# Patient Record
Sex: MALE | Race: WHITE | NOT HISPANIC OR LATINO | Employment: OTHER | URBAN - METROPOLITAN AREA
[De-identification: names, ages, dates, MRNs, and addresses within clinical notes are randomized per-mention and may not be internally consistent; named-entity substitution may affect disease eponyms.]

---

## 2017-01-03 ENCOUNTER — TELEPHONE (OUTPATIENT)
Dept: SURGERY | Facility: CLINIC | Age: 57
End: 2017-01-03

## 2017-01-03 NOTE — TELEPHONE ENCOUNTER
Pt having trouble with clotting & lovenox shots would like to speak to you sx 1/6    ADDENDUM:  I spoke with Mr. Gill who had bleeding from his AVF yesterday evening following dialysis. He just began taking therapeutic Lovenox in place of his usual Coumadin two days ago. The bleeding has slowed down and is only minimally oozing when he removes the dressing but he was curious what to do about the remaining Lovenox injections. He weighs 140kg and so the Lovenox was ordered 140mg SQ BID. I advised him to half the dose and take about 70mg SQ BID beginning tomorrow morning. He will have his PT, PTT, and INR checked when he comes in for PATs Thursday. He knows to call me back tomorrow if the fistula is still bleeding, as he may require a small prolene suture to be placed. I would be happy to do this in the office tomorrow if needed, although I suspect the skin will clot this evening as it has almost completely stopped bleeding now.

## 2017-01-05 ENCOUNTER — APPOINTMENT (OUTPATIENT)
Dept: PREADMISSION TESTING | Facility: HOSPITAL | Age: 57
End: 2017-01-05

## 2017-01-05 VITALS
TEMPERATURE: 98.6 F | HEART RATE: 85 BPM | RESPIRATION RATE: 18 BRPM | DIASTOLIC BLOOD PRESSURE: 71 MMHG | HEIGHT: 68 IN | SYSTOLIC BLOOD PRESSURE: 114 MMHG | WEIGHT: 312 LBS | BODY MASS INDEX: 47.29 KG/M2 | OXYGEN SATURATION: 93 %

## 2017-01-05 DIAGNOSIS — N25.81 SECONDARY HYPERPARATHYROIDISM OF RENAL ORIGIN (HCC): ICD-10-CM

## 2017-01-05 LAB
ABO GROUP BLD: NORMAL
BLD GP AB SCN SERPL QL: NEGATIVE
RH BLD: POSITIVE

## 2017-01-05 RX ORDER — MORPHINE SULFATE 100 MG/1
100 TABLET ORAL DAILY
Status: ON HOLD | COMMUNITY
End: 2018-07-06

## 2017-01-05 RX ORDER — ERGOCALCIFEROL 1.25 MG/1
50000 CAPSULE ORAL WEEKLY
COMMUNITY
End: 2017-05-09

## 2017-01-05 RX ORDER — FEBUXOSTAT 80 MG/1
40 TABLET, FILM COATED ORAL DAILY
Status: ON HOLD | COMMUNITY
End: 2017-01-06 | Stop reason: ALTCHOICE

## 2017-01-05 RX ORDER — PRAVASTATIN SODIUM 40 MG
40 TABLET ORAL DAILY
COMMUNITY

## 2017-01-05 RX ORDER — FERROUS SULFATE 325(65) MG
325 TABLET ORAL 2 TIMES DAILY
COMMUNITY
End: 2017-05-09

## 2017-01-05 NOTE — MR AVS SNAPSHOT
Armando Gill   1/5/2017 9:15 AM   Appointment    Provider:  STEPHON Reynaga   Department:  [facility] PREADMISSION T   Dept Phone:  [phone]                Your Full Care Plan              Your Updated Medication List          This list is accurate as of: 1/5/17  9:21 AM.  Always use your most recent med list.                aspirin 81 MG EC tablet       bumetanide 1 MG tablet   Commonly known as:  BUMEX       calcium acetate 667 MG capsule   Commonly known as:  PHOSLO       febuxostat 80 MG tablet tablet   Commonly known as:  ULORIC       ferrous sulfate 325 (65 FE) MG tablet       HUMALOG KWIKPEN 100 UNIT/ML solution pen-injector   Generic drug:  Insulin Lispro       hydrALAZINE 25 MG tablet   Commonly known as:  APRESOLINE       levETIRAcetam 1000 MG tablet   Commonly known as:  KEPPRA       LORazepam 0.5 MG tablet   Commonly known as:  ATIVAN       Morphine 100 MG 12 hr tablet   Commonly known as:  MS CONTIN       O2   Commonly known as:  OXYGEN       ondansetron 4 MG tablet   Commonly known as:  ZOFRAN       pravastatin 40 MG tablet   Commonly known as:  PRAVACHOL       PRODIGY NO CODING BLOOD GLUC test strip   Generic drug:  glucose blood       SYNTHROID 175 MCG tablet   Generic drug:  levothyroxine       temazepam 15 MG capsule   Commonly known as:  RESTORIL       TRINTELLIX 10 MG tablet   Generic drug:  Vortioxetine HBr       vitamin D 04470 UNITS capsule capsule   Commonly known as:  ERGOCALCIFEROL       warfarin 2.5 MG tablet   Commonly known as:  COUMADIN               [facility] Signup     Our records indicate that you have an active [facility] account.    You can view your After Visit Summary by going to [employer] and logging in with your [facility] username and password.  If you don't have a [facility] username and password but a parent or guardian has access to your record, the parent or guardian should login with their own [facility]  "username and password and access your record to view the After Visit Summary.    If you have questions, you can email [email or web address] or call [phone] to talk to our MyChart staff.  Remember, MyChart is NOT to be used for urgent needs.  For medical emergencies, dial 911.               Other Info from Your Visit           Allergies     Adhesive Tape Itching, Other (See Comments)    Pulls of the patient's skin on arms     Sulfa Antibiotics Other (See Comments)    Causes headaches      Vital Signs     Blood Pressure Pulse Temperature Respirations Height Weight    114/71 (BP Location: Right arm, Patient Position: Sitting) 85 98.6 °F (37 °C) (Oral) 18 68\" (172.7 cm) 312 lb (142 kg)    Oxygen Saturation Body Mass Index Smoking Status             93% 47.44 kg/m2 Never Smoker           Discharge Instructions       Take the following medications the morning of surgery with a small sip of water.    LEVETIRACETAM    ARRIVE  AM    General Instructions:  • Do not eat or drink after midnight: includes water, mints, or gum. You may brush your teeth.  • Do not smoke, chew tobacco, or drink alcohol.  • Bring medications in original bottles, any inhalers and if applicable your C-PAP/ BI-PAP machine.  • Bring any papers given to you in the doctor’s office.  • Wear clean comfortable clothes and socks.  • Do not wear contact lenses or make-up.  Bring a case for your glasses if applicable.   • Bring crutches or walker if applicable.  • Leave all other valuables and jewelry at home.    If you were given a blood bank ID arm band remember to bring it with you the day of surgery.    Preventing a Surgical Site Infection:  Shower on the morning of surgery using a fresh bar of anti-bacterial soap (such as Dial) and clean washcloth.  Dry with a clean towel and dress in clean clothing.  For 2 to 3 days before surgery, avoid shaving with a razor near where you will have surgery because the razor can irritate skin and make it " easier to develop an infection  Ask your surgeon if you will be receiving antibiotics prior to surgery  Make sure you, your family, and all healthcare providers clean their hands with soap and water or an alcohol based hand  before caring for you or your wound  If at all possible, quit smoking as many days before surgery as you can.    Day of surgery:  Upon arrival, a Pre-op nurse and Anesthesiologist will review your health history, obtain vital signs, and answer questions you may have.  The only belongings needed at this time will be your home medications and if applicable your C-PAP/BI-PAP machine.  If you are staying overnight your family can leave the rest of your belongings in the car and bring them to your room later.  A Pre-op nurse will start an IV and you may receive medication in preparation for surgery, including something to help you relax.  Your family will be able to see you in the Pre-op area.  While you are in surgery your family should notify the waiting room  if they leave the waiting room area and provide a contact phone number.    Please be aware that surgery does come with discomfort.  We want to make every effort to control your discomfort so please discuss any uncontrolled symptoms with your nurse.   Your doctor will most likely have prescribed pain medications.      If you are going home after surgery you will receive individualized written care instructions before being discharged.  A responsible adult must drive you to and from the hospital on the day of your surgery and stay with you for 24 hours.    If you are staying overnight following surgery, you will be transported to your hospital room following the recovery period.  [facility] has all private rooms.    If you have any questions please call Pre-Admission Testing at [phone].  Deductibles and co-payments are collected on the day of service. Please be prepared to pay the required co-pay, deductible  or deposit on the day of service as defined by your plan.       SYMPTOMS OF A STROKE    Call 911 or have someone take you to the Emergency Department if you have any of the following:    · Sudden numbness or weakness of your face, arm or leg especially on one side of the body  · Sudden confusion, diffiiculty speaking or trouble understanding   · Changes in your vision or loss of sight in one eye  · Sudden severe headache with no known cause  · sudden dizziness, trouble walking, loss of balance or coordination    It is important to seek emergency care right away if you have further stroke symptoms. If you get emergency help quickly, the powerful clot-dissolving medicines can reduce the disabilities caused by a stroke.     For more information:    American Stroke Association  1-942-9-STROKE  www.strokeassociation.org           IF YOU SMOKE OR USE TOBACCO PLEASE READ THE FOLLOWING:    Why is smoking bad for me?  Smoking increases the risk of heart disease, lung disease, vascular disease, stroke, and cancer.     If you smoke, STOP!    If you would like more information on quitting smoking, please visit the [employer] website: [email or web address]   This link will provide additional resources including the QUIT line and the Beat the Pack support groups.     For more information:    American Cancer Society  ([phone] 567-6781    American Heart Association  1-563.140.9939

## 2017-01-05 NOTE — DISCHARGE INSTRUCTIONS
Take the following medications the morning of surgery with a small sip of water.    LEVETIRACETAM    ARRIVE  AM    General Instructions:  • Do not eat or drink after midnight: includes water, mints, or gum. You may brush your teeth.  • Do not smoke, chew tobacco, or drink alcohol.  • Bring medications in original bottles, any inhalers and if applicable your C-PAP/ BI-PAP machine.  • Bring any papers given to you in the doctor’s office.  • Wear clean comfortable clothes and socks.  • Do not wear contact lenses or make-up.  Bring a case for your glasses if applicable.   • Bring crutches or walker if applicable.  • Leave all other valuables and jewelry at home.    If you were given a blood bank ID arm band remember to bring it with you the day of surgery.    Preventing a Surgical Site Infection:  Shower on the morning of surgery using a fresh bar of anti-bacterial soap (such as Dial) and clean washcloth.  Dry with a clean towel and dress in clean clothing.  For 2 to 3 days before surgery, avoid shaving with a razor near where you will have surgery because the razor can irritate skin and make it easier to develop an infection  Ask your surgeon if you will be receiving antibiotics prior to surgery  Make sure you, your family, and all healthcare providers clean their hands with soap and water or an alcohol based hand  before caring for you or your wound  If at all possible, quit smoking as many days before surgery as you can.    Day of surgery:  Upon arrival, a Pre-op nurse and Anesthesiologist will review your health history, obtain vital signs, and answer questions you may have.  The only belongings needed at this time will be your home medications and if applicable your C-PAP/BI-PAP machine.  If you are staying overnight your family can leave the rest of your belongings in the car and bring them to your room later.  A Pre-op nurse will start an IV and you may receive medication in preparation for  surgery, including something to help you relax.  Your family will be able to see you in the Pre-op area.  While you are in surgery your family should notify the waiting room  if they leave the waiting room area and provide a contact phone number.    Please be aware that surgery does come with discomfort.  We want to make every effort to control your discomfort so please discuss any uncontrolled symptoms with your nurse.   Your doctor will most likely have prescribed pain medications.      If you are going home after surgery you will receive individualized written care instructions before being discharged.  A responsible adult must drive you to and from the hospital on the day of your surgery and stay with you for 24 hours.    If you are staying overnight following surgery, you will be transported to your hospital room following the recovery period.  [facility] has all private rooms.    If you have any questions please call Pre-Admission Testing at [phone].  Deductibles and co-payments are collected on the day of service. Please be prepared to pay the required co-pay, deductible or deposit on the day of service as defined by your plan.

## 2017-01-06 ENCOUNTER — ANESTHESIA (OUTPATIENT)
Dept: PERIOP | Facility: HOSPITAL | Age: 57
End: 2017-01-06

## 2017-01-06 ENCOUNTER — ANESTHESIA EVENT (OUTPATIENT)
Dept: PERIOP | Facility: HOSPITAL | Age: 57
End: 2017-01-06

## 2017-01-06 PROBLEM — N25.81 SECONDARY HYPERPARATHYROIDISM OF RENAL ORIGIN (HCC): Status: ACTIVE | Noted: 2017-01-06

## 2017-01-06 PROCEDURE — 25010000003 CEFAZOLIN IN DEXTROSE 2-4 GM/100ML-% SOLUTION: Performed by: SURGERY

## 2017-01-06 PROCEDURE — 25010000002 SUCCINYLCHOLINE PER 20 MG: Performed by: NURSE ANESTHETIST, CERTIFIED REGISTERED

## 2017-01-06 PROCEDURE — 25010000002 ONDANSETRON PER 1 MG: Performed by: NURSE ANESTHETIST, CERTIFIED REGISTERED

## 2017-01-06 PROCEDURE — 25010000002 PHENYLEPHRINE PER 1 ML: Performed by: NURSE ANESTHETIST, CERTIFIED REGISTERED

## 2017-01-06 PROCEDURE — 25010000002 PROPOFOL 10 MG/ML EMULSION: Performed by: NURSE ANESTHETIST, CERTIFIED REGISTERED

## 2017-01-06 PROCEDURE — 25010000002 NEOSTIGMINE 10 MG/10ML SOLUTION: Performed by: NURSE ANESTHETIST, CERTIFIED REGISTERED

## 2017-01-06 PROCEDURE — 25010000002 FENTANYL CITRATE (PF) 100 MCG/2ML SOLUTION: Performed by: NURSE ANESTHETIST, CERTIFIED REGISTERED

## 2017-01-06 RX ORDER — GLYCOPYRROLATE 0.2 MG/ML
INJECTION INTRAMUSCULAR; INTRAVENOUS AS NEEDED
Status: DISCONTINUED | OUTPATIENT
Start: 2017-01-06 | End: 2017-01-06 | Stop reason: SURG

## 2017-01-06 RX ORDER — SUCCINYLCHOLINE CHLORIDE 20 MG/ML
INJECTION INTRAMUSCULAR; INTRAVENOUS AS NEEDED
Status: DISCONTINUED | OUTPATIENT
Start: 2017-01-06 | End: 2017-01-06 | Stop reason: SURG

## 2017-01-06 RX ORDER — ROCURONIUM BROMIDE 10 MG/ML
INJECTION, SOLUTION INTRAVENOUS AS NEEDED
Status: DISCONTINUED | OUTPATIENT
Start: 2017-01-06 | End: 2017-01-06 | Stop reason: SURG

## 2017-01-06 RX ORDER — LIDOCAINE HYDROCHLORIDE 20 MG/ML
INJECTION, SOLUTION INFILTRATION; PERINEURAL AS NEEDED
Status: DISCONTINUED | OUTPATIENT
Start: 2017-01-06 | End: 2017-01-06 | Stop reason: SURG

## 2017-01-06 RX ORDER — PROPOFOL 10 MG/ML
VIAL (ML) INTRAVENOUS AS NEEDED
Status: DISCONTINUED | OUTPATIENT
Start: 2017-01-06 | End: 2017-01-06 | Stop reason: SURG

## 2017-01-06 RX ORDER — ONDANSETRON 2 MG/ML
INJECTION INTRAMUSCULAR; INTRAVENOUS AS NEEDED
Status: DISCONTINUED | OUTPATIENT
Start: 2017-01-06 | End: 2017-01-06 | Stop reason: SURG

## 2017-01-06 RX ORDER — NEOSTIGMINE METHYLSULFATE 1 MG/ML
INJECTION, SOLUTION INTRAVENOUS AS NEEDED
Status: DISCONTINUED | OUTPATIENT
Start: 2017-01-06 | End: 2017-01-06 | Stop reason: SURG

## 2017-01-06 RX ORDER — FENTANYL CITRATE 50 UG/ML
INJECTION, SOLUTION INTRAMUSCULAR; INTRAVENOUS AS NEEDED
Status: DISCONTINUED | OUTPATIENT
Start: 2017-01-06 | End: 2017-01-06 | Stop reason: SURG

## 2017-01-06 RX ADMIN — PHENYLEPHRINE HYDROCHLORIDE 150 MCG: 10 INJECTION INTRAVENOUS at 09:58

## 2017-01-06 RX ADMIN — SUGAMMADEX 200 MG: 100 INJECTION, SOLUTION INTRAVENOUS at 11:14

## 2017-01-06 RX ADMIN — PHENYLEPHRINE HYDROCHLORIDE 100 MCG: 10 INJECTION INTRAVENOUS at 08:28

## 2017-01-06 RX ADMIN — ROCURONIUM BROMIDE 5 MG: 10 INJECTION INTRAVENOUS at 07:38

## 2017-01-06 RX ADMIN — PHENYLEPHRINE HYDROCHLORIDE 100 MCG: 10 INJECTION INTRAVENOUS at 09:43

## 2017-01-06 RX ADMIN — PHENYLEPHRINE HYDROCHLORIDE 150 MCG: 10 INJECTION INTRAVENOUS at 08:15

## 2017-01-06 RX ADMIN — PHENYLEPHRINE HYDROCHLORIDE 100 MCG: 10 INJECTION INTRAVENOUS at 09:38

## 2017-01-06 RX ADMIN — PROPOFOL 300 MG: 10 INJECTION, EMULSION INTRAVENOUS at 07:38

## 2017-01-06 RX ADMIN — CEFAZOLIN SODIUM 3 G: 2 INJECTION, SOLUTION INTRAVENOUS at 08:00

## 2017-01-06 RX ADMIN — GLYCOPYRROLATE 0.8 MG: 0.2 INJECTION INTRAMUSCULAR; INTRAVENOUS at 11:03

## 2017-01-06 RX ADMIN — PHENYLEPHRINE HYDROCHLORIDE 100 MCG: 10 INJECTION INTRAVENOUS at 07:54

## 2017-01-06 RX ADMIN — ROCURONIUM BROMIDE 45 MG: 10 INJECTION INTRAVENOUS at 07:44

## 2017-01-06 RX ADMIN — FENTANYL CITRATE 50 MCG: 50 INJECTION INTRAMUSCULAR; INTRAVENOUS at 07:37

## 2017-01-06 RX ADMIN — SUCCINYLCHOLINE CHLORIDE 140 MG: 20 INJECTION, SOLUTION INTRAMUSCULAR; INTRAVENOUS; PARENTERAL at 07:38

## 2017-01-06 RX ADMIN — ONDANSETRON 4 MG: 2 INJECTION INTRAMUSCULAR; INTRAVENOUS at 10:15

## 2017-01-06 RX ADMIN — PHENYLEPHRINE HYDROCHLORIDE 100 MCG: 10 INJECTION INTRAVENOUS at 07:57

## 2017-01-06 RX ADMIN — LIDOCAINE HYDROCHLORIDE 60 MG: 20 INJECTION, SOLUTION INFILTRATION; PERINEURAL at 11:03

## 2017-01-06 RX ADMIN — NEOSTIGMINE METHYLSULFATE 5 MG: 1 INJECTION INTRAVENOUS at 11:03

## 2017-01-06 RX ADMIN — LIDOCAINE HYDROCHLORIDE 60 MG: 20 INJECTION, SOLUTION INFILTRATION; PERINEURAL at 07:38

## 2017-01-06 NOTE — ANESTHESIA PROCEDURE NOTES
Airway  Urgency: elective    Date/Time: 1/6/2017 7:40 AM  End Time:1/6/2017 11:16 AM  Airway not difficult    General Information and Staff    Patient location during procedure: OR  Anesthesiologist: KAREY ZUNIGA  CRNA: SARAH ZUNIGA    Indications and Patient Condition  Indications for airway management: airway protection    Preoxygenated: yes  Mask difficulty assessment: 0 - not attempted    Final Airway Details  Final airway type: endotracheal airway      Successful airway: ETT and reinforced tube  Cuffed: yes   Successful intubation technique: video laryngoscopy  Facilitating devices/methods: intubating stylet  Endotracheal tube insertion site: oral  Blade: Caitlyn  Blade size: #3  ETT size: 8.0 mm  Cormack-Lehane Classification: grade I - full view of glottis  Placement verified by: chest auscultation and capnometry   Cuff volume (mL): 8  Measured from: lips  ETT to lips (cm): 22  Number of attempts at approach: 1    Additional Comments  SIVI.  EYES TAPED CLOSED PRIOR TO INTUBATION.  INTUBATION AS CHARTED ABOVE.  APPEARS ATRAUMATIC.  NO CHANGE TO DENTITION. +ETCO2. +BBS. +CR.

## 2017-01-06 NOTE — ANESTHESIA POSTPROCEDURE EVALUATION
Patient: Armando Gill    Procedure Summary     Date Anesthesia Start Anesthesia Stop Room / Location    01/06/17 0726 1116  [facility] / [facility]       Procedure Diagnosis Surgeon Provider    FOUR GLAND PARATHYROIDECTOMY WITH AUTOTRANSPLANT INTO RIGHT FOREARM, INTRA-OPERATIVE PTH MEASUREMENT, PARTIAL THYMECTOMY (Right Neck) Secondary hyperparathyroidism of renal origin  (Secondary hyperparathyroidism of renal origin [N25.81]) MD Deborah Blanca MD          Anesthesia Type: general  Last vitals  /72 (01/06/17 1245)    Temp      Pulse 79 (01/06/17 1245)   Resp 16 (01/06/17 1245)    SpO2 99 % (01/06/17 1245)      Post Anesthesia Care and Evaluation    Patient location during evaluation: PACU  Patient participation: complete - patient participated  Level of consciousness: awake  Pain management: adequate  Airway patency: patent  Cardiovascular status: acceptable  Respiratory status: acceptable  Hydration status: acceptable

## 2017-01-06 NOTE — ANESTHESIA PREPROCEDURE EVALUATION
Anesthesia Evaluation     Patient summary reviewed and Nursing notes reviewed    Airway   Mallampati: II  possible difficult intubation  Comment: Full neck  Dental          Pulmonary - normal exam   (+) asthma, sleep apnea (prev attpted trach for poonam.  not successful. doesn't use cpap,, but sleeps with head up),   Cardiovascular - normal exam  (+) hypertension, dysrhythmias,     Neuro/Psych  (+) psychiatric history Anxiety and Depression,    GI/Hepatic/Renal/Endo    (+)  chronic renal disease (last dialysis 1-4), diabetes mellitus, hypothyroidism,     ROS Comment: Ca ++ 9.5    Musculoskeletal     Abdominal    Substance History      OB/GYN          Other   (+) arthritis                          Anesthesia Plan    ASA 3     general

## 2017-01-10 ENCOUNTER — TELEPHONE (OUTPATIENT)
Dept: SURGERY | Facility: CLINIC | Age: 57
End: 2017-01-10

## 2017-01-10 NOTE — TELEPHONE ENCOUNTER
Could you please contact Dr. Ash Boone's office and ask him to speak with the [facility] that Mr. Gill uses to let the dialysis staff know what Calcitriol conversion he would like to now administer as an additional 0.5mg BID equivalent (that was what Mr. Gill was discharged with Monday) during his three times weekly dialysis sessions? I will be tied up in the operating room the rest of this afternoon, but would be happy to speak with him afterwards if he needs to contact me by my cell phone.

## 2017-01-10 NOTE — TELEPHONE ENCOUNTER
Spoke with Mariposa Cross NP @ Dr Boone office she will take care of the Calcitriol order at [facility] /

## 2017-01-13 DIAGNOSIS — N25.81 SECONDARY HYPERPARATHYROIDISM OF RENAL ORIGIN (HCC): Primary | ICD-10-CM

## 2017-01-16 ENCOUNTER — LAB (OUTPATIENT)
Dept: LAB | Facility: HOSPITAL | Age: 57
End: 2017-01-16

## 2017-01-16 DIAGNOSIS — N25.81 SECONDARY HYPERPARATHYROIDISM OF RENAL ORIGIN (HCC): ICD-10-CM

## 2017-01-16 LAB
ANION GAP SERPL CALCULATED.3IONS-SCNC: 15.5 MMOL/L
BUN BLD-MCNC: 52 MG/DL (ref 6–20)
BUN/CREAT SERPL: 9.4 (ref 7–25)
CA-I BLD-MCNC: 4.8 MG/DL (ref 4.6–5.4)
CA-I SERPL ISE-MCNC: 1.19 MMOL/L (ref 1.1–1.35)
CALCIUM SPEC-SCNC: 9.2 MG/DL (ref 8.6–10.5)
CHLORIDE SERPL-SCNC: 94 MMOL/L (ref 98–107)
CO2 SERPL-SCNC: 29.5 MMOL/L (ref 22–29)
CREAT BLD-MCNC: 5.56 MG/DL (ref 0.76–1.27)
GFR SERPL CREATININE-BSD FRML MDRD: 11 ML/MIN/1.73
GLUCOSE BLD-MCNC: 124 MG/DL (ref 65–99)
PHOSPHATE SERPL-MCNC: 4.1 MG/DL (ref 2.5–4.5)
POTASSIUM BLD-SCNC: 4.5 MMOL/L (ref 3.5–5.2)
PTH-INTACT SERPL-MCNC: 577.5 PG/ML (ref 15–65)
SODIUM BLD-SCNC: 139 MMOL/L (ref 136–145)

## 2017-01-16 PROCEDURE — 36415 COLL VENOUS BLD VENIPUNCTURE: CPT

## 2017-01-16 PROCEDURE — 83970 ASSAY OF PARATHORMONE: CPT

## 2017-01-16 PROCEDURE — 80048 BASIC METABOLIC PNL TOTAL CA: CPT

## 2017-01-16 PROCEDURE — 82330 ASSAY OF CALCIUM: CPT

## 2017-01-16 PROCEDURE — 84100 ASSAY OF PHOSPHORUS: CPT

## 2017-05-09 ENCOUNTER — OFFICE VISIT (OUTPATIENT)
Dept: SURGERY | Facility: CLINIC | Age: 57
End: 2017-05-09

## 2017-05-09 VITALS — WEIGHT: 315 LBS | OXYGEN SATURATION: 96 % | HEART RATE: 90 BPM | BODY MASS INDEX: 47.74 KG/M2 | HEIGHT: 68 IN

## 2017-05-09 DIAGNOSIS — N25.81 SECONDARY HYPERPARATHYROIDISM OF RENAL ORIGIN (HCC): Primary | ICD-10-CM

## 2017-05-09 PROCEDURE — 99213 OFFICE O/P EST LOW 20 MIN: CPT | Performed by: SURGERY

## 2017-05-26 ENCOUNTER — OFFICE VISIT (OUTPATIENT)
Dept: SURGERY | Facility: CLINIC | Age: 57
End: 2017-05-26

## 2017-05-26 DIAGNOSIS — N25.81 SECONDARY HYPERPARATHYROIDISM OF RENAL ORIGIN (HCC): Primary | ICD-10-CM

## 2017-05-26 PROCEDURE — 99213 OFFICE O/P EST LOW 20 MIN: CPT | Performed by: SURGERY

## 2017-05-26 RX ORDER — CEFAZOLIN SODIUM IN 0.9 % NACL 3 G/100 ML
3 INTRAVENOUS SOLUTION, PIGGYBACK (ML) INTRAVENOUS ONCE
Status: CANCELLED | OUTPATIENT
Start: 2017-08-02 | End: 2017-05-26

## 2017-06-01 ENCOUNTER — APPOINTMENT (OUTPATIENT)
Dept: WOUND CARE | Facility: HOSPITAL | Age: 57
End: 2017-06-01
Attending: SURGERY

## 2017-06-08 ENCOUNTER — APPOINTMENT (OUTPATIENT)
Dept: WOUND CARE | Facility: HOSPITAL | Age: 57
End: 2017-06-08
Attending: SURGERY

## 2017-06-13 ENCOUNTER — TRANSCRIBE ORDERS (OUTPATIENT)
Dept: WOUND CARE | Facility: HOSPITAL | Age: 57
End: 2017-06-13

## 2017-06-13 ENCOUNTER — HOSPITAL ENCOUNTER (OUTPATIENT)
Dept: GENERAL RADIOLOGY | Facility: HOSPITAL | Age: 57
Discharge: HOME | End: 2017-06-13
Attending: SURGERY | Admitting: SURGERY

## 2017-06-13 ENCOUNTER — LAB (OUTPATIENT)
Dept: LAB | Facility: HOSPITAL | Age: 57
End: 2017-06-13
Attending: SURGERY

## 2017-06-13 DIAGNOSIS — L97.522 NON-PRESSURE CHRONIC ULCER OF OTHER PART OF LEFT FOOT WITH FAT LAYER EXPOSED (HCC): ICD-10-CM

## 2017-06-13 DIAGNOSIS — L97.522 ULCER OF LEFT FOOT, WITH FAT LAYER EXPOSED (HCC): Primary | ICD-10-CM

## 2017-06-13 DIAGNOSIS — L97.522 ULCER OF LEFT FOOT, WITH FAT LAYER EXPOSED (HCC): ICD-10-CM

## 2017-06-13 LAB
ALBUMIN SERPL-MCNC: 4.2 G/DL (ref 3.5–5.2)
ALBUMIN/GLOB SERPL: 1.3 G/DL
ALP SERPL-CCNC: 101 U/L (ref 39–117)
ALT SERPL W P-5'-P-CCNC: 12 U/L (ref 1–41)
ANION GAP SERPL CALCULATED.3IONS-SCNC: 16.6 MMOL/L
AST SERPL-CCNC: 11 U/L (ref 1–40)
BASOPHILS # BLD AUTO: 0.04 10*3/MM3 (ref 0–0.2)
BASOPHILS NFR BLD AUTO: 0.4 % (ref 0–1.5)
BILIRUB SERPL-MCNC: 0.4 MG/DL (ref 0.1–1.2)
BUN BLD-MCNC: 56 MG/DL (ref 6–20)
BUN/CREAT SERPL: 11.5 (ref 7–25)
CALCIUM SPEC-SCNC: 9.5 MG/DL (ref 8.6–10.5)
CHLORIDE SERPL-SCNC: 94 MMOL/L (ref 98–107)
CO2 SERPL-SCNC: 30.4 MMOL/L (ref 22–29)
CREAT BLD-MCNC: 4.88 MG/DL (ref 0.76–1.27)
CRP SERPL-MCNC: 1.05 MG/DL (ref 0–0.5)
DEPRECATED RDW RBC AUTO: 46.8 FL (ref 37–54)
EOSINOPHIL # BLD AUTO: 0.28 10*3/MM3 (ref 0–0.7)
EOSINOPHIL NFR BLD AUTO: 2.7 % (ref 0.3–6.2)
ERYTHROCYTE [DISTWIDTH] IN BLOOD BY AUTOMATED COUNT: 13.1 % (ref 11.5–14.5)
ERYTHROCYTE [SEDIMENTATION RATE] IN BLOOD: 25 MM/HR (ref 0–20)
GFR SERPL CREATININE-BSD FRML MDRD: 12 ML/MIN/1.73
GLOBULIN UR ELPH-MCNC: 3.2 GM/DL
GLUCOSE BLD-MCNC: 155 MG/DL (ref 65–99)
HCT VFR BLD AUTO: 38.8 % (ref 40.4–52.2)
HGB BLD-MCNC: 12.3 G/DL (ref 13.7–17.6)
IMM GRANULOCYTES # BLD: 0.06 10*3/MM3 (ref 0–0.03)
IMM GRANULOCYTES NFR BLD: 0.6 % (ref 0–0.5)
LYMPHOCYTES # BLD AUTO: 1.24 10*3/MM3 (ref 0.9–4.8)
LYMPHOCYTES NFR BLD AUTO: 12 % (ref 19.6–45.3)
MCH RBC QN AUTO: 31 PG (ref 27–32.7)
MCHC RBC AUTO-ENTMCNC: 31.7 G/DL (ref 32.6–36.4)
MCV RBC AUTO: 97.7 FL (ref 79.8–96.2)
MONOCYTES # BLD AUTO: 1.01 10*3/MM3 (ref 0.2–1.2)
MONOCYTES NFR BLD AUTO: 9.8 % (ref 5–12)
NEUTROPHILS # BLD AUTO: 7.68 10*3/MM3 (ref 1.9–8.1)
NEUTROPHILS NFR BLD AUTO: 74.5 % (ref 42.7–76)
PLATELET # BLD AUTO: 156 10*3/MM3 (ref 140–500)
PMV BLD AUTO: 10.1 FL (ref 6–12)
POTASSIUM BLD-SCNC: 4 MMOL/L (ref 3.5–5.2)
PROT SERPL-MCNC: 7.4 G/DL (ref 6–8.5)
RBC # BLD AUTO: 3.97 10*6/MM3 (ref 4.6–6)
SODIUM BLD-SCNC: 141 MMOL/L (ref 136–145)
WBC NRBC COR # BLD: 10.31 10*3/MM3 (ref 4.5–10.7)

## 2017-06-13 PROCEDURE — 86140 C-REACTIVE PROTEIN: CPT

## 2017-06-13 PROCEDURE — 85652 RBC SED RATE AUTOMATED: CPT

## 2017-06-13 PROCEDURE — 85025 COMPLETE CBC W/AUTO DIFF WBC: CPT

## 2017-06-13 PROCEDURE — 80053 COMPREHEN METABOLIC PANEL: CPT

## 2017-06-13 PROCEDURE — 36415 COLL VENOUS BLD VENIPUNCTURE: CPT

## 2017-06-13 PROCEDURE — 73630 X-RAY EXAM OF FOOT: CPT

## 2017-06-15 ENCOUNTER — OFFICE VISIT (OUTPATIENT)
Dept: WOUND CARE | Facility: HOSPITAL | Age: 57
End: 2017-06-15
Attending: SURGERY

## 2017-06-15 DIAGNOSIS — L97.509 DIABETIC FOOT ULCER WITH OSTEOMYELITIS (HCC): Primary | ICD-10-CM

## 2017-06-15 DIAGNOSIS — E11.621 DIABETIC FOOT ULCER WITH OSTEOMYELITIS (HCC): Primary | ICD-10-CM

## 2017-06-15 DIAGNOSIS — E11.69 DIABETIC FOOT ULCER WITH OSTEOMYELITIS (HCC): Primary | ICD-10-CM

## 2017-06-15 DIAGNOSIS — M86.9 DIABETIC FOOT ULCER WITH OSTEOMYELITIS (HCC): Primary | ICD-10-CM

## 2017-06-15 PROCEDURE — 87147 CULTURE TYPE IMMUNOLOGIC: CPT | Performed by: SURGERY

## 2017-06-15 PROCEDURE — 87205 SMEAR GRAM STAIN: CPT | Performed by: SURGERY

## 2017-06-15 PROCEDURE — 87070 CULTURE OTHR SPECIMN AEROBIC: CPT | Performed by: SURGERY

## 2017-06-15 PROCEDURE — 87075 CULTR BACTERIA EXCEPT BLOOD: CPT | Performed by: SURGERY

## 2017-06-18 LAB
BACTERIA SPEC AEROBE CULT: ABNORMAL
BACTERIA SPEC AEROBE CULT: ABNORMAL
GRAM STN SPEC: ABNORMAL
GRAM STN SPEC: ABNORMAL

## 2017-06-20 LAB — BACTERIA SPEC ANAEROBE CULT: NORMAL

## 2017-06-29 ENCOUNTER — APPOINTMENT (OUTPATIENT)
Dept: WOUND CARE | Facility: HOSPITAL | Age: 57
End: 2017-06-29
Attending: SURGERY

## 2017-07-13 ENCOUNTER — APPOINTMENT (OUTPATIENT)
Dept: WOUND CARE | Facility: HOSPITAL | Age: 57
End: 2017-07-13
Attending: SURGERY

## 2017-07-27 ENCOUNTER — APPOINTMENT (OUTPATIENT)
Dept: WOUND CARE | Facility: HOSPITAL | Age: 57
End: 2017-07-27
Attending: SURGERY

## 2017-07-27 ENCOUNTER — APPOINTMENT (OUTPATIENT)
Dept: PREADMISSION TESTING | Facility: HOSPITAL | Age: 57
End: 2017-07-27

## 2017-07-27 VITALS
HEIGHT: 68 IN | RESPIRATION RATE: 16 BRPM | DIASTOLIC BLOOD PRESSURE: 77 MMHG | WEIGHT: 315 LBS | SYSTOLIC BLOOD PRESSURE: 135 MMHG | HEART RATE: 100 BPM | TEMPERATURE: 98.1 F | OXYGEN SATURATION: 96 % | BODY MASS INDEX: 47.74 KG/M2

## 2017-07-27 DIAGNOSIS — N25.81 SECONDARY HYPERPARATHYROIDISM OF RENAL ORIGIN (HCC): ICD-10-CM

## 2017-07-27 LAB
ANION GAP SERPL CALCULATED.3IONS-SCNC: 17.7 MMOL/L
BUN BLD-MCNC: 46 MG/DL (ref 6–20)
BUN/CREAT SERPL: 8.4 (ref 7–25)
CALCIUM SPEC-SCNC: 10.3 MG/DL (ref 8.6–10.5)
CALCIUM SPEC-SCNC: 10.3 MG/DL (ref 8.6–10.5)
CHLORIDE SERPL-SCNC: 91 MMOL/L (ref 98–107)
CO2 SERPL-SCNC: 30.3 MMOL/L (ref 22–29)
CREAT BLD-MCNC: 5.45 MG/DL (ref 0.76–1.27)
DEPRECATED RDW RBC AUTO: 44 FL (ref 37–54)
ERYTHROCYTE [DISTWIDTH] IN BLOOD BY AUTOMATED COUNT: 12.7 % (ref 11.5–14.5)
GFR SERPL CREATININE-BSD FRML MDRD: 11 ML/MIN/1.73
GLUCOSE BLD-MCNC: 108 MG/DL (ref 65–99)
HCT VFR BLD AUTO: 42.5 % (ref 40.4–52.2)
HGB BLD-MCNC: 13.8 G/DL (ref 13.7–17.6)
MCH RBC QN AUTO: 30.9 PG (ref 27–32.7)
MCHC RBC AUTO-ENTMCNC: 32.5 G/DL (ref 32.6–36.4)
MCV RBC AUTO: 95.3 FL (ref 79.8–96.2)
PLATELET # BLD AUTO: 165 10*3/MM3 (ref 140–500)
PMV BLD AUTO: 10.1 FL (ref 6–12)
POTASSIUM BLD-SCNC: 5 MMOL/L (ref 3.5–5.2)
PTH-INTACT SERPL-MCNC: 2658 PG/ML (ref 15–65)
RBC # BLD AUTO: 4.46 10*6/MM3 (ref 4.6–6)
SODIUM BLD-SCNC: 139 MMOL/L (ref 136–145)
WBC NRBC COR # BLD: 8.21 10*3/MM3 (ref 4.5–10.7)

## 2017-07-27 PROCEDURE — 93010 ELECTROCARDIOGRAM REPORT: CPT | Performed by: INTERNAL MEDICINE

## 2017-07-27 PROCEDURE — 80048 BASIC METABOLIC PNL TOTAL CA: CPT | Performed by: SURGERY

## 2017-07-27 PROCEDURE — 36415 COLL VENOUS BLD VENIPUNCTURE: CPT

## 2017-07-27 PROCEDURE — 83970 ASSAY OF PARATHORMONE: CPT | Performed by: SURGERY

## 2017-07-27 PROCEDURE — 93005 ELECTROCARDIOGRAM TRACING: CPT

## 2017-07-27 PROCEDURE — 85027 COMPLETE CBC AUTOMATED: CPT | Performed by: SURGERY

## 2017-07-27 RX ORDER — LORAZEPAM 0.5 MG/1
0.5 TABLET ORAL EVERY 8 HOURS PRN
Status: ON HOLD | COMMUNITY
End: 2018-07-06

## 2017-07-27 RX ORDER — WARFARIN SODIUM 2.5 MG/1
2.5 TABLET ORAL
Status: ON HOLD | COMMUNITY
End: 2017-08-02

## 2017-08-02 ENCOUNTER — ANESTHESIA (OUTPATIENT)
Dept: PERIOP | Facility: HOSPITAL | Age: 57
End: 2017-08-02

## 2017-08-02 ENCOUNTER — ANESTHESIA EVENT (OUTPATIENT)
Dept: PERIOP | Facility: HOSPITAL | Age: 57
End: 2017-08-02

## 2017-08-02 ENCOUNTER — HOSPITAL ENCOUNTER (OUTPATIENT)
Facility: HOSPITAL | Age: 57
Discharge: HOME | End: 2017-08-04
Attending: SURGERY | Admitting: SURGERY

## 2017-08-02 DIAGNOSIS — N18.6 END STAGE RENAL DISEASE (HCC): ICD-10-CM

## 2017-08-02 DIAGNOSIS — N25.81 SECONDARY HYPERPARATHYROIDISM OF RENAL ORIGIN (HCC): Primary | ICD-10-CM

## 2017-08-02 LAB
ANION GAP SERPL CALCULATED.3IONS-SCNC: 16.5 MMOL/L
ANION GAP SERPL CALCULATED.3IONS-SCNC: 18.4 MMOL/L
BUN BLD-MCNC: 47 MG/DL (ref 6–20)
BUN BLD-MCNC: 53 MG/DL (ref 6–20)
BUN/CREAT SERPL: 10.3 (ref 7–25)
BUN/CREAT SERPL: 10.6 (ref 7–25)
CALCIUM SPEC-SCNC: 9.1 MG/DL (ref 8.6–10.5)
CALCIUM SPEC-SCNC: 9.5 MG/DL (ref 8.6–10.5)
CALCIUM SPEC-SCNC: 9.8 MG/DL (ref 8.6–10.5)
CHLORIDE SERPL-SCNC: 92 MMOL/L (ref 98–107)
CHLORIDE SERPL-SCNC: 93 MMOL/L (ref 98–107)
CO2 SERPL-SCNC: 24.6 MMOL/L (ref 22–29)
CO2 SERPL-SCNC: 27.5 MMOL/L (ref 22–29)
CREAT BLD-MCNC: 4.43 MG/DL (ref 0.76–1.27)
CREAT BLD-MCNC: 5.15 MG/DL (ref 0.76–1.27)
GFR SERPL CREATININE-BSD FRML MDRD: 12 ML/MIN/1.73
GFR SERPL CREATININE-BSD FRML MDRD: 14 ML/MIN/1.73
GLUCOSE BLD-MCNC: 100 MG/DL (ref 65–99)
GLUCOSE BLD-MCNC: 144 MG/DL (ref 65–99)
GLUCOSE BLDC GLUCOMTR-MCNC: 126 MG/DL (ref 70–130)
GLUCOSE BLDC GLUCOMTR-MCNC: 134 MG/DL (ref 70–130)
GLUCOSE BLDC GLUCOMTR-MCNC: 91 MG/DL (ref 70–130)
INR PPP: 1.02 (ref 0.9–1.1)
INR PPP: 1.04 (ref 0.9–1.1)
MAGNESIUM SERPL-MCNC: 1.7 MG/DL (ref 1.6–2.6)
PHOSPHATE SERPL-MCNC: 3.8 MG/DL (ref 2.5–4.5)
POTASSIUM BLD-SCNC: 4.4 MMOL/L (ref 3.5–5.2)
POTASSIUM BLD-SCNC: 4.5 MMOL/L (ref 3.5–5.2)
PROTHROMBIN TIME: 13 SECONDS (ref 11.7–14.2)
PROTHROMBIN TIME: 13.2 SECONDS (ref 11.7–14.2)
PTH-INTACT SERPL-SCNC: 194.2 PG/ML (ref 15–65)
PTH-INTACT SERPL-SCNC: 209.8 PG/ML (ref 15–65)
PTH-INTACT SERPL-SCNC: 69 PG/ML (ref 15–65)
PTH-INTACT SERPL-SCNC: 71.4 PG/ML (ref 15–65)
SODIUM BLD-SCNC: 136 MMOL/L (ref 136–145)
SODIUM BLD-SCNC: 136 MMOL/L (ref 136–145)

## 2017-08-02 PROCEDURE — 60512 AUTOTRANSPLANT PARATHYROID: CPT | Performed by: SURGERY

## 2017-08-02 PROCEDURE — 88305 TISSUE EXAM BY PATHOLOGIST: CPT | Performed by: SURGERY

## 2017-08-02 PROCEDURE — 25010000002 DEXAMETHASONE PER 1 MG: Performed by: NURSE ANESTHETIST, CERTIFIED REGISTERED

## 2017-08-02 PROCEDURE — 82310 ASSAY OF CALCIUM: CPT | Performed by: SURGERY

## 2017-08-02 PROCEDURE — 82962 GLUCOSE BLOOD TEST: CPT

## 2017-08-02 PROCEDURE — 99222 1ST HOSP IP/OBS MODERATE 55: CPT | Performed by: SURGERY

## 2017-08-02 PROCEDURE — 60502 RE-EXPLORE PARATHYROIDS: CPT | Performed by: SURGERY

## 2017-08-02 PROCEDURE — 25010000002 SUCCINYLCHOLINE PER 20 MG: Performed by: NURSE ANESTHETIST, CERTIFIED REGISTERED

## 2017-08-02 PROCEDURE — 25010000002 NEOSTIGMINE PER 0.5 MG: Performed by: NURSE ANESTHETIST, CERTIFIED REGISTERED

## 2017-08-02 PROCEDURE — 88307 TISSUE EXAM BY PATHOLOGIST: CPT | Performed by: SURGERY

## 2017-08-02 PROCEDURE — 83735 ASSAY OF MAGNESIUM: CPT | Performed by: SURGERY

## 2017-08-02 PROCEDURE — 84100 ASSAY OF PHOSPHORUS: CPT | Performed by: SURGERY

## 2017-08-02 PROCEDURE — 94799 UNLISTED PULMONARY SVC/PX: CPT

## 2017-08-02 PROCEDURE — 25010000002 ONDANSETRON PER 1 MG: Performed by: NURSE ANESTHETIST, CERTIFIED REGISTERED

## 2017-08-02 PROCEDURE — 80048 BASIC METABOLIC PNL TOTAL CA: CPT | Performed by: SURGERY

## 2017-08-02 PROCEDURE — 88331 PATH CONSLTJ SURG 1 BLK 1SPC: CPT | Performed by: SURGERY

## 2017-08-02 PROCEDURE — 25010000002 FENTANYL CITRATE (PF) 100 MCG/2ML SOLUTION: Performed by: NURSE ANESTHETIST, CERTIFIED REGISTERED

## 2017-08-02 PROCEDURE — 85610 PROTHROMBIN TIME: CPT | Performed by: SURGERY

## 2017-08-02 PROCEDURE — 60220 PARTIAL REMOVAL OF THYROID: CPT | Performed by: SURGERY

## 2017-08-02 PROCEDURE — 25010000002 HYDROMORPHONE PER 4 MG: Performed by: NURSE ANESTHETIST, CERTIFIED REGISTERED

## 2017-08-02 PROCEDURE — 25010000002 MIDAZOLAM PER 1 MG: Performed by: ANESTHESIOLOGY

## 2017-08-02 PROCEDURE — 25010000002 HYDROMORPHONE PER 4 MG: Performed by: ANESTHESIOLOGY

## 2017-08-02 PROCEDURE — 25010000002 HYDRALAZINE PER 20 MG: Performed by: NURSE ANESTHETIST, CERTIFIED REGISTERED

## 2017-08-02 PROCEDURE — 25010000002 HEPARIN (PORCINE) PER 1000 UNITS: Performed by: SURGERY

## 2017-08-02 PROCEDURE — 83970 ASSAY OF PARATHORMONE: CPT | Performed by: SURGERY

## 2017-08-02 PROCEDURE — 25010000002 HYDROMORPHONE PER 4 MG: Performed by: SURGERY

## 2017-08-02 PROCEDURE — 25010000002 MIDAZOLAM PER 1 MG: Performed by: NURSE ANESTHETIST, CERTIFIED REGISTERED

## 2017-08-02 PROCEDURE — 25010000002 PROPOFOL 10 MG/ML EMULSION: Performed by: NURSE ANESTHETIST, CERTIFIED REGISTERED

## 2017-08-02 PROCEDURE — 85610 PROTHROMBIN TIME: CPT | Performed by: PHYSICIAN ASSISTANT

## 2017-08-02 RX ORDER — PROMETHAZINE HYDROCHLORIDE 25 MG/1
25 SUPPOSITORY RECTAL ONCE AS NEEDED
Status: DISCONTINUED | OUTPATIENT
Start: 2017-08-02 | End: 2017-08-02 | Stop reason: HOSPADM

## 2017-08-02 RX ORDER — PROPOFOL 10 MG/ML
VIAL (ML) INTRAVENOUS AS NEEDED
Status: DISCONTINUED | OUTPATIENT
Start: 2017-08-02 | End: 2017-08-02 | Stop reason: SURG

## 2017-08-02 RX ORDER — LEVOTHYROXINE SODIUM 175 UG/1
175 TABLET ORAL
Status: DISCONTINUED | OUTPATIENT
Start: 2017-08-03 | End: 2017-08-04 | Stop reason: HOSPADM

## 2017-08-02 RX ORDER — PROMETHAZINE HYDROCHLORIDE 25 MG/ML
6.25 INJECTION, SOLUTION INTRAMUSCULAR; INTRAVENOUS ONCE AS NEEDED
Status: DISCONTINUED | OUTPATIENT
Start: 2017-08-02 | End: 2017-08-02 | Stop reason: HOSPADM

## 2017-08-02 RX ORDER — OXYCODONE HYDROCHLORIDE AND ACETAMINOPHEN 5; 325 MG/1; MG/1
1 TABLET ORAL EVERY 4 HOURS PRN
Status: DISCONTINUED | OUTPATIENT
Start: 2017-08-02 | End: 2017-08-03

## 2017-08-02 RX ORDER — MORPHINE SULFATE 100 MG/1
100 TABLET ORAL EVERY 12 HOURS SCHEDULED
Status: DISCONTINUED | OUTPATIENT
Start: 2017-08-02 | End: 2017-08-02 | Stop reason: HOSPADM

## 2017-08-02 RX ORDER — ASPIRIN 81 MG/1
81 TABLET ORAL DAILY
Status: DISCONTINUED | OUTPATIENT
Start: 2017-08-03 | End: 2017-08-04 | Stop reason: HOSPADM

## 2017-08-02 RX ORDER — LORAZEPAM 0.5 MG/1
0.5 TABLET ORAL EVERY 8 HOURS PRN
Status: DISCONTINUED | OUTPATIENT
Start: 2017-08-02 | End: 2017-08-04 | Stop reason: HOSPADM

## 2017-08-02 RX ORDER — LIDOCAINE HYDROCHLORIDE 20 MG/ML
INJECTION, SOLUTION INFILTRATION; PERINEURAL AS NEEDED
Status: DISCONTINUED | OUTPATIENT
Start: 2017-08-02 | End: 2017-08-02 | Stop reason: SURG

## 2017-08-02 RX ORDER — CEFAZOLIN SODIUM IN 0.9 % NACL 3 G/100 ML
3 INTRAVENOUS SOLUTION, PIGGYBACK (ML) INTRAVENOUS ONCE
Status: COMPLETED | OUTPATIENT
Start: 2017-08-02 | End: 2017-08-02

## 2017-08-02 RX ORDER — MORPHINE SULFATE 100 MG/1
100 TABLET ORAL DAILY
Status: DISCONTINUED | OUTPATIENT
Start: 2017-08-02 | End: 2017-08-04 | Stop reason: HOSPADM

## 2017-08-02 RX ORDER — MIDAZOLAM HYDROCHLORIDE 1 MG/ML
1 INJECTION INTRAMUSCULAR; INTRAVENOUS
Status: DISCONTINUED | OUTPATIENT
Start: 2017-08-02 | End: 2017-08-02 | Stop reason: HOSPADM

## 2017-08-02 RX ORDER — ROCURONIUM BROMIDE 10 MG/ML
INJECTION, SOLUTION INTRAVENOUS AS NEEDED
Status: DISCONTINUED | OUTPATIENT
Start: 2017-08-02 | End: 2017-08-02 | Stop reason: SURG

## 2017-08-02 RX ORDER — BUMETANIDE 1 MG/1
1 TABLET ORAL
Status: DISCONTINUED | OUTPATIENT
Start: 2017-08-03 | End: 2017-08-04 | Stop reason: HOSPADM

## 2017-08-02 RX ORDER — BUPIVACAINE HYDROCHLORIDE AND EPINEPHRINE 5; 5 MG/ML; UG/ML
INJECTION, SOLUTION PERINEURAL AS NEEDED
Status: DISCONTINUED | OUTPATIENT
Start: 2017-08-02 | End: 2017-08-02 | Stop reason: HOSPADM

## 2017-08-02 RX ORDER — SODIUM CHLORIDE 9 MG/ML
9 INJECTION, SOLUTION INTRAVENOUS CONTINUOUS
Status: DISCONTINUED | OUTPATIENT
Start: 2017-08-02 | End: 2017-08-02

## 2017-08-02 RX ORDER — LEVETIRACETAM 500 MG/1
1000 TABLET ORAL EVERY 12 HOURS SCHEDULED
Status: DISCONTINUED | OUTPATIENT
Start: 2017-08-02 | End: 2017-08-04 | Stop reason: HOSPADM

## 2017-08-02 RX ORDER — FENTANYL CITRATE 50 UG/ML
INJECTION, SOLUTION INTRAMUSCULAR; INTRAVENOUS AS NEEDED
Status: DISCONTINUED | OUTPATIENT
Start: 2017-08-02 | End: 2017-08-02 | Stop reason: SURG

## 2017-08-02 RX ORDER — HYDROMORPHONE HYDROCHLORIDE 1 MG/ML
0.5 INJECTION, SOLUTION INTRAMUSCULAR; INTRAVENOUS; SUBCUTANEOUS
Status: CANCELLED | OUTPATIENT
Start: 2017-08-02 | End: 2017-08-12

## 2017-08-02 RX ORDER — CALCITRIOL 0.5 UG/1
0.5 CAPSULE, LIQUID FILLED ORAL EVERY 12 HOURS SCHEDULED
Status: DISCONTINUED | OUTPATIENT
Start: 2017-08-02 | End: 2017-08-04 | Stop reason: HOSPADM

## 2017-08-02 RX ORDER — CALCIUM CARBONATE 500(1250)
500 TABLET ORAL 3 TIMES DAILY
Status: DISCONTINUED | OUTPATIENT
Start: 2017-08-02 | End: 2017-08-03

## 2017-08-02 RX ORDER — SUCCINYLCHOLINE CHLORIDE 20 MG/ML
INJECTION INTRAMUSCULAR; INTRAVENOUS AS NEEDED
Status: DISCONTINUED | OUTPATIENT
Start: 2017-08-02 | End: 2017-08-02 | Stop reason: SURG

## 2017-08-02 RX ORDER — HYDRALAZINE HYDROCHLORIDE 25 MG/1
25 TABLET, FILM COATED ORAL NIGHTLY
Status: DISCONTINUED | OUTPATIENT
Start: 2017-08-02 | End: 2017-08-04 | Stop reason: HOSPADM

## 2017-08-02 RX ORDER — ATORVASTATIN CALCIUM 10 MG/1
10 TABLET, FILM COATED ORAL DAILY
Status: DISCONTINUED | OUTPATIENT
Start: 2017-08-02 | End: 2017-08-04 | Stop reason: HOSPADM

## 2017-08-02 RX ORDER — HYDROMORPHONE HYDROCHLORIDE 1 MG/ML
0.5 INJECTION, SOLUTION INTRAMUSCULAR; INTRAVENOUS; SUBCUTANEOUS
Status: DISCONTINUED | OUTPATIENT
Start: 2017-08-02 | End: 2017-08-02 | Stop reason: HOSPADM

## 2017-08-02 RX ORDER — FAMOTIDINE 10 MG/ML
20 INJECTION, SOLUTION INTRAVENOUS ONCE
Status: COMPLETED | OUTPATIENT
Start: 2017-08-02 | End: 2017-08-02

## 2017-08-02 RX ORDER — LABETALOL HYDROCHLORIDE 5 MG/ML
5 INJECTION, SOLUTION INTRAVENOUS
Status: DISCONTINUED | OUTPATIENT
Start: 2017-08-02 | End: 2017-08-02 | Stop reason: HOSPADM

## 2017-08-02 RX ORDER — NICOTINE POLACRILEX 4 MG
15 LOZENGE BUCCAL
Status: DISCONTINUED | OUTPATIENT
Start: 2017-08-02 | End: 2017-08-04 | Stop reason: HOSPADM

## 2017-08-02 RX ORDER — FENTANYL CITRATE 50 UG/ML
50 INJECTION, SOLUTION INTRAMUSCULAR; INTRAVENOUS
Status: DISCONTINUED | OUTPATIENT
Start: 2017-08-02 | End: 2017-08-02 | Stop reason: HOSPADM

## 2017-08-02 RX ORDER — DEXTROSE MONOHYDRATE 25 G/50ML
25 INJECTION, SOLUTION INTRAVENOUS
Status: DISCONTINUED | OUTPATIENT
Start: 2017-08-02 | End: 2017-08-04 | Stop reason: HOSPADM

## 2017-08-02 RX ORDER — NALOXONE HCL 0.4 MG/ML
0.4 VIAL (ML) INJECTION
Status: CANCELLED | OUTPATIENT
Start: 2017-08-02

## 2017-08-02 RX ORDER — GLYCOPYRROLATE 0.2 MG/ML
INJECTION INTRAMUSCULAR; INTRAVENOUS AS NEEDED
Status: DISCONTINUED | OUTPATIENT
Start: 2017-08-02 | End: 2017-08-02 | Stop reason: SURG

## 2017-08-02 RX ORDER — SODIUM CHLORIDE 0.9 % (FLUSH) 0.9 %
1-10 SYRINGE (ML) INJECTION AS NEEDED
Status: DISCONTINUED | OUTPATIENT
Start: 2017-08-02 | End: 2017-08-04 | Stop reason: HOSPADM

## 2017-08-02 RX ORDER — FENTANYL CITRATE 50 UG/ML
25 INJECTION, SOLUTION INTRAMUSCULAR; INTRAVENOUS
Status: DISCONTINUED | OUTPATIENT
Start: 2017-08-02 | End: 2017-08-02 | Stop reason: HOSPADM

## 2017-08-02 RX ORDER — SODIUM CHLORIDE 9 MG/ML
50 INJECTION, SOLUTION INTRAVENOUS CONTINUOUS
Status: DISCONTINUED | OUTPATIENT
Start: 2017-08-02 | End: 2017-08-02

## 2017-08-02 RX ORDER — MAGNESIUM HYDROXIDE 1200 MG/15ML
LIQUID ORAL AS NEEDED
Status: DISCONTINUED | OUTPATIENT
Start: 2017-08-02 | End: 2017-08-02 | Stop reason: HOSPADM

## 2017-08-02 RX ORDER — SODIUM CHLORIDE, SODIUM LACTATE, POTASSIUM CHLORIDE, CALCIUM CHLORIDE 600; 310; 30; 20 MG/100ML; MG/100ML; MG/100ML; MG/100ML
9 INJECTION, SOLUTION INTRAVENOUS CONTINUOUS
Status: DISCONTINUED | OUTPATIENT
Start: 2017-08-02 | End: 2017-08-02

## 2017-08-02 RX ORDER — ONDANSETRON 2 MG/ML
4 INJECTION INTRAMUSCULAR; INTRAVENOUS EVERY 6 HOURS PRN
Status: DISCONTINUED | OUTPATIENT
Start: 2017-08-02 | End: 2017-08-04 | Stop reason: HOSPADM

## 2017-08-02 RX ORDER — HYDROMORPHONE HYDROCHLORIDE 1 MG/ML
0.25 INJECTION, SOLUTION INTRAMUSCULAR; INTRAVENOUS; SUBCUTANEOUS
Status: DISCONTINUED | OUTPATIENT
Start: 2017-08-02 | End: 2017-08-02 | Stop reason: HOSPADM

## 2017-08-02 RX ORDER — OXYCODONE HCL 20 MG/1
TABLET, FILM COATED, EXTENDED RELEASE ORAL
Status: DISCONTINUED
Start: 2017-08-02 | End: 2017-08-02 | Stop reason: WASHOUT

## 2017-08-02 RX ORDER — HEPARIN SODIUM 5000 [USP'U]/ML
5000 INJECTION, SOLUTION INTRAVENOUS; SUBCUTANEOUS EVERY 8 HOURS SCHEDULED
Status: DISCONTINUED | OUTPATIENT
Start: 2017-08-02 | End: 2017-08-04 | Stop reason: HOSPADM

## 2017-08-02 RX ORDER — DEXAMETHASONE SODIUM PHOSPHATE 10 MG/ML
INJECTION INTRAMUSCULAR; INTRAVENOUS AS NEEDED
Status: DISCONTINUED | OUTPATIENT
Start: 2017-08-02 | End: 2017-08-02 | Stop reason: SURG

## 2017-08-02 RX ORDER — DIPHENHYDRAMINE HYDROCHLORIDE 50 MG/ML
12.5 INJECTION INTRAMUSCULAR; INTRAVENOUS
Status: DISCONTINUED | OUTPATIENT
Start: 2017-08-02 | End: 2017-08-02 | Stop reason: HOSPADM

## 2017-08-02 RX ORDER — IPRATROPIUM BROMIDE AND ALBUTEROL SULFATE 2.5; .5 MG/3ML; MG/3ML
3 SOLUTION RESPIRATORY (INHALATION) ONCE AS NEEDED
Status: DISCONTINUED | OUTPATIENT
Start: 2017-08-02 | End: 2017-08-02 | Stop reason: HOSPADM

## 2017-08-02 RX ORDER — PROMETHAZINE HYDROCHLORIDE 25 MG/1
25 TABLET ORAL ONCE AS NEEDED
Status: DISCONTINUED | OUTPATIENT
Start: 2017-08-02 | End: 2017-08-02 | Stop reason: HOSPADM

## 2017-08-02 RX ORDER — CEFAZOLIN SODIUM IN 0.9 % NACL 3 G/100 ML
3 INTRAVENOUS SOLUTION, PIGGYBACK (ML) INTRAVENOUS ONCE
Status: DISCONTINUED | OUTPATIENT
Start: 2017-08-02 | End: 2017-08-04 | Stop reason: HOSPADM

## 2017-08-02 RX ORDER — IBUPROFEN 200 MG
1250 CAPSULE ORAL 3 TIMES DAILY
Status: DISCONTINUED | OUTPATIENT
Start: 2017-08-02 | End: 2017-08-02 | Stop reason: CLARIF

## 2017-08-02 RX ORDER — ONDANSETRON 2 MG/ML
INJECTION INTRAMUSCULAR; INTRAVENOUS AS NEEDED
Status: DISCONTINUED | OUTPATIENT
Start: 2017-08-02 | End: 2017-08-02 | Stop reason: SURG

## 2017-08-02 RX ORDER — HYDRALAZINE HYDROCHLORIDE 20 MG/ML
5 INJECTION INTRAMUSCULAR; INTRAVENOUS
Status: DISCONTINUED | OUTPATIENT
Start: 2017-08-02 | End: 2017-08-02 | Stop reason: HOSPADM

## 2017-08-02 RX ORDER — MIDAZOLAM HYDROCHLORIDE 1 MG/ML
2 INJECTION INTRAMUSCULAR; INTRAVENOUS
Status: DISCONTINUED | OUTPATIENT
Start: 2017-08-02 | End: 2017-08-02 | Stop reason: HOSPADM

## 2017-08-02 RX ORDER — SODIUM CHLORIDE 0.9 % (FLUSH) 0.9 %
1-10 SYRINGE (ML) INJECTION AS NEEDED
Status: DISCONTINUED | OUTPATIENT
Start: 2017-08-02 | End: 2017-08-02 | Stop reason: HOSPADM

## 2017-08-02 RX ADMIN — HYDRALAZINE HYDROCHLORIDE 5 MG: 20 INJECTION INTRAMUSCULAR; INTRAVENOUS at 13:06

## 2017-08-02 RX ADMIN — MIDAZOLAM 1 MG: 1 INJECTION INTRAMUSCULAR; INTRAVENOUS at 07:34

## 2017-08-02 RX ADMIN — MIDAZOLAM 1 MG: 1 INJECTION INTRAMUSCULAR; INTRAVENOUS at 13:43

## 2017-08-02 RX ADMIN — FENTANYL CITRATE 25 MCG: 50 INJECTION INTRAMUSCULAR; INTRAVENOUS at 12:51

## 2017-08-02 RX ADMIN — FENTANYL CITRATE 25 MCG: 50 INJECTION INTRAMUSCULAR; INTRAVENOUS at 12:40

## 2017-08-02 RX ADMIN — SUCCINYLCHOLINE CHLORIDE 160 MG: 20 INJECTION, SOLUTION INTRAMUSCULAR; INTRAVENOUS; PARENTERAL at 09:07

## 2017-08-02 RX ADMIN — MORPHINE SULFATE 100 MG: 100 TABLET, EXTENDED RELEASE ORAL at 13:40

## 2017-08-02 RX ADMIN — SODIUM CHLORIDE: 9 INJECTION, SOLUTION INTRAVENOUS at 10:00

## 2017-08-02 RX ADMIN — GLYCOPYRROLATE 0.4 MG: 0.2 INJECTION INTRAMUSCULAR; INTRAVENOUS at 11:52

## 2017-08-02 RX ADMIN — SODIUM CHLORIDE 9 ML/HR: 9 INJECTION, SOLUTION INTRAVENOUS at 07:35

## 2017-08-02 RX ADMIN — ATORVASTATIN CALCIUM 10 MG: 10 TABLET, FILM COATED ORAL at 18:32

## 2017-08-02 RX ADMIN — ROCURONIUM BROMIDE 10 MG: 10 INJECTION INTRAVENOUS at 09:07

## 2017-08-02 RX ADMIN — HYDROMORPHONE HYDROCHLORIDE 0.5 MG: 1 INJECTION, SOLUTION INTRAMUSCULAR; INTRAVENOUS; SUBCUTANEOUS at 13:07

## 2017-08-02 RX ADMIN — Medication 500 MG: at 18:32

## 2017-08-02 RX ADMIN — CEFAZOLIN 3 G: 1 INJECTION, POWDER, FOR SOLUTION INTRAMUSCULAR; INTRAVENOUS; PARENTERAL at 09:20

## 2017-08-02 RX ADMIN — HYDROMORPHONE HYDROCHLORIDE 0.5 MG: 1 INJECTION, SOLUTION INTRAMUSCULAR; INTRAVENOUS; SUBCUTANEOUS at 13:43

## 2017-08-02 RX ADMIN — Medication 500 MG: at 20:01

## 2017-08-02 RX ADMIN — FAMOTIDINE 20 MG: 10 INJECTION INTRAVENOUS at 07:34

## 2017-08-02 RX ADMIN — OXYCODONE HYDROCHLORIDE AND ACETAMINOPHEN 1 TABLET: 5; 325 TABLET ORAL at 19:47

## 2017-08-02 RX ADMIN — NEOSTIGMINE METHYLSULFATE 2 MG: 1 INJECTION INTRAMUSCULAR; INTRAVENOUS; SUBCUTANEOUS at 11:52

## 2017-08-02 RX ADMIN — CALCITRIOL 0.5 MCG: 0.5 CAPSULE, LIQUID FILLED ORAL at 20:01

## 2017-08-02 RX ADMIN — FENTANYL CITRATE 50 MCG: 50 INJECTION INTRAMUSCULAR; INTRAVENOUS at 09:07

## 2017-08-02 RX ADMIN — HYDROMORPHONE HYDROCHLORIDE 1 MG: 2 INJECTION, SOLUTION INTRAMUSCULAR; INTRAVENOUS; SUBCUTANEOUS at 17:12

## 2017-08-02 RX ADMIN — ROCURONIUM BROMIDE 20 MG: 10 INJECTION INTRAVENOUS at 09:10

## 2017-08-02 RX ADMIN — FENTANYL CITRATE 50 MCG: 50 INJECTION INTRAMUSCULAR; INTRAVENOUS at 09:26

## 2017-08-02 RX ADMIN — PROPOFOL 150 MG: 10 INJECTION, EMULSION INTRAVENOUS at 09:07

## 2017-08-02 RX ADMIN — HYDROMORPHONE HYDROCHLORIDE 1 MG: 2 INJECTION, SOLUTION INTRAMUSCULAR; INTRAVENOUS; SUBCUTANEOUS at 15:13

## 2017-08-02 RX ADMIN — HEPARIN SODIUM 5000 UNITS: 5000 INJECTION, SOLUTION INTRAVENOUS; SUBCUTANEOUS at 22:09

## 2017-08-02 RX ADMIN — MORPHINE SULFATE 100 MG: 100 TABLET, EXTENDED RELEASE ORAL at 15:15

## 2017-08-02 RX ADMIN — HYDROMORPHONE HYDROCHLORIDE 0.25 MG: 1 INJECTION, SOLUTION INTRAMUSCULAR; INTRAVENOUS; SUBCUTANEOUS at 12:25

## 2017-08-02 RX ADMIN — LIDOCAINE HYDROCHLORIDE 60 MG: 20 INJECTION, SOLUTION INFILTRATION; PERINEURAL at 09:07

## 2017-08-02 RX ADMIN — DEXAMETHASONE SODIUM PHOSPHATE 4 MG: 10 INJECTION INTRAMUSCULAR; INTRAVENOUS at 09:30

## 2017-08-02 RX ADMIN — MIDAZOLAM 1 MG: 1 INJECTION INTRAMUSCULAR; INTRAVENOUS at 13:07

## 2017-08-02 RX ADMIN — LORAZEPAM 0.5 MG: 0.5 TABLET ORAL at 15:22

## 2017-08-02 RX ADMIN — HYDRALAZINE HYDROCHLORIDE 5 MG: 20 INJECTION INTRAMUSCULAR; INTRAVENOUS at 12:30

## 2017-08-02 RX ADMIN — PROPOFOL 50 MG: 10 INJECTION, EMULSION INTRAVENOUS at 09:26

## 2017-08-02 RX ADMIN — OXYCODONE HYDROCHLORIDE AND ACETAMINOPHEN 1 TABLET: 5; 325 TABLET ORAL at 23:38

## 2017-08-02 RX ADMIN — HYDROMORPHONE HYDROCHLORIDE 0.5 MG: 1 INJECTION, SOLUTION INTRAMUSCULAR; INTRAVENOUS; SUBCUTANEOUS at 12:50

## 2017-08-02 RX ADMIN — FENTANYL CITRATE 25 MCG: 50 INJECTION INTRAMUSCULAR; INTRAVENOUS at 12:25

## 2017-08-02 RX ADMIN — OXYCODONE HYDROCHLORIDE AND ACETAMINOPHEN 1 TABLET: 5; 325 TABLET ORAL at 15:13

## 2017-08-02 RX ADMIN — ONDANSETRON 4 MG: 2 INJECTION INTRAMUSCULAR; INTRAVENOUS at 11:52

## 2017-08-02 RX ADMIN — HYDROMORPHONE HYDROCHLORIDE 0.25 MG: 1 INJECTION, SOLUTION INTRAMUSCULAR; INTRAVENOUS; SUBCUTANEOUS at 12:35

## 2017-08-02 RX ADMIN — HYDROMORPHONE HYDROCHLORIDE 1 MG: 2 INJECTION, SOLUTION INTRAMUSCULAR; INTRAVENOUS; SUBCUTANEOUS at 22:08

## 2017-08-02 RX ADMIN — HEPARIN SODIUM 5000 UNITS: 5000 INJECTION, SOLUTION INTRAVENOUS; SUBCUTANEOUS at 18:32

## 2017-08-02 RX ADMIN — FENTANYL CITRATE 50 MCG: 50 INJECTION INTRAMUSCULAR; INTRAVENOUS at 09:45

## 2017-08-02 RX ADMIN — HYDRALAZINE HYDROCHLORIDE 5 MG: 20 INJECTION INTRAMUSCULAR; INTRAVENOUS at 12:24

## 2017-08-02 RX ADMIN — HYDROMORPHONE HYDROCHLORIDE 1 MG: 2 INJECTION, SOLUTION INTRAMUSCULAR; INTRAVENOUS; SUBCUTANEOUS at 19:47

## 2017-08-02 RX ADMIN — FENTANYL CITRATE 25 MCG: 50 INJECTION INTRAMUSCULAR; INTRAVENOUS at 12:30

## 2017-08-02 RX ADMIN — HYDRALAZINE HYDROCHLORIDE 25 MG: 25 TABLET, FILM COATED ORAL at 20:01

## 2017-08-02 RX ADMIN — LEVETIRACETAM 1000 MG: 500 TABLET, FILM COATED ORAL at 20:01

## 2017-08-02 NOTE — H&P
General Surgery  History and Physical     CC: Recurrent secondary hyperparathyroidism     HPI: The patient is a pleasant 56 y.o. year-old gentleman who presents today for recurrent secondary hyperparathyroidism.  I saw him 2 weeks ago, we discussed that his PTH hormone levels had increased back up to 1522 from a postoperative level of 340 following his 3 gland parathyroidectomy with autotransplantation into his right arm in January of this year.  A repeat sestamibi/SPECT-CT revealed persistent radiotracer activity within the left superior neck but there was no CT evidence for an enlarged gland in this vicinity.  At the time of his initial operation for his secondary hyperparathyroidism, 3 of his 4 glands were able to be found, but his left superior gland was unable to be identified. Of note, the repeat sestamibi scan shows no activity within the right forearm at the vicinity of his autotransplant. He again emphasizes that the first 2 weeks after his surgery in January he felt great, but since that time he has had an increase in body aches and pains, with recurrent foot ulcers that had previously healed following his parathyroidectomy surgery.  He denies any abdominal pain, kidney stones, or depression but does have increasing anxiety lately.  He continues to dialyze for his end-stage renal disease every Monday, Wednesday, and Friday and Dr. Boone is his nephrologist.  He receives hemodialysis via a left upper extremity AV fistula.  He takes Coumadin daily for a history of atrial fibrillation.     Past Medical History:   End-stage renal disease  Secondary hyperparathyroidism  Hyperlipidemia  Hypertension  Diabetes mellitus  Depression  Atrial fibrillation  Anxiety  Hypothyroidism  Obstructive sleep apnea (previous tracheostomy)  Seizure disorder  Vitamin D deficiency     Past Surgical History:   Three gland parathyroidectomy with partial thymectomy and autotransplant into right forearm (unable to find fourth gland  within the neck or thymus)  Left upper extremity AV fistula  Cholecystectomy  Cataract extraction  Left inguinal hernia repair  Tracheostomy  Colonoscopy (2015)     Medications:   Hydralazine 25 mg daily  Keppra 1000 mg twice daily  Bumex 2 mg 2 times daily on nondialysis days  Aspirin 81 mg daily  Coumadin 2.5 mg daily  Pravastatin 40 mg daily  Levothyroxine 175 µg daily  Morphine sulfate 100 mg daily  Trintellix 10 mg nightly  Calcium carbonate 1250 mg 3 times daily with meals  Velphoro 100 mg 3 times daily with meals (posphate binder)  Zofran 4 mg every 12 hours as needed for nausea  Ativan 0.5 mg daily as needed for anxiety  Humalog insulin as needed sliding scale before meals  Continuous oxygen at 2 L as needed     Allergies: Adhesive tape, sulfa antibiotics     Family History: Father and sister with coronary artery disease     Social History: , nonsmoker, no alcohol use     ROS:   Constitutional: Negative for fevers or chills  HENT: Negative for hearing loss or runny nose  Eyes: Negative for vision changes or scleral icterus  Respiratory: Negative for cough or shortness of breath  Cardiovascular: Negative for chest pain or heart palpitations  Gastrointestinal: Negative for abdominal pain, nausea, vomiting, constipation, melena, or hematochezia  Genitourinary: Negative for hematuria or dysuria  Musculoskeletal: Positive for myalgias on the joint pain, and bone pain  Neurologic: Negative for headaches or dizziness  Psychiatric: Positive for anxiety and depression   All other systems reviewed and negative     Physical Exam:  Vitals:    08/02/17 0633   BP: 107/65   Pulse: 90   Resp: 20   Temp: 98.2 °F (36.8 °C)   SpO2: 94%       General: No acute distress, well-nourished & well-developed  HEAD: normocephalic, atraumatic  EYES: normal conjunctiva, sclera anicteric  EARS: grossly normal hearing  NECK: supple, no thyromegaly, incision well-healed  CARDIOVASCULAR: regular rate and rhythm  RESPIRATORY: clear to  auscultation bilaterally  GASTROINTESTINAL: soft, nontender, non-distended  MUSCULOSKELETAL: LUE AVF with good thrill, right forearm scar well-healed  PSYCHIATRIC: oriented x3, normal mood and affect     LABS:  PTH 1522  Ca 9.2     SESTAMIBI/SPECT CT:  There is physiologic distribution of the radiopharmaceutical following injection of Tc-99 sestamibi.  There is a focus of persistent radiotracer activity involving the superior aspect of the left thyroid gland without an obvious CT correlate.  No focus of persistent activity consistent with an enlarged parathyroid gland is seen in the neck.  There is no activity within the right forearm in the region of the patient's parathyroid autotransplantation.  There are postsurgical clips demonstrated in the left lower parathyroid region and inferior to the right thyroid gland as well.  No pathologic adenopathy is seen in the visualized neck.  No suspicious lung nodules are seen.  Some minimal scarring and/or chronic atelectasis in the lingula segment of the left upper lobe is seen.  Coronary artery calcifications are demonstrated.  No acute bony abnormality.     ASSESSMENT & PLAN  Mr. Gill is a 56 year-old gentleman with ESRD and resulting secondary hyperparathyroidism s/p three gland parathyroidectomy with auto-transplant in the right forearm and inability to identify his fourth gland anywhere in the neck or thymus.  His sestamibi/SPECT-CT suggests that the residual parathyroid activity is emanating from a small radiographically undetectable gland within the left superior neck.  His autotransplanted partial gland in the right forearm has no obvious activity on sestamibi scan.  Additionally, there are no mediastinal glands identified.  I counseled him that he will require a repeat neck exploration with his fourth gland removed and re-autotransplanted into the right forearm. He will need to be admitted postoperatively for calcium monitoring that will be managed by his  nephrologist, Dr. Boone. All questions were answered today and he understands that he will need to be discharged home on calcium supplementation just as he did with the last operation.     Freida Morfin MD  General and Endoscopic Surgery  [facility]     [address], [address]  [place], [place], [address]  P: [phone]  F: [phone]

## 2017-08-02 NOTE — PLAN OF CARE
Problem: Patient Care Overview (Adult)  Goal: Plan of Care Review  Outcome: Ongoing (interventions implemented as appropriate)    08/02/17 0646 08/02/17 6960   Coping/Psychosocial Response Interventions   Plan Of Care Reviewed With --  patient   Patient Care Overview   Progress no change --    Outcome Evaluation   Outcome Summary/Follow up Plan --  pt arrived to floor from PACU with complaints of 10/10 pain. Medicated as ordered and pt now resting comfortably in bed. Will continue to monitor         Problem: Fall Risk (Adult)  Goal: Identify Related Risk Factors and Signs and Symptoms  Outcome: Ongoing (interventions implemented as appropriate)  Goal: Absence of Falls  Outcome: Ongoing (interventions implemented as appropriate)    Problem: Diabetes, Type 2 (Adult)  Goal: Signs and Symptoms of Listed Potential Problems Will be Absent or Manageable (Diabetes, Type 2)  Outcome: Ongoing (interventions implemented as appropriate)

## 2017-08-02 NOTE — CONSULTS
Referring Provider: Freida Morfin MD  Reason for Consultation: ESRD    Subjective     Chief complaint No chief complaint on file.      History of present illness:    56 years old white male with a past medical history of end-stage renal disease on hemodialysis was admitted for left thyroid extremity and parathyroidectomy with autotransplantation.  The has left upper extremity AV fistula.  Patient was to dialysis Monday Wednesday Friday however he had dialysis on Monday and Tuesday.  He just finish his surgery.  We will consulted to help in management of his end-stage renal disease.    Past Medical History:   Diagnosis Date   • Anxiety    • Arthritis    • Asthma    • Asymptomatic hyperuricemia    • Atrial fibrillation    • Callus of foot     LEFT FOOT , PRESENTLY ATTENDING WOUND CLINIC [facility]   • Cancer of pituitary gland    • Depression    • Diabetes mellitus     type 2, A1C of 5 1 month ago by patient report   • Dialysis patient     MON WED FRI   • Dyslipidemia    • End stage renal disease    • Gout    • History of anemia    • History of colon polyps    • Hyperparathyroidism    • Hyperprolactinemia    • Hypertension    • Hypogonadism, male    • Hypothyroidism    • Male erectile disorder of organic origin    • Neuropathy    • Obstructive sleep apnea    • Presence of arterial-venous shunt (for dialysis)    • Seizure disorder      2 YRS AGO   • Vitamin D deficiency disease      Past Surgical History:   Procedure Laterality Date   • CATARACT EXTRACTION Bilateral 2009   • CHOLECYSTECTOMY  2002    Performed in [place]   • COLONOSCOPY W/ POLYPECTOMY N/A 11/2015    Dr. Bolivar   • INGUINAL HERNIA REPAIR Left 2002    Open Inguinal   • THYROIDECTOMY Right 1/6/2017    Procedure: FOUR GLAND PARATHYROIDECTOMY WITH AUTOTRANSPLANT INTO RIGHT FOREARM, INTRA-OPERATIVE PTH MEASUREMENT, PARTIAL THYMECTOMY;  Surgeon: Freida Morfin MD;  Location: [facility];  Service:    • TRACHEOSTOMY N/A 10/22/2007    W/ REVISION  OF THYROID ISTHMUS, DUE TO SLEEP APNEA; ALLOWED TO GROW CLOSED AFTER ONE YEAR DUE TO LACK OF RELIEF OF SLEEP APNEA SYMPTOMS, DR. MAXIMO BELL   • TRACHEOSTOMY N/A 05/21/2009    REVISION, DR. MAXIMO BELL   • TRACHEOSTOMY N/A 04/25/2009    REVISION, DR. MAXIMO BELL   • VASCULAR SURGERY Left 2014    Acess in Left Arm-Dr. Guzman     Family History   Problem Relation Age of Onset   • Heart disease Sister    • Heart disease Father    • Kidney disease Neg Hx    • Malig Hyperthermia Neg Hx      Social History   Substance Use Topics   • Smoking status: Never Smoker   • Smokeless tobacco: Never Used   • Alcohol use No     Prescriptions Prior to Admission   Medication Sig Dispense Refill Last Dose   • aspirin 81 MG EC tablet Take 81 mg by mouth. NOT TO STOP PTS PER DOCTER ORDER   7/28/2017   • bumetanide (BUMEX) 1 MG tablet Take 1 mg by mouth 2 (Two) Times a Day. Only on non dialysis days (T, Th, Sat, Sun)   7/30/2017   • calcium carbonate (OS-ALENA) 1250 (500 CA) MG tablet Take 1 tablet by mouth 3 (Three) Times a Day With Meals. 90 tablet 12 8/1/2017 at 2000   • hydrALAZINE (APRESOLINE) 25 MG tablet Take 25 mg by mouth Every Night.   8/1/2017 at 2000   • levETIRAcetam (KEPPRA) 1000 MG tablet Take 1,000 mg by mouth 2 (two) times a day.   8/2/2017 at 0500   • LORazepam (ATIVAN) 0.5 MG tablet Take 0.5 mg by mouth Every 8 (Eight) Hours As Needed for Anxiety.   8/1/2017 at 1300   • Morphine (MS CONTIN) 100 MG 12 hr tablet Take 100 mg by mouth Daily.   8/1/2017 at 0900   • ondansetron (ZOFRAN) 4 MG tablet Take 4 mg by mouth Every 8 (Eight) Hours As Needed for nausea or vomiting.   2/2/2017   • pravastatin (PRAVACHOL) 40 MG tablet Take 40 mg by mouth Daily.   8/1/2017 at 0800   • SYNTHROID 175 MCG tablet Take 175 mcg by mouth daily.   8/1/2017 at 0800   • Vortioxetine HBr (BRINTELLIX) 10 MG tablet Take 10 mg by mouth every night.   8/1/2017 at 2000   • warfarin (COUMADIN) 2.5 MG tablet Take 5 mg by mouth Daily. TAKES 1/2  Of 2.5  TAB  "ON sun., mon., wed., Friday  TO STOP FOR SX   7/28/2017   • enoxaparin (LOVENOX) 60 MG/0.6ML solution syringe Inject 70 mg under the skin Every 12 (Twelve) Hours.   8/1/2017 at 2000   • Insulin Lispro (HUMALOG KWIKPEN) 100 UNIT/ML solution pen-injector Inject 100 mL under the skin As Needed. SLIDING SCALE    Taking     Allergies:  Adhesive tape and Sulfa antibiotics    Review of Systems  Pertinent items are noted in HPI.    Objective     Vital Signs  Temp:  [97.9 °F (36.6 °C)-98.5 °F (36.9 °C)] 97.9 °F (36.6 °C)  Heart Rate:  [] 96  Resp:  [16-20] 16  BP: (107-216)/() 136/70    Flowsheet Rows         First Filed Value    Admission Height  68\" (172.7 cm) Documented at 08/02/2017 0633    Admission Weight  (!)  318 lb 4.8 oz (144 kg) Documented at 08/02/2017 0633           I/O this shift:  In: 900 [I.V.:900]  Out: 25 [Blood:25]       Intake/Output Summary (Last 24 hours) at 08/02/17 1650  Last data filed at 08/02/17 1432   Gross per 24 hour   Intake              900 ml   Output               25 ml   Net              875 ml       Physical Exam:     General Appearance:    Alert, cooperative, in no acute distress   Head:    Normocephalic, without obvious abnormality, atraumatic   Eyes:            Lids and lashes normal, conjunctivae and sclerae normal, no   icterus, no pallor, corneas clear, PERRLA   Ears:    Ears appear intact with no abnormalities noted   Throat:   No oral lesions, no thrush, oral mucosa moist   Neck:   No adenopathy, supple, trachea midline, no thyromegaly, no   carotid bruit, no JVD   Back:     No kyphosis present, no scoliosis present, no skin lesions,      erythema or scars, no tenderness to percussion or                   palpation,   range of motion normal   Lungs:     Clear to auscultation,respirations regular, even and                  unlabored    Heart:    Regular rhythm and normal rate, normal S1 and S2, no            murmur, no gallop, no rub, no click   Chest Wall:    No " abnormalities observed   Abdomen:     Normal bowel sounds, no masses, no organomegaly, soft        non-tender, non-distended, no guarding, no rebound                tenderness   Rectal:     Deferred   Extremities:   Moves all extremities well, no edema, no cyanosis, no             Redness. L AVF   Pulses:   Pulses palpable and equal bilaterally   Skin:   No bleeding, bruising or rash   Lymph nodes:   No palpable adenopathy   Neurologic:   Cranial nerves 2 - 12 grossly intact, sensation intact, DTR       present and equal bilaterally         Results Review:  Results from last 7 days  Lab Units 08/02/17  1355 08/02/17  0631 07/27/17  1159   SODIUM mmol/L 136 136 139   POTASSIUM mmol/L 4.5 4.4 5.0   CHLORIDE mmol/L 93* 92* 91*   CO2 mmol/L 24.6 27.5 30.3*   BUN mg/dL 53* 47* 46*   CREATININE mg/dL 5.15* 4.43* 5.45*   CALCIUM mg/dL 9.5 9.8 10.3  10.3   GLUCOSE mg/dL 144* 100* 108*       Estimated Creatinine Clearance: 22.6 mL/min (by C-G formula based on Cr of 5.15).      Results from last 7 days  Lab Units 08/02/17  1355   MAGNESIUM mg/dL 1.7   PHOSPHORUS mg/dL 3.8         Results from last 7 days  Lab Units 07/27/17  1159   WBC 10*3/mm3 8.21   HEMOGLOBIN g/dL 13.8   PLATELETS 10*3/mm3 165         Results from last 7 days  Lab Units 08/02/17  1528 08/02/17  0614   INR  1.04 1.02       Active Medications    [START ON 8/3/2017] aspirin 81 mg Oral Daily   atorvastatin 10 mg Oral Daily   [START ON 8/3/2017] bumetanide 1 mg Oral 2 times per day on Sun Tue Thu Sat   ceFAZolin 3 g Intravenous Once   heparin (porcine) 5,000 Units Subcutaneous Q8H   hydrALAZINE 25 mg Oral Nightly   insulin aspart 0-14 Units Subcutaneous 4x Daily With Meals & Nightly   levETIRAcetam 1,000 mg Oral Q12H   [START ON 8/3/2017] levothyroxine 175 mcg Oral Q AM   Morphine 100 mg Oral Daily   oyster shell calcium 500 mg Oral TID   Vortioxetine HBr 10 mg Oral Nightly          Assessment/Plan     Active Problems:    Secondary hyperparathyroidism of renal  origin      1. End-stage renal disease on hemodialysis Monday Wednesday Friday: He should have the dialysis on Monday and Tuesday.  Plan for dialysis tomorrow against 3 calcium bath  2.  Secondary hyperparathyroidism status post left thyroid lobectomy with autotransplantation in the right forearm: 31 calcium supplement.  We will obtain ionized calcium every 6 hours.  Discussed with the nurse.  He might need to start the patient on a calcium drip.  3.  Status post left thyroid lobectomy   4.  Anemia of end-stage renal disease      Andres Hall MD  08/02/17  4:50 PM

## 2017-08-02 NOTE — ANESTHESIA PROCEDURE NOTES
Airway  Urgency: elective    Date/Time: 8/2/2017 9:09 AM  Airway not difficult    General Information and Staff    Patient location during procedure: OR  Anesthesiologist: BECKY GOMEZ  CRNA: CHRIS MONTANO    Indications and Patient Condition  Indications for airway management: airway protection    Preoxygenated: yes  MILS maintained throughout  Mask difficulty assessment: 2 - vent by mask + OA or adjuvant +/- NMBA    Final Airway Details  Final airway type: endotracheal airway      Successful airway: ETT and reinforced tube  Cuffed: yes   Successful intubation technique: direct laryngoscopy  Endotracheal tube insertion site: oral  Blade: Caitlyn  Blade size: #4  ETT size: 7.5 mm  Cormack-Lehane Classification: grade I - full view of glottis  Placement verified by: chest auscultation and capnometry   Measured from: teeth  ETT to teeth (cm): 23  Number of attempts at approach: 1

## 2017-08-02 NOTE — ANESTHESIA PREPROCEDURE EVALUATION
Anesthesia Evaluation     Patient summary reviewed and Nursing notes reviewed   no history of anesthetic complications:  NPO Solid Status: > 6 hours       Airway   Mallampati: II  TM distance: >3 FB  Neck ROM: full  no difficulty expected  Comment: EMANUEL Jan 2017 with Grade I view and 8.0 ETT  Dental    (+) poor dentition    Pulmonary - normal exam   (+) sleep apnea (previous TRACH reversed unable to tolerate CPAP),   Cardiovascular - normal exam    ECG reviewed    (+) hypertension,   (-) angina      Neuro/Psych  GI/Hepatic/Renal/Endo    (+) morbid obesity, renal disease dialysis, diabetes mellitus,     Musculoskeletal     Abdominal    Substance History      OB/GYN          Other                                        Anesthesia Plan    ASA 3     general     Anesthetic plan and risks discussed with patient.

## 2017-08-02 NOTE — PLAN OF CARE
Problem: Perioperative Period (Adult)  Goal: Signs and Symptoms of Listed Potential Problems Will be Absent or Manageable (Perioperative Period)  Outcome: Ongoing (interventions implemented as appropriate)    08/02/17 0647   Perioperative Period   Problems Assessed (Perioperative Period) pain   Problems Present (Perioperative Period) none

## 2017-08-02 NOTE — OP NOTE
Operative Note :  Freida Morfin MD      Armando Gill  1960    Procedure Date: 08/02/17    Pre-op Diagnosis:  · Recurrent secondary hyperparathyroidism due to end stage renal disease    Post-op Diagnosis:  · Recurrent secondary hyperparathyroidism due to end stage renal disease    Procedure:   · Left thyroid lobectomy, left superior parathyroidectomy with autotransplantation into right forearm, and intraoperative PTH monitoring    Surgeon: Freida Morfin MD    Assistant: Jessica Wilkes MD    Anesthesia:  General (general endotracheal tube)    Estimated Blood Loss: 50 cc    Specimens: Left superior parathyroid gland (frozen), left thyroid lobe, intraoperative PTH measurement x2    Complications: None    Indications:  · Mr. Gill is a 56-year-old gentleman with end-stage renal disease and resulting secondary hyperparathyroidism.  He underwent attempted 4 gland parathyroid excision last January, however one of his parathyroid gland was unable to be identified and it was thought to be due to significant scar tissue and possible atrophy due to his previous tracheostomy for obstructive sleep apnea.  Following a 3 gland excision with autotransplantation into the right forearm, his parathyroid hormone levels dropped from 2500 preoperatively to 300 immediately Intra-Op.  However, over the next 3 months, his parathyroid hormone levels has slowly risen back up to a level of 2500.  He presents today for neck exploration with completion parathyroidectomy and free autotransplanted into the right forearm.  A sestamibi scan performed preoperatively revealed increased uptake within the left upper portion of the neck within the thyroid gland itself, suspicious for an intrathyroidal position of his remaining parathyroid gland.  He has been counseled on the risks of the procedure, including bleeding (increased given his Coumadin use and end-stage renal disease), possible recurrent laryngeal nerve injury, increased  given this is a reoperation on his neck approximately 7 months after his last operation), and possible inability to find his parathyroid gland.  Despite these risks, he has consented to proceed.    Findings:   · Left superior parathyroid gland identified within the posterior thyroidal tissue at the superior pole the left thyroid lobe.  A portion of the gland was autotransplanted into the right brachial radialis musculature of the forearm in the same position as his previous autotransplant. Pre-op PTH measurement of 209 dropped to 69 intra-operatively following the excision of the last parathyroid gland.    Description of procedure:  The patient was brought to the operating room and placed on the OR table in supine position.  An endotracheal tube was inserted, and general anesthesia was induced.  A large shoulder roll was positioned behind his scapulas and his arms were tucked at his side.  His anterior neck was prepped and draped in a sterile fashion.  A surgical timeout was then completed.  The previous lower neck incision was anesthetized using 0.5% Marcaine with epinephrine and the skin incised through his prior scar.  The underlying soft tissues and platysma were divided using electrocautery and superior and inferior flaps were then raised beneath the platysma overlying his anterior jugular veins.  The strap muscles were then divided in the midline, and significant scar tissue was encountered here.  The thyroid beneath the strap musculature was not very easy to identify given the very dense scar tissue.  Very slow and tedious dissection was carried out around what appeared to be the left lobe of the thyroid gland along its inferior pole.  We were able to eventually enter a small window of normal-appearing tissue at the base of the left thyroid lobe, and this allowed for greater ease dissecting along the entirety of the inferior pole.  The inferior thyroidal vessels were isolated and divided between clips.  Once  the left lobe of the thyroid was able to slowly be retracted more medially, the recurrent laryngeal nerve was able to be identified coursing up towards the larynx and trachea.  We took great care to avoid any damage to the nerve, however it was significantly fused up to the capsule surrounding the thyroid lobe and it did incur a bit of blunt contact during dissection to slowly retract it off of the undersurface of the thyroid lobe.  The superior pole of the thyroid lobe was also slowly and carefully dissected out.  The superior thyroidal vessels were also isolated between clips and divided.  Once the entirety of the thyroid lobe was then retracted medially, it was taken off of the superior surface of the trachea.  What appeared to be a parathyroid gland nestled within the medial and posterior thyroid lobe was identified.  The left lobe of the thyroid gland was then transected across the isthmus using electrocautery.  The identified parathyroid tissue was slowly dissected off of the undersurface of the thyroid gland and a small portion set aside for possible autotransplantation.  This portion of the parathyroid gland was submerged in saline, and sunk to the bottom, supporting the diagnosis of parathyroid tissue.  The remaining portion of the gland was sent off to pathology as a frozen section.  The left neck was then reinspected and the recurrent laryngeal nerve identified.  It appeared to be free of any injury.  Surgicel was placed into the left neck and pressure applied for hemostasis.  Approximately fifteen minutes later, 5 cc of venous blood was withdrawn from the left internal jugular vein under direct visualization and passed off to the laboratory for intraoperative PTH measurement.  Ten minutes later, another 5 cc of venous blood was withdrawn from the left internal jugular vein and sent off for a second PTH measurement intraoperatively.  At this point, pathology called back in and reported that the frozen  section was consistent with a hypercellular parathyroid gland and some attached adjacent thyroid tissue.  The left thyroid lobe was then sent off to pathology in formalin as a permanent specimen.  The neck was then reinspected and found to be hemostatic.  2 new pieces of Surgicel were then placed down within the left neck and the neck was closed in multiple layers beginning with the strap muscles in the midline.  These were closed with a running 3-0 Vicryl stitch.  The platysma was then closed transversely using a running 3-0 Vicryl stitch.  Skin was then closed with a running 5-0 Vicryl subcuticular stitch and dressed with Dermabond.  The drapes were then taken down and the right arm was prepped and draped in a sterile fashion.  A small portion of the set aside left superior parathyroid gland was minced up on a small piece of Telfa gauze.  The previous incision over the right upper forearm was anesthetized using 0.5% Marcaine and opened with a 15 blade scalpel.  The underlying brachioradialis musculature was identified and the overlying muscular fascia was divided.  Small pockets were then created within the brachioradialis musculature and within each pocket a small piece of parathyroid tissue was submerged.  Each of the small pockets were closed primarily using interrupted 3-0 Vicryl sutures.  I made sure to place all of the pockets within the center of the previously placed metallic clips marking all 4 quadrants of the muscular involvement.  The remaining wound was then closed in interrupted layers using 3-0 Vicryl deep dermal sutures and a running 4-0 Vicryl subcuticular stitch.  Dermabond was dressed over this incision as well.  The patient was then extubated and transferred to PACU in stable condition.  Both of the intraoperative PTH measurements came down to 69 and 71 respectively, and were called into the OR prior to closure of the arm incision.  All counts were correct per nursing at the completion of the  case.    Freida Morfin MD  General and Endoscopic Surgery  [facility]    [address], [address]  [place], [place], [address]  P: [phone]  F: [phone]

## 2017-08-02 NOTE — PLAN OF CARE
Problem: Patient Care Overview (Adult)  Goal: Plan of Care Review  Outcome: Ongoing (interventions implemented as appropriate)    08/02/17 0646   Coping/Psychosocial Response Interventions   Plan Of Care Reviewed With patient   Patient Care Overview   Progress no change       Goal: Adult Individualization and Mutuality  Outcome: Ongoing (interventions implemented as appropriate)    08/02/17 0646   Individualization   Patient Specific Preferences Goes by Trell

## 2017-08-02 NOTE — ANESTHESIA POSTPROCEDURE EVALUATION
Patient: Armando Gill    Procedure Summary     Date Anesthesia Start Anesthesia Stop Room / Location    08/02/17 0858 1208  [facility] 04 / [facility]       Procedure Diagnosis Surgeon Provider    left thyroid lobectomy, Left superior parathyroidectomy with autotransplant into right forearm, intra-operative pth monitoring (N/A Neck) Secondary hyperparathyroidism of renal origin  (Secondary hyperparathyroidism of renal origin [N25.81]) MD Luis Blanca MD          Anesthesia Type: general  Last vitals  BP   (!) 200/93 (08/02/17 1306)    Temp        Pulse       Resp        SpO2          Post Anesthesia Care and Evaluation    Patient location during evaluation: PACU  Anesthetic complications: No anesthetic complications

## 2017-08-03 LAB
ANION GAP SERPL CALCULATED.3IONS-SCNC: 19.1 MMOL/L
BASOPHILS # BLD AUTO: 0.03 10*3/MM3 (ref 0–0.2)
BASOPHILS NFR BLD AUTO: 0.3 % (ref 0–1.5)
BUN BLD-MCNC: 62 MG/DL (ref 6–20)
BUN/CREAT SERPL: 10.1 (ref 7–25)
CALCIUM SPEC-SCNC: 8.1 MG/DL (ref 8.6–10.5)
CALCIUM SPEC-SCNC: 8.1 MG/DL (ref 8.6–10.5)
CALCIUM SPEC-SCNC: 8.7 MG/DL (ref 8.6–10.5)
CALCIUM SPEC-SCNC: 9 MG/DL (ref 8.6–10.5)
CHLORIDE SERPL-SCNC: 94 MMOL/L (ref 98–107)
CO2 SERPL-SCNC: 22.9 MMOL/L (ref 22–29)
CREAT BLD-MCNC: 6.13 MG/DL (ref 0.76–1.27)
CYTO UR: NORMAL
DEPRECATED RDW RBC AUTO: 44.7 FL (ref 37–54)
EOSINOPHIL # BLD AUTO: 0.21 10*3/MM3 (ref 0–0.7)
EOSINOPHIL NFR BLD AUTO: 2.1 % (ref 0.3–6.2)
ERYTHROCYTE [DISTWIDTH] IN BLOOD BY AUTOMATED COUNT: 12.9 % (ref 11.5–14.5)
GFR SERPL CREATININE-BSD FRML MDRD: 10 ML/MIN/1.73
GLUCOSE BLD-MCNC: 113 MG/DL (ref 65–99)
GLUCOSE BLDC GLUCOMTR-MCNC: 151 MG/DL (ref 70–130)
GLUCOSE BLDC GLUCOMTR-MCNC: 166 MG/DL (ref 70–130)
GLUCOSE BLDC GLUCOMTR-MCNC: 99 MG/DL (ref 70–130)
HCT VFR BLD AUTO: 39.7 % (ref 40.4–52.2)
HGB BLD-MCNC: 12.7 G/DL (ref 13.7–17.6)
IMM GRANULOCYTES # BLD: 0.03 10*3/MM3 (ref 0–0.03)
IMM GRANULOCYTES NFR BLD: 0.3 % (ref 0–0.5)
INR PPP: 1.05 (ref 0.9–1.1)
LAB AP CASE REPORT: NORMAL
LYMPHOCYTES # BLD AUTO: 1.14 10*3/MM3 (ref 0.9–4.8)
LYMPHOCYTES NFR BLD AUTO: 11.5 % (ref 19.6–45.3)
Lab: NORMAL
Lab: NORMAL
MCH RBC QN AUTO: 30.6 PG (ref 27–32.7)
MCHC RBC AUTO-ENTMCNC: 32 G/DL (ref 32.6–36.4)
MCV RBC AUTO: 95.7 FL (ref 79.8–96.2)
MONOCYTES # BLD AUTO: 1.51 10*3/MM3 (ref 0.2–1.2)
MONOCYTES NFR BLD AUTO: 15.3 % (ref 5–12)
NEUTROPHILS # BLD AUTO: 6.97 10*3/MM3 (ref 1.9–8.1)
NEUTROPHILS NFR BLD AUTO: 70.5 % (ref 42.7–76)
PATH REPORT.FINAL DX SPEC: NORMAL
PATH REPORT.GROSS SPEC: NORMAL
PLATELET # BLD AUTO: 152 10*3/MM3 (ref 140–500)
PMV BLD AUTO: 10.7 FL (ref 6–12)
POTASSIUM BLD-SCNC: 4.6 MMOL/L (ref 3.5–5.2)
PROTHROMBIN TIME: 13.3 SECONDS (ref 11.7–14.2)
RBC # BLD AUTO: 4.15 10*6/MM3 (ref 4.6–6)
SODIUM BLD-SCNC: 136 MMOL/L (ref 136–145)
WBC NRBC COR # BLD: 9.89 10*3/MM3 (ref 4.5–10.7)

## 2017-08-03 PROCEDURE — 82310 ASSAY OF CALCIUM: CPT | Performed by: SURGERY

## 2017-08-03 PROCEDURE — 85025 COMPLETE CBC W/AUTO DIFF WBC: CPT | Performed by: SURGERY

## 2017-08-03 PROCEDURE — 85610 PROTHROMBIN TIME: CPT | Performed by: SURGERY

## 2017-08-03 PROCEDURE — 25010000002 HEPARIN (PORCINE) PER 1000 UNITS: Performed by: SURGERY

## 2017-08-03 PROCEDURE — G0378 HOSPITAL OBSERVATION PER HR: HCPCS

## 2017-08-03 PROCEDURE — 80048 BASIC METABOLIC PNL TOTAL CA: CPT | Performed by: SURGERY

## 2017-08-03 PROCEDURE — 99024 POSTOP FOLLOW-UP VISIT: CPT | Performed by: SURGERY

## 2017-08-03 PROCEDURE — 82962 GLUCOSE BLOOD TEST: CPT

## 2017-08-03 PROCEDURE — 25010000002 HYDROMORPHONE PER 4 MG: Performed by: SURGERY

## 2017-08-03 RX ORDER — OXYCODONE AND ACETAMINOPHEN 7.5; 325 MG/1; MG/1
1 TABLET ORAL EVERY 4 HOURS PRN
Qty: 30 TABLET | Refills: 0 | Status: SHIPPED | OUTPATIENT
Start: 2017-08-03 | End: 2017-08-24

## 2017-08-03 RX ORDER — OXYCODONE AND ACETAMINOPHEN 7.5; 325 MG/1; MG/1
1 TABLET ORAL EVERY 4 HOURS PRN
Status: DISCONTINUED | OUTPATIENT
Start: 2017-08-03 | End: 2017-08-04 | Stop reason: HOSPADM

## 2017-08-03 RX ORDER — CALCIUM CARBONATE 200(500)MG
3 TABLET,CHEWABLE ORAL EVERY 8 HOURS SCHEDULED
Status: DISCONTINUED | OUTPATIENT
Start: 2017-08-03 | End: 2017-08-04 | Stop reason: HOSPADM

## 2017-08-03 RX ORDER — ALBUMIN (HUMAN) 12.5 G/50ML
12.5 SOLUTION INTRAVENOUS AS NEEDED
Status: DISCONTINUED | OUTPATIENT
Start: 2017-08-03 | End: 2017-08-04 | Stop reason: HOSPADM

## 2017-08-03 RX ORDER — ALBUMIN (HUMAN) 12.5 G/50ML
12.5 SOLUTION INTRAVENOUS AS NEEDED
Status: CANCELLED | OUTPATIENT
Start: 2017-08-03 | End: 2017-08-04

## 2017-08-03 RX ADMIN — CALCITRIOL 0.5 MCG: 0.5 CAPSULE, LIQUID FILLED ORAL at 08:33

## 2017-08-03 RX ADMIN — ATORVASTATIN CALCIUM 10 MG: 10 TABLET, FILM COATED ORAL at 08:33

## 2017-08-03 RX ADMIN — ASPIRIN 81 MG: 81 TABLET ORAL at 08:33

## 2017-08-03 RX ADMIN — Medication 3 TABLET: at 17:54

## 2017-08-03 RX ADMIN — Medication 3 TABLET: at 22:41

## 2017-08-03 RX ADMIN — Medication 500 MG: at 08:33

## 2017-08-03 RX ADMIN — HEPARIN SODIUM 5000 UNITS: 5000 INJECTION, SOLUTION INTRAVENOUS; SUBCUTANEOUS at 05:17

## 2017-08-03 RX ADMIN — HYDROMORPHONE HYDROCHLORIDE 1 MG: 2 INJECTION, SOLUTION INTRAMUSCULAR; INTRAVENOUS; SUBCUTANEOUS at 05:17

## 2017-08-03 RX ADMIN — MORPHINE SULFATE 100 MG: 100 TABLET, EXTENDED RELEASE ORAL at 08:33

## 2017-08-03 RX ADMIN — HYDROMORPHONE HYDROCHLORIDE 1 MG: 2 INJECTION, SOLUTION INTRAMUSCULAR; INTRAVENOUS; SUBCUTANEOUS at 20:09

## 2017-08-03 RX ADMIN — CALCITRIOL 0.5 MCG: 0.5 CAPSULE, LIQUID FILLED ORAL at 20:10

## 2017-08-03 RX ADMIN — BUMETANIDE 1 MG: 1 TABLET ORAL at 20:08

## 2017-08-03 RX ADMIN — HYDROMORPHONE HYDROCHLORIDE 1 MG: 2 INJECTION, SOLUTION INTRAMUSCULAR; INTRAVENOUS; SUBCUTANEOUS at 14:16

## 2017-08-03 RX ADMIN — OXYCODONE HYDROCHLORIDE AND ACETAMINOPHEN 1 TABLET: 5; 325 TABLET ORAL at 06:08

## 2017-08-03 RX ADMIN — HYDROMORPHONE HYDROCHLORIDE 1 MG: 2 INJECTION, SOLUTION INTRAMUSCULAR; INTRAVENOUS; SUBCUTANEOUS at 03:14

## 2017-08-03 RX ADMIN — OXYCODONE HYDROCHLORIDE AND ACETAMINOPHEN 1 TABLET: 7.5; 325 TABLET ORAL at 21:45

## 2017-08-03 RX ADMIN — LEVETIRACETAM 1000 MG: 500 TABLET, FILM COATED ORAL at 20:08

## 2017-08-03 RX ADMIN — HEPARIN SODIUM 5000 UNITS: 5000 INJECTION, SOLUTION INTRAVENOUS; SUBCUTANEOUS at 14:16

## 2017-08-03 RX ADMIN — HYDROMORPHONE HYDROCHLORIDE 1 MG: 2 INJECTION, SOLUTION INTRAMUSCULAR; INTRAVENOUS; SUBCUTANEOUS at 08:02

## 2017-08-03 RX ADMIN — OXYCODONE HYDROCHLORIDE AND ACETAMINOPHEN 1 TABLET: 7.5; 325 TABLET ORAL at 15:03

## 2017-08-03 RX ADMIN — LEVOTHYROXINE SODIUM 175 MCG: 175 TABLET ORAL at 05:17

## 2017-08-03 RX ADMIN — HEPARIN SODIUM 5000 UNITS: 5000 INJECTION, SOLUTION INTRAVENOUS; SUBCUTANEOUS at 22:42

## 2017-08-03 RX ADMIN — LEVETIRACETAM 1000 MG: 500 TABLET, FILM COATED ORAL at 08:33

## 2017-08-03 RX ADMIN — HYDROMORPHONE HYDROCHLORIDE 1 MG: 2 INJECTION, SOLUTION INTRAMUSCULAR; INTRAVENOUS; SUBCUTANEOUS at 00:20

## 2017-08-03 RX ADMIN — HYDROMORPHONE HYDROCHLORIDE 1 MG: 2 INJECTION, SOLUTION INTRAMUSCULAR; INTRAVENOUS; SUBCUTANEOUS at 22:42

## 2017-08-03 RX ADMIN — HYDRALAZINE HYDROCHLORIDE 25 MG: 25 TABLET, FILM COATED ORAL at 20:08

## 2017-08-03 RX ADMIN — HYDROMORPHONE HYDROCHLORIDE 1 MG: 2 INJECTION, SOLUTION INTRAMUSCULAR; INTRAVENOUS; SUBCUTANEOUS at 10:41

## 2017-08-03 RX ADMIN — HYDROMORPHONE HYDROCHLORIDE 1 MG: 2 INJECTION, SOLUTION INTRAMUSCULAR; INTRAVENOUS; SUBCUTANEOUS at 17:54

## 2017-08-03 NOTE — SIGNIFICANT NOTE
08/03/17 1453   Rehab Treatment   Discipline (PT Student)   Rehab Evaluation   Evaluation Not Performed patient/family decline, not feeling well  (Pt reports he does not feel like participipating today.  Pt just retruned from dialysis and reports being too tired.  Informed RN Harvinder who is agreebale to follow up tomorrow for evaluation.  Will attempt evaluation again tomorrow.)   Recommendation   PT - Next Appointment 08/04/17

## 2017-08-03 NOTE — PROGRESS NOTES
"General Surgery    Progress Note    CC: Follow-up secondary hyperparathyroidism    POD#1 completion four gland parathyroidectomy with autotransplant into right forearm, left thyroid lobectomy        S: Seen in dialysis, pain in throat is worse today but he denies any dysphagia or dyspnea. Some raspiness to his voice, this is improved with more conversation    O:/75  Pulse 81  Temp 98.1 °F (36.7 °C)  Resp 20  Ht 68\" (172.7 cm)  Wt (!) 320 lb (145 kg)  SpO2 94%  BMI 48.66 kg/m2  Body mass index is 48.66 kg/(m^2).    Intake & Output (last day)       08/02 0701 - 08/03 0700 08/03 0701 - 08/04 0700    I.V. (mL/kg) 900 (6.2)     Total Intake(mL/kg) 900 (6.2)     Urine (mL/kg/hr) 0 (0)     Blood 25 (0)     Total Output 25      Net +875            Unmeasured Urine Occurrence 3 x           GENERAL: alert, well appearing, and in no distress and oriented to person, place, and time  HEENT: normocephalic, atraumatic, moist mucous membranes, clear sclera  NECK: lower transverse incision with some ecchymoses inferiorly, dermabond intact   CHEST: clear to auscultation, no wheezes, rales or rhonchi, symmetric air entry  CARDIAC: regular rate and rhythm    ABDOMEN: soft, nontender, nondistended, no masses or organomegaly  EXTREMITIES: right forearm incision healing well with scant ecchymosis, LUE AVF with dialysis cannulas attached   SKIN: Warm and moist, no rashes    LABS    Results from last 7 days  Lab Units 08/03/17  0702 08/02/17  2355 08/02/17  1809 08/02/17  1355 08/02/17  0631   SODIUM mmol/L 136  --   --  136 136   POTASSIUM mmol/L 4.6  --   --  4.5 4.4   CHLORIDE mmol/L 94*  --   --  93* 92*   CO2 mmol/L 22.9  --   --  24.6 27.5   BUN mg/dL 62*  --   --  53* 47*   CREATININE mg/dL 6.13*  --   --  5.15* 4.43*   GLUCOSE mg/dL 113*  --   --  144* 100*   CALCIUM mg/dL 8.1* 8.1* 9.1 9.5 9.8       Results from last 7 days  Lab Units 08/03/17  0702 07/27/17  1159   WBC 10*3/mm3 9.89 8.21   HEMOGLOBIN g/dL 12.7* 13.8 "   HEMATOCRIT % 39.7* 42.5   PLATELETS 10*3/mm3 152 165         Results from last 7 days  Lab Units 08/03/17  0702 08/02/17  1528 08/02/17  0614   INR  1.05 1.04 1.02         A/P: 56 y.o. male POD#1 completion four gland parathyroidectomy with autotransplant into right forearm, left thyroid lobectomy        Increase PO pain medication dosing to Percocet 7.5s (Rx left on chart for discharge)  Calcium down to 8.1, continue oral supplementation. Appreciate renal assistance. Discharge home whenever OK with nephrology.  Resume Coumadin tomorrow if no further expansion of his neck ecchymosis. Continue prophylactic subQ heparin  He will need labs drawn on Monday August 14th to recheck his Calcium, and I will call him with those results. He will need prescriptions for the oral calcium supplementation e-prescribed prior to discharge. His outpatient labs are already ordered under his discharge tab.   I will be going out of town this evening, Dr. Barakat to cover until I can return. He should follow-up with me 2 weeks after discharge.    Freida Morfin MD  General and Endoscopic Surgery  [facility]    [address], [address]  [place], [place], [address]  P: [phone]  F: [phone]

## 2017-08-03 NOTE — PLAN OF CARE
Problem: Patient Care Overview (Adult)  Goal: Plan of Care Review  Outcome: Ongoing (interventions implemented as appropriate)    08/03/17 0306   Coping/Psychosocial Response Interventions   Plan Of Care Reviewed With patient   Patient Care Overview   Progress no change   Outcome Evaluation   Outcome Summary/Follow up Plan no complications pt slept most the night. q2 pain meds        Goal: Adult Individualization and Mutuality  Outcome: Ongoing (interventions implemented as appropriate)  Goal: Discharge Needs Assessment  Outcome: Ongoing (interventions implemented as appropriate)    Problem: Perioperative Period (Adult)  Goal: Signs and Symptoms of Listed Potential Problems Will be Absent or Manageable (Perioperative Period)  Outcome: Ongoing (interventions implemented as appropriate)    Problem: Fall Risk (Adult)  Goal: Identify Related Risk Factors and Signs and Symptoms  Outcome: Ongoing (interventions implemented as appropriate)  Goal: Absence of Falls  Outcome: Ongoing (interventions implemented as appropriate)    Problem: Diabetes, Type 2 (Adult)  Goal: Signs and Symptoms of Listed Potential Problems Will be Absent or Manageable (Diabetes, Type 2)  Outcome: Ongoing (interventions implemented as appropriate)    Problem: Pain, Acute (Adult)  Goal: Identify Related Risk Factors and Signs and Symptoms  Outcome: Ongoing (interventions implemented as appropriate)  Goal: Acceptable Pain Control/Comfort Level  Outcome: Ongoing (interventions implemented as appropriate)

## 2017-08-03 NOTE — PROGRESS NOTES
NEPHROLOGY PROGRESS NOTE    PATIENT IDENTIFICATION:   Name:  Armando Gill      MRN:  [id number]     56 y.o.  male             Reason for visit: ESRD    SUBJECTIVE:   Seen and examined. No SOA or CP. No new complaints. Had dialysis earlier.  OBJECTIVE:  Vitals:    08/03/17 0546 08/03/17 0743 08/03/17 0845 08/03/17 1250   BP:  136/73 143/75 (!) 185/90   BP Location:    Right arm   Patient Position:    Lying   Pulse:  80 81 81   Resp:  16 20 18   Temp:  98 °F (36.7 °C) 98.1 °F (36.7 °C) 97.8 °F (36.6 °C)   TempSrc:  Oral  Oral   SpO2:  94%     Weight: (!) 320 lb (145 kg)      Height:               Body mass index is 48.66 kg/(m^2).    Intake/Output Summary (Last 24 hours) at 08/03/17 1459  Last data filed at 08/03/17 1300   Gross per 24 hour   Intake                0 ml   Output             2000 ml   Net            -2000 ml         Exam:  GEN:  No distress, appears stated age  EYES:   Anicteric sclera  ENT:    External ears/nose normal, MM are   NECK:  No adenopathy, JVP   LUNGS: Normal chest on inspection; not labored  CV:  Normal S1S2, without murmur  ABD:  Non-tender, non-distended, no hepatosplenomegaly, +BS  EXT:  No edema; no cyanosis; clubbing    Scheduled meds:    aspirin 81 mg Oral Daily   atorvastatin 10 mg Oral Daily   bumetanide 1 mg Oral 2 times per day on Sun Tue Thu Sat   calcitriol 0.5 mcg Oral Q12H   ceFAZolin 3 g Intravenous Once   heparin (porcine) 5,000 Units Subcutaneous Q8H   hydrALAZINE 25 mg Oral Nightly   insulin aspart 0-14 Units Subcutaneous 4x Daily With Meals & Nightly   levETIRAcetam 1,000 mg Oral Q12H   levothyroxine 175 mcg Oral Q AM   Morphine 100 mg Oral Daily   oyster shell calcium 500 mg Oral TID   Vortioxetine HBr 10 mg Oral Nightly   Vortioxetine HBr 10 mg Oral Nightly     IV meds:                           Data Review:      Results from last 7 days  Lab Units 08/03/17  1429 08/03/17  0702 08/02/17  2355  08/02/17  1355 08/02/17  0631   SODIUM mmol/L  --  136  --   --  136  136   POTASSIUM mmol/L  --  4.6  --   --  4.5 4.4   CHLORIDE mmol/L  --  94*  --   --  93* 92*   CO2 mmol/L  --  22.9  --   --  24.6 27.5   BUN mg/dL  --  62*  --   --  53* 47*   CREATININE mg/dL  --  6.13*  --   --  5.15* 4.43*   CALCIUM mg/dL 9.0 8.1* 8.1*  < > 9.5 9.8   GLUCOSE mg/dL  --  113*  --   --  144* 100*   < > = values in this interval not displayed.    Estimated Creatinine Clearance: 19.1 mL/min (by C-G formula based on Cr of 6.13).      Results from last 7 days  Lab Units 08/02/17  1355   MAGNESIUM mg/dL 1.7   PHOSPHORUS mg/dL 3.8         Results from last 7 days  Lab Units 08/03/17  0702   WBC 10*3/mm3 9.89   HEMOGLOBIN g/dL 12.7*   PLATELETS 10*3/mm3 152         Results from last 7 days  Lab Units 08/03/17  0702 08/02/17  1528 08/02/17  0614   INR  1.05 1.04 1.02             ASSESSMENT:   Principal Problem:    Secondary hyperparathyroidism of renal origin      1. End-stage renal disease on hemodialysis Monday Wednesday Friday:   Plan for dialysis tomorrow against 3 calcium bath to put him back on his schedule.   2.  Secondary hyperparathyroidism status post left thyroid lobectomy with autotransplantation in the right forearm. Will increase calcium to 1500 mg TID. D/W pharmacy We will obtain ionized calcium every 6 hours.    3.  Status post left thyroid lobectomy   4.  Anemia of end-stage renal disease    OK to D/C home post dialysis tomorrow if calcium is OK    Andres Hall MD  8/3/2017    2:59 PM

## 2017-08-03 NOTE — PLAN OF CARE
Problem: Patient Care Overview (Adult)  Goal: Plan of Care Review  Outcome: Ongoing (interventions implemented as appropriate)    08/03/17 6117   Coping/Psychosocial Response Interventions   Plan Of Care Reviewed With patient;spouse   Patient Care Overview   Progress improving   Outcome Evaluation   Outcome Summary/Follow up Plan This shift pt has had stable vital signs. Pt had dialyisis and was off unit from shortly after 8 am til after 2 pm. Pt has voiced alot of anxiety and pain.        Goal: Adult Individualization and Mutuality  Outcome: Ongoing (interventions implemented as appropriate)  Goal: Discharge Needs Assessment  Outcome: Ongoing (interventions implemented as appropriate)    Problem: Perioperative Period (Adult)  Goal: Signs and Symptoms of Listed Potential Problems Will be Absent or Manageable (Perioperative Period)  Outcome: Ongoing (interventions implemented as appropriate)    Problem: Fall Risk (Adult)  Goal: Identify Related Risk Factors and Signs and Symptoms  Outcome: Ongoing (interventions implemented as appropriate)  Goal: Absence of Falls  Outcome: Ongoing (interventions implemented as appropriate)    Problem: Diabetes, Type 2 (Adult)  Goal: Signs and Symptoms of Listed Potential Problems Will be Absent or Manageable (Diabetes, Type 2)  Outcome: Ongoing (interventions implemented as appropriate)    Problem: Pain, Acute (Adult)  Goal: Identify Related Risk Factors and Signs and Symptoms  Outcome: Ongoing (interventions implemented as appropriate)  Goal: Acceptable Pain Control/Comfort Level  Outcome: Ongoing (interventions implemented as appropriate)

## 2017-08-03 NOTE — SIGNIFICANT NOTE
08/03/17 0830   Rehab Treatment   Discipline (PT Student)   Rehab Evaluation   Evaluation Not Performed (Pt going to dialysis per nursing.  Will check back in PM.)   Recommendation   PT - Next Appointment 08/03/17

## 2017-08-03 NOTE — PROGRESS NOTES
Discharge Planning Assessment  [facility]     Patient Name: Armando Gill  MRN: [id number]  Today's Date: 8/3/2017    Admit Date: 8/2/2017          Discharge Needs Assessment       08/03/17 1633    Living Environment    Lives With spouse    Living Arrangements house    Home Accessibility no concerns    Stair Railings at Home none    Type of Financial/Environmental Concern none    Transportation Available car;family or friend will provide    Living Environment    Provides Primary Care For no one    Quality Of Family Relationships supportive    Able to Return to Prior Living Arrangements yes    Discharge Needs Assessment    Concerns To Be Addressed basic needs concerns    Readmission Within The Last 30 Days no previous admission in last 30 days    Equipment Currently Used at Home none    Equipment Needed After Discharge none    Discharge Contact Information if Applicable wife, Roxie Stewart [phone]    Discharge Planning Comments Home with wife, no needs            Discharge Plan       08/03/17 1634    Case Management/Social Work Plan    Plan Home with wife,  no needs    Patient/Family In Agreement With Plan yes    Additional Comments S/W patient at bedside and verified face sheet.  CMS ZAPATA given and explained, recieved signature at bedside.  Patient drives, does not use any equipment.  Patient uses [facility] in [place] for medications and states that he can afford his meds now, but has had trouble in the past.  Explained about [employer], or calling the company that supplies the drug for an application.  Patient has a living will, on file.  Patient currently does dialysis at [facility] in [facility] on M-W-F.  Wife will  patient from the hospital.  Will continue to monitor and assist, as needed.  DENAE Hughes, CCP        Discharge Placement     No information found                Demographic Summary       08/03/17 1631    Referral Information    Admission Type inpatient    Arrived From home  or self-care    Referral Source admission list    Reason For Consult discharge planning    Record Reviewed plan of care    Contact Information    Permission Granted to Share Information With family/designee   wife, Roxie Gill [phone]    Primary Care Physician Information    Name Dr. Luis Antonio Abarca             Functional Status       08/03/17 1633    Functional Status Current    Ambulation 0-->independent    Transferring 0-->independent    Toileting 0-->independent    Bathing 0-->independent    Dressing 0-->independent    Eating 0-->independent    Communication 0-->understands/communicates without difficulty    Swallowing (if score 2 or more for any item, consult Rehab Services) 0-->swallows foods/liquids without difficulty    Functional Status Prior    Ambulation 0-->independent    Transferring 0-->independent    Toileting 0-->independent    Bathing 0-->independent    Dressing 0-->independent    Eating 0-->independent    Communication 0-->understands/communicates without difficulty    Swallowing 0-->swallows foods/liquids without difficulty    IADL    Medications independent    Meal Preparation assistive person    Housekeeping assistive person    Laundry assistive person    Shopping assistive person    Oral Care independent            Psychosocial     None            Abuse/Neglect     None            Legal     None            Substance Abuse     None            Patient Forms     None          Shalini Garg RN

## 2017-08-04 VITALS
DIASTOLIC BLOOD PRESSURE: 73 MMHG | TEMPERATURE: 98.8 F | OXYGEN SATURATION: 98 % | BODY MASS INDEX: 47.74 KG/M2 | HEART RATE: 86 BPM | WEIGHT: 315 LBS | RESPIRATION RATE: 18 BRPM | HEIGHT: 68 IN | SYSTOLIC BLOOD PRESSURE: 129 MMHG

## 2017-08-04 LAB
ANION GAP SERPL CALCULATED.3IONS-SCNC: 16 MMOL/L
BASOPHILS # BLD AUTO: 0.04 10*3/MM3 (ref 0–0.2)
BASOPHILS NFR BLD AUTO: 0.5 % (ref 0–1.5)
BUN BLD-MCNC: 48 MG/DL (ref 6–20)
BUN/CREAT SERPL: 9.1 (ref 7–25)
CALCIUM SPEC-SCNC: 8.4 MG/DL (ref 8.6–10.5)
CALCIUM SPEC-SCNC: 8.6 MG/DL (ref 8.6–10.5)
CALCIUM SPEC-SCNC: 9.9 MG/DL (ref 8.6–10.5)
CHLORIDE SERPL-SCNC: 95 MMOL/L (ref 98–107)
CO2 SERPL-SCNC: 23 MMOL/L (ref 22–29)
CREAT BLD-MCNC: 5.29 MG/DL (ref 0.76–1.27)
DEPRECATED RDW RBC AUTO: 44.8 FL (ref 37–54)
EOSINOPHIL # BLD AUTO: 0.46 10*3/MM3 (ref 0–0.7)
EOSINOPHIL NFR BLD AUTO: 5.6 % (ref 0.3–6.2)
ERYTHROCYTE [DISTWIDTH] IN BLOOD BY AUTOMATED COUNT: 12.9 % (ref 11.5–14.5)
GFR SERPL CREATININE-BSD FRML MDRD: 11 ML/MIN/1.73
GLUCOSE BLD-MCNC: 95 MG/DL (ref 65–99)
GLUCOSE BLDC GLUCOMTR-MCNC: 122 MG/DL (ref 70–130)
GLUCOSE BLDC GLUCOMTR-MCNC: 82 MG/DL (ref 70–130)
HCT VFR BLD AUTO: 39.7 % (ref 40.4–52.2)
HGB BLD-MCNC: 12.6 G/DL (ref 13.7–17.6)
IMM GRANULOCYTES # BLD: 0.02 10*3/MM3 (ref 0–0.03)
IMM GRANULOCYTES NFR BLD: 0.2 % (ref 0–0.5)
INR PPP: 0.99 (ref 0.9–1.1)
LYMPHOCYTES # BLD AUTO: 1.16 10*3/MM3 (ref 0.9–4.8)
LYMPHOCYTES NFR BLD AUTO: 14.2 % (ref 19.6–45.3)
MCH RBC QN AUTO: 30.7 PG (ref 27–32.7)
MCHC RBC AUTO-ENTMCNC: 31.7 G/DL (ref 32.6–36.4)
MCV RBC AUTO: 96.6 FL (ref 79.8–96.2)
MONOCYTES # BLD AUTO: 1.38 10*3/MM3 (ref 0.2–1.2)
MONOCYTES NFR BLD AUTO: 16.9 % (ref 5–12)
NEUTROPHILS # BLD AUTO: 5.1 10*3/MM3 (ref 1.9–8.1)
NEUTROPHILS NFR BLD AUTO: 62.6 % (ref 42.7–76)
PLATELET # BLD AUTO: 126 10*3/MM3 (ref 140–500)
PMV BLD AUTO: 10.6 FL (ref 6–12)
POTASSIUM BLD-SCNC: 4.7 MMOL/L (ref 3.5–5.2)
PROTHROMBIN TIME: 12.7 SECONDS (ref 11.7–14.2)
RBC # BLD AUTO: 4.11 10*6/MM3 (ref 4.6–6)
SODIUM BLD-SCNC: 134 MMOL/L (ref 136–145)
WBC NRBC COR # BLD: 8.16 10*3/MM3 (ref 4.5–10.7)

## 2017-08-04 PROCEDURE — 25010000002 HYDROMORPHONE PER 4 MG: Performed by: SURGERY

## 2017-08-04 PROCEDURE — 82310 ASSAY OF CALCIUM: CPT | Performed by: SURGERY

## 2017-08-04 PROCEDURE — 85025 COMPLETE CBC W/AUTO DIFF WBC: CPT | Performed by: SURGERY

## 2017-08-04 PROCEDURE — 82962 GLUCOSE BLOOD TEST: CPT

## 2017-08-04 PROCEDURE — 80048 BASIC METABOLIC PNL TOTAL CA: CPT | Performed by: SURGERY

## 2017-08-04 PROCEDURE — 85610 PROTHROMBIN TIME: CPT | Performed by: SURGERY

## 2017-08-04 PROCEDURE — G0378 HOSPITAL OBSERVATION PER HR: HCPCS

## 2017-08-04 PROCEDURE — 25010000002 HEPARIN (PORCINE) PER 1000 UNITS: Performed by: SURGERY

## 2017-08-04 RX ADMIN — Medication 3 TABLET: at 14:37

## 2017-08-04 RX ADMIN — MORPHINE SULFATE 100 MG: 100 TABLET, EXTENDED RELEASE ORAL at 14:39

## 2017-08-04 RX ADMIN — HYDROMORPHONE HYDROCHLORIDE 1 MG: 2 INJECTION, SOLUTION INTRAMUSCULAR; INTRAVENOUS; SUBCUTANEOUS at 14:37

## 2017-08-04 RX ADMIN — HEPARIN SODIUM 5000 UNITS: 5000 INJECTION, SOLUTION INTRAVENOUS; SUBCUTANEOUS at 14:37

## 2017-08-04 RX ADMIN — HEPARIN SODIUM 5000 UNITS: 5000 INJECTION, SOLUTION INTRAVENOUS; SUBCUTANEOUS at 06:28

## 2017-08-04 RX ADMIN — HYDROMORPHONE HYDROCHLORIDE 1 MG: 2 INJECTION, SOLUTION INTRAMUSCULAR; INTRAVENOUS; SUBCUTANEOUS at 06:28

## 2017-08-04 RX ADMIN — ASPIRIN 81 MG: 81 TABLET ORAL at 14:37

## 2017-08-04 RX ADMIN — HYDROMORPHONE HYDROCHLORIDE 1 MG: 2 INJECTION, SOLUTION INTRAMUSCULAR; INTRAVENOUS; SUBCUTANEOUS at 04:24

## 2017-08-04 RX ADMIN — ATORVASTATIN CALCIUM 10 MG: 10 TABLET, FILM COATED ORAL at 14:37

## 2017-08-04 RX ADMIN — CALCITRIOL 0.5 MCG: 0.5 CAPSULE, LIQUID FILLED ORAL at 14:36

## 2017-08-04 RX ADMIN — HYDROMORPHONE HYDROCHLORIDE 1 MG: 2 INJECTION, SOLUTION INTRAMUSCULAR; INTRAVENOUS; SUBCUTANEOUS at 08:17

## 2017-08-04 RX ADMIN — LEVOTHYROXINE SODIUM 175 MCG: 175 TABLET ORAL at 06:28

## 2017-08-04 RX ADMIN — Medication 3 TABLET: at 06:28

## 2017-08-04 RX ADMIN — HYDROMORPHONE HYDROCHLORIDE 1 MG: 2 INJECTION, SOLUTION INTRAMUSCULAR; INTRAVENOUS; SUBCUTANEOUS at 01:58

## 2017-08-04 RX ADMIN — LEVETIRACETAM 1000 MG: 500 TABLET, FILM COATED ORAL at 14:36

## 2017-08-04 NOTE — SIGNIFICANT NOTE
08/04/17 1042   Rehab Treatment   Discipline physical therapist   Rehab Evaluation   Evaluation Not Performed patient unavailable for evaluation  (off the floor to dialysis. Will follow up later today)   Recommendation   PT - Next Appointment 08/04/17

## 2017-08-04 NOTE — SIGNIFICANT NOTE
08/04/17 1353   Rehab Treatment   Discipline physical therapist   Rehab Evaluation   Evaluation Not Performed patient unavailable for evaluation  (Off  the floor to dialysis since around 10 AM. Will follow up tomorrow.)   Recommendation   PT - Next Appointment 08/05/17

## 2017-08-04 NOTE — PLAN OF CARE
Problem: Patient Care Overview (Adult)  Goal: Plan of Care Review  Outcome: Ongoing (interventions implemented as appropriate)    08/04/17 0412   Coping/Psychosocial Response Interventions   Plan Of Care Reviewed With patient   Patient Care Overview   Progress no change   Outcome Evaluation   Outcome Summary/Follow up Plan no complications pt rested most of the night. q2 pain meds        Goal: Adult Individualization and Mutuality  Outcome: Ongoing (interventions implemented as appropriate)  Goal: Discharge Needs Assessment  Outcome: Ongoing (interventions implemented as appropriate)    Problem: Perioperative Period (Adult)  Goal: Signs and Symptoms of Listed Potential Problems Will be Absent or Manageable (Perioperative Period)  Outcome: Ongoing (interventions implemented as appropriate)    Problem: Fall Risk (Adult)  Goal: Identify Related Risk Factors and Signs and Symptoms  Outcome: Ongoing (interventions implemented as appropriate)  Goal: Absence of Falls  Outcome: Ongoing (interventions implemented as appropriate)    Problem: Diabetes, Type 2 (Adult)  Goal: Signs and Symptoms of Listed Potential Problems Will be Absent or Manageable (Diabetes, Type 2)  Outcome: Ongoing (interventions implemented as appropriate)    Problem: Pain, Acute (Adult)  Goal: Identify Related Risk Factors and Signs and Symptoms  Outcome: Ongoing (interventions implemented as appropriate)  Goal: Acceptable Pain Control/Comfort Level  Outcome: Ongoing (interventions implemented as appropriate)

## 2017-08-04 NOTE — PROGRESS NOTES
Continued Stay Note  [facility]     Patient Name: Armando Gill  MRN: [id number]  Today's Date: 8/4/2017    Admit Date: 8/2/2017          Discharge Plan       08/04/17 1602    Case Management/Social Work Plan    Plan Home with wife, no needs    Patient/Family In Agreement With Plan yes    Final Note    Final Note orders to d/c              Discharge Codes     None        Expected Discharge Date and Time     Expected Discharge Date Expected Discharge Time    Aug 4, 2017             Shalini Garg RN

## 2017-08-04 NOTE — PROGRESS NOTES
NEPHROLOGY PROGRESS NOTE    PATIENT IDENTIFICATION:   Name:  Armando Gill      MRN:  [id number]     56 y.o.  male             Reason for visit: ESRD    SUBJECTIVE:   Seen and examined. No SOA or CP. Seen on HD with no issues. Wants to go home.    OBJECTIVE:  Vitals:    08/03/17 1933 08/03/17 2320 08/04/17 0643 08/04/17 0751   BP: 100/65 104/64  122/69   BP Location: Right arm Right arm  Right arm   Patient Position: Lying Lying  Lying   Pulse: 101 90  88   Resp: 16 16  18   Temp: 98.4 °F (36.9 °C) 98.1 °F (36.7 °C)  99.2 °F (37.3 °C)   TempSrc: Oral Oral  Oral   SpO2:  95%  97%   Weight:   (!) 321 lb 14.4 oz (146 kg)    Height:               Body mass index is 48.94 kg/(m^2).  No intake or output data in the 24 hours ending 08/04/17 1412      Exam:  GEN:  No distress, appears stated age  EYES:   Anicteric sclera  ENT:    External ears/nose normal, MM are   NECK:  No adenopathy, JVP   LUNGS: Normal chest on inspection; not labored  CV:  Normal S1S2, without murmur  ABD:  Non-tender, non-distended, no hepatosplenomegaly, +BS  EXT:  No edema; no cyanosis; clubbing    Scheduled meds:      aspirin 81 mg Oral Daily   atorvastatin 10 mg Oral Daily   bumetanide 1 mg Oral 2 times per day on Sun Tue Thu Sat   calcitriol 0.5 mcg Oral Q12H   calcium carbonate 3 tablet Oral Q8H   ceFAZolin 3 g Intravenous Once   heparin (porcine) 5,000 Units Subcutaneous Q8H   hydrALAZINE 25 mg Oral Nightly   insulin aspart 0-14 Units Subcutaneous 4x Daily With Meals & Nightly   levETIRAcetam 1,000 mg Oral Q12H   levothyroxine 175 mcg Oral Q AM   Morphine 100 mg Oral Daily   Vortioxetine HBr 10 mg Oral Nightly     IV meds:                           Data Review:      Results from last 7 days  Lab Units 08/04/17  1220 08/04/17  0621 08/04/17  0002  08/03/17  0702  08/02/17  1355   SODIUM mmol/L  --  134*  --   --  136  --  136   POTASSIUM mmol/L  --  4.7  --   --  4.6  --  4.5   CHLORIDE mmol/L  --  95*  --   --  94*  --  93*   CO2 mmol/L   --  23.0  --   --  22.9  --  24.6   BUN mg/dL  --  48*  --   --  62*  --  53*   CREATININE mg/dL  --  5.29*  --   --  6.13*  --  5.15*   CALCIUM mg/dL 9.9 8.6 8.4*  < > 8.1*  < > 9.5   GLUCOSE mg/dL  --  95  --   --  113*  --  144*   < > = values in this interval not displayed.    Estimated Creatinine Clearance: 22.2 mL/min (by C-G formula based on Cr of 5.29).      Results from last 7 days  Lab Units 08/02/17  1355   MAGNESIUM mg/dL 1.7   PHOSPHORUS mg/dL 3.8         Results from last 7 days  Lab Units 08/04/17  0621 08/03/17  0702   WBC 10*3/mm3 8.16 9.89   HEMOGLOBIN g/dL 12.6* 12.7*   PLATELETS 10*3/mm3 126* 152         Results from last 7 days  Lab Units 08/04/17  0622 08/03/17  0702 08/02/17  1528 08/02/17  0614   INR  0.99 1.05 1.04 1.02             ASSESSMENT:   Principal Problem:    Secondary hyperparathyroidism of renal origin      1. End-stage renal disease on hemodialysis Monday Wednesday Friday:    2.  Secondary hyperparathyroidism status post left thyroid lobectomy with autotransplantation in the right forearm   3.  Status post left thyroid lobectomy   4.  Anemia of end-stage renal disease    Seen on HD. OK to D/C home post dialysis today on Tums 1500 mg po TID with weekly calcium check. D/W patient and dialysis    Andres Hall MD  8/4/2017    2:12 PM

## 2017-08-07 NOTE — PROGRESS NOTES
Continued Stay Note  [facility]     Patient Name: Armando Gill  MRN: [id number]  Today's Date: 8/7/2017    Admit Date: 8/2/2017          Discharge Plan       08/07/17 0756    Case Management/Social Work Plan    Plan Home with wife, no needs    Final Note    Final Note d/c'd home              Discharge Codes       08/07/17 0756    Discharge Codes    Discharge Codes 01  Discharge to home        Expected Discharge Date and Time     Expected Discharge Date Expected Discharge Time    Aug 4, 2017             Shalini Garg RN

## 2017-08-08 ENCOUNTER — TELEPHONE (OUTPATIENT)
Dept: SURGERY | Facility: CLINIC | Age: 57
End: 2017-08-08

## 2017-08-24 ENCOUNTER — OFFICE VISIT (OUTPATIENT)
Dept: SURGERY | Facility: CLINIC | Age: 57
End: 2017-08-24

## 2017-08-24 ENCOUNTER — APPOINTMENT (OUTPATIENT)
Dept: LAB | Facility: HOSPITAL | Age: 57
End: 2017-08-24

## 2017-08-24 ENCOUNTER — APPOINTMENT (OUTPATIENT)
Dept: WOUND CARE | Facility: HOSPITAL | Age: 57
End: 2017-08-24
Attending: SURGERY

## 2017-08-24 DIAGNOSIS — N25.81 SECONDARY HYPERPARATHYROIDISM OF RENAL ORIGIN (HCC): Primary | ICD-10-CM

## 2017-08-24 DIAGNOSIS — I48.20 CHRONIC ATRIAL FIBRILLATION (HCC): ICD-10-CM

## 2017-08-24 LAB — CALCIUM SPEC-SCNC: 9.3 MG/DL (ref 8.6–10.5)

## 2017-08-24 PROCEDURE — 82310 ASSAY OF CALCIUM: CPT | Performed by: SURGERY

## 2017-08-24 PROCEDURE — 36415 COLL VENOUS BLD VENIPUNCTURE: CPT | Performed by: SURGERY

## 2017-08-24 PROCEDURE — 99024 POSTOP FOLLOW-UP VISIT: CPT | Performed by: SURGERY

## 2017-08-25 ENCOUNTER — TELEPHONE (OUTPATIENT)
Dept: SURGERY | Facility: CLINIC | Age: 57
End: 2017-08-25

## 2017-08-25 NOTE — TELEPHONE ENCOUNTER
Patient called and said Dr Boone told him not to stop taking his TUMS. Patient states he was quite addiment.   Is this OK with you?

## 2017-08-25 NOTE — PROGRESS NOTES
CHIEF COMPLAINT:   Chief Complaint   Patient presents with   • Post-op     Left thyroid lobectomy, left superior parathyroidectomy with autotransplantation into right forearm, and intraoperative PTH monitoring on 08/02/2017       HISTORY OF PRESENT ILLNESS:  This is a 56 y.o. male  who presents for a post-operative visit after undergoing a completion 4 gland parathyroidectomy with autotransplant into the right forearm for secondary hyperparathyroidism due to renal disease.  He had previously undergone 3 gland excision back in January of this year, but his fourth gland was unable to be identified and it was thought to be due to possible atrophy following his previous tracheostomy for sleep apnea years prior. Unfortunately following the 3 gland parathyroidectomy with autotransplant in January, he developed recurrent secondary hyperparathyroidism suggestive of a persistent unidentified gland.  Preoperative imaging was suggestive of a left superior gland within the neck with a recurrent pTH increase up to 2600. He is now approximately 3 weeks postop from a completion fourth gland parathyroidectomy with left iraj-thyroidectomy due to the intrathyroidal nature of the retained parathyroid gland.  He has been taking Tums 1500 mg 3 times daily at the direction of his nephrology team and has had no signs of hypocalcemia.  He has been undergoing hemodialysis every Monday, Wednesday, and Friday at [facility] in [place] and has been back on his Coumadin daily.  He has had no change in his voice, and states that he feels well overall.    Pathology:   1.  PARATHYROID, LEFT SUPERIOR, EXCISION:              MILDLY ENLARGED AND HYPERCELLULAR PARATHYROID GLAND (SEE COMMENT).             SMALL PORTION OF ADHERENT BENIGN THYROID TISSUE.     COMMENT: The histologic features are compatible with the clinical impression of parathyroid hyperplasia.      2.  THYROID, LEFT LOBE, LOBECTOMY:             THYROID TISSUE WITH MINIMAL FIBROSIS, BUT  OTHERWISE WITH NO SIGNIFICANT HISTOPATHOLOGY.             PARATHYROID TISSUE PRESENT IN ADJACENT SOFT TISSUE.             SMALL LYMPH NODE PRESENT IN ADJACENT SOFT TISSUE.             ADJACENT SOFT TISSUE WITH FOCAL FIBROSIS AND FOREIGN BODY-TYPE GIANT CELL REACTION.             NO EVIDENCE OF MALIGNANCY.        PHYSICAL EXAM:  Neck: lower transverse scar healing well, no neck swelling  Lungs: Clear  Heart: RRR  ABD: obese, soft  Ext: right forearm incision healing well without ecchymoses, left upper extremity AVF with palpable thrill    A/P:  This is a 56 y.o. male patient who is S/P 4 gland parathyroidectomy with autotransplantation into the right forearm for secondary hyperparathyroidism    His most recent labs drawn at hemodialysis on August 16 showed a calcium level of 8.1 and a parathyroid hormone level of 138, down from 2650 preoperatively.  His intraoperative PTH measurements were much lower, but this test was run on a separate machine with different calibration, and his PTH level of 138 now being above the intraoperative measurements of 71 and 69 is irrelevant.  This does not represent a true increase in his PTH measurement.  I asked him to go directly over to the lab following our office visit today for repeat calcium measurement, and it has just returned as 9.3, so I have contacted him and told him to stop taking his Tums regularly as it appears his autotransplanted small portion of the fourth gland is now functioning well.  He knows to be on the look out for any signs of hypocalcemia, and contact his nephrologist immediately.  He can follow-up with me as needed.    Freida Morfin MD  General and Endoscopic Surgery  [facility]    [address], [address]  [place], [place], [address]  P: [phone]  F: [phone]

## 2017-08-27 NOTE — TELEPHONE ENCOUNTER
That's fine, he can refer to his nephrologist regarding ongoing calcium supplementation and should continue taking the TUMS if Dr. Boone wants him to do so.

## 2017-09-07 ENCOUNTER — APPOINTMENT (OUTPATIENT)
Dept: WOUND CARE | Facility: HOSPITAL | Age: 57
End: 2017-09-07
Attending: SURGERY

## 2017-09-21 ENCOUNTER — APPOINTMENT (OUTPATIENT)
Dept: WOUND CARE | Facility: HOSPITAL | Age: 57
End: 2017-09-21
Attending: SURGERY

## 2017-10-05 ENCOUNTER — APPOINTMENT (OUTPATIENT)
Dept: WOUND CARE | Facility: HOSPITAL | Age: 57
End: 2017-10-05
Attending: SURGERY

## 2017-10-12 ENCOUNTER — APPOINTMENT (OUTPATIENT)
Dept: WOUND CARE | Facility: HOSPITAL | Age: 57
End: 2017-10-12
Attending: SURGERY

## 2017-10-26 ENCOUNTER — APPOINTMENT (OUTPATIENT)
Dept: WOUND CARE | Facility: HOSPITAL | Age: 57
End: 2017-10-26
Attending: SURGERY

## 2017-11-09 ENCOUNTER — APPOINTMENT (OUTPATIENT)
Dept: WOUND CARE | Facility: HOSPITAL | Age: 57
End: 2017-11-09
Attending: SURGERY

## 2017-11-16 ENCOUNTER — APPOINTMENT (OUTPATIENT)
Dept: WOUND CARE | Facility: HOSPITAL | Age: 57
End: 2017-11-16
Attending: SURGERY

## 2017-12-07 ENCOUNTER — APPOINTMENT (OUTPATIENT)
Dept: WOUND CARE | Facility: HOSPITAL | Age: 57
End: 2017-12-07
Attending: SURGERY

## 2017-12-21 ENCOUNTER — APPOINTMENT (OUTPATIENT)
Dept: WOUND CARE | Facility: HOSPITAL | Age: 57
End: 2017-12-21
Attending: SURGERY

## 2018-01-04 ENCOUNTER — APPOINTMENT (OUTPATIENT)
Dept: WOUND CARE | Facility: HOSPITAL | Age: 58
End: 2018-01-04
Attending: SURGERY

## 2018-01-18 ENCOUNTER — APPOINTMENT (OUTPATIENT)
Dept: WOUND CARE | Facility: HOSPITAL | Age: 58
End: 2018-01-18
Attending: SURGERY

## 2018-01-25 ENCOUNTER — APPOINTMENT (OUTPATIENT)
Dept: WOUND CARE | Facility: HOSPITAL | Age: 58
End: 2018-01-25
Attending: SURGERY

## 2018-02-08 ENCOUNTER — APPOINTMENT (OUTPATIENT)
Dept: WOUND CARE | Facility: HOSPITAL | Age: 58
End: 2018-02-08
Attending: SURGERY

## 2018-02-22 ENCOUNTER — APPOINTMENT (OUTPATIENT)
Dept: WOUND CARE | Facility: HOSPITAL | Age: 58
End: 2018-02-22
Attending: SURGERY

## 2018-03-08 ENCOUNTER — APPOINTMENT (OUTPATIENT)
Dept: WOUND CARE | Facility: HOSPITAL | Age: 58
End: 2018-03-08
Attending: SURGERY

## 2018-03-15 ENCOUNTER — APPOINTMENT (OUTPATIENT)
Dept: WOUND CARE | Facility: HOSPITAL | Age: 58
End: 2018-03-15
Attending: SURGERY

## 2018-03-29 ENCOUNTER — APPOINTMENT (OUTPATIENT)
Dept: WOUND CARE | Facility: HOSPITAL | Age: 58
End: 2018-03-29
Attending: SURGERY

## 2018-04-05 ENCOUNTER — APPOINTMENT (OUTPATIENT)
Dept: WOUND CARE | Facility: HOSPITAL | Age: 58
End: 2018-04-05
Attending: SURGERY

## 2018-04-11 ENCOUNTER — TRANSCRIBE ORDERS (OUTPATIENT)
Dept: ADMINISTRATIVE | Facility: HOSPITAL | Age: 58
End: 2018-04-11

## 2018-04-11 DIAGNOSIS — R07.9 CHEST PAIN, UNSPECIFIED TYPE: Primary | ICD-10-CM

## 2018-04-17 ENCOUNTER — APPOINTMENT (OUTPATIENT)
Dept: CARDIOLOGY | Facility: HOSPITAL | Age: 58
End: 2018-04-17

## 2018-04-19 ENCOUNTER — APPOINTMENT (OUTPATIENT)
Dept: WOUND CARE | Facility: HOSPITAL | Age: 58
End: 2018-04-19
Attending: SURGERY

## 2018-04-24 ENCOUNTER — HOSPITAL ENCOUNTER (OUTPATIENT)
Dept: CARDIOLOGY | Facility: HOSPITAL | Age: 58
Discharge: HOME | End: 2018-04-24
Admitting: FAMILY MEDICINE

## 2018-04-24 VITALS — BODY MASS INDEX: 49.42 KG/M2 | WEIGHT: 315 LBS

## 2018-04-24 DIAGNOSIS — R07.9 CHEST PAIN, UNSPECIFIED TYPE: ICD-10-CM

## 2018-04-24 LAB
AORTIC DIMENSIONLESS INDEX: 1 (DI)
BH CV ECHO MEAS - ACS: 2 CM
BH CV ECHO MEAS - AO MAX PG (FULL): 1.4 MMHG
BH CV ECHO MEAS - AO MAX PG: 8.8 MMHG
BH CV ECHO MEAS - AO MEAN PG (FULL): 1 MMHG
BH CV ECHO MEAS - AO MEAN PG: 5 MMHG
BH CV ECHO MEAS - AO ROOT AREA (BSA CORRECTED): 1.4
BH CV ECHO MEAS - AO ROOT AREA: 9.1 CM^2
BH CV ECHO MEAS - AO ROOT DIAM: 3.4 CM
BH CV ECHO MEAS - AO V2 MAX: 148 CM/SEC
BH CV ECHO MEAS - AO V2 MEAN: 99.4 CM/SEC
BH CV ECHO MEAS - AO V2 VTI: 24.8 CM
BH CV ECHO MEAS - AVA(I,A): 4.6 CM^2
BH CV ECHO MEAS - AVA(I,D): 4.6 CM^2
BH CV ECHO MEAS - AVA(V,A): 4.2 CM^2
BH CV ECHO MEAS - AVA(V,D): 4.2 CM^2
BH CV ECHO MEAS - BSA(HAYCOCK): 2.7 M^2
BH CV ECHO MEAS - BSA: 2.5 M^2
BH CV ECHO MEAS - BZI_BMI: 49.4 KILOGRAMS/M^2
BH CV ECHO MEAS - BZI_METRIC_HEIGHT: 172.7 CM
BH CV ECHO MEAS - BZI_METRIC_WEIGHT: 147.4 KG
BH CV ECHO MEAS - CONTRAST EF (2CH): 77.9 ML/M^2
BH CV ECHO MEAS - CONTRAST EF 4CH: 81.9 ML/M^2
BH CV ECHO MEAS - EDV(CUBED): 125 ML
BH CV ECHO MEAS - EDV(MOD-SP2): 133 ML
BH CV ECHO MEAS - EDV(MOD-SP4): 147 ML
BH CV ECHO MEAS - EDV(TEICH): 118.2 ML
BH CV ECHO MEAS - EF(CUBED): 68.6 %
BH CV ECHO MEAS - EF(MOD-BP): 69 %
BH CV ECHO MEAS - EF(MOD-SP2): 69 %
BH CV ECHO MEAS - EF(MOD-SP4): 67 %
BH CV ECHO MEAS - EF(TEICH): 59.9 %
BH CV ECHO MEAS - ESV(CUBED): 39.3 ML
BH CV ECHO MEAS - ESV(MOD-SP2): 41 ML
BH CV ECHO MEAS - ESV(MOD-SP4): 49 ML
BH CV ECHO MEAS - ESV(TEICH): 47.4 ML
BH CV ECHO MEAS - FS: 32 %
BH CV ECHO MEAS - IVS/LVPW: 0.91
BH CV ECHO MEAS - IVSD: 1 CM
BH CV ECHO MEAS - LAT PEAK E' VEL: 14 CM/SEC
BH CV ECHO MEAS - LV DIASTOLIC VOL/BSA (35-75): 33 ML/M^2
BH CV ECHO MEAS - LV MASS(C)D: 194.4 GRAMS
BH CV ECHO MEAS - LV MASS(C)DI: 77.4 GRAMS/M^2
BH CV ECHO MEAS - LV MAX PG: 7.4 MMHG
BH CV ECHO MEAS - LV MEAN PG: 4 MMHG
BH CV ECHO MEAS - LV SYSTOLIC VOL/BSA (12-30): 6 ML/M^2
BH CV ECHO MEAS - LV V1 MAX: 136 CM/SEC
BH CV ECHO MEAS - LV V1 MEAN: 90.2 CM/SEC
BH CV ECHO MEAS - LV V1 VTI: 25 CM
BH CV ECHO MEAS - LVIDD: 5 CM
BH CV ECHO MEAS - LVIDS: 3.4 CM
BH CV ECHO MEAS - LVLD AP2: 9.1 CM
BH CV ECHO MEAS - LVLD AP4: 9.4 CM
BH CV ECHO MEAS - LVLS AP2: 8.6 CM
BH CV ECHO MEAS - LVLS AP4: 8.2 CM
BH CV ECHO MEAS - LVOT AREA (M): 4.5 CM^2
BH CV ECHO MEAS - LVOT AREA: 4.5 CM^2
BH CV ECHO MEAS - LVOT DIAM: 2.4 CM
BH CV ECHO MEAS - LVPWD: 1.1 CM
BH CV ECHO MEAS - MED PEAK E' VEL: 7 CM/SEC
BH CV ECHO MEAS - MV A DUR: 0.12 SEC
BH CV ECHO MEAS - MV A MAX VEL: 79.1 CM/SEC
BH CV ECHO MEAS - MV DEC SLOPE: 589 CM/SEC^2
BH CV ECHO MEAS - MV DEC TIME: 0.21 SEC
BH CV ECHO MEAS - MV E MAX VEL: 59.2 CM/SEC
BH CV ECHO MEAS - MV E/A: 0.75
BH CV ECHO MEAS - MV MAX PG: 4.2 MMHG
BH CV ECHO MEAS - MV MEAN PG: 2 MMHG
BH CV ECHO MEAS - MV P1/2T MAX VEL: 109 CM/SEC
BH CV ECHO MEAS - MV P1/2T: 54.2 MSEC
BH CV ECHO MEAS - MV V2 MAX: 102 CM/SEC
BH CV ECHO MEAS - MV V2 MEAN: 57.3 CM/SEC
BH CV ECHO MEAS - MV V2 VTI: 28.1 CM
BH CV ECHO MEAS - MVA P1/2T LCG: 2 CM^2
BH CV ECHO MEAS - MVA(P1/2T): 4.1 CM^2
BH CV ECHO MEAS - MVA(VTI): 4 CM^2
BH CV ECHO MEAS - PULM A REVS DUR: 0.1 SEC
BH CV ECHO MEAS - PULM A REVS VEL: 28.3 CM/SEC
BH CV ECHO MEAS - PULM DIAS VEL: 38.3 CM/SEC
BH CV ECHO MEAS - PULM S/D: 0.89
BH CV ECHO MEAS - PULM SYS VEL: 34 CM/SEC
BH CV ECHO MEAS - RAP SYSTOLE: 8 MMHG
BH CV ECHO MEAS - RVSP: 56 MMHG
BH CV ECHO MEAS - SI(AO): 89.6 ML/M^2
BH CV ECHO MEAS - SI(CUBED): 34.1 ML/M^2
BH CV ECHO MEAS - SI(LVOT): 45 ML/M^2
BH CV ECHO MEAS - SI(MOD-SP2): 23.9 ML/M^2
BH CV ECHO MEAS - SI(MOD-SP4): 27.1 ML/M^2
BH CV ECHO MEAS - SI(TEICH): 28.2 ML/M^2
BH CV ECHO MEAS - SV(AO): 225.2 ML
BH CV ECHO MEAS - SV(CUBED): 85.7 ML
BH CV ECHO MEAS - SV(LVOT): 113.1 ML
BH CV ECHO MEAS - SV(MOD-SP4): 68 ML
BH CV ECHO MEAS - SV(TEICH): 70.8 ML
BH CV ECHO MEAS - TAPSE (>1.6): 2.1 CM2
BH CV ECHO MEAS - TR MAX VEL: 347 CM/SEC
BH CV ECHO MEASUREMENTS AVERAGE E/E' RATIO: 6.5
BH CV STRESS BP STAGE 1: NORMAL
BH CV STRESS BP STAGE 2: NORMAL
BH CV STRESS BP STAGE 3: NORMAL
BH CV STRESS DOSE DOBUTAMINE STAGE 1: 10
BH CV STRESS DOSE DOBUTAMINE STAGE 2: 20
BH CV STRESS DOSE DOBUTAMINE STAGE 3: 30
BH CV STRESS DURATION MIN STAGE 1: 3
BH CV STRESS DURATION MIN STAGE 2: 3
BH CV STRESS DURATION MIN STAGE 3: 2
BH CV STRESS DURATION SEC STAGE 1: 0
BH CV STRESS DURATION SEC STAGE 2: 0
BH CV STRESS DURATION SEC STAGE 3: 18
BH CV STRESS ECHO POST STRESS EJECTION FRACTION EF: 82 %
BH CV STRESS HR STAGE 1: 105
BH CV STRESS HR STAGE 2: 129
BH CV STRESS HR STAGE 3: 140
BH CV STRESS PROTOCOL 1: NORMAL
BH CV STRESS RATE STAGE 1: 89
BH CV STRESS RATE STAGE 2: 178
BH CV STRESS RATE STAGE 3: 266
BH CV STRESS RECOVERY BP: NORMAL MMHG
BH CV STRESS RECOVERY HR: 111 BPM
BH CV STRESS STAGE 1: 1
BH CV STRESS STAGE 2: 2
BH CV STRESS STAGE 3: 3
BH CV VAS BP RIGHT ARM: NORMAL MMHG
BH CV XLRA - RV BASE: 5 CM
BH CV XLRA - TDI S': 8 CM/SEC
LEFT ATRIUM VOLUME INDEX: 31.5 ML/M2
MAXIMAL PREDICTED HEART RATE: 164 BPM
PERCENT MAX PREDICTED HR: 85.98 %
STRESS BASELINE BP: NORMAL MMHG
STRESS BASELINE HR: 85 BPM
STRESS PERCENT HR: 101 %
STRESS POST PEAK BP: NORMAL MMHG
STRESS POST PEAK HR: 141 BPM
STRESS TARGET HR: 139 BPM

## 2018-04-24 PROCEDURE — 25010000002 PERFLUTREN (DEFINITY) 8.476 MG IN SODIUM CHLORIDE 0.9 % 10 ML INJECTION: Performed by: FAMILY MEDICINE

## 2018-04-24 PROCEDURE — 93325 DOPPLER ECHO COLOR FLOW MAPG: CPT | Performed by: INTERNAL MEDICINE

## 2018-04-24 PROCEDURE — 93350 STRESS TTE ONLY: CPT

## 2018-04-24 PROCEDURE — 93320 DOPPLER ECHO COMPLETE: CPT

## 2018-04-24 PROCEDURE — 93325 DOPPLER ECHO COLOR FLOW MAPG: CPT

## 2018-04-24 PROCEDURE — 25010000002 DOBUTAMINE PER 250 MG: Performed by: INTERNAL MEDICINE

## 2018-04-24 PROCEDURE — 93017 CV STRESS TEST TRACING ONLY: CPT

## 2018-04-24 PROCEDURE — 93352 ADMIN ECG CONTRAST AGENT: CPT | Performed by: INTERNAL MEDICINE

## 2018-04-24 PROCEDURE — 93018 CV STRESS TEST I&R ONLY: CPT | Performed by: INTERNAL MEDICINE

## 2018-04-24 PROCEDURE — 93350 STRESS TTE ONLY: CPT | Performed by: INTERNAL MEDICINE

## 2018-04-24 PROCEDURE — 93320 DOPPLER ECHO COMPLETE: CPT | Performed by: INTERNAL MEDICINE

## 2018-04-24 PROCEDURE — 93016 CV STRESS TEST SUPVJ ONLY: CPT | Performed by: INTERNAL MEDICINE

## 2018-04-24 RX ORDER — DOBUTAMINE HYDROCHLORIDE 100 MG/100ML
2-20 INJECTION INTRAVENOUS
Status: DISCONTINUED | OUTPATIENT
Start: 2018-04-24 | End: 2018-04-25 | Stop reason: HOSPADM

## 2018-04-24 RX ORDER — ERGOCALCIFEROL 1.25 MG/1
50000 CAPSULE ORAL
COMMUNITY

## 2018-04-24 RX ADMIN — SODIUM CHLORIDE 8 ML: 9 INJECTION INTRAMUSCULAR; INTRAVENOUS; SUBCUTANEOUS at 11:12

## 2018-04-24 RX ADMIN — DOBUTAMINE HYDROCHLORIDE 10 MCG/KG/MIN: 100 INJECTION INTRAVENOUS at 11:34

## 2018-05-03 ENCOUNTER — APPOINTMENT (OUTPATIENT)
Dept: WOUND CARE | Facility: HOSPITAL | Age: 58
End: 2018-05-03
Attending: SURGERY

## 2018-05-10 ENCOUNTER — APPOINTMENT (OUTPATIENT)
Dept: WOUND CARE | Facility: HOSPITAL | Age: 58
End: 2018-05-10
Attending: SURGERY

## 2018-05-24 ENCOUNTER — APPOINTMENT (OUTPATIENT)
Dept: WOUND CARE | Facility: HOSPITAL | Age: 58
End: 2018-05-24
Attending: SURGERY

## 2018-06-01 ENCOUNTER — OFFICE VISIT (OUTPATIENT)
Dept: WOUND CARE | Facility: HOSPITAL | Age: 58
End: 2018-06-01
Attending: SURGERY

## 2018-06-21 ENCOUNTER — OFFICE VISIT (OUTPATIENT)
Dept: WOUND CARE | Facility: HOSPITAL | Age: 58
End: 2018-06-21
Attending: SURGERY

## 2018-06-23 ENCOUNTER — APPOINTMENT (OUTPATIENT)
Dept: GENERAL RADIOLOGY | Facility: HOSPITAL | Age: 58
End: 2018-06-23

## 2018-06-23 ENCOUNTER — APPOINTMENT (OUTPATIENT)
Dept: CT IMAGING | Facility: HOSPITAL | Age: 58
End: 2018-06-23

## 2018-06-23 ENCOUNTER — HOSPITAL ENCOUNTER (INPATIENT)
Facility: HOSPITAL | Age: 58
LOS: 13 days | Discharge: NURSING, REHAB OR LONG-TERM CARE | End: 2018-07-06
Attending: INTERNAL MEDICINE | Admitting: SURGERY

## 2018-06-23 DIAGNOSIS — I48.91 ATRIAL FIBRILLATION, UNSPECIFIED TYPE (HCC): ICD-10-CM

## 2018-06-23 DIAGNOSIS — M00.231 STREPTOCOCCAL ARTHRITIS OF RIGHT WRIST (HCC): ICD-10-CM

## 2018-06-23 DIAGNOSIS — A41.9 SEPTICEMIA (HCC): ICD-10-CM

## 2018-06-23 DIAGNOSIS — R53.1 GENERALIZED WEAKNESS: Primary | ICD-10-CM

## 2018-06-23 DIAGNOSIS — R78.81 BACTEREMIA DUE TO STREPTOCOCCUS: ICD-10-CM

## 2018-06-23 DIAGNOSIS — B95.5 BACTEREMIA DUE TO STREPTOCOCCUS: ICD-10-CM

## 2018-06-23 PROBLEM — R50.9 FEVER: Status: ACTIVE | Noted: 2018-06-23

## 2018-06-23 PROBLEM — Z66 DNR (DO NOT RESUSCITATE): Status: ACTIVE | Noted: 2018-06-23

## 2018-06-23 PROBLEM — Z79.01 LONG TERM CURRENT USE OF ANTICOAGULANT: Status: ACTIVE | Noted: 2018-06-23

## 2018-06-23 PROBLEM — R11.2 NAUSEA AND VOMITING: Status: ACTIVE | Noted: 2018-06-23

## 2018-06-23 PROBLEM — M25.531 ACUTE PAIN OF RIGHT WRIST: Status: ACTIVE | Noted: 2018-06-23

## 2018-06-23 LAB
ALBUMIN SERPL-MCNC: 4.2 G/DL (ref 3.5–5.2)
ALBUMIN/GLOB SERPL: 1.3 G/DL
ALP SERPL-CCNC: 42 U/L (ref 39–117)
ALT SERPL W P-5'-P-CCNC: 67 U/L (ref 1–41)
ANION GAP SERPL CALCULATED.3IONS-SCNC: 28 MMOL/L
AST SERPL-CCNC: 74 U/L (ref 1–40)
BILIRUB SERPL-MCNC: 0.5 MG/DL (ref 0.1–1.2)
BUN BLD-MCNC: 81 MG/DL (ref 6–20)
BUN/CREAT SERPL: 9.9 (ref 7–25)
CALCIUM SPEC-SCNC: 8.8 MG/DL (ref 8.6–10.5)
CHLORIDE SERPL-SCNC: 90 MMOL/L (ref 98–107)
CO2 SERPL-SCNC: 19 MMOL/L (ref 22–29)
CREAT BLD-MCNC: 8.22 MG/DL (ref 0.76–1.27)
D-LACTATE SERPL-SCNC: 1.4 MMOL/L (ref 0.5–2)
D-LACTATE SERPL-SCNC: 2.1 MMOL/L (ref 0.5–2)
DEPRECATED RDW RBC AUTO: 55 FL (ref 37–54)
ERYTHROCYTE [DISTWIDTH] IN BLOOD BY AUTOMATED COUNT: 15.1 % (ref 11.5–14.5)
GFR SERPL CREATININE-BSD FRML MDRD: 7 ML/MIN/1.73
GLOBULIN UR ELPH-MCNC: 3.3 GM/DL
GLUCOSE BLD-MCNC: 130 MG/DL (ref 65–99)
GLUCOSE BLDC GLUCOMTR-MCNC: 119 MG/DL (ref 70–130)
GLUCOSE BLDC GLUCOMTR-MCNC: 136 MG/DL (ref 70–130)
HCT VFR BLD AUTO: 37.8 % (ref 40.4–52.2)
HGB BLD-MCNC: 11.8 G/DL (ref 13.7–17.6)
HOLD SPECIMEN: NORMAL
INR PPP: 2.81 (ref 0.9–1.1)
LYMPHOCYTES # BLD MANUAL: 1.08 10*3/MM3 (ref 0.9–4.8)
LYMPHOCYTES NFR BLD MANUAL: 3 % (ref 19.6–45.3)
LYMPHOCYTES NFR BLD MANUAL: 3 % (ref 5–12)
MCH RBC QN AUTO: 30.9 PG (ref 27–32.7)
MCHC RBC AUTO-ENTMCNC: 31.2 G/DL (ref 32.6–36.4)
MCV RBC AUTO: 99 FL (ref 79.8–96.2)
MONOCYTES # BLD AUTO: 1.08 10*3/MM3 (ref 0.2–1.2)
NEUTROPHILS # BLD AUTO: 33.95 10*3/MM3 (ref 1.9–8.1)
NEUTROPHILS NFR BLD MANUAL: 94 % (ref 42.7–76)
PLAT MORPH BLD: NORMAL
PLATELET # BLD AUTO: 174 10*3/MM3 (ref 140–500)
PMV BLD AUTO: 10.7 FL (ref 6–12)
POTASSIUM BLD-SCNC: 5 MMOL/L (ref 3.5–5.2)
PROCALCITONIN SERPL-MCNC: 36.64 NG/ML (ref 0.1–0.25)
PROT SERPL-MCNC: 7.5 G/DL (ref 6–8.5)
PROTHROMBIN TIME: 29.2 SECONDS (ref 11.7–14.2)
RBC # BLD AUTO: 3.82 10*6/MM3 (ref 4.6–6)
RBC MORPH BLD: NORMAL
SCAN SLIDE: NORMAL
SODIUM BLD-SCNC: 137 MMOL/L (ref 136–145)
WBC MORPH BLD: NORMAL
WBC NRBC COR # BLD: 36.12 10*3/MM3 (ref 4.5–10.7)

## 2018-06-23 PROCEDURE — 80053 COMPREHEN METABOLIC PANEL: CPT | Performed by: INTERNAL MEDICINE

## 2018-06-23 PROCEDURE — 82962 GLUCOSE BLOOD TEST: CPT

## 2018-06-23 PROCEDURE — 25010000002 VANCOMYCIN PER 500 MG: Performed by: INTERNAL MEDICINE

## 2018-06-23 PROCEDURE — 87040 BLOOD CULTURE FOR BACTERIA: CPT | Performed by: INTERNAL MEDICINE

## 2018-06-23 PROCEDURE — 71046 X-RAY EXAM CHEST 2 VIEWS: CPT

## 2018-06-23 PROCEDURE — 83605 ASSAY OF LACTIC ACID: CPT | Performed by: INTERNAL MEDICINE

## 2018-06-23 PROCEDURE — 84145 PROCALCITONIN (PCT): CPT | Performed by: INTERNAL MEDICINE

## 2018-06-23 PROCEDURE — 85025 COMPLETE CBC W/AUTO DIFF WBC: CPT | Performed by: INTERNAL MEDICINE

## 2018-06-23 PROCEDURE — 85610 PROTHROMBIN TIME: CPT | Performed by: INTERNAL MEDICINE

## 2018-06-23 PROCEDURE — 25010000002 HYDROMORPHONE PER 4 MG: Performed by: INTERNAL MEDICINE

## 2018-06-23 PROCEDURE — 25010000002 CEFEPIME PER 500 MG: Performed by: INTERNAL MEDICINE

## 2018-06-23 PROCEDURE — 85007 BL SMEAR W/DIFF WBC COUNT: CPT | Performed by: INTERNAL MEDICINE

## 2018-06-23 RX ORDER — HYDROMORPHONE HYDROCHLORIDE 1 MG/ML
0.5 INJECTION, SOLUTION INTRAMUSCULAR; INTRAVENOUS; SUBCUTANEOUS
Status: DISCONTINUED | OUTPATIENT
Start: 2018-06-23 | End: 2018-06-24

## 2018-06-23 RX ORDER — SODIUM CHLORIDE 0.9 % (FLUSH) 0.9 %
1-10 SYRINGE (ML) INJECTION AS NEEDED
Status: DISCONTINUED | OUTPATIENT
Start: 2018-06-23 | End: 2018-07-06 | Stop reason: HOSPADM

## 2018-06-23 RX ORDER — ONDANSETRON 4 MG/1
4 TABLET, ORALLY DISINTEGRATING ORAL EVERY 6 HOURS PRN
Status: DISCONTINUED | OUTPATIENT
Start: 2018-06-23 | End: 2018-07-06 | Stop reason: HOSPADM

## 2018-06-23 RX ORDER — LEVETIRACETAM 500 MG/1
1000 TABLET ORAL EVERY 12 HOURS SCHEDULED
Status: DISCONTINUED | OUTPATIENT
Start: 2018-06-23 | End: 2018-07-06 | Stop reason: HOSPADM

## 2018-06-23 RX ORDER — ACETAMINOPHEN 325 MG/1
650 TABLET ORAL EVERY 4 HOURS PRN
Status: DISCONTINUED | OUTPATIENT
Start: 2018-06-23 | End: 2018-07-06 | Stop reason: HOSPADM

## 2018-06-23 RX ORDER — NITROGLYCERIN 0.4 MG/1
0.4 TABLET SUBLINGUAL
Status: DISCONTINUED | OUTPATIENT
Start: 2018-06-23 | End: 2018-07-06 | Stop reason: HOSPADM

## 2018-06-23 RX ORDER — MORPHINE SULFATE 100 MG/1
100 TABLET ORAL DAILY
Status: DISCONTINUED | OUTPATIENT
Start: 2018-06-23 | End: 2018-06-23

## 2018-06-23 RX ORDER — ONDANSETRON 4 MG/1
4 TABLET, FILM COATED ORAL EVERY 6 HOURS PRN
Status: DISCONTINUED | OUTPATIENT
Start: 2018-06-23 | End: 2018-07-06 | Stop reason: HOSPADM

## 2018-06-23 RX ORDER — ALBUMIN (HUMAN) 12.5 G/50ML
12.5 SOLUTION INTRAVENOUS AS NEEDED
Status: ACTIVE | OUTPATIENT
Start: 2018-06-23 | End: 2018-06-24

## 2018-06-23 RX ORDER — MORPHINE SULFATE 100 MG/1
100 TABLET ORAL DAILY
Status: DISCONTINUED | OUTPATIENT
Start: 2018-06-24 | End: 2018-07-06 | Stop reason: HOSPADM

## 2018-06-23 RX ORDER — NALOXONE HYDROCHLORIDE 1 MG/ML
0.4 INJECTION INTRAMUSCULAR; INTRAVENOUS; SUBCUTANEOUS
Status: DISCONTINUED | OUTPATIENT
Start: 2018-06-23 | End: 2018-06-24

## 2018-06-23 RX ORDER — DILTIAZEM HYDROCHLORIDE 180 MG/1
180 CAPSULE, COATED, EXTENDED RELEASE ORAL
Status: ON HOLD | COMMUNITY
End: 2018-07-06

## 2018-06-23 RX ORDER — ASPIRIN 81 MG/1
81 TABLET ORAL DAILY
Status: DISCONTINUED | OUTPATIENT
Start: 2018-06-23 | End: 2018-07-06 | Stop reason: HOSPADM

## 2018-06-23 RX ORDER — HYDRALAZINE HYDROCHLORIDE 25 MG/1
25 TABLET, FILM COATED ORAL 3 TIMES DAILY
Status: DISCONTINUED | OUTPATIENT
Start: 2018-06-23 | End: 2018-06-23

## 2018-06-23 RX ORDER — OXYCODONE HYDROCHLORIDE AND ACETAMINOPHEN 5; 325 MG/1; MG/1
1 TABLET ORAL EVERY 6 HOURS PRN
Status: DISPENSED | OUTPATIENT
Start: 2018-06-23 | End: 2018-07-03

## 2018-06-23 RX ORDER — DILTIAZEM HYDROCHLORIDE 180 MG/1
180 CAPSULE, COATED, EXTENDED RELEASE ORAL
Status: DISCONTINUED | OUTPATIENT
Start: 2018-06-23 | End: 2018-06-24

## 2018-06-23 RX ORDER — ATORVASTATIN CALCIUM 10 MG/1
10 TABLET, FILM COATED ORAL DAILY
Status: DISCONTINUED | OUTPATIENT
Start: 2018-06-23 | End: 2018-06-23

## 2018-06-23 RX ORDER — LORAZEPAM 0.5 MG/1
0.5 TABLET ORAL EVERY 8 HOURS PRN
Status: DISCONTINUED | OUTPATIENT
Start: 2018-06-23 | End: 2018-06-30

## 2018-06-23 RX ORDER — LEVOTHYROXINE SODIUM 175 UG/1
175 TABLET ORAL DAILY
Status: DISCONTINUED | OUTPATIENT
Start: 2018-06-23 | End: 2018-07-06 | Stop reason: HOSPADM

## 2018-06-23 RX ORDER — FAMOTIDINE 40 MG/1
40 TABLET, FILM COATED ORAL 2 TIMES DAILY PRN
Status: DISCONTINUED | OUTPATIENT
Start: 2018-06-23 | End: 2018-07-06 | Stop reason: HOSPADM

## 2018-06-23 RX ORDER — ONDANSETRON 2 MG/ML
4 INJECTION INTRAMUSCULAR; INTRAVENOUS EVERY 6 HOURS PRN
Status: DISCONTINUED | OUTPATIENT
Start: 2018-06-23 | End: 2018-07-06 | Stop reason: HOSPADM

## 2018-06-23 RX ORDER — BISACODYL 5 MG/1
5 TABLET, DELAYED RELEASE ORAL DAILY PRN
Status: DISCONTINUED | OUTPATIENT
Start: 2018-06-23 | End: 2018-07-06 | Stop reason: HOSPADM

## 2018-06-23 RX ADMIN — CEFEPIME HYDROCHLORIDE 1 G: 1 INJECTION, POWDER, FOR SOLUTION INTRAMUSCULAR; INTRAVENOUS at 13:14

## 2018-06-23 RX ADMIN — OXYCODONE HYDROCHLORIDE AND ACETAMINOPHEN 1 TABLET: 5; 325 TABLET ORAL at 20:05

## 2018-06-23 RX ADMIN — HYDRALAZINE HYDROCHLORIDE 25 MG: 25 TABLET, FILM COATED ORAL at 19:55

## 2018-06-23 RX ADMIN — DILTIAZEM HYDROCHLORIDE 180 MG: 180 CAPSULE, COATED, EXTENDED RELEASE ORAL at 19:54

## 2018-06-23 RX ADMIN — VANCOMYCIN HYDROCHLORIDE 2500 MG: 5 INJECTION, POWDER, LYOPHILIZED, FOR SOLUTION INTRAVENOUS at 20:06

## 2018-06-23 RX ADMIN — LEVETIRACETAM 1000 MG: 500 TABLET, FILM COATED ORAL at 19:56

## 2018-06-23 RX ADMIN — ASPIRIN 81 MG: 81 TABLET, DELAYED RELEASE ORAL at 19:55

## 2018-06-23 RX ADMIN — LORAZEPAM 0.5 MG: 0.5 TABLET ORAL at 22:50

## 2018-06-23 RX ADMIN — ATORVASTATIN CALCIUM 10 MG: 10 TABLET, FILM COATED ORAL at 19:56

## 2018-06-23 RX ADMIN — HYDROMORPHONE HYDROCHLORIDE 0.5 MG: 1 INJECTION, SOLUTION INTRAMUSCULAR; INTRAVENOUS; SUBCUTANEOUS at 13:12

## 2018-06-23 RX ADMIN — VORTIOXETINE 20 MG: 10 TABLET, FILM COATED ORAL at 22:15

## 2018-06-24 ENCOUNTER — APPOINTMENT (OUTPATIENT)
Dept: CT IMAGING | Facility: HOSPITAL | Age: 58
End: 2018-06-24

## 2018-06-24 LAB
ALBUMIN SERPL-MCNC: 3.7 G/DL (ref 3.5–5.2)
ANION GAP SERPL CALCULATED.3IONS-SCNC: 20.4 MMOL/L
BASOPHILS # BLD AUTO: 0.01 10*3/MM3 (ref 0–0.2)
BASOPHILS NFR BLD AUTO: 0 % (ref 0–1.5)
BUN BLD-MCNC: 50 MG/DL (ref 6–20)
BUN/CREAT SERPL: 8.3 (ref 7–25)
CALCIUM SPEC-SCNC: 8.3 MG/DL (ref 8.6–10.5)
CHLORIDE SERPL-SCNC: 94 MMOL/L (ref 98–107)
CO2 SERPL-SCNC: 23.6 MMOL/L (ref 22–29)
CREAT BLD-MCNC: 6.03 MG/DL (ref 0.76–1.27)
DEPRECATED RDW RBC AUTO: 55.9 FL (ref 37–54)
EOSINOPHIL # BLD AUTO: 0 10*3/MM3 (ref 0–0.7)
EOSINOPHIL NFR BLD AUTO: 0 % (ref 0.3–6.2)
ERYTHROCYTE [DISTWIDTH] IN BLOOD BY AUTOMATED COUNT: 15.3 % (ref 11.5–14.5)
GFR SERPL CREATININE-BSD FRML MDRD: 10 ML/MIN/1.73
GLUCOSE BLD-MCNC: 116 MG/DL (ref 65–99)
GLUCOSE BLDC GLUCOMTR-MCNC: 113 MG/DL (ref 70–130)
GLUCOSE BLDC GLUCOMTR-MCNC: 142 MG/DL (ref 70–130)
GLUCOSE BLDC GLUCOMTR-MCNC: 147 MG/DL (ref 70–130)
GLUCOSE BLDC GLUCOMTR-MCNC: 154 MG/DL (ref 70–130)
HCT VFR BLD AUTO: 35.6 % (ref 40.4–52.2)
HGB BLD-MCNC: 10.9 G/DL (ref 13.7–17.6)
IMM GRANULOCYTES # BLD: 0.23 10*3/MM3 (ref 0–0.03)
IMM GRANULOCYTES NFR BLD: 0.8 % (ref 0–0.5)
INR PPP: 3.67 (ref 0.9–1.1)
LYMPHOCYTES # BLD AUTO: 1.28 10*3/MM3 (ref 0.9–4.8)
LYMPHOCYTES NFR BLD AUTO: 4.2 % (ref 19.6–45.3)
MCH RBC QN AUTO: 30.8 PG (ref 27–32.7)
MCHC RBC AUTO-ENTMCNC: 30.6 G/DL (ref 32.6–36.4)
MCV RBC AUTO: 100.6 FL (ref 79.8–96.2)
MONOCYTES # BLD AUTO: 1.49 10*3/MM3 (ref 0.2–1.2)
MONOCYTES NFR BLD AUTO: 4.9 % (ref 5–12)
NEUTROPHILS # BLD AUTO: 27.45 10*3/MM3 (ref 1.9–8.1)
NEUTROPHILS NFR BLD AUTO: 90.9 % (ref 42.7–76)
NRBC BLD MANUAL-RTO: 0 /100 WBC (ref 0–0)
PHOSPHATE SERPL-MCNC: 6.5 MG/DL (ref 2.5–4.5)
PLATELET # BLD AUTO: 154 10*3/MM3 (ref 140–500)
PMV BLD AUTO: 10.9 FL (ref 6–12)
POTASSIUM BLD-SCNC: 4.6 MMOL/L (ref 3.5–5.2)
PROCALCITONIN SERPL-MCNC: 36.06 NG/ML (ref 0.1–0.25)
PROTHROMBIN TIME: 35.9 SECONDS (ref 11.7–14.2)
RBC # BLD AUTO: 3.54 10*6/MM3 (ref 4.6–6)
SODIUM BLD-SCNC: 138 MMOL/L (ref 136–145)
URATE SERPL-MCNC: 4.1 MG/DL (ref 3.4–7)
WBC NRBC COR # BLD: 30.23 10*3/MM3 (ref 4.5–10.7)

## 2018-06-24 PROCEDURE — 94799 UNLISTED PULMONARY SVC/PX: CPT

## 2018-06-24 PROCEDURE — 25010000002 HYDROMORPHONE PER 4 MG: Performed by: INTERNAL MEDICINE

## 2018-06-24 PROCEDURE — 82962 GLUCOSE BLOOD TEST: CPT

## 2018-06-24 PROCEDURE — 25010000002 CEFEPIME PER 500 MG: Performed by: INTERNAL MEDICINE

## 2018-06-24 PROCEDURE — 85610 PROTHROMBIN TIME: CPT | Performed by: INTERNAL MEDICINE

## 2018-06-24 PROCEDURE — 85025 COMPLETE CBC W/AUTO DIFF WBC: CPT | Performed by: INTERNAL MEDICINE

## 2018-06-24 PROCEDURE — 99223 1ST HOSP IP/OBS HIGH 75: CPT | Performed by: INTERNAL MEDICINE

## 2018-06-24 PROCEDURE — 93010 ELECTROCARDIOGRAM REPORT: CPT | Performed by: INTERNAL MEDICINE

## 2018-06-24 PROCEDURE — 74176 CT ABD & PELVIS W/O CONTRAST: CPT

## 2018-06-24 PROCEDURE — 80069 RENAL FUNCTION PANEL: CPT | Performed by: INTERNAL MEDICINE

## 2018-06-24 PROCEDURE — 84550 ASSAY OF BLOOD/URIC ACID: CPT | Performed by: INTERNAL MEDICINE

## 2018-06-24 PROCEDURE — 84145 PROCALCITONIN (PCT): CPT | Performed by: INTERNAL MEDICINE

## 2018-06-24 PROCEDURE — 93005 ELECTROCARDIOGRAM TRACING: CPT | Performed by: INTERNAL MEDICINE

## 2018-06-24 RX ORDER — NALOXONE HYDROCHLORIDE 1 MG/ML
0.4 INJECTION INTRAMUSCULAR; INTRAVENOUS; SUBCUTANEOUS
Status: DISCONTINUED | OUTPATIENT
Start: 2018-06-24 | End: 2018-07-06 | Stop reason: HOSPADM

## 2018-06-24 RX ORDER — DILTIAZEM HYDROCHLORIDE 120 MG/1
120 CAPSULE, COATED, EXTENDED RELEASE ORAL
Status: DISCONTINUED | OUTPATIENT
Start: 2018-06-25 | End: 2018-07-06 | Stop reason: HOSPADM

## 2018-06-24 RX ADMIN — LEVOTHYROXINE SODIUM 175 MCG: 175 TABLET ORAL at 08:51

## 2018-06-24 RX ADMIN — HYDROMORPHONE HYDROCHLORIDE 0.5 MG: 1 INJECTION, SOLUTION INTRAMUSCULAR; INTRAVENOUS; SUBCUTANEOUS at 05:51

## 2018-06-24 RX ADMIN — OXYCODONE HYDROCHLORIDE AND ACETAMINOPHEN 1 TABLET: 5; 325 TABLET ORAL at 08:51

## 2018-06-24 RX ADMIN — HYDROMORPHONE HYDROCHLORIDE 0.5 MG: 1 INJECTION, SOLUTION INTRAMUSCULAR; INTRAVENOUS; SUBCUTANEOUS at 00:22

## 2018-06-24 RX ADMIN — LEVETIRACETAM 1000 MG: 500 TABLET, FILM COATED ORAL at 08:51

## 2018-06-24 RX ADMIN — VORTIOXETINE 20 MG: 10 TABLET, FILM COATED ORAL at 20:56

## 2018-06-24 RX ADMIN — LEVETIRACETAM 1000 MG: 500 TABLET, FILM COATED ORAL at 20:56

## 2018-06-24 RX ADMIN — DILTIAZEM HYDROCHLORIDE 180 MG: 180 CAPSULE, COATED, EXTENDED RELEASE ORAL at 08:51

## 2018-06-24 RX ADMIN — HYDROMORPHONE HYDROCHLORIDE 1 MG: 1 INJECTION, SOLUTION INTRAMUSCULAR; INTRAVENOUS; SUBCUTANEOUS at 10:28

## 2018-06-24 RX ADMIN — HYDROMORPHONE HYDROCHLORIDE 1 MG: 1 INJECTION, SOLUTION INTRAMUSCULAR; INTRAVENOUS; SUBCUTANEOUS at 19:14

## 2018-06-24 RX ADMIN — ASPIRIN 81 MG: 81 TABLET, DELAYED RELEASE ORAL at 08:51

## 2018-06-24 RX ADMIN — CEFEPIME HYDROCHLORIDE 2 G: 2 INJECTION, POWDER, FOR SOLUTION INTRAVENOUS at 11:21

## 2018-06-24 RX ADMIN — LORAZEPAM 0.5 MG: 0.5 TABLET ORAL at 08:51

## 2018-06-24 RX ADMIN — OXYCODONE HYDROCHLORIDE AND ACETAMINOPHEN 1 TABLET: 5; 325 TABLET ORAL at 20:56

## 2018-06-24 RX ADMIN — MORPHINE SULFATE 100 MG: 100 TABLET, EXTENDED RELEASE ORAL at 08:51

## 2018-06-25 ENCOUNTER — APPOINTMENT (OUTPATIENT)
Dept: CARDIOLOGY | Facility: HOSPITAL | Age: 58
End: 2018-06-25
Attending: INTERNAL MEDICINE

## 2018-06-25 ENCOUNTER — APPOINTMENT (OUTPATIENT)
Dept: GENERAL RADIOLOGY | Facility: HOSPITAL | Age: 58
End: 2018-06-25

## 2018-06-25 PROBLEM — M00.231: Status: ACTIVE | Noted: 2018-06-25

## 2018-06-25 PROBLEM — B95.5 BACTEREMIA DUE TO STREPTOCOCCUS: Status: ACTIVE | Noted: 2018-06-25

## 2018-06-25 PROBLEM — R78.81 BACTEREMIA DUE TO STREPTOCOCCUS: Status: ACTIVE | Noted: 2018-06-25

## 2018-06-25 LAB
ALBUMIN SERPL-MCNC: 3.6 G/DL (ref 3.5–5.2)
ALBUMIN/GLOB SERPL: 0.9 G/DL
ALP SERPL-CCNC: 51 U/L (ref 39–117)
ALT SERPL W P-5'-P-CCNC: 82 U/L (ref 1–41)
ANION GAP SERPL CALCULATED.3IONS-SCNC: 22.2 MMOL/L
APPEARANCE FLD: ABNORMAL
AST SERPL-CCNC: 49 U/L (ref 1–40)
BACTERIA UR QL AUTO: ABNORMAL /HPF
BASOPHILS # BLD AUTO: 0.03 10*3/MM3 (ref 0–0.2)
BASOPHILS NFR BLD AUTO: 0.1 % (ref 0–1.5)
BH CV ECHO MEAS - ACS: 2.2 CM
BH CV ECHO MEAS - AO MEAN PG (FULL): 1 MMHG
BH CV ECHO MEAS - AO MEAN PG: 3 MMHG
BH CV ECHO MEAS - AO ROOT AREA (BSA CORRECTED): 1.1
BH CV ECHO MEAS - AO ROOT AREA: 6.6 CM^2
BH CV ECHO MEAS - AO ROOT DIAM: 2.9 CM
BH CV ECHO MEAS - AO V2 MAX: 107 CM/SEC
BH CV ECHO MEAS - AO V2 MEAN: 75.8 CM/SEC
BH CV ECHO MEAS - AO V2 VTI: 18.9 CM
BH CV ECHO MEAS - AVA(I,A): 2.7 CM^2
BH CV ECHO MEAS - AVA(I,D): 2.7 CM^2
BH CV ECHO MEAS - BSA(HAYCOCK): 2.8 M^2
BH CV ECHO MEAS - BSA: 2.5 M^2
BH CV ECHO MEAS - BZI_BMI: 49.9 KILOGRAMS/M^2
BH CV ECHO MEAS - BZI_METRIC_HEIGHT: 172.7 CM
BH CV ECHO MEAS - BZI_METRIC_WEIGHT: 148.8 KG
BH CV ECHO MEAS - CONTRAST EF (2CH): 55 ML/M^2
BH CV ECHO MEAS - CONTRAST EF 4CH: 42.5 ML/M^2
BH CV ECHO MEAS - EDV(CUBED): 166.4 ML
BH CV ECHO MEAS - EDV(MOD-SP2): 131 ML
BH CV ECHO MEAS - EDV(MOD-SP4): 113 ML
BH CV ECHO MEAS - EDV(TEICH): 147.4 ML
BH CV ECHO MEAS - EF(CUBED): 61.5 %
BH CV ECHO MEAS - EF(MOD-BP): 53 %
BH CV ECHO MEAS - EF(MOD-SP2): 55 %
BH CV ECHO MEAS - EF(MOD-SP4): 42.5 %
BH CV ECHO MEAS - EF(TEICH): 52.5 %
BH CV ECHO MEAS - ESV(CUBED): 64 ML
BH CV ECHO MEAS - ESV(MOD-SP2): 59 ML
BH CV ECHO MEAS - ESV(MOD-SP4): 65 ML
BH CV ECHO MEAS - ESV(TEICH): 70 ML
BH CV ECHO MEAS - FS: 27.3 %
BH CV ECHO MEAS - IVS/LVPW: 0.92
BH CV ECHO MEAS - IVSD: 1.1 CM
BH CV ECHO MEAS - LAT PEAK E' VEL: 14 CM/SEC
BH CV ECHO MEAS - LV DIASTOLIC VOL/BSA (35-75): 44.8 ML/M^2
BH CV ECHO MEAS - LV MASS(C)D: 257 GRAMS
BH CV ECHO MEAS - LV MASS(C)DI: 101.9 GRAMS/M^2
BH CV ECHO MEAS - LV MEAN PG: 2 MMHG
BH CV ECHO MEAS - LV SYSTOLIC VOL/BSA (12-30): 25.8 ML/M^2
BH CV ECHO MEAS - LV V1 MAX: 99 CM/SEC
BH CV ECHO MEAS - LV V1 MEAN: 63.1 CM/SEC
BH CV ECHO MEAS - LV V1 VTI: 16.4 CM
BH CV ECHO MEAS - LVIDD: 5.5 CM
BH CV ECHO MEAS - LVIDS: 4 CM
BH CV ECHO MEAS - LVLD AP2: 9.2 CM
BH CV ECHO MEAS - LVLD AP4: 7.9 CM
BH CV ECHO MEAS - LVLS AP2: 8.4 CM
BH CV ECHO MEAS - LVLS AP4: 8.3 CM
BH CV ECHO MEAS - LVOT AREA (M): 3.1 CM^2
BH CV ECHO MEAS - LVOT AREA: 3.1 CM^2
BH CV ECHO MEAS - LVOT DIAM: 2 CM
BH CV ECHO MEAS - LVPWD: 1.2 CM
BH CV ECHO MEAS - MED PEAK E' VEL: 10 CM/SEC
BH CV ECHO MEAS - MR MAX PG: 32.5 MMHG
BH CV ECHO MEAS - MR MAX VEL: 285 CM/SEC
BH CV ECHO MEAS - MV DEC SLOPE: 491 CM/SEC^2
BH CV ECHO MEAS - MV DEC TIME: 0.2 SEC
BH CV ECHO MEAS - MV E MAX VEL: 133 CM/SEC
BH CV ECHO MEAS - MV MEAN PG: 2 MMHG
BH CV ECHO MEAS - MV P1/2T MAX VEL: 126 CM/SEC
BH CV ECHO MEAS - MV P1/2T: 75.2 MSEC
BH CV ECHO MEAS - MV V2 MEAN: 65.5 CM/SEC
BH CV ECHO MEAS - MV V2 VTI: 29.4 CM
BH CV ECHO MEAS - MVA P1/2T LCG: 1.7 CM^2
BH CV ECHO MEAS - MVA(P1/2T): 2.9 CM^2
BH CV ECHO MEAS - MVA(VTI): 1.8 CM^2
BH CV ECHO MEAS - PA ACC SLOPE: 7.1 CM/SEC^2
BH CV ECHO MEAS - PA ACC TIME: 0.11 SEC
BH CV ECHO MEAS - PA MAX PG (FULL): -0.58 MMHG
BH CV ECHO MEAS - PA MAX PG: 2.4 MMHG
BH CV ECHO MEAS - PA PR(ACCEL): 30 MMHG
BH CV ECHO MEAS - PA V2 MAX: 77.6 CM/SEC
BH CV ECHO MEAS - PVA(V,A): 5.5 CM^2
BH CV ECHO MEAS - PVA(V,D): 5.5 CM^2
BH CV ECHO MEAS - QP/QS: 1.6
BH CV ECHO MEAS - RV MAX PG: 3 MMHG
BH CV ECHO MEAS - RV MEAN PG: 1 MMHG
BH CV ECHO MEAS - RV V1 MAX: 86.5 CM/SEC
BH CV ECHO MEAS - RV V1 MEAN: 50.2 CM/SEC
BH CV ECHO MEAS - RV V1 VTI: 17.1 CM
BH CV ECHO MEAS - RVOT AREA: 4.9 CM^2
BH CV ECHO MEAS - RVOT DIAM: 2.5 CM
BH CV ECHO MEAS - SI(AO): 49.5 ML/M^2
BH CV ECHO MEAS - SI(CUBED): 40.6 ML/M^2
BH CV ECHO MEAS - SI(LVOT): 20.4 ML/M^2
BH CV ECHO MEAS - SI(MOD-SP2): 28.5 ML/M^2
BH CV ECHO MEAS - SI(MOD-SP4): 19 ML/M^2
BH CV ECHO MEAS - SI(TEICH): 30.7 ML/M^2
BH CV ECHO MEAS - SV(AO): 124.8 ML
BH CV ECHO MEAS - SV(CUBED): 102.4 ML
BH CV ECHO MEAS - SV(LVOT): 51.5 ML
BH CV ECHO MEAS - SV(MOD-SP2): 72 ML
BH CV ECHO MEAS - SV(MOD-SP4): 48 ML
BH CV ECHO MEAS - SV(RVOT): 83.9 ML
BH CV ECHO MEAS - SV(TEICH): 77.4 ML
BH CV ECHO MEAS - TR MAX VEL: 266 CM/SEC
BH CV ECHO MEASUREMENTS AVERAGE E/E' RATIO: 11.08
BH CV VAS BP RIGHT ARM: NORMAL MMHG
BH CV XLRA - RV BASE: 4 CM
BH CV XLRA - TDI S': 10 CM/SEC
BILIRUB SERPL-MCNC: 0.5 MG/DL (ref 0.1–1.2)
BILIRUB UR QL STRIP: NEGATIVE
BUN BLD-MCNC: 72 MG/DL (ref 6–20)
BUN/CREAT SERPL: 9.7 (ref 7–25)
CALCIUM SPEC-SCNC: 8.2 MG/DL (ref 8.6–10.5)
CHLORIDE SERPL-SCNC: 88 MMOL/L (ref 98–107)
CLARITY UR: ABNORMAL
CO2 SERPL-SCNC: 21.8 MMOL/L (ref 22–29)
COLOR FLD: ABNORMAL
COLOR UR: ABNORMAL
CREAT BLD-MCNC: 7.41 MG/DL (ref 0.76–1.27)
CRYSTALS FLD MICRO: NORMAL
DEPRECATED RDW RBC AUTO: 55.8 FL (ref 37–54)
EOSINOPHIL # BLD AUTO: 0 10*3/MM3 (ref 0–0.7)
EOSINOPHIL NFR BLD AUTO: 0 % (ref 0.3–6.2)
ERYTHROCYTE [DISTWIDTH] IN BLOOD BY AUTOMATED COUNT: 15.4 % (ref 11.5–14.5)
GFR SERPL CREATININE-BSD FRML MDRD: 8 ML/MIN/1.73
GLOBULIN UR ELPH-MCNC: 3.8 GM/DL
GLUCOSE BLD-MCNC: 120 MG/DL (ref 65–99)
GLUCOSE BLDC GLUCOMTR-MCNC: 133 MG/DL (ref 70–130)
GLUCOSE BLDC GLUCOMTR-MCNC: 163 MG/DL (ref 70–130)
GLUCOSE BLDC GLUCOMTR-MCNC: 202 MG/DL (ref 70–130)
GLUCOSE UR STRIP-MCNC: NEGATIVE MG/DL
HAV IGM SERPL QL IA: NORMAL
HBV CORE IGM SERPL QL IA: NORMAL
HBV SURFACE AG SERPL QL IA: NORMAL
HCT VFR BLD AUTO: 34.5 % (ref 40.4–52.2)
HCV AB SER DONR QL: NORMAL
HGB BLD-MCNC: 10.8 G/DL (ref 13.7–17.6)
HGB UR QL STRIP.AUTO: ABNORMAL
HYALINE CASTS UR QL AUTO: ABNORMAL /LPF
IMM GRANULOCYTES # BLD: 0.17 10*3/MM3 (ref 0–0.03)
IMM GRANULOCYTES NFR BLD: 0.6 % (ref 0–0.5)
INR PPP: 2.97 (ref 0.9–1.1)
KETONES UR QL STRIP: NEGATIVE
LEFT ATRIUM VOLUME INDEX: 30 ML/M2
LEUKOCYTE ESTERASE UR QL STRIP.AUTO: ABNORMAL
LYMPHOCYTES # BLD AUTO: 1.46 10*3/MM3 (ref 0.9–4.8)
LYMPHOCYTES NFR BLD AUTO: 5.4 % (ref 19.6–45.3)
LYMPHOCYTES NFR FLD MANUAL: 11 %
MAXIMAL PREDICTED HEART RATE: 164 BPM
MCH RBC QN AUTO: 31.3 PG (ref 27–32.7)
MCHC RBC AUTO-ENTMCNC: 31.3 G/DL (ref 32.6–36.4)
MCV RBC AUTO: 100 FL (ref 79.8–96.2)
MONOCYTES # BLD AUTO: 1.35 10*3/MM3 (ref 0.2–1.2)
MONOCYTES NFR BLD AUTO: 5 % (ref 5–12)
MONOCYTES NFR FLD: 19 %
MONOS+MACROS NFR FLD: 5 %
NEUTROPHILS # BLD AUTO: 24.32 10*3/MM3 (ref 1.9–8.1)
NEUTROPHILS NFR BLD AUTO: 89.5 % (ref 42.7–76)
NEUTROPHILS NFR FLD MANUAL: 65 %
NITRITE UR QL STRIP: NEGATIVE
PH UR STRIP.AUTO: <=5 [PH] (ref 5–8)
PLATELET # BLD AUTO: 154 10*3/MM3 (ref 140–500)
PMV BLD AUTO: 10.7 FL (ref 6–12)
POTASSIUM BLD-SCNC: 4.7 MMOL/L (ref 3.5–5.2)
PROT SERPL-MCNC: 7.4 G/DL (ref 6–8.5)
PROT UR QL STRIP: ABNORMAL
PROTHROMBIN TIME: 30.4 SECONDS (ref 11.7–14.2)
RBC # BLD AUTO: 3.45 10*6/MM3 (ref 4.6–6)
RBC # FLD AUTO: ABNORMAL /MM3
RBC # UR: ABNORMAL /HPF
REF LAB TEST METHOD: ABNORMAL
SODIUM BLD-SCNC: 132 MMOL/L (ref 136–145)
SP GR UR STRIP: 1.02 (ref 1–1.03)
SQUAMOUS #/AREA URNS HPF: ABNORMAL /HPF
STRESS TARGET HR: 139 BPM
TSH SERPL DL<=0.05 MIU/L-ACNC: 4.48 MIU/ML (ref 0.27–4.2)
UROBILINOGEN UR QL STRIP: ABNORMAL
VANCOMYCIN SERPL-MCNC: 33.4 MCG/ML (ref 5–40)
WBC # FLD: ABNORMAL /MM3
WBC NRBC COR # BLD: 27.16 10*3/MM3 (ref 4.5–10.7)
WBC UR QL AUTO: ABNORMAL /HPF

## 2018-06-25 PROCEDURE — 84443 ASSAY THYROID STIM HORMONE: CPT | Performed by: INTERNAL MEDICINE

## 2018-06-25 PROCEDURE — 89051 BODY FLUID CELL COUNT: CPT | Performed by: INTERNAL MEDICINE

## 2018-06-25 PROCEDURE — 25010000002 HYDROMORPHONE PER 4 MG: Performed by: INTERNAL MEDICINE

## 2018-06-25 PROCEDURE — 85610 PROTHROMBIN TIME: CPT | Performed by: INTERNAL MEDICINE

## 2018-06-25 PROCEDURE — 93306 TTE W/DOPPLER COMPLETE: CPT | Performed by: INTERNAL MEDICINE

## 2018-06-25 PROCEDURE — 80074 ACUTE HEPATITIS PANEL: CPT | Performed by: INTERNAL MEDICINE

## 2018-06-25 PROCEDURE — 87205 SMEAR GRAM STAIN: CPT | Performed by: INTERNAL MEDICINE

## 2018-06-25 PROCEDURE — 93306 TTE W/DOPPLER COMPLETE: CPT

## 2018-06-25 PROCEDURE — 80202 ASSAY OF VANCOMYCIN: CPT | Performed by: INTERNAL MEDICINE

## 2018-06-25 PROCEDURE — 87147 CULTURE TYPE IMMUNOLOGIC: CPT | Performed by: INTERNAL MEDICINE

## 2018-06-25 PROCEDURE — 85025 COMPLETE CBC W/AUTO DIFF WBC: CPT | Performed by: INTERNAL MEDICINE

## 2018-06-25 PROCEDURE — 87086 URINE CULTURE/COLONY COUNT: CPT | Performed by: INTERNAL MEDICINE

## 2018-06-25 PROCEDURE — 63710000001 INSULIN ASPART PER 5 UNITS: Performed by: INTERNAL MEDICINE

## 2018-06-25 PROCEDURE — 87186 SC STD MICRODIL/AGAR DIL: CPT | Performed by: INTERNAL MEDICINE

## 2018-06-25 PROCEDURE — 87070 CULTURE OTHR SPECIMN AEROBIC: CPT | Performed by: INTERNAL MEDICINE

## 2018-06-25 PROCEDURE — 77002 NEEDLE LOCALIZATION BY XRAY: CPT

## 2018-06-25 PROCEDURE — 80053 COMPREHEN METABOLIC PANEL: CPT | Performed by: INTERNAL MEDICINE

## 2018-06-25 PROCEDURE — 87015 SPECIMEN INFECT AGNT CONCNTJ: CPT | Performed by: INTERNAL MEDICINE

## 2018-06-25 PROCEDURE — 25010000003 CEFTRIAXONE PER 250 MG: Performed by: INTERNAL MEDICINE

## 2018-06-25 PROCEDURE — 99233 SBSQ HOSP IP/OBS HIGH 50: CPT | Performed by: INTERNAL MEDICINE

## 2018-06-25 PROCEDURE — 82962 GLUCOSE BLOOD TEST: CPT

## 2018-06-25 PROCEDURE — 25010000002 PERFLUTREN (DEFINITY) 8.476 MG IN SODIUM CHLORIDE 0.9 % 10 ML INJECTION: Performed by: INTERNAL MEDICINE

## 2018-06-25 PROCEDURE — 89060 EXAM SYNOVIAL FLUID CRYSTALS: CPT | Performed by: INTERNAL MEDICINE

## 2018-06-25 PROCEDURE — 81001 URINALYSIS AUTO W/SCOPE: CPT | Performed by: INTERNAL MEDICINE

## 2018-06-25 PROCEDURE — 25010000002 IOPAMIDOL 61 % SOLUTION: Performed by: RADIOLOGY

## 2018-06-25 RX ORDER — DEXTROSE MONOHYDRATE 25 G/50ML
25 INJECTION, SOLUTION INTRAVENOUS
Status: DISCONTINUED | OUTPATIENT
Start: 2018-06-25 | End: 2018-07-06 | Stop reason: HOSPADM

## 2018-06-25 RX ORDER — CEFTRIAXONE SODIUM 2 G/50ML
2 INJECTION, SOLUTION INTRAVENOUS EVERY 24 HOURS
Status: DISCONTINUED | OUTPATIENT
Start: 2018-06-25 | End: 2018-07-04

## 2018-06-25 RX ORDER — LIDOCAINE HYDROCHLORIDE 10 MG/ML
10 INJECTION, SOLUTION INFILTRATION; PERINEURAL ONCE
Status: COMPLETED | OUTPATIENT
Start: 2018-06-25 | End: 2018-06-25

## 2018-06-25 RX ORDER — NICOTINE POLACRILEX 4 MG
15 LOZENGE BUCCAL
Status: DISCONTINUED | OUTPATIENT
Start: 2018-06-25 | End: 2018-07-06 | Stop reason: HOSPADM

## 2018-06-25 RX ADMIN — HYDROMORPHONE HYDROCHLORIDE 1 MG: 1 INJECTION, SOLUTION INTRAMUSCULAR; INTRAVENOUS; SUBCUTANEOUS at 21:04

## 2018-06-25 RX ADMIN — MORPHINE SULFATE 100 MG: 100 TABLET, EXTENDED RELEASE ORAL at 09:38

## 2018-06-25 RX ADMIN — LEVOTHYROXINE SODIUM 175 MCG: 175 TABLET ORAL at 09:38

## 2018-06-25 RX ADMIN — LEVETIRACETAM 1000 MG: 500 TABLET, FILM COATED ORAL at 21:05

## 2018-06-25 RX ADMIN — HYDROMORPHONE HYDROCHLORIDE 1 MG: 1 INJECTION, SOLUTION INTRAMUSCULAR; INTRAVENOUS; SUBCUTANEOUS at 09:32

## 2018-06-25 RX ADMIN — OXYCODONE HYDROCHLORIDE AND ACETAMINOPHEN 1 TABLET: 5; 325 TABLET ORAL at 21:04

## 2018-06-25 RX ADMIN — CEFTRIAXONE SODIUM 2 G: 2 INJECTION, SOLUTION INTRAVENOUS at 21:04

## 2018-06-25 RX ADMIN — HYDROMORPHONE HYDROCHLORIDE 1 MG: 1 INJECTION, SOLUTION INTRAMUSCULAR; INTRAVENOUS; SUBCUTANEOUS at 02:05

## 2018-06-25 RX ADMIN — HYDROMORPHONE HYDROCHLORIDE 1 MG: 1 INJECTION, SOLUTION INTRAMUSCULAR; INTRAVENOUS; SUBCUTANEOUS at 06:33

## 2018-06-25 RX ADMIN — OXYCODONE HYDROCHLORIDE AND ACETAMINOPHEN 1 TABLET: 5; 325 TABLET ORAL at 05:39

## 2018-06-25 RX ADMIN — INSULIN ASPART 2 UNITS: 100 INJECTION, SOLUTION INTRAVENOUS; SUBCUTANEOUS at 22:05

## 2018-06-25 RX ADMIN — LORAZEPAM 0.5 MG: 0.5 TABLET ORAL at 05:35

## 2018-06-25 RX ADMIN — LORAZEPAM 0.5 MG: 0.5 TABLET ORAL at 19:13

## 2018-06-25 RX ADMIN — VORTIOXETINE 20 MG: 10 TABLET, FILM COATED ORAL at 21:04

## 2018-06-25 RX ADMIN — IOPAMIDOL 1 ML: 612 INJECTION, SOLUTION INTRAVENOUS at 10:47

## 2018-06-25 RX ADMIN — DILTIAZEM HYDROCHLORIDE 120 MG: 120 CAPSULE, COATED, EXTENDED RELEASE ORAL at 09:37

## 2018-06-25 RX ADMIN — LEVETIRACETAM 1000 MG: 500 TABLET, FILM COATED ORAL at 09:38

## 2018-06-25 RX ADMIN — OXYCODONE HYDROCHLORIDE AND ACETAMINOPHEN 1 TABLET: 5; 325 TABLET ORAL at 11:36

## 2018-06-25 RX ADMIN — LIDOCAINE HYDROCHLORIDE 2 ML: 10 INJECTION, SOLUTION INFILTRATION; PERINEURAL at 10:38

## 2018-06-25 RX ADMIN — PERFLUTREN 2.5 ML: 6.52 INJECTION, SUSPENSION INTRAVENOUS at 08:45

## 2018-06-25 RX ADMIN — HYDROMORPHONE HYDROCHLORIDE 1 MG: 1 INJECTION, SOLUTION INTRAMUSCULAR; INTRAVENOUS; SUBCUTANEOUS at 12:25

## 2018-06-25 RX ADMIN — ASPIRIN 81 MG: 81 TABLET, DELAYED RELEASE ORAL at 11:36

## 2018-06-26 ENCOUNTER — ANESTHESIA EVENT (OUTPATIENT)
Dept: PERIOP | Facility: HOSPITAL | Age: 58
End: 2018-06-26

## 2018-06-26 ENCOUNTER — ANESTHESIA (OUTPATIENT)
Dept: PERIOP | Facility: HOSPITAL | Age: 58
End: 2018-06-26

## 2018-06-26 LAB
ABO GROUP BLD: NORMAL
ANION GAP SERPL CALCULATED.3IONS-SCNC: 14.4 MMOL/L
BASOPHILS # BLD AUTO: 0.01 10*3/MM3 (ref 0–0.2)
BASOPHILS NFR BLD AUTO: 0 % (ref 0–1.5)
BLD GP AB SCN SERPL QL: NEGATIVE
BUN BLD-MCNC: 48 MG/DL (ref 6–20)
BUN/CREAT SERPL: 9.5 (ref 7–25)
CALCIUM SPEC-SCNC: 8.2 MG/DL (ref 8.6–10.5)
CHLORIDE SERPL-SCNC: 92 MMOL/L (ref 98–107)
CO2 SERPL-SCNC: 25.6 MMOL/L (ref 22–29)
CREAT BLD-MCNC: 5.06 MG/DL (ref 0.76–1.27)
CRYSTALS FLD MICRO: NORMAL
DEPRECATED RDW RBC AUTO: 55.8 FL (ref 37–54)
EOSINOPHIL # BLD AUTO: 0.01 10*3/MM3 (ref 0–0.7)
EOSINOPHIL NFR BLD AUTO: 0 % (ref 0.3–6.2)
ERYTHROCYTE [DISTWIDTH] IN BLOOD BY AUTOMATED COUNT: 15.3 % (ref 11.5–14.5)
GFR SERPL CREATININE-BSD FRML MDRD: 12 ML/MIN/1.73
GLUCOSE BLD-MCNC: 112 MG/DL (ref 65–99)
GLUCOSE BLDC GLUCOMTR-MCNC: 111 MG/DL (ref 70–130)
GLUCOSE BLDC GLUCOMTR-MCNC: 112 MG/DL (ref 70–130)
GLUCOSE BLDC GLUCOMTR-MCNC: 115 MG/DL (ref 70–130)
GLUCOSE BLDC GLUCOMTR-MCNC: 131 MG/DL (ref 70–130)
GLUCOSE BLDC GLUCOMTR-MCNC: 135 MG/DL (ref 70–130)
HCT VFR BLD AUTO: 33.6 % (ref 40.4–52.2)
HGB BLD-MCNC: 10.1 G/DL (ref 13.7–17.6)
IMM GRANULOCYTES # BLD: 0.1 10*3/MM3 (ref 0–0.03)
IMM GRANULOCYTES NFR BLD: 0.4 % (ref 0–0.5)
INR PPP: 1.92 (ref 0.9–1.1)
INR PPP: 2.21 (ref 0.9–1.1)
INR PPP: 2.6 (ref 0.9–1.1)
LYMPHOCYTES # BLD AUTO: 1.4 10*3/MM3 (ref 0.9–4.8)
LYMPHOCYTES NFR BLD AUTO: 6.1 % (ref 19.6–45.3)
MCH RBC QN AUTO: 30.1 PG (ref 27–32.7)
MCHC RBC AUTO-ENTMCNC: 30.1 G/DL (ref 32.6–36.4)
MCV RBC AUTO: 100.3 FL (ref 79.8–96.2)
MONOCYTES # BLD AUTO: 1.38 10*3/MM3 (ref 0.2–1.2)
MONOCYTES NFR BLD AUTO: 6.1 % (ref 5–12)
NEUTROPHILS # BLD AUTO: 19.98 10*3/MM3 (ref 1.9–8.1)
NEUTROPHILS NFR BLD AUTO: 87.8 % (ref 42.7–76)
NRBC BLD MANUAL-RTO: 0.1 /100 WBC (ref 0–0)
PLATELET # BLD AUTO: 158 10*3/MM3 (ref 140–500)
PMV BLD AUTO: 10.9 FL (ref 6–12)
POTASSIUM BLD-SCNC: 3.9 MMOL/L (ref 3.5–5.2)
PROTHROMBIN TIME: 21.6 SECONDS (ref 11.7–14.2)
PROTHROMBIN TIME: 24.1 SECONDS (ref 11.7–14.2)
PROTHROMBIN TIME: 27.5 SECONDS (ref 11.7–14.2)
RBC # BLD AUTO: 3.35 10*6/MM3 (ref 4.6–6)
RH BLD: POSITIVE
SODIUM BLD-SCNC: 132 MMOL/L (ref 136–145)
T&S EXPIRATION DATE: NORMAL
WBC NRBC COR # BLD: 22.78 10*3/MM3 (ref 4.5–10.7)

## 2018-06-26 PROCEDURE — 80048 BASIC METABOLIC PNL TOTAL CA: CPT | Performed by: INTERNAL MEDICINE

## 2018-06-26 PROCEDURE — 85610 PROTHROMBIN TIME: CPT | Performed by: HOSPITALIST

## 2018-06-26 PROCEDURE — P9017 PLASMA 1 DONOR FRZ W/IN 8 HR: HCPCS

## 2018-06-26 PROCEDURE — 25010000002 FENTANYL CITRATE (PF) 100 MCG/2ML SOLUTION: Performed by: NURSE ANESTHETIST, CERTIFIED REGISTERED

## 2018-06-26 PROCEDURE — 87015 SPECIMEN INFECT AGNT CONCNTJ: CPT | Performed by: SURGERY

## 2018-06-26 PROCEDURE — 86927 PLASMA FRESH FROZEN: CPT

## 2018-06-26 PROCEDURE — 87205 SMEAR GRAM STAIN: CPT | Performed by: SURGERY

## 2018-06-26 PROCEDURE — C1713 ANCHOR/SCREW BN/BN,TIS/BN: HCPCS | Performed by: SURGERY

## 2018-06-26 PROCEDURE — 86850 RBC ANTIBODY SCREEN: CPT | Performed by: HOSPITALIST

## 2018-06-26 PROCEDURE — 87186 SC STD MICRODIL/AGAR DIL: CPT | Performed by: SURGERY

## 2018-06-26 PROCEDURE — 86901 BLOOD TYPING SEROLOGIC RH(D): CPT | Performed by: HOSPITALIST

## 2018-06-26 PROCEDURE — 82962 GLUCOSE BLOOD TEST: CPT

## 2018-06-26 PROCEDURE — 25010000002 PROPOFOL 10 MG/ML EMULSION: Performed by: NURSE ANESTHETIST, CERTIFIED REGISTERED

## 2018-06-26 PROCEDURE — 87070 CULTURE OTHR SPECIMN AEROBIC: CPT | Performed by: SURGERY

## 2018-06-26 PROCEDURE — 87147 CULTURE TYPE IMMUNOLOGIC: CPT | Performed by: SURGERY

## 2018-06-26 PROCEDURE — 36430 TRANSFUSION BLD/BLD COMPNT: CPT

## 2018-06-26 PROCEDURE — 0R9Q0ZZ DRAINAGE OF RIGHT CARPAL JOINT, OPEN APPROACH: ICD-10-PCS | Performed by: SURGERY

## 2018-06-26 PROCEDURE — 25010000003 CEFTRIAXONE PER 250 MG: Performed by: INTERNAL MEDICINE

## 2018-06-26 PROCEDURE — 25010000002 HYDROMORPHONE PER 4 MG: Performed by: INTERNAL MEDICINE

## 2018-06-26 PROCEDURE — 0R9N0ZZ DRAINAGE OF RIGHT WRIST JOINT, OPEN APPROACH: ICD-10-PCS | Performed by: SURGERY

## 2018-06-26 PROCEDURE — 25010000002 MIDAZOLAM PER 1 MG: Performed by: ANESTHESIOLOGY

## 2018-06-26 PROCEDURE — 99232 SBSQ HOSP IP/OBS MODERATE 35: CPT | Performed by: INTERNAL MEDICINE

## 2018-06-26 PROCEDURE — 89060 EXAM SYNOVIAL FLUID CRYSTALS: CPT | Performed by: SURGERY

## 2018-06-26 PROCEDURE — 25010000002 EPINEPHRINE PER 0.1 MG: Performed by: NURSE ANESTHETIST, CERTIFIED REGISTERED

## 2018-06-26 PROCEDURE — 94799 UNLISTED PULMONARY SVC/PX: CPT

## 2018-06-26 PROCEDURE — 85610 PROTHROMBIN TIME: CPT | Performed by: INTERNAL MEDICINE

## 2018-06-26 PROCEDURE — 86900 BLOOD TYPING SEROLOGIC ABO: CPT | Performed by: HOSPITALIST

## 2018-06-26 PROCEDURE — 87075 CULTR BACTERIA EXCEPT BLOOD: CPT | Performed by: SURGERY

## 2018-06-26 PROCEDURE — 25010000002 VITAMIN K1 PER 1 MG: Performed by: INTERNAL MEDICINE

## 2018-06-26 PROCEDURE — 85025 COMPLETE CBC W/AUTO DIFF WBC: CPT | Performed by: INTERNAL MEDICINE

## 2018-06-26 PROCEDURE — 25010000002 FENTANYL CITRATE (PF) 100 MCG/2ML SOLUTION: Performed by: ANESTHESIOLOGY

## 2018-06-26 DEVICE — STIMULAN® RAPID CURE PROVIDED STERILE FOR SINGLE PATIENT USE. STIMULAN® RAPID CURE CONTAINS CALCIUM SULFATE POWDER AND MIXING SOLUTION IN PRE-MEASURED QUANTITIES SO THAT WHEN MIXED TOGETHER IN A STERILE MIXING BOWL, THE RESULTANT PASTE IS TO BE DIGITALLY PACKED INTO OPEN BONE VOID/GAP TO SET INSITU OR PLACED INTO THE MOULD PROVIDED, THE MIXTURE SETS TO FORM BEADS. THE BIODEGRADABLE, RADIOPAQUE BEADS ARE RESORBED IN APPROXIMATELY 30 – 60 DAYS WHEN USED IN ACCORDANCE WITH THE DEVICE LABELLING. STIMULAN® RAPID CURE IS MANUFACTURED FROM SYNTHETIC IMPLANT GRADE CALCIUM SULFATE DIHYDRATE(CASO4.2H2O) THAT RESORBS AND IS REPLACED WITH BONE DURING THE HEALING PROCESS. ALSO, AS THE BONE VOID FILLER BEADS ARE BIODEGRADABLE AND BIOCOMPATIBLE, THEY MAY BE USED AT AN INFECTED SITE.
Type: IMPLANTABLE DEVICE | Site: WRIST | Status: FUNCTIONAL
Brand: STIMULAN® RAPID CURE

## 2018-06-26 RX ORDER — MAGNESIUM HYDROXIDE 1200 MG/15ML
LIQUID ORAL AS NEEDED
Status: DISCONTINUED | OUTPATIENT
Start: 2018-06-26 | End: 2018-06-26 | Stop reason: HOSPADM

## 2018-06-26 RX ORDER — PROMETHAZINE HYDROCHLORIDE 25 MG/ML
12.5 INJECTION, SOLUTION INTRAMUSCULAR; INTRAVENOUS ONCE AS NEEDED
Status: DISCONTINUED | OUTPATIENT
Start: 2018-06-26 | End: 2018-06-26 | Stop reason: HOSPADM

## 2018-06-26 RX ORDER — EPHEDRINE SULFATE 50 MG/ML
5 INJECTION, SOLUTION INTRAVENOUS ONCE AS NEEDED
Status: DISCONTINUED | OUTPATIENT
Start: 2018-06-26 | End: 2018-06-26 | Stop reason: HOSPADM

## 2018-06-26 RX ORDER — GLYCOPYRROLATE 0.2 MG/ML
INJECTION INTRAMUSCULAR; INTRAVENOUS AS NEEDED
Status: DISCONTINUED | OUTPATIENT
Start: 2018-06-26 | End: 2018-06-26 | Stop reason: SURG

## 2018-06-26 RX ORDER — PROMETHAZINE HYDROCHLORIDE 25 MG/1
12.5 TABLET ORAL ONCE AS NEEDED
Status: DISCONTINUED | OUTPATIENT
Start: 2018-06-26 | End: 2018-06-26 | Stop reason: HOSPADM

## 2018-06-26 RX ORDER — SODIUM CHLORIDE 9 MG/ML
INJECTION, SOLUTION INTRAVENOUS CONTINUOUS PRN
Status: DISCONTINUED | OUTPATIENT
Start: 2018-06-26 | End: 2018-06-26 | Stop reason: SURG

## 2018-06-26 RX ORDER — DIPHENHYDRAMINE HYDROCHLORIDE 50 MG/ML
12.5 INJECTION INTRAMUSCULAR; INTRAVENOUS
Status: DISCONTINUED | OUTPATIENT
Start: 2018-06-26 | End: 2018-06-26 | Stop reason: HOSPADM

## 2018-06-26 RX ORDER — ONDANSETRON 2 MG/ML
4 INJECTION INTRAMUSCULAR; INTRAVENOUS ONCE AS NEEDED
Status: DISCONTINUED | OUTPATIENT
Start: 2018-06-26 | End: 2018-06-26 | Stop reason: HOSPADM

## 2018-06-26 RX ORDER — MIDAZOLAM HYDROCHLORIDE 1 MG/ML
2 INJECTION INTRAMUSCULAR; INTRAVENOUS
Status: DISCONTINUED | OUTPATIENT
Start: 2018-06-26 | End: 2018-06-26 | Stop reason: HOSPADM

## 2018-06-26 RX ORDER — LABETALOL HYDROCHLORIDE 5 MG/ML
5 INJECTION, SOLUTION INTRAVENOUS
Status: DISCONTINUED | OUTPATIENT
Start: 2018-06-26 | End: 2018-06-26 | Stop reason: HOSPADM

## 2018-06-26 RX ORDER — HYDROCODONE BITARTRATE AND ACETAMINOPHEN 7.5; 325 MG/1; MG/1
1 TABLET ORAL ONCE AS NEEDED
Status: DISCONTINUED | OUTPATIENT
Start: 2018-06-26 | End: 2018-06-26 | Stop reason: HOSPADM

## 2018-06-26 RX ORDER — EPINEPHRINE 1 MG/ML
INJECTION, SOLUTION, CONCENTRATE INTRAVENOUS AS NEEDED
Status: DISCONTINUED | OUTPATIENT
Start: 2018-06-26 | End: 2018-06-26 | Stop reason: SURG

## 2018-06-26 RX ORDER — PROMETHAZINE HYDROCHLORIDE 25 MG/1
25 TABLET ORAL ONCE AS NEEDED
Status: DISCONTINUED | OUTPATIENT
Start: 2018-06-26 | End: 2018-06-26 | Stop reason: HOSPADM

## 2018-06-26 RX ORDER — FLUMAZENIL 0.1 MG/ML
0.2 INJECTION INTRAVENOUS AS NEEDED
Status: DISCONTINUED | OUTPATIENT
Start: 2018-06-26 | End: 2018-06-26 | Stop reason: HOSPADM

## 2018-06-26 RX ORDER — BUPIVACAINE HYDROCHLORIDE 5 MG/ML
INJECTION, SOLUTION EPIDURAL; INTRACAUDAL AS NEEDED
Status: DISCONTINUED | OUTPATIENT
Start: 2018-06-26 | End: 2018-06-26 | Stop reason: HOSPADM

## 2018-06-26 RX ORDER — PROMETHAZINE HYDROCHLORIDE 25 MG/1
25 SUPPOSITORY RECTAL ONCE AS NEEDED
Status: DISCONTINUED | OUTPATIENT
Start: 2018-06-26 | End: 2018-06-26 | Stop reason: HOSPADM

## 2018-06-26 RX ORDER — SODIUM CHLORIDE, SODIUM LACTATE, POTASSIUM CHLORIDE, CALCIUM CHLORIDE 600; 310; 30; 20 MG/100ML; MG/100ML; MG/100ML; MG/100ML
9 INJECTION, SOLUTION INTRAVENOUS CONTINUOUS PRN
Status: DISCONTINUED | OUTPATIENT
Start: 2018-06-26 | End: 2018-07-06 | Stop reason: HOSPADM

## 2018-06-26 RX ORDER — OXYCODONE AND ACETAMINOPHEN 7.5; 325 MG/1; MG/1
1 TABLET ORAL ONCE AS NEEDED
Status: DISCONTINUED | OUTPATIENT
Start: 2018-06-26 | End: 2018-06-26 | Stop reason: HOSPADM

## 2018-06-26 RX ORDER — ALBUMIN (HUMAN) 12.5 G/50ML
12.5 SOLUTION INTRAVENOUS
Status: DISCONTINUED | OUTPATIENT
Start: 2018-06-26 | End: 2018-07-06 | Stop reason: HOSPADM

## 2018-06-26 RX ORDER — MIDAZOLAM HYDROCHLORIDE 1 MG/ML
1 INJECTION INTRAMUSCULAR; INTRAVENOUS
Status: DISCONTINUED | OUTPATIENT
Start: 2018-06-26 | End: 2018-06-26 | Stop reason: HOSPADM

## 2018-06-26 RX ORDER — NALOXONE HCL 0.4 MG/ML
0.2 VIAL (ML) INJECTION AS NEEDED
Status: DISCONTINUED | OUTPATIENT
Start: 2018-06-26 | End: 2018-06-26 | Stop reason: HOSPADM

## 2018-06-26 RX ORDER — FAMOTIDINE 10 MG/ML
20 INJECTION, SOLUTION INTRAVENOUS
Status: DISCONTINUED | OUTPATIENT
Start: 2018-06-26 | End: 2018-06-26 | Stop reason: HOSPADM

## 2018-06-26 RX ORDER — FENTANYL CITRATE 50 UG/ML
25 INJECTION, SOLUTION INTRAMUSCULAR; INTRAVENOUS
Status: DISCONTINUED | OUTPATIENT
Start: 2018-06-26 | End: 2018-06-26 | Stop reason: HOSPADM

## 2018-06-26 RX ORDER — PROPOFOL 10 MG/ML
VIAL (ML) INTRAVENOUS AS NEEDED
Status: DISCONTINUED | OUTPATIENT
Start: 2018-06-26 | End: 2018-06-26 | Stop reason: SURG

## 2018-06-26 RX ORDER — LIDOCAINE HYDROCHLORIDE 20 MG/ML
INJECTION, SOLUTION INFILTRATION; PERINEURAL AS NEEDED
Status: DISCONTINUED | OUTPATIENT
Start: 2018-06-26 | End: 2018-06-26 | Stop reason: SURG

## 2018-06-26 RX ORDER — SODIUM CHLORIDE 0.9 % (FLUSH) 0.9 %
1-10 SYRINGE (ML) INJECTION AS NEEDED
Status: DISCONTINUED | OUTPATIENT
Start: 2018-06-26 | End: 2018-06-26 | Stop reason: HOSPADM

## 2018-06-26 RX ADMIN — ASPIRIN 81 MG: 81 TABLET, DELAYED RELEASE ORAL at 08:39

## 2018-06-26 RX ADMIN — SODIUM CHLORIDE: 9 INJECTION, SOLUTION INTRAVENOUS at 18:28

## 2018-06-26 RX ADMIN — OXYCODONE HYDROCHLORIDE AND ACETAMINOPHEN 1 TABLET: 5; 325 TABLET ORAL at 06:59

## 2018-06-26 RX ADMIN — LABETALOL HYDROCHLORIDE 5 MG: 5 INJECTION, SOLUTION INTRAVENOUS at 20:20

## 2018-06-26 RX ADMIN — LORAZEPAM 0.5 MG: 0.5 TABLET ORAL at 13:42

## 2018-06-26 RX ADMIN — LEVETIRACETAM 1000 MG: 500 TABLET, FILM COATED ORAL at 08:39

## 2018-06-26 RX ADMIN — GLYCOPYRROLATE 0.2 MCG: 0.2 INJECTION INTRAMUSCULAR; INTRAVENOUS at 17:46

## 2018-06-26 RX ADMIN — PROPOFOL 250 MG: 10 INJECTION, EMULSION INTRAVENOUS at 17:37

## 2018-06-26 RX ADMIN — EPHEDRINE SULFATE 10 MG: 50 INJECTION INTRAMUSCULAR; INTRAVENOUS; SUBCUTANEOUS at 17:48

## 2018-06-26 RX ADMIN — FAMOTIDINE 20 MG: 10 INJECTION, SOLUTION INTRAVENOUS at 16:46

## 2018-06-26 RX ADMIN — LABETALOL HYDROCHLORIDE 5 MG: 5 INJECTION, SOLUTION INTRAVENOUS at 20:10

## 2018-06-26 RX ADMIN — FENTANYL CITRATE 25 MCG: 50 INJECTION, SOLUTION INTRAMUSCULAR; INTRAVENOUS at 19:51

## 2018-06-26 RX ADMIN — EPINEPHRINE 0.1 MG: 1 INJECTION, SOLUTION INTRAMUSCULAR; SUBCUTANEOUS at 17:50

## 2018-06-26 RX ADMIN — LEVETIRACETAM 1000 MG: 500 TABLET, FILM COATED ORAL at 22:12

## 2018-06-26 RX ADMIN — LIDOCAINE HYDROCHLORIDE 60 MG: 20 INJECTION, SOLUTION INFILTRATION; PERINEURAL at 17:33

## 2018-06-26 RX ADMIN — FENTANYL CITRATE 25 MCG: 50 INJECTION, SOLUTION INTRAMUSCULAR; INTRAVENOUS at 20:05

## 2018-06-26 RX ADMIN — LABETALOL HYDROCHLORIDE 5 MG: 5 INJECTION, SOLUTION INTRAVENOUS at 20:05

## 2018-06-26 RX ADMIN — FENTANYL CITRATE 25 MCG: 50 INJECTION, SOLUTION INTRAMUSCULAR; INTRAVENOUS at 16:47

## 2018-06-26 RX ADMIN — LORAZEPAM 0.5 MG: 0.5 TABLET ORAL at 03:04

## 2018-06-26 RX ADMIN — HYDROMORPHONE HYDROCHLORIDE 1 MG: 1 INJECTION, SOLUTION INTRAMUSCULAR; INTRAVENOUS; SUBCUTANEOUS at 06:58

## 2018-06-26 RX ADMIN — HYDROMORPHONE HYDROCHLORIDE 1 MG: 1 INJECTION, SOLUTION INTRAMUSCULAR; INTRAVENOUS; SUBCUTANEOUS at 13:43

## 2018-06-26 RX ADMIN — MORPHINE SULFATE 100 MG: 100 TABLET, EXTENDED RELEASE ORAL at 08:39

## 2018-06-26 RX ADMIN — DILTIAZEM HYDROCHLORIDE 120 MG: 120 CAPSULE, COATED, EXTENDED RELEASE ORAL at 08:39

## 2018-06-26 RX ADMIN — MIDAZOLAM 1 MG: 1 INJECTION INTRAMUSCULAR; INTRAVENOUS at 16:47

## 2018-06-26 RX ADMIN — CEFTRIAXONE SODIUM 2 G: 2 INJECTION, SOLUTION INTRAVENOUS at 15:50

## 2018-06-26 RX ADMIN — LABETALOL HYDROCHLORIDE 5 MG: 5 INJECTION, SOLUTION INTRAVENOUS at 20:00

## 2018-06-26 RX ADMIN — LEVOTHYROXINE SODIUM 175 MCG: 175 TABLET ORAL at 08:39

## 2018-06-26 RX ADMIN — PHYTONADIONE 5 MG: 10 INJECTION, EMULSION INTRAMUSCULAR; INTRAVENOUS; SUBCUTANEOUS at 12:47

## 2018-06-26 NOTE — ANESTHESIA PREPROCEDURE EVALUATION
Anesthesia Evaluation     Patient summary reviewed                Airway   Mallampati: II  Neck ROM: full  Possible difficult intubation  Dental      Pulmonary    (+) asthma, sleep apnea,   Cardiovascular     ECG reviewed  PT is on anticoagulation therapy  Rhythm: irregular    (+) hypertension, dysrhythmias Atrial Fib,       Neuro/Psych  (+) seizures well controlled,     GI/Hepatic/Renal/Endo    (+) morbid obesity,  diabetes mellitus, hypothyroidism,     Musculoskeletal     Abdominal    Substance History      OB/GYN          Other                      Anesthesia Plan    ASA 4     general     Anesthetic plan and risks discussed with patient.  Use of blood products discussed with patient .

## 2018-06-27 LAB
ABO + RH BLD: NORMAL
ALBUMIN SERPL-MCNC: 3.4 G/DL (ref 3.5–5.2)
ALBUMIN/GLOB SERPL: 0.8 G/DL
ALP SERPL-CCNC: 65 U/L (ref 39–117)
ALT SERPL W P-5'-P-CCNC: 65 U/L (ref 1–41)
ANION GAP SERPL CALCULATED.3IONS-SCNC: 20.1 MMOL/L
ANISOCYTOSIS BLD QL: ABNORMAL
AST SERPL-CCNC: 23 U/L (ref 1–40)
BACTERIA FLD CULT: ABNORMAL
BACTERIA SPEC AEROBE CULT: NO GROWTH
BH BB BLOOD EXPIRATION DATE: NORMAL
BH BB BLOOD TYPE BARCODE: 5100
BH BB DISPENSE STATUS: NORMAL
BH BB PRODUCT CODE: NORMAL
BH BB UNIT NUMBER: NORMAL
BILIRUB SERPL-MCNC: 0.4 MG/DL (ref 0.1–1.2)
BUN BLD-MCNC: 71 MG/DL (ref 6–20)
BUN/CREAT SERPL: 11.3 (ref 7–25)
C3 FRG RBC-MCNC: ABNORMAL
CALCIUM SPEC-SCNC: 8.1 MG/DL (ref 8.6–10.5)
CHLORIDE SERPL-SCNC: 92 MMOL/L (ref 98–107)
CO2 SERPL-SCNC: 23.9 MMOL/L (ref 22–29)
CREAT BLD-MCNC: 6.29 MG/DL (ref 0.76–1.27)
DEPRECATED RDW RBC AUTO: 58.4 FL (ref 37–54)
ERYTHROCYTE [DISTWIDTH] IN BLOOD BY AUTOMATED COUNT: 15.4 % (ref 11.5–14.5)
GFR SERPL CREATININE-BSD FRML MDRD: 9 ML/MIN/1.73
GLOBULIN UR ELPH-MCNC: 4.1 GM/DL
GLUCOSE BLD-MCNC: 112 MG/DL (ref 65–99)
GLUCOSE BLDC GLUCOMTR-MCNC: 108 MG/DL (ref 70–130)
GLUCOSE BLDC GLUCOMTR-MCNC: 109 MG/DL (ref 70–130)
GLUCOSE BLDC GLUCOMTR-MCNC: 109 MG/DL (ref 70–130)
GLUCOSE BLDC GLUCOMTR-MCNC: 93 MG/DL (ref 70–130)
GRAM STN SPEC: ABNORMAL
HCT VFR BLD AUTO: 36.5 % (ref 40.4–52.2)
HGB BLD-MCNC: 10.8 G/DL (ref 13.7–17.6)
INR PPP: 1.42 (ref 0.9–1.1)
LYMPHOCYTES # BLD MANUAL: 0.42 10*3/MM3 (ref 0.9–4.8)
LYMPHOCYTES NFR BLD MANUAL: 14 % (ref 5–12)
LYMPHOCYTES NFR BLD MANUAL: 2 % (ref 19.6–45.3)
MCH RBC QN AUTO: 30.4 PG (ref 27–32.7)
MCHC RBC AUTO-ENTMCNC: 29.6 G/DL (ref 32.6–36.4)
MCV RBC AUTO: 102.8 FL (ref 79.8–96.2)
MONOCYTES # BLD AUTO: 2.95 10*3/MM3 (ref 0.2–1.2)
NEUTROPHILS # BLD AUTO: 17.72 10*3/MM3 (ref 1.9–8.1)
NEUTROPHILS NFR BLD MANUAL: 84 % (ref 42.7–76)
OVALOCYTES BLD QL SMEAR: ABNORMAL
PLAT MORPH BLD: NORMAL
PLATELET # BLD AUTO: 184 10*3/MM3 (ref 140–500)
PMV BLD AUTO: 10.7 FL (ref 6–12)
POTASSIUM BLD-SCNC: 4.7 MMOL/L (ref 3.5–5.2)
PROT SERPL-MCNC: 7.5 G/DL (ref 6–8.5)
PROTHROMBIN TIME: 17.1 SECONDS (ref 11.7–14.2)
RBC # BLD AUTO: 3.55 10*6/MM3 (ref 4.6–6)
SCAN SLIDE: NORMAL
SODIUM BLD-SCNC: 136 MMOL/L (ref 136–145)
STREP GROUPING: ABNORMAL
UNIT  ABO: NORMAL
UNIT  RH: NORMAL
WBC MORPH BLD: NORMAL
WBC NRBC COR # BLD: 21.09 10*3/MM3 (ref 4.5–10.7)

## 2018-06-27 PROCEDURE — 85007 BL SMEAR W/DIFF WBC COUNT: CPT | Performed by: INTERNAL MEDICINE

## 2018-06-27 PROCEDURE — 99232 SBSQ HOSP IP/OBS MODERATE 35: CPT | Performed by: INTERNAL MEDICINE

## 2018-06-27 PROCEDURE — 25010000002 HEPARIN (PORCINE) PER 1000 UNITS: Performed by: INTERNAL MEDICINE

## 2018-06-27 PROCEDURE — 82962 GLUCOSE BLOOD TEST: CPT

## 2018-06-27 PROCEDURE — 25010000003 CEFTRIAXONE PER 250 MG: Performed by: INTERNAL MEDICINE

## 2018-06-27 PROCEDURE — 85025 COMPLETE CBC W/AUTO DIFF WBC: CPT | Performed by: INTERNAL MEDICINE

## 2018-06-27 PROCEDURE — 85610 PROTHROMBIN TIME: CPT | Performed by: INTERNAL MEDICINE

## 2018-06-27 PROCEDURE — 80053 COMPREHEN METABOLIC PANEL: CPT | Performed by: INTERNAL MEDICINE

## 2018-06-27 PROCEDURE — 25010000002 HYDROMORPHONE PER 4 MG: Performed by: INTERNAL MEDICINE

## 2018-06-27 RX ORDER — SENNA AND DOCUSATE SODIUM 50; 8.6 MG/1; MG/1
2 TABLET, FILM COATED ORAL 2 TIMES DAILY
Status: DISCONTINUED | OUTPATIENT
Start: 2018-06-27 | End: 2018-07-06 | Stop reason: HOSPADM

## 2018-06-27 RX ORDER — HEPARIN SODIUM 5000 [USP'U]/ML
5000 INJECTION, SOLUTION INTRAVENOUS; SUBCUTANEOUS EVERY 12 HOURS SCHEDULED
Status: DISCONTINUED | OUTPATIENT
Start: 2018-06-27 | End: 2018-06-28

## 2018-06-27 RX ADMIN — DILTIAZEM HYDROCHLORIDE 120 MG: 120 CAPSULE, COATED, EXTENDED RELEASE ORAL at 08:39

## 2018-06-27 RX ADMIN — DOCUSATE SODIUM -SENNOSIDES 2 TABLET: 50; 8.6 TABLET, COATED ORAL at 22:14

## 2018-06-27 RX ADMIN — HYDROMORPHONE HYDROCHLORIDE 1 MG: 1 INJECTION, SOLUTION INTRAMUSCULAR; INTRAVENOUS; SUBCUTANEOUS at 04:54

## 2018-06-27 RX ADMIN — HEPARIN SODIUM 5000 UNITS: 5000 INJECTION, SOLUTION INTRAVENOUS; SUBCUTANEOUS at 22:14

## 2018-06-27 RX ADMIN — HYDROMORPHONE HYDROCHLORIDE 1 MG: 1 INJECTION, SOLUTION INTRAMUSCULAR; INTRAVENOUS; SUBCUTANEOUS at 22:14

## 2018-06-27 RX ADMIN — ASPIRIN 81 MG: 81 TABLET, DELAYED RELEASE ORAL at 08:39

## 2018-06-27 RX ADMIN — CEFTRIAXONE SODIUM 2 G: 2 INJECTION, SOLUTION INTRAVENOUS at 16:28

## 2018-06-27 RX ADMIN — LEVETIRACETAM 1000 MG: 500 TABLET, FILM COATED ORAL at 22:13

## 2018-06-27 RX ADMIN — HYDROMORPHONE HYDROCHLORIDE 1 MG: 1 INJECTION, SOLUTION INTRAMUSCULAR; INTRAVENOUS; SUBCUTANEOUS at 18:31

## 2018-06-27 RX ADMIN — VORTIOXETINE 20 MG: 10 TABLET, FILM COATED ORAL at 22:13

## 2018-06-27 RX ADMIN — LEVOTHYROXINE SODIUM 175 MCG: 175 TABLET ORAL at 08:39

## 2018-06-27 RX ADMIN — LORAZEPAM 0.5 MG: 0.5 TABLET ORAL at 11:35

## 2018-06-27 RX ADMIN — LEVETIRACETAM 1000 MG: 500 TABLET, FILM COATED ORAL at 08:39

## 2018-06-27 RX ADMIN — MORPHINE SULFATE 100 MG: 100 TABLET, EXTENDED RELEASE ORAL at 08:39

## 2018-06-27 RX ADMIN — OXYCODONE HYDROCHLORIDE AND ACETAMINOPHEN 1 TABLET: 5; 325 TABLET ORAL at 05:41

## 2018-06-27 NOTE — ANESTHESIA POSTPROCEDURE EVALUATION
Patient: Armando Gill    Procedure Summary     Date:  06/26/18 Room / Location:  [facility] OR 02 / [facility] MAIN OR    Anesthesia Start:  1725 Anesthesia Stop:  1919    Procedure:  RIGHT WRIST INCISION AND DRAINAGE (Right Arm Upper) Diagnosis:      Surgeon:  Wilian Arredondo MD Provider:  Beto Schrader MD    Anesthesia Type:  general ASA Status:  4          Anesthesia Type: general  Last vitals  BP   (!) 187/83 (06/26/18 2015)   Temp   37.1 °C (98.8 °F) (06/26/18 1916)   Pulse   92 (06/26/18 2015)   Resp   16 (06/26/18 2015)     SpO2   96 % (06/26/18 2015)     Post Anesthesia Care and Evaluation    Patient location during evaluation: PACU  Patient participation: complete - patient participated  Level of consciousness: awake and alert  Pain management: adequate  Airway patency: patent  Anesthetic complications: No anesthetic complications    Cardiovascular status: acceptable  Respiratory status: acceptable  Hydration status: acceptable

## 2018-06-28 ENCOUNTER — APPOINTMENT (OUTPATIENT)
Dept: GENERAL RADIOLOGY | Facility: HOSPITAL | Age: 58
End: 2018-06-28
Attending: ORTHOPAEDIC SURGERY

## 2018-06-28 PROBLEM — G89.4 CHRONIC PAIN SYNDROME: Status: ACTIVE | Noted: 2018-06-28

## 2018-06-28 PROBLEM — F11.20 OPIOID DEPENDENCE (HCC): Status: ACTIVE | Noted: 2018-06-28

## 2018-06-28 LAB
ALBUMIN SERPL-MCNC: 3.5 G/DL (ref 3.5–5.2)
ALBUMIN/GLOB SERPL: 0.9 G/DL
ALP SERPL-CCNC: 70 U/L (ref 39–117)
ALT SERPL W P-5'-P-CCNC: 50 U/L (ref 1–41)
ANION GAP SERPL CALCULATED.3IONS-SCNC: 18.7 MMOL/L
AST SERPL-CCNC: 15 U/L (ref 1–40)
BACTERIA SPEC AEROBE CULT: NORMAL
BACTERIA SPEC AEROBE CULT: NORMAL
BASOPHILS # BLD AUTO: 0.01 10*3/MM3 (ref 0–0.2)
BASOPHILS NFR BLD AUTO: 0.1 % (ref 0–1.5)
BILIRUB SERPL-MCNC: 0.4 MG/DL (ref 0.1–1.2)
BUN BLD-MCNC: 46 MG/DL (ref 6–20)
BUN/CREAT SERPL: 9.4 (ref 7–25)
C3 FRG RBC-MCNC: NORMAL
CALCIUM SPEC-SCNC: 8.4 MG/DL (ref 8.6–10.5)
CHLORIDE SERPL-SCNC: 87 MMOL/L (ref 98–107)
CO2 SERPL-SCNC: 24.3 MMOL/L (ref 22–29)
CREAT BLD-MCNC: 4.87 MG/DL (ref 0.76–1.27)
CRP SERPL-MCNC: 10.97 MG/DL (ref 0–0.5)
DEPRECATED RDW RBC AUTO: 56.4 FL (ref 37–54)
EOSINOPHIL # BLD AUTO: 0.07 10*3/MM3 (ref 0–0.7)
EOSINOPHIL NFR BLD AUTO: 0.4 % (ref 0.3–6.2)
ERYTHROCYTE [DISTWIDTH] IN BLOOD BY AUTOMATED COUNT: 15.3 % (ref 11.5–14.5)
GFR SERPL CREATININE-BSD FRML MDRD: 12 ML/MIN/1.73
GLOBULIN UR ELPH-MCNC: 3.9 GM/DL
GLUCOSE BLD-MCNC: 106 MG/DL (ref 65–99)
GLUCOSE BLDC GLUCOMTR-MCNC: 115 MG/DL (ref 70–130)
GLUCOSE BLDC GLUCOMTR-MCNC: 124 MG/DL (ref 70–130)
GLUCOSE BLDC GLUCOMTR-MCNC: 124 MG/DL (ref 70–130)
GLUCOSE BLDC GLUCOMTR-MCNC: 153 MG/DL (ref 70–130)
HCT VFR BLD AUTO: 36 % (ref 40.4–52.2)
HGB BLD-MCNC: 10.9 G/DL (ref 13.7–17.6)
IMM GRANULOCYTES # BLD: 0.25 10*3/MM3 (ref 0–0.03)
IMM GRANULOCYTES NFR BLD: 1.5 % (ref 0–0.5)
INR PPP: 1.35 (ref 0.9–1.1)
LYMPHOCYTES # BLD AUTO: 1.63 10*3/MM3 (ref 0.9–4.8)
LYMPHOCYTES NFR BLD AUTO: 9.5 % (ref 19.6–45.3)
MCH RBC QN AUTO: 30.5 PG (ref 27–32.7)
MCHC RBC AUTO-ENTMCNC: 30.3 G/DL (ref 32.6–36.4)
MCV RBC AUTO: 100.8 FL (ref 79.8–96.2)
MONOCYTES # BLD AUTO: 1.01 10*3/MM3 (ref 0.2–1.2)
MONOCYTES NFR BLD AUTO: 5.9 % (ref 5–12)
NEUTROPHILS # BLD AUTO: 14.5 10*3/MM3 (ref 1.9–8.1)
NEUTROPHILS NFR BLD AUTO: 84.1 % (ref 42.7–76)
NRBC BLD MANUAL-RTO: 0.1 /100 WBC (ref 0–0)
OVALOCYTES BLD QL SMEAR: NORMAL
PLAT MORPH BLD: NORMAL
PLATELET # BLD AUTO: 201 10*3/MM3 (ref 140–500)
PMV BLD AUTO: 10.4 FL (ref 6–12)
POTASSIUM BLD-SCNC: 3.9 MMOL/L (ref 3.5–5.2)
PROT SERPL-MCNC: 7.4 G/DL (ref 6–8.5)
PROTHROMBIN TIME: 16.4 SECONDS (ref 11.7–14.2)
RBC # BLD AUTO: 3.57 10*6/MM3 (ref 4.6–6)
SODIUM BLD-SCNC: 130 MMOL/L (ref 136–145)
WBC MORPH BLD: NORMAL
WBC NRBC COR # BLD: 17.22 10*3/MM3 (ref 4.5–10.7)

## 2018-06-28 PROCEDURE — 86140 C-REACTIVE PROTEIN: CPT | Performed by: INTERNAL MEDICINE

## 2018-06-28 PROCEDURE — 82962 GLUCOSE BLOOD TEST: CPT

## 2018-06-28 PROCEDURE — 85025 COMPLETE CBC W/AUTO DIFF WBC: CPT | Performed by: INTERNAL MEDICINE

## 2018-06-28 PROCEDURE — 85007 BL SMEAR W/DIFF WBC COUNT: CPT | Performed by: INTERNAL MEDICINE

## 2018-06-28 PROCEDURE — 80053 COMPREHEN METABOLIC PANEL: CPT | Performed by: INTERNAL MEDICINE

## 2018-06-28 PROCEDURE — 73610 X-RAY EXAM OF ANKLE: CPT

## 2018-06-28 PROCEDURE — 73030 X-RAY EXAM OF SHOULDER: CPT

## 2018-06-28 PROCEDURE — 25010000002 HYDROMORPHONE PER 4 MG: Performed by: INTERNAL MEDICINE

## 2018-06-28 PROCEDURE — 97162 PT EVAL MOD COMPLEX 30 MIN: CPT

## 2018-06-28 PROCEDURE — 99232 SBSQ HOSP IP/OBS MODERATE 35: CPT | Performed by: INTERNAL MEDICINE

## 2018-06-28 PROCEDURE — 25010000002 HEPARIN (PORCINE) PER 1000 UNITS: Performed by: INTERNAL MEDICINE

## 2018-06-28 PROCEDURE — 25010000003 CEFTRIAXONE PER 250 MG: Performed by: INTERNAL MEDICINE

## 2018-06-28 PROCEDURE — 73630 X-RAY EXAM OF FOOT: CPT

## 2018-06-28 PROCEDURE — 63710000001 INSULIN ASPART PER 5 UNITS: Performed by: INTERNAL MEDICINE

## 2018-06-28 PROCEDURE — 94799 UNLISTED PULMONARY SVC/PX: CPT

## 2018-06-28 PROCEDURE — 85610 PROTHROMBIN TIME: CPT | Performed by: INTERNAL MEDICINE

## 2018-06-28 RX ORDER — HYDRALAZINE HYDROCHLORIDE 25 MG/1
25 TABLET, FILM COATED ORAL EVERY 8 HOURS PRN
Status: DISCONTINUED | OUTPATIENT
Start: 2018-06-28 | End: 2018-07-06 | Stop reason: HOSPADM

## 2018-06-28 RX ORDER — HYDRALAZINE HYDROCHLORIDE 25 MG/1
25 TABLET, FILM COATED ORAL ONCE
Status: COMPLETED | OUTPATIENT
Start: 2018-06-28 | End: 2018-06-28

## 2018-06-28 RX ORDER — HEPARIN SODIUM 5000 [USP'U]/ML
5000 INJECTION, SOLUTION INTRAVENOUS; SUBCUTANEOUS EVERY 8 HOURS SCHEDULED
Status: DISCONTINUED | OUTPATIENT
Start: 2018-06-28 | End: 2018-07-06 | Stop reason: HOSPADM

## 2018-06-28 RX ADMIN — DOCUSATE SODIUM -SENNOSIDES 2 TABLET: 50; 8.6 TABLET, COATED ORAL at 21:06

## 2018-06-28 RX ADMIN — MORPHINE SULFATE 100 MG: 100 TABLET, EXTENDED RELEASE ORAL at 08:33

## 2018-06-28 RX ADMIN — POLYETHYLENE GLYCOL 3350 17 G: 17 POWDER, FOR SOLUTION ORAL at 08:33

## 2018-06-28 RX ADMIN — OXYCODONE HYDROCHLORIDE AND ACETAMINOPHEN 1 TABLET: 5; 325 TABLET ORAL at 08:33

## 2018-06-28 RX ADMIN — HEPARIN SODIUM 5000 UNITS: 5000 INJECTION INTRAVENOUS; SUBCUTANEOUS at 21:06

## 2018-06-28 RX ADMIN — CEFTRIAXONE SODIUM 2 G: 2 INJECTION, SOLUTION INTRAVENOUS at 16:00

## 2018-06-28 RX ADMIN — HYDRALAZINE HYDROCHLORIDE 25 MG: 25 TABLET, FILM COATED ORAL at 16:24

## 2018-06-28 RX ADMIN — HEPARIN SODIUM 5000 UNITS: 5000 INJECTION, SOLUTION INTRAVENOUS; SUBCUTANEOUS at 08:33

## 2018-06-28 RX ADMIN — LEVETIRACETAM 1000 MG: 500 TABLET, FILM COATED ORAL at 21:06

## 2018-06-28 RX ADMIN — LEVETIRACETAM 1000 MG: 500 TABLET, FILM COATED ORAL at 08:33

## 2018-06-28 RX ADMIN — INSULIN ASPART 2 UNITS: 100 INJECTION, SOLUTION INTRAVENOUS; SUBCUTANEOUS at 21:10

## 2018-06-28 RX ADMIN — ASPIRIN 81 MG: 81 TABLET, DELAYED RELEASE ORAL at 08:33

## 2018-06-28 RX ADMIN — DOCUSATE SODIUM -SENNOSIDES 2 TABLET: 50; 8.6 TABLET, COATED ORAL at 08:33

## 2018-06-28 RX ADMIN — DILTIAZEM HYDROCHLORIDE 120 MG: 120 CAPSULE, COATED, EXTENDED RELEASE ORAL at 08:33

## 2018-06-28 RX ADMIN — OXYCODONE HYDROCHLORIDE AND ACETAMINOPHEN 1 TABLET: 5; 325 TABLET ORAL at 21:10

## 2018-06-28 RX ADMIN — HYDROMORPHONE HYDROCHLORIDE 1 MG: 1 INJECTION, SOLUTION INTRAMUSCULAR; INTRAVENOUS; SUBCUTANEOUS at 04:09

## 2018-06-28 RX ADMIN — LORAZEPAM 0.5 MG: 0.5 TABLET ORAL at 06:17

## 2018-06-28 RX ADMIN — LEVOTHYROXINE SODIUM 175 MCG: 175 TABLET ORAL at 06:17

## 2018-06-29 LAB
ANION GAP SERPL CALCULATED.3IONS-SCNC: 20.6 MMOL/L
BACTERIA FLD CULT: ABNORMAL
BUN BLD-MCNC: 65 MG/DL (ref 6–20)
BUN/CREAT SERPL: 9.9 (ref 7–25)
CALCIUM SPEC-SCNC: 8.4 MG/DL (ref 8.6–10.5)
CHLORIDE SERPL-SCNC: 89 MMOL/L (ref 98–107)
CO2 SERPL-SCNC: 23.4 MMOL/L (ref 22–29)
CREAT BLD-MCNC: 6.54 MG/DL (ref 0.76–1.27)
DEPRECATED RDW RBC AUTO: 55.8 FL (ref 37–54)
ERYTHROCYTE [DISTWIDTH] IN BLOOD BY AUTOMATED COUNT: 15.2 % (ref 11.5–14.5)
GFR SERPL CREATININE-BSD FRML MDRD: 9 ML/MIN/1.73
GLUCOSE BLD-MCNC: 127 MG/DL (ref 65–99)
GLUCOSE BLDC GLUCOMTR-MCNC: 114 MG/DL (ref 70–130)
GLUCOSE BLDC GLUCOMTR-MCNC: 116 MG/DL (ref 70–130)
GLUCOSE BLDC GLUCOMTR-MCNC: 122 MG/DL (ref 70–130)
GLUCOSE BLDC GLUCOMTR-MCNC: 126 MG/DL (ref 70–130)
GRAM STN SPEC: ABNORMAL
GRAM STN SPEC: ABNORMAL
HBA1C MFR BLD: 4.8 % (ref 4.8–5.6)
HCT VFR BLD AUTO: 34.5 % (ref 40.4–52.2)
HGB BLD-MCNC: 10.6 G/DL (ref 13.7–17.6)
INR PPP: 1.39 (ref 0.9–1.1)
MCH RBC QN AUTO: 30.8 PG (ref 27–32.7)
MCHC RBC AUTO-ENTMCNC: 30.7 G/DL (ref 32.6–36.4)
MCV RBC AUTO: 100.3 FL (ref 79.8–96.2)
PLATELET # BLD AUTO: 241 10*3/MM3 (ref 140–500)
PMV BLD AUTO: 10.1 FL (ref 6–12)
POTASSIUM BLD-SCNC: 3.9 MMOL/L (ref 3.5–5.2)
PROTHROMBIN TIME: 16.8 SECONDS (ref 11.7–14.2)
RBC # BLD AUTO: 3.44 10*6/MM3 (ref 4.6–6)
SODIUM BLD-SCNC: 133 MMOL/L (ref 136–145)
STREP GROUPING: ABNORMAL
WBC NRBC COR # BLD: 16.44 10*3/MM3 (ref 4.5–10.7)

## 2018-06-29 PROCEDURE — 97110 THERAPEUTIC EXERCISES: CPT

## 2018-06-29 PROCEDURE — 83036 HEMOGLOBIN GLYCOSYLATED A1C: CPT | Performed by: INTERNAL MEDICINE

## 2018-06-29 PROCEDURE — 80048 BASIC METABOLIC PNL TOTAL CA: CPT | Performed by: INTERNAL MEDICINE

## 2018-06-29 PROCEDURE — 25010000003 CEFTRIAXONE PER 250 MG: Performed by: INTERNAL MEDICINE

## 2018-06-29 PROCEDURE — 82962 GLUCOSE BLOOD TEST: CPT

## 2018-06-29 PROCEDURE — 25010000002 HEPARIN (PORCINE) PER 1000 UNITS: Performed by: INTERNAL MEDICINE

## 2018-06-29 PROCEDURE — 85610 PROTHROMBIN TIME: CPT | Performed by: INTERNAL MEDICINE

## 2018-06-29 PROCEDURE — 85027 COMPLETE CBC AUTOMATED: CPT | Performed by: INTERNAL MEDICINE

## 2018-06-29 PROCEDURE — 99232 SBSQ HOSP IP/OBS MODERATE 35: CPT | Performed by: INTERNAL MEDICINE

## 2018-06-29 RX ORDER — WARFARIN SODIUM 5 MG/1
5 TABLET ORAL
Status: DISCONTINUED | OUTPATIENT
Start: 2018-06-29 | End: 2018-07-01

## 2018-06-29 RX ADMIN — HEPARIN SODIUM 5000 UNITS: 5000 INJECTION INTRAVENOUS; SUBCUTANEOUS at 18:49

## 2018-06-29 RX ADMIN — MORPHINE SULFATE 100 MG: 100 TABLET, EXTENDED RELEASE ORAL at 09:15

## 2018-06-29 RX ADMIN — HEPARIN SODIUM 5000 UNITS: 5000 INJECTION INTRAVENOUS; SUBCUTANEOUS at 21:32

## 2018-06-29 RX ADMIN — DOCUSATE SODIUM -SENNOSIDES 2 TABLET: 50; 8.6 TABLET, COATED ORAL at 21:32

## 2018-06-29 RX ADMIN — VORTIOXETINE 20 MG: 10 TABLET, FILM COATED ORAL at 21:32

## 2018-06-29 RX ADMIN — LEVOTHYROXINE SODIUM 175 MCG: 175 TABLET ORAL at 06:06

## 2018-06-29 RX ADMIN — HEPARIN SODIUM 5000 UNITS: 5000 INJECTION INTRAVENOUS; SUBCUTANEOUS at 06:05

## 2018-06-29 RX ADMIN — LEVETIRACETAM 1000 MG: 500 TABLET, FILM COATED ORAL at 21:32

## 2018-06-29 RX ADMIN — WARFARIN SODIUM 5 MG: 5 TABLET ORAL at 18:49

## 2018-06-29 RX ADMIN — VORTIOXETINE 20 MG: 10 TABLET, FILM COATED ORAL at 02:33

## 2018-06-29 RX ADMIN — LORAZEPAM 0.5 MG: 0.5 TABLET ORAL at 02:32

## 2018-06-29 RX ADMIN — POLYETHYLENE GLYCOL 3350 17 G: 17 POWDER, FOR SOLUTION ORAL at 09:15

## 2018-06-29 RX ADMIN — DOCUSATE SODIUM -SENNOSIDES 2 TABLET: 50; 8.6 TABLET, COATED ORAL at 09:15

## 2018-06-29 RX ADMIN — LORAZEPAM 0.5 MG: 0.5 TABLET ORAL at 16:28

## 2018-06-29 RX ADMIN — LEVETIRACETAM 1000 MG: 500 TABLET, FILM COATED ORAL at 09:15

## 2018-06-29 RX ADMIN — ASPIRIN 81 MG: 81 TABLET, DELAYED RELEASE ORAL at 09:15

## 2018-06-29 RX ADMIN — CEFTRIAXONE SODIUM 2 G: 2 INJECTION, SOLUTION INTRAVENOUS at 18:49

## 2018-06-29 RX ADMIN — DILTIAZEM HYDROCHLORIDE 120 MG: 120 CAPSULE, COATED, EXTENDED RELEASE ORAL at 09:15

## 2018-06-30 LAB
GLUCOSE BLDC GLUCOMTR-MCNC: 118 MG/DL (ref 70–130)
GLUCOSE BLDC GLUCOMTR-MCNC: 120 MG/DL (ref 70–130)
GLUCOSE BLDC GLUCOMTR-MCNC: 125 MG/DL (ref 70–130)
GLUCOSE BLDC GLUCOMTR-MCNC: 172 MG/DL (ref 70–130)
INR PPP: 1.36 (ref 0.9–1.1)
PROTHROMBIN TIME: 16.6 SECONDS (ref 11.7–14.2)

## 2018-06-30 PROCEDURE — 63710000001 INSULIN ASPART PER 5 UNITS: Performed by: INTERNAL MEDICINE

## 2018-06-30 PROCEDURE — 85610 PROTHROMBIN TIME: CPT | Performed by: INTERNAL MEDICINE

## 2018-06-30 PROCEDURE — 87205 SMEAR GRAM STAIN: CPT | Performed by: SURGERY

## 2018-06-30 PROCEDURE — 25010000003 CEFTRIAXONE PER 250 MG: Performed by: INTERNAL MEDICINE

## 2018-06-30 PROCEDURE — 25010000002 HYDROMORPHONE PER 4 MG: Performed by: INTERNAL MEDICINE

## 2018-06-30 PROCEDURE — 25010000002 HEPARIN (PORCINE) PER 1000 UNITS: Performed by: INTERNAL MEDICINE

## 2018-06-30 PROCEDURE — 90791 PSYCH DIAGNOSTIC EVALUATION: CPT | Performed by: SOCIAL WORKER

## 2018-06-30 PROCEDURE — 82962 GLUCOSE BLOOD TEST: CPT

## 2018-06-30 PROCEDURE — 87070 CULTURE OTHR SPECIMN AEROBIC: CPT | Performed by: SURGERY

## 2018-06-30 RX ORDER — LORAZEPAM 1 MG/1
1 TABLET ORAL EVERY 8 HOURS PRN
Status: DISCONTINUED | OUTPATIENT
Start: 2018-06-30 | End: 2018-07-06 | Stop reason: HOSPADM

## 2018-06-30 RX ADMIN — OXYCODONE HYDROCHLORIDE AND ACETAMINOPHEN 1 TABLET: 5; 325 TABLET ORAL at 12:17

## 2018-06-30 RX ADMIN — LEVETIRACETAM 1000 MG: 500 TABLET, FILM COATED ORAL at 20:27

## 2018-06-30 RX ADMIN — INSULIN ASPART 2 UNITS: 100 INJECTION, SOLUTION INTRAVENOUS; SUBCUTANEOUS at 12:10

## 2018-06-30 RX ADMIN — POLYETHYLENE GLYCOL 3350 17 G: 17 POWDER, FOR SOLUTION ORAL at 08:58

## 2018-06-30 RX ADMIN — MORPHINE SULFATE 100 MG: 100 TABLET, EXTENDED RELEASE ORAL at 08:58

## 2018-06-30 RX ADMIN — HEPARIN SODIUM 5000 UNITS: 5000 INJECTION INTRAVENOUS; SUBCUTANEOUS at 06:26

## 2018-06-30 RX ADMIN — WARFARIN SODIUM 5 MG: 5 TABLET ORAL at 18:51

## 2018-06-30 RX ADMIN — LORAZEPAM 0.5 MG: 0.5 TABLET ORAL at 09:25

## 2018-06-30 RX ADMIN — HYDROMORPHONE HYDROCHLORIDE 1 MG: 1 INJECTION, SOLUTION INTRAMUSCULAR; INTRAVENOUS; SUBCUTANEOUS at 10:12

## 2018-06-30 RX ADMIN — DOCUSATE SODIUM -SENNOSIDES 2 TABLET: 50; 8.6 TABLET, COATED ORAL at 08:58

## 2018-06-30 RX ADMIN — DILTIAZEM HYDROCHLORIDE 120 MG: 120 CAPSULE, COATED, EXTENDED RELEASE ORAL at 08:58

## 2018-06-30 RX ADMIN — VORTIOXETINE 20 MG: 10 TABLET, FILM COATED ORAL at 20:27

## 2018-06-30 RX ADMIN — ASPIRIN 81 MG: 81 TABLET, DELAYED RELEASE ORAL at 08:58

## 2018-06-30 RX ADMIN — HEPARIN SODIUM 5000 UNITS: 5000 INJECTION INTRAVENOUS; SUBCUTANEOUS at 14:34

## 2018-06-30 RX ADMIN — CEFTRIAXONE SODIUM 2 G: 2 INJECTION, SOLUTION INTRAVENOUS at 16:34

## 2018-06-30 RX ADMIN — LEVETIRACETAM 1000 MG: 500 TABLET, FILM COATED ORAL at 08:58

## 2018-06-30 RX ADMIN — DOCUSATE SODIUM -SENNOSIDES 2 TABLET: 50; 8.6 TABLET, COATED ORAL at 20:27

## 2018-06-30 RX ADMIN — LEVOTHYROXINE SODIUM 175 MCG: 175 TABLET ORAL at 06:26

## 2018-06-30 RX ADMIN — HEPARIN SODIUM 5000 UNITS: 5000 INJECTION INTRAVENOUS; SUBCUTANEOUS at 21:29

## 2018-07-01 PROBLEM — E87.1 HYPONATREMIA: Status: ACTIVE | Noted: 2018-07-01

## 2018-07-01 LAB
ANION GAP SERPL CALCULATED.3IONS-SCNC: 22 MMOL/L
BACTERIA SPEC ANAEROBE CULT: NORMAL
BASOPHILS # BLD AUTO: 0.03 10*3/MM3 (ref 0–0.2)
BASOPHILS NFR BLD AUTO: 0.2 % (ref 0–1.5)
BUN BLD-MCNC: 66 MG/DL (ref 6–20)
BUN/CREAT SERPL: 10.3 (ref 7–25)
CALCIUM SPEC-SCNC: 8.7 MG/DL (ref 8.6–10.5)
CHLORIDE SERPL-SCNC: 85 MMOL/L (ref 98–107)
CO2 SERPL-SCNC: 22 MMOL/L (ref 22–29)
CREAT BLD-MCNC: 6.38 MG/DL (ref 0.76–1.27)
DEPRECATED RDW RBC AUTO: 53.7 FL (ref 37–54)
EOSINOPHIL # BLD AUTO: 0.12 10*3/MM3 (ref 0–0.7)
EOSINOPHIL NFR BLD AUTO: 0.8 % (ref 0.3–6.2)
ERYTHROCYTE [DISTWIDTH] IN BLOOD BY AUTOMATED COUNT: 15 % (ref 11.5–14.5)
GFR SERPL CREATININE-BSD FRML MDRD: 9 ML/MIN/1.73
GLUCOSE BLD-MCNC: 120 MG/DL (ref 65–99)
GLUCOSE BLDC GLUCOMTR-MCNC: 110 MG/DL (ref 70–130)
GLUCOSE BLDC GLUCOMTR-MCNC: 112 MG/DL (ref 70–130)
GLUCOSE BLDC GLUCOMTR-MCNC: 129 MG/DL (ref 70–130)
GLUCOSE BLDC GLUCOMTR-MCNC: 132 MG/DL (ref 70–130)
HCT VFR BLD AUTO: 35.4 % (ref 40.4–52.2)
HGB BLD-MCNC: 11.1 G/DL (ref 13.7–17.6)
IMM GRANULOCYTES # BLD: 0.43 10*3/MM3 (ref 0–0.03)
IMM GRANULOCYTES NFR BLD: 2.7 % (ref 0–0.5)
INR PPP: 1.55 (ref 0.9–1.1)
LYMPHOCYTES # BLD AUTO: 1.65 10*3/MM3 (ref 0.9–4.8)
LYMPHOCYTES NFR BLD AUTO: 10.4 % (ref 19.6–45.3)
MCH RBC QN AUTO: 30.7 PG (ref 27–32.7)
MCHC RBC AUTO-ENTMCNC: 31.4 G/DL (ref 32.6–36.4)
MCV RBC AUTO: 98.1 FL (ref 79.8–96.2)
MONOCYTES # BLD AUTO: 0.87 10*3/MM3 (ref 0.2–1.2)
MONOCYTES NFR BLD AUTO: 5.5 % (ref 5–12)
NEUTROPHILS # BLD AUTO: 13.27 10*3/MM3 (ref 1.9–8.1)
NEUTROPHILS NFR BLD AUTO: 83.1 % (ref 42.7–76)
PLATELET # BLD AUTO: 295 10*3/MM3 (ref 140–500)
PMV BLD AUTO: 9.8 FL (ref 6–12)
POTASSIUM BLD-SCNC: 3.9 MMOL/L (ref 3.5–5.2)
PROTHROMBIN TIME: 18.3 SECONDS (ref 11.7–14.2)
RBC # BLD AUTO: 3.61 10*6/MM3 (ref 4.6–6)
SODIUM BLD-SCNC: 129 MMOL/L (ref 136–145)
WBC NRBC COR # BLD: 15.94 10*3/MM3 (ref 4.5–10.7)

## 2018-07-01 PROCEDURE — 85025 COMPLETE CBC W/AUTO DIFF WBC: CPT | Performed by: INTERNAL MEDICINE

## 2018-07-01 PROCEDURE — 85610 PROTHROMBIN TIME: CPT | Performed by: INTERNAL MEDICINE

## 2018-07-01 PROCEDURE — 82962 GLUCOSE BLOOD TEST: CPT

## 2018-07-01 PROCEDURE — 25010000002 HEPARIN (PORCINE) PER 1000 UNITS: Performed by: INTERNAL MEDICINE

## 2018-07-01 PROCEDURE — 97530 THERAPEUTIC ACTIVITIES: CPT | Performed by: PHYSICAL THERAPIST

## 2018-07-01 PROCEDURE — 25010000003 CEFTRIAXONE PER 250 MG: Performed by: INTERNAL MEDICINE

## 2018-07-01 PROCEDURE — 80048 BASIC METABOLIC PNL TOTAL CA: CPT | Performed by: INTERNAL MEDICINE

## 2018-07-01 RX ADMIN — LEVOTHYROXINE SODIUM 175 MCG: 175 TABLET ORAL at 06:16

## 2018-07-01 RX ADMIN — MORPHINE SULFATE 100 MG: 100 TABLET, EXTENDED RELEASE ORAL at 09:19

## 2018-07-01 RX ADMIN — HEPARIN SODIUM 5000 UNITS: 5000 INJECTION INTRAVENOUS; SUBCUTANEOUS at 21:32

## 2018-07-01 RX ADMIN — DILTIAZEM HYDROCHLORIDE 120 MG: 120 CAPSULE, COATED, EXTENDED RELEASE ORAL at 09:19

## 2018-07-01 RX ADMIN — POLYETHYLENE GLYCOL 3350 17 G: 17 POWDER, FOR SOLUTION ORAL at 09:19

## 2018-07-01 RX ADMIN — HEPARIN SODIUM 5000 UNITS: 5000 INJECTION INTRAVENOUS; SUBCUTANEOUS at 06:16

## 2018-07-01 RX ADMIN — LEVETIRACETAM 1000 MG: 500 TABLET, FILM COATED ORAL at 21:32

## 2018-07-01 RX ADMIN — CEFTRIAXONE SODIUM 2 G: 2 INJECTION, SOLUTION INTRAVENOUS at 15:15

## 2018-07-01 RX ADMIN — DOCUSATE SODIUM -SENNOSIDES 2 TABLET: 50; 8.6 TABLET, COATED ORAL at 21:32

## 2018-07-01 RX ADMIN — LEVETIRACETAM 1000 MG: 500 TABLET, FILM COATED ORAL at 09:19

## 2018-07-01 RX ADMIN — ASPIRIN 81 MG: 81 TABLET, DELAYED RELEASE ORAL at 09:19

## 2018-07-01 RX ADMIN — LORAZEPAM 1 MG: 1 TABLET ORAL at 12:42

## 2018-07-01 RX ADMIN — OXYCODONE HYDROCHLORIDE AND ACETAMINOPHEN 1 TABLET: 5; 325 TABLET ORAL at 04:41

## 2018-07-01 RX ADMIN — DOCUSATE SODIUM -SENNOSIDES 2 TABLET: 50; 8.6 TABLET, COATED ORAL at 09:19

## 2018-07-01 RX ADMIN — HEPARIN SODIUM 5000 UNITS: 5000 INJECTION INTRAVENOUS; SUBCUTANEOUS at 15:14

## 2018-07-01 NOTE — PROGRESS NOTES
Met w/ patient and his wife today.  Patient is denying any wish to die or suicidal ideations.  He does state that he does not want to go to physical rehab but wants to get rehab at home on days that he does not get dialysis. That he wants to take the van to go to dialysis.  He admits that up to 3 years ago he has had thoughts of wanting to give up and quit dialysis after learning he would not be on the kidney transplant list.  He at times will state that he has hope they he still may have the option of being on the transplant list.  Wife is appropriately engaging w/ patient, using eye statements and not blaming patient. He does seem to get confused at times but quickly corrects himself.  His insight into his physical weakness and needing help w/ ambulating is limited.      In a telephone conversation yesterday patient's wife admits to having had prior conversations a/b patient wanting to quit dialysis. She states and he confirms that this is his decision.  He is denying wanting to die but is not wanting at times to continue dialysis.  No active suicidal ideations and no specific plan.      [facility] will continue to follow.

## 2018-07-01 NOTE — PROGRESS NOTES
Name: Armando Gill ADMIT: 2018   : 1960  PCP: Luis Antonio Abarca MD    MRN: [id number] LOS: 8 days   AGE/SEX: 57 y.o. male  ROOM: 420/1   Subjective   CC: right wrist pain  Having anxiety.  Mostly over wanting to go home instead of rehabilitation.  His wife is at bedside and clearly states she cannot take care of him at home and his current state because he has to go up and down stairs.   Pain is well controlled and denies other complaints     Objective   Vital Signs  Temp:  [97.8 °F (36.6 °C)-98.1 °F (36.7 °C)] 97.8 °F (36.6 °C)  Heart Rate:  [80-93] 93  Resp:  [18-20] 20  BP: (142-168)/(70-99) 155/70  SpO2:  [95 %-97 %] 96 %  on  Flow (L/min):  [1-2] 1;   Device (Oxygen Therapy): room air  Body mass index is 49.02 kg/m².    Physical Exam   Constitutional: No distress.   HENT:   Head: Normocephalic and atraumatic.   Mouth/Throat: Oropharynx is clear and moist.   Eyes: Conjunctivae and EOM are normal. Pupils are equal, round, and reactive to light.   Neck: Normal range of motion. Neck supple.   Cardiovascular: Normal rate, regular rhythm and intact distal pulses.    Pulmonary/Chest: Effort normal and breath sounds normal.   Abdominal: Soft. Bowel sounds are normal. There is no tenderness.   Musculoskeletal: He exhibits edema (2-3+ BLE edema (Chronic)). Tenderness: right wrist.   Right wrist dressed   Neurological: He is alert.   Oriented to person   Skin: Skin is warm and dry. He is not diaphoretic.   Chronic venous stasis BLE     Psychiatric: His speech is normal. His mood appears anxious. He is agitated. He expresses impulsivity. He exhibits a depressed mood. He expresses suicidal ideation.   Nursing note and vitals reviewed.      Results Review:       I reviewed the patient's new clinical results.    Results from last 7 days  Lab Units 18  0521 18  0355 18  0414 18  0612   WBC 10*3/mm3 15.94* 16.44* 17.22* 21.09*   HEMOGLOBIN g/dL 11.1* 10.6* 10.9* 10.8*   PLATELETS  10*3/mm3 295 241 201 184       Results from last 7 days  Lab Units 07/01/18 0521 06/29/18 0355 06/28/18 0414 06/27/18  0612   SODIUM mmol/L 129* 133* 130* 136   POTASSIUM mmol/L 3.9 3.9 3.9 4.7   CHLORIDE mmol/L 85* 89* 87* 92*   CO2 mmol/L 22.0 23.4 24.3 23.9   BUN mg/dL 66* 65* 46* 71*   CREATININE mg/dL 6.38* 6.54* 4.87* 6.29*   GLUCOSE mg/dL 120* 127* 106* 112*   Estimated Creatinine Clearance: 18.2 mL/min (A) (by C-G formula based on SCr of 6.38 mg/dL (H)).    Results from last 7 days  Lab Units 07/01/18 0521 06/29/18 0355 06/28/18 0414 06/27/18 0612 06/25/18  0643   CALCIUM mg/dL 8.7 8.4* 8.4* 8.1*  < > 8.2*   ALBUMIN g/dL  --   --  3.50 3.40*  --  3.60   < > = values in this interval not displayed.      aspirin 81 mg Oral Daily   ceftriaxone 2 g Intravenous Q24H   diltiaZEM  mg Oral Q24H   heparin (porcine) 5,000 Units Subcutaneous Q8H   insulin aspart 0-7 Units Subcutaneous 4x Daily With Meals & Nightly   levETIRAcetam 1,000 mg Oral Q12H   levothyroxine 175 mcg Oral Daily   Morphine 100 mg Oral Daily   polyethylene glycol 17 g Oral Daily   sennosides-docusate sodium 2 tablet Oral BID   Vortioxetine HBr 20 mg Oral Nightly       lactated ringers 9 mL/hr   Diet Regular; Thin; Consistent Carbohydrate, Renal      Assessment/Plan      Active Hospital Problems    Diagnosis Date Noted   • Hyponatremia [E87.1] 07/01/2018   • Opioid dependence [F11.20] 06/28/2018   • Chronic pain syndrome [G89.4] 06/28/2018   • Bacteremia due to Streptococcus [R78.81] 06/25/2018   • Streptococcal arthritis of right wrist [M00.231] 06/25/2018   • Fever [R50.9] 06/23/2018   • Sepsis [A41.9] 06/23/2018   • Nausea and vomiting [R11.2] 06/23/2018   • Atrial fibrillation [I48.91] 06/23/2018   • Long term current use of anticoagulant [Z79.01] 06/23/2018   • Acute pain of right wrist [M25.531] 06/23/2018   • DNR (do not resuscitate) [Z66] 06/23/2018   • End stage renal disease [N18.6] 05/04/2016      Resolved Hospital Problems     Diagnosis Date Noted Date Resolved   No resolved problems to display.     Severe anxiety  -Increased his Ativan to 1 mg every 8 hours as needed yesterday but he didn't get a single dose.  He needed it  -Consulted [facility], and needs psychiatry to see him  -Suspect this is related to his acute illness and being in the hospital  -He is very anxious about going to rehabilitation but his wife clearly states she cannot take care of him at home.    Sepsis  - due to bacteremia-Blood Cx's from [facility] growing strep  - on ceftriaxone per ID, change to cefazolin on dialysis days on discharge  - repeat blood cx's NGTD  - appreciate ID input    Right Wrist Septic Arthritis  - s/p arthrocentesis 6/25 with 124,000 WBC, crystal exam negative and GPCs on gm stain suggesting septic arthritis  - s/p washout with implantation of tobramycin beads 6/26/18  - also has left shoulder and left ankle pain which are improving  -no plans for further surgeries per hand ortho at this point but if culture from 6/30 grows then he may need a washout.  D/W Dr. Arredondo      ESRD  - MWF  - HTN meds adjusted per nephrology  - appreciate nephrology recs    Hyponatremia  -related to volume overload since he stopped HD early on Friday from anxiety  -defer to nephrology if they want to run him today vs continue tomorrow    Type 2 DM  - BG acceptable  - continue low dose ssi  - A1C 5.1 last month by patient report    Afib:  -holding warfarin incase procedures planned for right hand    Chronic Pain/Opioid Dependence  - on large doses of MS Contin chronically-will keep these the same for now given acute surgical issues but may consider dose reduction over long term    DVT Prophylaxis  SubQ heparin, on AC with coumadin but this is held/reversed for surgery.    Thanks to consultants.    DISPO: Ideally to subacute rehabilitation on Monday if we can obtain precertification.  If patient refuses to go to rehabilitation and then we are stuck with home with  home health which is not ideal.    Jacob Price MD  [facility]  07/01/18  11:39 AM

## 2018-07-01 NOTE — PROGRESS NOTES
"   LOS: 8 days    Patient Care Team:  Luis Antonio Abarca MD as PCP - General (Family Medicine)    Chief Complaint:  No chief complaint on file.    Follow up ESRD  Subjective     Interval History:      Seen and examined.  Today is the patient stated that he will continue dialysis andhe does not feel depressed  Anymore. No SOA or CP.  Review of Systems:   Not hungry. No bm since admit.     Objective     Vital Signs  Temp:  [97.8 °F (36.6 °C)-98.1 °F (36.7 °C)] 97.8 °F (36.6 °C)  Heart Rate:  [80-93] 93  Resp:  [18-20] 20  BP: (144-168)/(70-99) 155/70    Flowsheet Rows      First Filed Value   Admission Height  172.7 cm (68\") Documented at 06/23/2018 1100   Admission Weight   150 kg (330 lb 11 oz) Documented at 06/23/2018 1100          No intake/output data recorded.  I/O last 3 completed shifts:  In: -   Out: 150 [Urine:150]  No intake or output data in the 24 hours ending 07/01/18 1501    Physical Exam:  Middle age WM.   .  Oral mucosa dry. No jvd.    Heart irreg, irreg.    Lungs clear to auscultation.    Abd obese, soft, nontender.    Ext right wrist dressing.  Left shoulder more ROM.    LLE dressing in place.       Results Review:      Results from last 7 days  Lab Units 07/01/18  0521 06/29/18  0355 06/28/18  0414 06/27/18  0612  06/25/18  0643   SODIUM mmol/L 129* 133* 130* 136  < > 132*   POTASSIUM mmol/L 3.9 3.9 3.9 4.7  < > 4.7   CHLORIDE mmol/L 85* 89* 87* 92*  < > 88*   CO2 mmol/L 22.0 23.4 24.3 23.9  < > 21.8*   BUN mg/dL 66* 65* 46* 71*  < > 72*   CREATININE mg/dL 6.38* 6.54* 4.87* 6.29*  < > 7.41*   CALCIUM mg/dL 8.7 8.4* 8.4* 8.1*  < > 8.2*   BILIRUBIN mg/dL  --   --  0.4 0.4  --  0.5   ALK PHOS U/L  --   --  70 65  --  51   ALT (SGPT) U/L  --   --  50* 65*  --  82*   AST (SGOT) U/L  --   --  15 23  --  49*   GLUCOSE mg/dL 120* 127* 106* 112*  < > 120*   < > = values in this interval not displayed.    Estimated Creatinine Clearance: 18.2 mL/min (A) (by C-G formula based on SCr of 6.38 mg/dL (H)).      "             Results from last 7 days  Lab Units 07/01/18  0521 06/29/18  0355 06/28/18  0414 06/27/18  0612 06/26/18  0408   WBC 10*3/mm3 15.94* 16.44* 17.22* 21.09* 22.78*   HEMOGLOBIN g/dL 11.1* 10.6* 10.9* 10.8* 10.1*   PLATELETS 10*3/mm3 295 241 201 184 158         Results from last 7 days  Lab Units 07/01/18  0521 06/30/18  0545 06/29/18  0355 06/28/18  0414 06/27/18  0612   INR  1.55* 1.36* 1.39* 1.35* 1.42*         Imaging Results (last 24 hours)     ** No results found for the last 24 hours. **          aspirin 81 mg Oral Daily   ceftriaxone 2 g Intravenous Q24H   diltiaZEM  mg Oral Q24H   heparin (porcine) 5,000 Units Subcutaneous Q8H   insulin aspart 0-7 Units Subcutaneous 4x Daily With Meals & Nightly   levETIRAcetam 1,000 mg Oral Q12H   levothyroxine 175 mcg Oral Daily   Morphine 100 mg Oral Daily   polyethylene glycol 17 g Oral Daily   sennosides-docusate sodium 2 tablet Oral BID   Vortioxetine HBr 20 mg Oral Nightly       lactated ringers 9 mL/hr       Medication Review:   Current Facility-Administered Medications   Medication Dose Route Frequency Provider Last Rate Last Dose   • acetaminophen (TYLENOL) tablet 650 mg  650 mg Oral Q4H PRN Mark Helms MD       • albumin human 25 % IV SOLN 12.5 g  12.5 g Intravenous Q1H PRN Angle Nicholson MD       • aspirin EC tablet 81 mg  81 mg Oral Daily Mark Helms MD   81 mg at 07/01/18 0919   • bisacodyl (DULCOLAX) EC tablet 5 mg  5 mg Oral Daily PRN Mark Helms MD       • cefTRIAXone (ROCEPHIN) IVPB 2 g  2 g Intravenous Q24H Amarilys Barry  mL/hr at 06/30/18 1634 2 g at 06/30/18 1634   • dextrose (D50W) solution 25 g  25 g Intravenous Q15 Min PRN Mark Helms MD       • dextrose (GLUTOSE) oral gel 15 g  15 g Oral Q15 Min PRN Mark Helms MD       • diltiaZEM CD (CARDIZEM CD) 24 hr capsule 120 mg  120 mg Oral Q24H Angle Nicholson MD   120 mg at 07/01/18 0919   • famotidine (PEPCID) tablet 40 mg  40 mg Oral BID PRN  Mark Helms MD       • glucagon (human recombinant) (GLUCAGEN DIAGNOSTIC) injection 1 mg  1 mg Subcutaneous PRN Mark Helms MD       • heparin (porcine) 5000 UNIT/ML injection 5,000 Units  5,000 Units Subcutaneous Q8H Mark Helms MD   5,000 Units at 07/01/18 0616   • hydrALAZINE (APRESOLINE) tablet 25 mg  25 mg Oral Q8H PRN Mark Helms MD       • HYDROmorphone (DILAUDID) injection 1 mg  1 mg Intravenous Q2H PRN Angle Nicholson MD   1 mg at 06/30/18 1012    And   • Naloxone HCl (NARCAN) injection 0.4 mg  0.4 mg Intravenous Q5 Min PRN Angle Nicholson MD       • insulin aspart (novoLOG) injection 0-7 Units  0-7 Units Subcutaneous 4x Daily With Meals & Nightly Mark Helms MD   2 Units at 06/30/18 1210   • lactated ringers infusion  9 mL/hr Intravenous Continuous PRN Beto Schrader MD       • levETIRAcetam (KEPPRA) tablet 1,000 mg  1,000 mg Oral Q12H Mark Helms MD   1,000 mg at 07/01/18 0919   • levothyroxine (SYNTHROID, LEVOTHROID) tablet 175 mcg  175 mcg Oral Daily Mark Helms MD   175 mcg at 07/01/18 0616   • LORazepam (ATIVAN) tablet 1 mg  1 mg Oral Q8H PRN Jacob Price MD   1 mg at 07/01/18 1242   • Morphine (MS CONTIN) 12 hr tablet 100 mg  100 mg Oral Daily Mark Helms MD   100 mg at 07/01/18 0919   • nitroglycerin (NITROSTAT) SL tablet 0.4 mg  0.4 mg Sublingual Q5 Min PRN Mark Helms MD       • ondansetron (ZOFRAN) tablet 4 mg  4 mg Oral Q6H PRN Mark Helms MD        Or   • ondansetron ODT (ZOFRAN-ODT) disintegrating tablet 4 mg  4 mg Oral Q6H PRN Mark Helms MD        Or   • ondansetron (ZOFRAN) injection 4 mg  4 mg Intravenous Q6H PRN Mark Helms MD       • oxyCODONE-acetaminophen (PERCOCET) 5-325 MG per tablet 1 tablet  1 tablet Oral Q6H PRN Mark Helms MD   1 tablet at 07/01/18 0441   • polyethylene glycol 3350 powder (packet)  17 g Oral Daily Mark Helms MD   17 g at 07/01/18 0919   • sennosides-docusate  sodium (SENOKOT-S) 8.6-50 MG tablet 2 tablet  2 tablet Oral BID Mark Helms MD   2 tablet at 07/01/18 0919   • sodium chloride 0.9 % flush 1-10 mL  1-10 mL Intravenous PRN Mark Helms MD       • Vortioxetine HBr tablet 20 mg  20 mg Oral Nightly Mark Helms MD   20 mg at 06/30/18 2027       Assessment/Plan   1.  ESRD: MWF. Next dialysis Monday. Volume is generous we'll try to remove more fluid with dialysis.    2.  Sepsis syndrome; Strep bacteremia and right wrist infection.  SP washout right wrist, AB beads.   Joint pain , left shoulder, left ankle improved. WBC coming down.  Plan for Cefazolin  on dialysis after discharge.    3. Chronic pain with acute exacerbation with right wrist, left shoulder, left ankle pain.   4.  Obesity   5.  DM2  6.  Elevated LFT's. Improving.   7.  pAfib on AC.  8.  HTN : Pressure is acceptable.  It is labile although  9.  Anemia CKD.  At goal.         Andres Hall MD  07/01/18  3:01 PM     Per ems and pt's wife's report, pt was last seen around baseline last night around 9pm.  Pt did not get out of bed this morning and had decreased responsiveness.  Pt has a history of dementia, parkinson's, enlarged prostate, and c.diff.  Pt is currently on oral abx for c. diff.  Pt also has bruises and family states he had a fall sometime in the last week.  Pt found to be febrile upon triage.

## 2018-07-01 NOTE — THERAPY TREATMENT NOTE
Acute Care - Physical Therapy Treatment Note  [facility]     Patient Name: Armando Gill  : 1960  MRN: [id number]  Today's Date: 2018             Admit Date: 2018    Visit Dx:    ICD-10-CM ICD-9-CM   1. Generalized weakness R53.1 780.79   2. Septicemia A41.9 038.9     Patient Active Problem List   Diagnosis   • End stage renal disease   • Secondary hyperparathyroidism of renal origin   • Fever   • Sepsis   • Nausea and vomiting   • Atrial fibrillation   • Long term current use of anticoagulant   • Acute pain of right wrist   • DNR (do not resuscitate)   • Bacteremia due to Streptococcus   • Streptococcal arthritis of right wrist   • Opioid dependence   • Chronic pain syndrome   • Hyponatremia       Therapy Treatment          Rehabilitation Treatment Summary     Row Name 18 1300             Treatment Time/Intention    Discipline physical therapist  -GR      Document Type therapy note (daily note)  -GR      Subjective Information complains of;fatigue  -GR      Mode of Treatment physical therapy  -GR      Patient Effort adequate  -GR      Recorded by [GR] Kevin Livingston, PT 18 1313      Row Name 18 1300             Vital Signs    Pretreatment Heart Rate (beats/min) 98  -GR      Intratreatment Heart Rate (beats/min) 123  -GR      Posttreatment Heart Rate (beats/min) 98  -GR      Recorded by [GR] Kevin Livingston, PT 18 1313      Row Name 18 1300             Cognitive Assessment/Intervention- PT/OT    Affect/Mental Status (Cognitive) emotionally labile  -GR      Recorded by [GR] Kevin Livingston, PT 18 1313      Row Name 18 1300             Bed Mobility Assessment/Treatment    Comment (Bed Mobility) up in chair  -GR      Recorded by [GR] Kevin Livingston, PT 18 1313      Row Name 18 1300             Transfer Assessment/Treatment    Sit-Stand [place] (Transfers) minimum assist (75% patient effort);2 person assist  -GR      Stand-Sit  [place] (Transfers) minimum assist (75% patient effort);2 person assist  -GR      Recorded by [GR] Kevin Livingston, PT 07/01/18 1313      Row Name 07/01/18 1300             Sit-Stand Transfer    Assistive Device (Sit-Stand Transfers) cane, quad  -GR      Recorded by [GR] Kvein Livingston, PT 07/01/18 1313      Row Name 07/01/18 1300             Stand-Sit Transfer    Assistive Device (Stand-Sit Transfers) cane, quad  -GR      Recorded by [GR] Kevin Livingston, PT 07/01/18 1313      Row Name 07/01/18 1300             Gait/Stairs Assessment/Training    [place] Level (Gait) minimum assist (75% patient effort);2 person assist  -GR      Assistive Device (Gait) cane, quad   2L 02 via NC  -GR      Distance in Feet (Gait) 50   -GR      Comment (Gait/Stairs) advised on seated UE chest press and BLE ankle pump, LAQ, MIP with wife present  -GR      Recorded by [GR] Kevin Livingston, PT 07/01/18 1313      Row Name 07/01/18 1300             Therapeutic Exercise    13556 - PT Therapeutic Activity Minutes 15  -GR      Recorded by [GR] Kevin Livingston, PT 07/01/18 1313      Row Name 07/01/18 1300             Positioning and Restraints    Pre-Treatment Position sitting in chair/recliner  -GR      Post Treatment Position chair  -GR      In Chair notified nsg;call light within reach;exit alarm on;with family/caregiver  -GR      Recorded by [GR] Kevin Livingston, PT 07/01/18 1313      Row Name 07/01/18 1300             Pain Scale: Numbers Pre/Post-Treatment    Pain Scale: Numbers, Pretreatment 0/10 - no pain  -GR      Pain Scale: Numbers, Post-Treatment 0/10 - no pain  -GR      Recorded by [GR] Kevin Livingston, PT 07/01/18 1313      Row Name                Wound 06/24/18 1104 Left medial;anterior calf blisters    Wound - Properties Group Date first assessed: 06/24/18 [SANJU] Time first assessed: 1104 [SANJU] Present On Admission : no;picture taken [SANJU] Side: Left [SANJU] Orientation: medial;anterior [SANJU] Location: calf [SANJU] Type:  blisters [SANJU] Additional Comments: dressing present on left leg, clean, dry and intact 6/26/2018 [DK] Recorded by:  [DK] Brenda Addison RN 06/26/18 1621 [SANJU] Saman Leroy RN 06/24/18 1105    Row Name                Wound 06/24/18 1108 Left lower other (see notes) ulceration, venous    Wound - Properties Group Date first assessed: 06/24/18 [SANJU] Time first assessed: 1108 [SANJU] Present On Admission : yes [SANJU] Side: Left [SANJU] Orientation: lower [SANJU] Location: other (see notes) [SANJU], under great toe   Type: ulceration, venous [SANJU] Recorded by:  [SANJU] Saman Leroy RN 06/24/18 1109    Row Name                Wound 06/26/18 1857 Right wrist incision    Wound - Properties Group Date first assessed: 06/26/18 [SL] Time first assessed: 1857 [SL] Side: Right [SL] Location: wrist [SL] Type: incision [SL] Recorded by:  [SL] Sheron Dutton RN 06/26/18 1857    Row Name                Wound 06/27/18 0847 Left anterior foot blisters    Wound - Properties Group Date first assessed: 06/27/18 [LF] Time first assessed: 0847 [LF] Present On Admission : no [LF] Side: Left [LF] Orientation: anterior [LF] Location: foot [LF] Type: blisters [LF] Recorded by:  [LF] Luisa Quesada RN 06/27/18 1449      User Key  (r) = Recorded By, (t) = Taken By, (c) = Cosigned By    Initials Name Effective Dates Discipline    GR Kevin Livingston, PT 10/03/16 -  PT    TRIPP Addison RN 06/16/16 -  Nurse    LF Luisa Quesada RN 06/16/16 -  Nurse    RODRÍGUEZ Dutton RN 06/16/16 -  Nurse    SANJU Leroy RN 06/16/16 -  Nurse          Wound 06/24/18 1104 Left medial;anterior calf blisters (Active)   Dressing Appearance intact;dry 7/1/2018  8:52 AM   Closure KOURTNEY 7/1/2018  8:52 AM   Base dressing in place, unable to visualize 7/1/2018  8:52 AM   Periwound swelling;redness 7/1/2018  6:38 AM   Periwound Temperature warm 7/1/2018  6:38 AM   Periwound Skin Turgor firm 7/1/2018  6:38 AM   Drainage Characteristics/Odor serous;malodorous  7/1/2018  6:38 AM   Drainage Amount copious 7/1/2018  6:38 AM   Care, Wound cleansed with;sterile normal saline 7/1/2018  6:38 AM   Dressing Care, Wound gauze 7/1/2018  8:52 AM       Wound 06/24/18 1108 Left lower other (see notes) ulceration, venous (Active)   Dressing Appearance no drainage 7/1/2018  8:52 AM   Closure KOURTNEY 7/1/2018  8:52 AM   Base dressing in place, unable to visualize 7/1/2018  8:52 AM   Periwound intact 7/1/2018  6:38 AM   Periwound Temperature warm 7/1/2018  6:38 AM   Periwound Skin Turgor firm 7/1/2018  6:38 AM   Edges callused 7/1/2018  6:38 AM   Drainage Amount none 7/1/2018  6:38 AM   Dressing Care, Wound gauze 7/1/2018  8:52 AM       Wound 06/26/18 1857 Right wrist incision (Active)   Dressing Appearance dried drainage;intact 7/1/2018 11:12 AM   Closure Sutures 7/1/2018 11:12 AM   Drainage Characteristics/Odor other (see comments) 7/1/2018 11:12 AM   Drainage Amount scant 7/1/2018 11:12 AM   Dressing Care, Wound gauze;dressing changed 7/1/2018 11:12 AM       Wound 06/27/18 0847 Left anterior foot blisters (Active)   Dressing Appearance intact;dry 7/1/2018  8:52 AM   Closure KOURTNEY 7/1/2018  8:52 AM   Base dressing in place, unable to visualize 7/1/2018  8:52 AM   Periwound swelling;redness 7/1/2018  6:38 AM   Periwound Temperature warm 7/1/2018  6:38 AM   Periwound Skin Turgor firm 7/1/2018  6:38 AM   Drainage Amount none 7/1/2018  8:52 AM   Dressing Care, Wound gauze 7/1/2018  8:52 AM             Physical Therapy Education     Title: PT OT SLP Therapies (Done)     Topic: Physical Therapy (Done)     Point: Mobility training (Done)    Learning Progress Summary     Learner Status Readiness Method Response Comment Documented by    Patient Done Acceptance E,TB VU safety, gait, purse lipped breathing GR 07/01/18 1313     Done Acceptance E VU,NR  PM 06/29/18 1108     Done Acceptance E VU,NR   06/28/18 1001    Family Done Acceptance E,TB VU safety, gait, purse lipped breathing GR 07/01/18 1317           Point: Home exercise program (Done)    Learning Progress Summary     Learner Status Readiness Method Response Comment Documented by    Patient Done Acceptance E,TB VU safety, gait, purse lipped breathing GR 07/01/18 1313     Done Acceptance E VU,NR  PM 06/29/18 1108    Family Done Acceptance E,TB VU safety, gait, purse lipped breathing GR 07/01/18 1313          Point: Body mechanics (Done)    Learning Progress Summary     Learner Status Readiness Method Response Comment Documented by    Patient Done Acceptance E,TB VU safety, gait, purse lipped breathing GR 07/01/18 1313     Done Acceptance E VU,NR  PM 06/29/18 1108     Done Acceptance E VU,NR   06/28/18 1001    Family Done Acceptance E,TB VU safety, gait, purse lipped breathing GR 07/01/18 1313          Point: Precautions (Done)    Learning Progress Summary     Learner Status Readiness Method Response Comment Documented by    Patient Done Acceptance E,TB VU safety, gait, purse lipped breathing GR 07/01/18 1313     Done Acceptance E VU,NR  PM 06/29/18 1108     Done Acceptance E VU,NR   06/28/18 1001    Family Done Acceptance E,TB VU safety, gait, purse lipped breathing GR 07/01/18 1313                      User Key     Initials Effective Dates Name Provider Type Discipline     06/08/18 -  Karli Quispe, PT Physical Therapist PT     10/03/16 -  Kevin Livingston, PT Physical Therapist PT    PM 06/25/18 -  Luis Curry, PT Student PT Student PT                    PT Recommendation and Plan     Plan of Care Reviewed With: patient  Progress: improving  Outcome Summary: Patient is an appropriate candidate for subacute rehab per his continued endurance and strength defictis.  He is anxious regarding d/c and would like to return home; at this time he poses a fall risk. Increased distance amb was observed.          Outcome Measures     Row Name 07/01/18 1300 06/29/18 1112          How much help from another person do you currently need...    Turning  from your back to your side while in flat bed without using bedrails? 2  -GR 2  -KH (r) PM (t) KH (c)     Moving from lying on back to sitting on the side of a flat bed without bedrails? 2  -GR 2  -KH (r) PM (t) KH (c)     Moving to and from a bed to a chair (including a wheelchair)? 2  -GR 2  -KH (r) PM (t) KH (c)     Standing up from a chair using your arms (e.g., wheelchair, bedside chair)? 3  -GR 3  -KH (r) PM (t) KH (c)     Climbing 3-5 steps with a railing? 1  -GR 2  -KH (r) PM (t) KH (c)     To walk in hospital room? 3  -GR 3  -KH (r) PM (t) KH (c)     AM-PAC 6 Clicks Score 13  -GR 14  -KH (r) PM (t)        Functional Assessment    Outcome Measure Options AM-PAC 6 Clicks Basic Mobility (PT)  -GR AM-PAC 6 Clicks Basic Mobility (PT)  -KH (r) PM (t) KH (c)       User Key  (r) = Recorded By, (t) = Taken By, (c) = Cosigned By    Initials Name Provider Type     Karli Quispe, PT Physical Therapist     Kevin Livingston, PT Physical Therapist    PM Luis Curry, PT Student PT Student           Time Calculation:         PT Charges     Row Name 07/01/18 1316 07/01/18 1315 07/01/18 1300       Time Calculation    Start Time  -- 1145  -GR  --    Stop Time  -- 1200  -GR  --    Time Calculation (min)  -- 15 min  -GR  --    PT Received On 07/01/18  -GR  --  --    PT - Next Appointment 07/02/18  -GR  --  --       Time Calculation- PT    Total Timed Code Minutes- PT  -- 15 minute(s)  -GR  --       Timed Charges    29257 - PT Therapeutic Activity Minutes  --  -- 15  -GR      User Key  (r) = Recorded By, (t) = Taken By, (c) = Cosigned By    Initials Name Provider Type    OLEKSANDR Livingston, PT Physical Therapist        Therapy Suggested Charges     Code   Minutes Charges    54764 (CPT®)  Pt Neuromusc Re Education Ea 15 Min      95658 (CPT®) Hc Pt Ther Proc Ea 15 Min      68481 (CPT®) Hc Gait Training Ea 15 Min      57136 (CPT®) Hc Pt Therapeutic Act Ea 15 Min 15 1    29920 (CPT®) Hc Pt Manual Therapy Ea 15 Min       87956 (CPT®) Hc Pt Iontophoresis Ea 15 Min      25833 (CPT®) Hc Pt Elec Stim Ea-Per 15 Min      90832 (CPT®) Hc Pt Ultrasound Ea 15 Min      17281 (CPT®) Hc Pt Self Care/Mgmt/Train Ea 15 Min      Total  15 1        Therapy Charges for Today     Code Description Service Date Service Provider Modifiers Qty    05372614796 HC PT THERAPEUTIC ACT EA 15 MIN 7/1/2018 Kevin Livingston, PT GP 1          PT G-Codes  Outcome Measure Options: AM-PAC 6 Clicks Basic Mobility (PT)    Kevin Livingston, PT  7/1/2018

## 2018-07-01 NOTE — PLAN OF CARE
Problem: Patient Care Overview  Goal: Plan of Care Review  Outcome: Ongoing (interventions implemented as appropriate)   07/01/18 1313   Coping/Psychosocial   Plan of Care Reviewed With patient   Plan of Care Review   Progress improving   OTHER   Outcome Summary Patient is an appropriate candidate for subacute rehab per his continued endurance and strength defictis. He is anxious regarding d/c and would like to return home; at this time he poses a fall risk. Increased distance amb was observed.

## 2018-07-01 NOTE — PLAN OF CARE
Problem: Patient Care Overview  Goal: Plan of Care Review  Outcome: Ongoing (interventions implemented as appropriate)   07/01/18 4441   Coping/Psychosocial   Plan of Care Reviewed With patient   Plan of Care Review   Progress improving   OTHER   Outcome Summary vss. infrequent c/o pain. pain meds administered per order. lethargic and forgetful during most of shift. wound care completed. no suicidal thoughts during shift. will continue to monitor

## 2018-07-01 NOTE — PLAN OF CARE
Problem: Patient Care Overview  Goal: Plan of Care Review  Outcome: Ongoing (interventions implemented as appropriate)   07/01/18 8822   Coping/Psychosocial   Plan of Care Reviewed With patient;spouse   Plan of Care Review   Progress improving   OTHER   Outcome Summary ATIVEN GIVEN TODAY. PT LETHARGIC AND MORE CONFUSED. DIALYSIS ORDERS CALLED IN FOR TOMORROW. DRESSING CHANGES DONE. UP CHAIR WALKED WITH PT. WIFE AND FAMILY ON BOARD FOR REHAB, PT. IS NOT. COUMADIN ON HOLD. ON ROOM AIR. PSYCHIATRIST TO SEE. RESTING COMFORTABLY. SAFETY MAINTAINED, WILL CONTINUE TO MONITOR.        Problem: Pain, Acute (Adult)  Goal: Acceptable Pain Control/Comfort Level  Outcome: Ongoing (interventions implemented as appropriate)      Problem: Breathing Pattern Ineffective (Adult)  Goal: Effective Oxygenation/Ventilation  Outcome: Ongoing (interventions implemented as appropriate)    Goal: Anxiety/Fear Reduction  Outcome: Ongoing (interventions implemented as appropriate)      Problem: Fall Risk (Adult)  Goal: Absence of Fall  Outcome: Ongoing (interventions implemented as appropriate)      Problem: Skin Injury Risk (Adult)  Goal: Skin Health and Integrity  Outcome: Ongoing (interventions implemented as appropriate)      Problem: Sepsis/Septic Shock (Adult)  Goal: Signs and Symptoms of Listed Potential Problems Will be Absent, Minimized or Managed (Sepsis/Septic Shock)  Outcome: Ongoing (interventions implemented as appropriate)      Problem: Diabetes, Type 2 (Adult)  Goal: Signs and Symptoms of Listed Potential Problems Will be Absent, Minimized or Managed (Diabetes, Type 2)  Outcome: Ongoing (interventions implemented as appropriate)      Problem: Wound (Includes Pressure Injury) (Adult)  Goal: Signs and Symptoms of Listed Potential Problems Will be Absent, Minimized or Managed (Wound)  Outcome: Ongoing (interventions implemented as appropriate)

## 2018-07-01 NOTE — PROGRESS NOTES
.                                                                                                                                                Patient Care Team:  Luis Antonio Abarca MD as PCP - General (Family Medicine)    Subjective:  Feeling better with R wrist pain and motion; wants to go home.    Vital Signs   Temp:  [97.8 °F (36.6 °C)-98.1 °F (36.7 °C)] 97.8 °F (36.6 °C)  Heart Rate:  [80-93] 93  Resp:  [18-20] 20  BP: (144-168)/(70-99) 155/70      Physical Exam:     General Appearance:    Alert, cooperative, in no acute distress   Head:    Normocephalic, without obvious abnormality, atraumatic   Eyes:            Lids and lashes normal, conjunctivae and sclerae normal, no   icterus, no pallor, corneas clear, PERRLA   Ears:    Ears appear intact with no abnormalities noted   Throat:   No oral lesions, no thrush, oral mucosa moist   Neck:   No adenopathy, supple, trachea midline, no thyromegaly, no   carotid bruit, no JVD   Back:     No kyphosis present, no scoliosis present, no skin lesions,      erythema or scars, no tenderness to percussion or                   palpation,   range of motion normal   Lungs:     Clear to auscultation,respirations regular, even and                  unlabored    Heart:    Regular rhythm and normal rate, normal S1 and S2, no            murmur, no gallop, no rub, no click   Abdomen:     Normal bowel sounds, no masses, no organomegaly, soft        non-tender, non-distended, no guarding, no rebound                tenderness   Rectal:     Deferred   Extremities:   Moves all extremities well, no edema, no cyanosis, no             redness    RUE: viable with good blanching and capillary refills;  R wrist: moderate swelling; wound is clean with clear exudate from wound; no turbid drainage; skin rash around surgical wound; mild limited ROM.       Results Review:   I reviewed the patient's new clinical results.  Results for DARÍO GONZALEZ (MRN [id number]) as of 7/1/2018 14:28   Ref.  Range 6/29/2018 03:55 7/1/2018 05:21   WBC Latest Ref Range: 4.50 - 10.70 10*3/mm3 16.44 (H) 15.94 (H)   RBC Latest Ref Range: 4.60 - 6.00 10*6/mm3 3.44 (L) 3.61 (L)   Hemoglobin Latest Ref Range: 13.7 - 17.6 g/dL 10.6 (L) 11.1 (L)   Hematocrit Latest Ref Range: 40.4 - 52.2 % 34.5 (L) 35.4 (L)     6/25/18: R wrist arthrocentesis aspirate culture: Scant growth (1+) Streptococcus dysgalactiae ssp equisimilis    6/26/18: R wrist arthrotomy fluid culture:     Scant growth (1+) Streptococcus, Beta Hemolytic, Group G        6/25, 6/26/18: joint fluid: no crystal     Active Problems:    End stage renal disease    Fever    Sepsis    Nausea and vomiting    Atrial fibrillation    Long term current use of anticoagulant    Acute pain of right wrist    DNR (do not resuscitate)    Bacteremia due to Streptococcus    Streptococcal arthritis of right wrist    Opioid dependence    Chronic pain syndrome     Impression:  Right wrist septic arthritis s/p Right wrist: arthrotomy of radiocarpal and midcarpal joint; incision and drainage; placement of Tobramycin beads     Plans:  1. Making progress on R wrist septic arthritis from clinical examination. R wrist wound appears to better than yesterday without any sign of turbid drainage; also better wrist range of motion. Repeat wound culture yesterday is negative but preliminary. I am more in favor of the semi opaque drainage being from the texture of dissolvable antibiotics cement. However, I would like to wait for final result of wound culture. Prefer to hold off Coumadin for another 1-2 days unless strongly indicated.  2. Continue IV Abx.R hand/wrist elevated.    Wilian Arredondo MD  07/01/18  2:21 PM  Office phone: [phone]

## 2018-07-02 ENCOUNTER — APPOINTMENT (OUTPATIENT)
Dept: GENERAL RADIOLOGY | Facility: HOSPITAL | Age: 58
End: 2018-07-02
Attending: INTERNAL MEDICINE

## 2018-07-02 LAB
ANION GAP SERPL CALCULATED.3IONS-SCNC: 24.4 MMOL/L
BASOPHILS # BLD AUTO: 0.02 10*3/MM3 (ref 0–0.2)
BASOPHILS NFR BLD AUTO: 0.1 % (ref 0–1.5)
BUN BLD-MCNC: 76 MG/DL (ref 6–20)
BUN/CREAT SERPL: 9.3 (ref 7–25)
CALCIUM SPEC-SCNC: 8.8 MG/DL (ref 8.6–10.5)
CHLORIDE SERPL-SCNC: 89 MMOL/L (ref 98–107)
CO2 SERPL-SCNC: 21.6 MMOL/L (ref 22–29)
CREAT BLD-MCNC: 8.18 MG/DL (ref 0.76–1.27)
DEPRECATED RDW RBC AUTO: 52.8 FL (ref 37–54)
EOSINOPHIL # BLD AUTO: 0.11 10*3/MM3 (ref 0–0.7)
EOSINOPHIL NFR BLD AUTO: 0.8 % (ref 0.3–6.2)
ERYTHROCYTE [DISTWIDTH] IN BLOOD BY AUTOMATED COUNT: 14.7 % (ref 11.5–14.5)
GFR SERPL CREATININE-BSD FRML MDRD: 7 ML/MIN/1.73
GLUCOSE BLD-MCNC: 105 MG/DL (ref 65–99)
GLUCOSE BLDC GLUCOMTR-MCNC: 105 MG/DL (ref 70–130)
GLUCOSE BLDC GLUCOMTR-MCNC: 112 MG/DL (ref 70–130)
GLUCOSE BLDC GLUCOMTR-MCNC: 79 MG/DL (ref 70–130)
HCT VFR BLD AUTO: 35.4 % (ref 40.4–52.2)
HGB BLD-MCNC: 10.8 G/DL (ref 13.7–17.6)
IMM GRANULOCYTES # BLD: 0.3 10*3/MM3 (ref 0–0.03)
IMM GRANULOCYTES NFR BLD: 2.1 % (ref 0–0.5)
INR PPP: 1.76 (ref 0.9–1.1)
LYMPHOCYTES # BLD AUTO: 1.83 10*3/MM3 (ref 0.9–4.8)
LYMPHOCYTES NFR BLD AUTO: 12.6 % (ref 19.6–45.3)
MCH RBC QN AUTO: 30.2 PG (ref 27–32.7)
MCHC RBC AUTO-ENTMCNC: 30.5 G/DL (ref 32.6–36.4)
MCV RBC AUTO: 98.9 FL (ref 79.8–96.2)
MONOCYTES # BLD AUTO: 0.59 10*3/MM3 (ref 0.2–1.2)
MONOCYTES NFR BLD AUTO: 4.1 % (ref 5–12)
NEUTROPHILS # BLD AUTO: 11.68 10*3/MM3 (ref 1.9–8.1)
NEUTROPHILS NFR BLD AUTO: 80.3 % (ref 42.7–76)
PLATELET # BLD AUTO: 279 10*3/MM3 (ref 140–500)
PMV BLD AUTO: 9.8 FL (ref 6–12)
POTASSIUM BLD-SCNC: 4.6 MMOL/L (ref 3.5–5.2)
PROTHROMBIN TIME: 20.2 SECONDS (ref 11.7–14.2)
RBC # BLD AUTO: 3.58 10*6/MM3 (ref 4.6–6)
SODIUM BLD-SCNC: 135 MMOL/L (ref 136–145)
WBC NRBC COR # BLD: 14.53 10*3/MM3 (ref 4.5–10.7)

## 2018-07-02 PROCEDURE — 73130 X-RAY EXAM OF HAND: CPT

## 2018-07-02 PROCEDURE — 25010000003 CEFTRIAXONE PER 250 MG: Performed by: INTERNAL MEDICINE

## 2018-07-02 PROCEDURE — 85025 COMPLETE CBC W/AUTO DIFF WBC: CPT | Performed by: INTERNAL MEDICINE

## 2018-07-02 PROCEDURE — 80048 BASIC METABOLIC PNL TOTAL CA: CPT | Performed by: INTERNAL MEDICINE

## 2018-07-02 PROCEDURE — 85610 PROTHROMBIN TIME: CPT | Performed by: INTERNAL MEDICINE

## 2018-07-02 PROCEDURE — 82962 GLUCOSE BLOOD TEST: CPT

## 2018-07-02 PROCEDURE — 25010000002 HEPARIN (PORCINE) PER 1000 UNITS: Performed by: INTERNAL MEDICINE

## 2018-07-02 PROCEDURE — 73110 X-RAY EXAM OF WRIST: CPT

## 2018-07-02 RX ADMIN — DILTIAZEM HYDROCHLORIDE 120 MG: 120 CAPSULE, COATED, EXTENDED RELEASE ORAL at 09:01

## 2018-07-02 RX ADMIN — HEPARIN SODIUM 5000 UNITS: 5000 INJECTION INTRAVENOUS; SUBCUTANEOUS at 05:56

## 2018-07-02 RX ADMIN — MORPHINE SULFATE 100 MG: 100 TABLET, EXTENDED RELEASE ORAL at 09:01

## 2018-07-02 RX ADMIN — LEVOTHYROXINE SODIUM 175 MCG: 175 TABLET ORAL at 05:56

## 2018-07-02 RX ADMIN — DOCUSATE SODIUM -SENNOSIDES 2 TABLET: 50; 8.6 TABLET, COATED ORAL at 09:01

## 2018-07-02 RX ADMIN — ASPIRIN 81 MG: 81 TABLET, DELAYED RELEASE ORAL at 09:01

## 2018-07-02 RX ADMIN — DOCUSATE SODIUM -SENNOSIDES 2 TABLET: 50; 8.6 TABLET, COATED ORAL at 20:18

## 2018-07-02 RX ADMIN — LEVETIRACETAM 1000 MG: 500 TABLET, FILM COATED ORAL at 20:18

## 2018-07-02 RX ADMIN — CEFTRIAXONE SODIUM 2 G: 2 INJECTION, SOLUTION INTRAVENOUS at 16:40

## 2018-07-02 RX ADMIN — LEVETIRACETAM 1000 MG: 500 TABLET, FILM COATED ORAL at 09:01

## 2018-07-02 RX ADMIN — LORAZEPAM 1 MG: 1 TABLET ORAL at 09:01

## 2018-07-02 RX ADMIN — HEPARIN SODIUM 5000 UNITS: 5000 INJECTION INTRAVENOUS; SUBCUTANEOUS at 21:50

## 2018-07-02 RX ADMIN — LORAZEPAM 1 MG: 1 TABLET ORAL at 20:18

## 2018-07-02 RX ADMIN — HEPARIN SODIUM 5000 UNITS: 5000 INJECTION INTRAVENOUS; SUBCUTANEOUS at 14:30

## 2018-07-02 NOTE — PROGRESS NOTES
Name: Armando Gill ADMIT: 2018   : 1960  PCP: Luis Antonio Abarca MD    MRN: [id number] LOS: 9 days   AGE/SEX: 57 y.o. male  ROOM: 420/1   Subjective   CC: right wrist pain  Confused and very sleepy  In HD and has slept the whole time, does wake up for me but doesn't make sense when he speaks.   Pain is well controlled and denies other complaints     Objective   Vital Signs  Temp:  [97.5 °F (36.4 °C)-98.9 °F (37.2 °C)] 97.5 °F (36.4 °C)  Heart Rate:  [66-86] 80  Resp:  [12-16] 16  BP: (116-155)/(69-81) 131/69  SpO2:  [92 %-96 %] 92 %  on  Flow (L/min):  [1] 1;   Device (Oxygen Therapy): nasal cannula  Body mass index is 49.02 kg/m².    Physical Exam   Constitutional: He appears lethargic. No distress.   HENT:   Head: Normocephalic and atraumatic.   Mouth/Throat: Oropharynx is clear and moist.   Eyes: Conjunctivae and EOM are normal. Pupils are equal, round, and reactive to light.   Neck: Normal range of motion. Neck supple.   Cardiovascular: Normal rate, regular rhythm and intact distal pulses.    Pulmonary/Chest: Effort normal and breath sounds normal.   Abdominal: Soft. Bowel sounds are normal. There is no tenderness.   Musculoskeletal: He exhibits edema (2-3+ BLE edema (Chronic)). Tenderness: right wrist.   Right wrist dressed   Neurological: He appears lethargic. He is disoriented.       Skin: Skin is warm and dry. He is not diaphoretic.   Chronic venous stasis BLE     Psychiatric: His speech is delayed. He is slowed and withdrawn.   Nursing note and vitals reviewed.      Results Review:       I reviewed the patient's new clinical results.    Results from last 7 days  Lab Units 18  0425 18  0521 18  0355 18  0414   WBC 10*3/mm3 14.53* 15.94* 16.44* 17.22*   HEMOGLOBIN g/dL 10.8* 11.1* 10.6* 10.9*   PLATELETS 10*3/mm3 279 295 241 201       Results from last 7 days  Lab Units 18  0425 18  0521 18  0355 18  0414   SODIUM mmol/L 135* 129* 133* 130*    POTASSIUM mmol/L 4.6 3.9 3.9 3.9   CHLORIDE mmol/L 89* 85* 89* 87*   CO2 mmol/L 21.6* 22.0 23.4 24.3   BUN mg/dL 76* 66* 65* 46*   CREATININE mg/dL 8.18* 6.38* 6.54* 4.87*   GLUCOSE mg/dL 105* 120* 127* 106*   Estimated Creatinine Clearance: 14.2 mL/min (A) (by C-G formula based on SCr of 8.18 mg/dL (H)).    Results from last 7 days  Lab Units 07/02/18  0425 07/01/18  0521 06/29/18  0355 06/28/18  0414 06/27/18  0612   CALCIUM mg/dL 8.8 8.7 8.4* 8.4* 8.1*   ALBUMIN g/dL  --   --   --  3.50 3.40*         aspirin 81 mg Oral Daily   ceftriaxone 2 g Intravenous Q24H   diltiaZEM  mg Oral Q24H   heparin (porcine) 5,000 Units Subcutaneous Q8H   insulin aspart 0-7 Units Subcutaneous 4x Daily With Meals & Nightly   levETIRAcetam 1,000 mg Oral Q12H   levothyroxine 175 mcg Oral Daily   Morphine 100 mg Oral Daily   polyethylene glycol 17 g Oral Daily   sennosides-docusate sodium 2 tablet Oral BID   Vortioxetine HBr 20 mg Oral Nightly       lactated ringers 9 mL/hr   Diet Regular; Thin; Consistent Carbohydrate, Renal      Assessment/Plan      Active Hospital Problems    Diagnosis Date Noted   • Hyponatremia [E87.1] 07/01/2018   • Opioid dependence [F11.20] 06/28/2018   • Chronic pain syndrome [G89.4] 06/28/2018   • Bacteremia due to Streptococcus [R78.81] 06/25/2018   • Streptococcal arthritis of right wrist [M00.231] 06/25/2018   • Fever [R50.9] 06/23/2018   • Sepsis [A41.9] 06/23/2018   • Nausea and vomiting [R11.2] 06/23/2018   • Atrial fibrillation [I48.91] 06/23/2018   • Long term current use of anticoagulant [Z79.01] 06/23/2018   • Acute pain of right wrist [M25.531] 06/23/2018   • DNR (do not resuscitate) [Z66] 06/23/2018   • End stage renal disease [N18.6] 05/04/2016      Resolved Hospital Problems    Diagnosis Date Noted Date Resolved   No resolved problems to display.     Lethargy and confusion  -could be from ativan but he only had one dose yesterday and one today  -given obesity, opioids I suspect he has  OHS  -check ABG for possible CO2 retention    Severe anxiety  -Increased his Ativan to 1 mg every 8 hours as needed   -Consulted access Center, and needs psychiatry to see him  -Suspect this is related to his acute illness and being in the hospital  -He is very anxious about going to rehabilitation but his wife clearly states she cannot take care of him at home.    Sepsis  - due to bacteremia-Blood Cx's from [facility] growing strep  - on ceftriaxone per ID, change to cefazolin on dialysis days on discharge  - repeat blood cx's NGTD  - appreciate ID input    Right Wrist Septic Arthritis  - s/p arthrocentesis 6/25 with 124,000 WBC, crystal exam negative and GPCs on gm stain suggesting septic arthritis  - s/p washout with implantation of tobramycin beads 6/26/18  - also has left shoulder and left ankle pain which are improving  -no plans for further surgeries per hand ortho at this point but if culture from 6/30 grows then he may need a washout.  D/W Dr. Arredondo      ESRD  - MWF  - HTN meds adjusted per nephrology  - appreciate nephrology recs    Hyponatremia  -improved  -related to volume overload since he stopped HD early on Friday from anxiety  -will improve with HD    Type 2 DM  - BG acceptable  - continue low dose ssi  - A1C 5.1 last month by patient report    Afib:  -holding warfarin incase more procedures planned for right hand    Chronic Pain/Opioid Dependence  - on large doses of MS Contin chronically-will keep these the same for now given acute surgical issues but may consider dose reduction over long term    DVT Prophylaxis  SubQ heparin, on AC with coumadin but this is held/reversed for surgery.    Thanks to consultants.    DISPO: Ideally to subacute rehabilitation soon if we can obtain precertification.  If patient refuses to go to rehabilitation and then we are stuck with home with home health which is not ideal.    Jacob Price MD  [facility]  07/02/18  1:13 PM

## 2018-07-02 NOTE — PLAN OF CARE
Problem: Patient Care Overview  Goal: Discharge Needs Assessment  Outcome: Ongoing (interventions implemented as appropriate)   06/26/18 1338 06/28/18 0900   Discharge Needs Assessment   Readmission Within the Last 30 Days no previous admission in last 30 days --    Concerns to be Addressed basic needs --    Patient/Family Anticipates Transition to home with family --    Patient/Family Anticipated Services at Transition home health care --    Transportation Concerns --  car, none   Transportation Anticipated family or friend will provide --    Anticipated Changes Related to Illness inability to care for self --    Outpatient/Agency/Support Group Needs homecare agency;skilled nursing facility --    Discharge Facility/Level of Care Needs home with home health;skilled transitional care --    Current Discharge Risk chronically ill --    Disability   Equipment Currently Used at Home walker, standard;glucometer;cane, straight;oxygen;hospital bed  (oxygen 2L unsure of provider, ) --        Problem: Pain, Acute (Adult)  Goal: Acceptable Pain Control/Comfort Level  Outcome: Ongoing (interventions implemented as appropriate)      Problem: Breathing Pattern Ineffective (Adult)  Goal: Effective Oxygenation/Ventilation  Outcome: Ongoing (interventions implemented as appropriate)      Problem: Fall Risk (Adult)  Goal: Absence of Fall  Outcome: Ongoing (interventions implemented as appropriate)      Problem: Skin Injury Risk (Adult)  Goal: Skin Health and Integrity  Outcome: Ongoing (interventions implemented as appropriate)      Problem: Sepsis/Septic Shock (Adult)  Goal: Signs and Symptoms of Listed Potential Problems Will be Absent, Minimized or Managed (Sepsis/Septic Shock)  Outcome: Ongoing (interventions implemented as appropriate)      Problem: Diabetes, Type 2 (Adult)  Goal: Signs and Symptoms of Listed Potential Problems Will be Absent, Minimized or Managed (Diabetes, Type 2)  Outcome: Ongoing (interventions implemented  as appropriate)      Problem: Wound (Includes Pressure Injury) (Adult)  Goal: Signs and Symptoms of Listed Potential Problems Will be Absent, Minimized or Managed (Wound)  Outcome: Ongoing (interventions implemented as appropriate)

## 2018-07-02 NOTE — PLAN OF CARE
Problem: Patient Care Overview  Goal: Plan of Care Review  Outcome: Ongoing (interventions implemented as appropriate)   07/02/18 1744   Coping/Psychosocial   Plan of Care Reviewed With patient   Plan of Care Review   Progress no change   OTHER   Outcome Summary Ativan given this morning, patient has clarissa sleeping in between care. arouses to speech. patient confused upon waking easily reorients after becoming more alert.dressing change done, dialysis today 3    07/02/18 1744   Coping/Psychosocial   Plan of Care Reviewed With patient   Plan of Care Review   Progress no change   OTHER   Outcome Summary Ativan given this mornning, patient has clarissa sleeping inbetween care. arouses to speech. patient confused upon waking easly reorients after becoming more alert.dressing change done, dialysis today 3liters off, will go again tomorrow. VSS will continue to monitor. patient treated for bg 79 with oj, patient states would prefer something else next time, as oj gives him heartburn    liters off, will go again tomorrow. VSS will continue to monitor. patient treated for bg 79 with oj, patient states would prefer something else next time, as oj gives him heartburn

## 2018-07-02 NOTE — SIGNIFICANT NOTE
07/02/18 0902   Rehab Treatment   Discipline physical therapy assistant   Reason Treatment Not Performed unavailable for treatment  (Pt is going to dialysis in AM)   Recommendation   PT - Next Appointment 07/02/18

## 2018-07-02 NOTE — PROGRESS NOTES
Access Center Follow Up:  Attempted to see pt but he was in x-ray.  According to RN his hand got pinched when being transported thus it is getting x-rayed.  She reports pt has slept quite a bit today.  He was tearful this morning.  Access will continue to follow.  Anticipate psychiatrist to see pt tomorrow.

## 2018-07-02 NOTE — SIGNIFICANT NOTE
"Pt insistent on sitting on side of bed, regardless of his instability and wobbling while on side of bed.  Educated repeatedly on safety and concern for falls.  Pt then summonsed up the strength to hop into chair sitting at bedside while saying \"haha\" to staff.  Chair alarm in seat.   "

## 2018-07-02 NOTE — PROGRESS NOTES
Continued Stay Note  [facility]     Patient Name: Armando Gill  MRN: [id number]  Today's Date: 7/2/2018    Admit Date: 6/23/2018          Discharge Plan     Row Name 07/02/18 1631       Plan    Plan [facility] skilled rehab with HD chair    Patient/Family in Agreement with Plan yes    Plan Comments Spoke with Dania/ [facility] and she states that the precert has not been started and that more PT notes were needed, HD chair needs to be set up and she is watching behaviors.  ..........................Yarelis Campoverde RN              Discharge Codes    No documentation.       Expected Discharge Date and Time     Expected Discharge Date Expected Discharge Time    Jul 2, 2018             Yarelis Campoverde RN

## 2018-07-02 NOTE — SIGNIFICANT NOTE
07/02/18 1337   Rehab Treatment   Discipline physical therapy assistant   Reason Treatment Not Performed unavailable for treatment  (Pt off floor PT to follow up tomorrow)   Recommendation   PT - Next Appointment 07/03/18

## 2018-07-02 NOTE — PROGRESS NOTES
"   LOS: 9 days    Patient Care Team:  Luis Antonio Abarca MD as PCP - General (Family Medicine)    Chief Complaint:  No chief complaint on file.    Follow up ESRD  Subjective     Interval History:      Seen and examined. Seen on HD. D/W dialysis nurse. No issues. No CP or SOA.  Was confused after ativan yesterday    Review of Systems:       Objective     Vital Signs  Temp:  [97.8 °F (36.6 °C)-98.9 °F (37.2 °C)] 98.9 °F (37.2 °C)  Heart Rate:  [66-86] 85  Resp:  [12-16] 16  BP: (116-155)/(70-81) 128/81    Flowsheet Rows      First Filed Value   Admission Height  172.7 cm (68\") Documented at 06/23/2018 1100   Admission Weight   150 kg (330 lb 11 oz) Documented at 06/23/2018 1100          No intake/output data recorded.  No intake/output data recorded.  No intake or output data in the 24 hours ending 07/02/18 1113    Physical Exam:  Middle age WM.   .  Oral mucosa dry. No jvd.    Heart irreg, irreg.    Lungs clear to auscultation.    Abd obese, soft, nontender.    Ext right wrist dressing.  Left shoulder more ROM.    LLE dressing in place.       Results Review:      Results from last 7 days  Lab Units 07/02/18  0425 07/01/18  0521 06/29/18  0355 06/28/18  0414 06/27/18  0612   SODIUM mmol/L 135* 129* 133* 130* 136   POTASSIUM mmol/L 4.6 3.9 3.9 3.9 4.7   CHLORIDE mmol/L 89* 85* 89* 87* 92*   CO2 mmol/L 21.6* 22.0 23.4 24.3 23.9   BUN mg/dL 76* 66* 65* 46* 71*   CREATININE mg/dL 8.18* 6.38* 6.54* 4.87* 6.29*   CALCIUM mg/dL 8.8 8.7 8.4* 8.4* 8.1*   BILIRUBIN mg/dL  --   --   --  0.4 0.4   ALK PHOS U/L  --   --   --  70 65   ALT (SGPT) U/L  --   --   --  50* 65*   AST (SGOT) U/L  --   --   --  15 23   GLUCOSE mg/dL 105* 120* 127* 106* 112*       Estimated Creatinine Clearance: 14.2 mL/min (A) (by C-G formula based on SCr of 8.18 mg/dL (H)).                  Results from last 7 days  Lab Units 07/02/18  0425 07/01/18  0521 06/29/18  0355 06/28/18  0414 06/27/18  0612   WBC 10*3/mm3 14.53* 15.94* 16.44* 17.22* 21.09* "   HEMOGLOBIN g/dL 10.8* 11.1* 10.6* 10.9* 10.8*   PLATELETS 10*3/mm3 279 295 241 201 184         Results from last 7 days  Lab Units 07/02/18  0425 07/01/18  0521 06/30/18  0545 06/29/18  0355 06/28/18  0414   INR  1.76* 1.55* 1.36* 1.39* 1.35*         Imaging Results (last 24 hours)     ** No results found for the last 24 hours. **          aspirin 81 mg Oral Daily   ceftriaxone 2 g Intravenous Q24H   diltiaZEM  mg Oral Q24H   heparin (porcine) 5,000 Units Subcutaneous Q8H   insulin aspart 0-7 Units Subcutaneous 4x Daily With Meals & Nightly   levETIRAcetam 1,000 mg Oral Q12H   levothyroxine 175 mcg Oral Daily   Morphine 100 mg Oral Daily   polyethylene glycol 17 g Oral Daily   sennosides-docusate sodium 2 tablet Oral BID   Vortioxetine HBr 20 mg Oral Nightly       lactated ringers 9 mL/hr       Medication Review:   Current Facility-Administered Medications   Medication Dose Route Frequency Provider Last Rate Last Dose   • acetaminophen (TYLENOL) tablet 650 mg  650 mg Oral Q4H PRN Mark Helms MD       • albumin human 25 % IV SOLN 12.5 g  12.5 g Intravenous Q1H PRN Angle Nicholson MD       • aspirin EC tablet 81 mg  81 mg Oral Daily Mark Helms MD   81 mg at 07/02/18 0901   • bisacodyl (DULCOLAX) EC tablet 5 mg  5 mg Oral Daily PRN Mark Helms MD       • cefTRIAXone (ROCEPHIN) IVPB 2 g  2 g Intravenous Q24H Amarilys Barry  mL/hr at 07/01/18 1515 2 g at 07/01/18 1515   • dextrose (D50W) solution 25 g  25 g Intravenous Q15 Min PRN Mark Helms MD       • dextrose (GLUTOSE) oral gel 15 g  15 g Oral Q15 Min PRN Mark Helms MD       • diltiaZEM CD (CARDIZEM CD) 24 hr capsule 120 mg  120 mg Oral Q24H Angle Nicholson MD   120 mg at 07/02/18 0901   • famotidine (PEPCID) tablet 40 mg  40 mg Oral BID PRN Mark Helms MD       • glucagon (human recombinant) (GLUCAGEN DIAGNOSTIC) injection 1 mg  1 mg Subcutaneous PRN Mark Helms MD       • heparin (porcine) 5000 UNIT/ML  injection 5,000 Units  5,000 Units Subcutaneous Q8H Mark Helms MD   5,000 Units at 07/02/18 0556   • hydrALAZINE (APRESOLINE) tablet 25 mg  25 mg Oral Q8H PRN Mark Helms MD       • HYDROmorphone (DILAUDID) injection 1 mg  1 mg Intravenous Q2H PRN Angle Nicholson MD   1 mg at 06/30/18 1012    And   • Naloxone HCl (NARCAN) injection 0.4 mg  0.4 mg Intravenous Q5 Min PRN Angle Nicholson MD       • insulin aspart (novoLOG) injection 0-7 Units  0-7 Units Subcutaneous 4x Daily With Meals & Nightly Mark Helms MD   2 Units at 06/30/18 1210   • lactated ringers infusion  9 mL/hr Intravenous Continuous PRN Beto Schrader MD       • levETIRAcetam (KEPPRA) tablet 1,000 mg  1,000 mg Oral Q12H Mark Helms MD   1,000 mg at 07/02/18 0901   • levothyroxine (SYNTHROID, LEVOTHROID) tablet 175 mcg  175 mcg Oral Daily Mark Helms MD   175 mcg at 07/02/18 0556   • LORazepam (ATIVAN) tablet 1 mg  1 mg Oral Q8H PRN Jacob Price MD   1 mg at 07/02/18 0901   • Morphine (MS CONTIN) 12 hr tablet 100 mg  100 mg Oral Daily Mark Helms MD   100 mg at 07/02/18 0901   • nitroglycerin (NITROSTAT) SL tablet 0.4 mg  0.4 mg Sublingual Q5 Min PRN Mark Helms MD       • ondansetron (ZOFRAN) tablet 4 mg  4 mg Oral Q6H PRN Mark Helms MD        Or   • ondansetron ODT (ZOFRAN-ODT) disintegrating tablet 4 mg  4 mg Oral Q6H PRN Mark Helms MD        Or   • ondansetron (ZOFRAN) injection 4 mg  4 mg Intravenous Q6H PRN Mark Helms MD       • oxyCODONE-acetaminophen (PERCOCET) 5-325 MG per tablet 1 tablet  1 tablet Oral Q6H PRN Mark Helms MD   1 tablet at 07/01/18 0441   • polyethylene glycol 3350 powder (packet)  17 g Oral Daily Mark Helms MD   17 g at 07/01/18 0919   • sennosides-docusate sodium (SENOKOT-S) 8.6-50 MG tablet 2 tablet  2 tablet Oral BID Mark Helms MD   2 tablet at 07/02/18 0901   • sodium chloride 0.9 % flush 1-10 mL  1-10 mL Intravenous PRN  Mark Helms MD       • Vortioxetine HBr tablet 20 mg  20 mg Oral Nightly Mark Helms MD   20 mg at 06/30/18 2027       Assessment/Plan   1.  ESRD: MWF. Seen on HD Volume is generous we'll try to remove more fluid with dialysis today and will order UF only at AM     2.  Sepsis syndrome; Strep bacteremia and right wrist infection.  SP washout right wrist, AB beads.   Joint pain , left shoulder, left ankle improved. WBC coming down.  Plan for Cefazolin  on dialysis after discharge.  3. Chronic pain with acute exacerbation with right wrist, left shoulder, left ankle pain.   4.  Obesity   5.  DM2  6.  Elevated LFT's. Improving.   7.  pAfib on AC.  8.  HTN : Pressure is acceptable.  It is labile although  9.  Anemia CKD.  At goal.         Andres Hall MD  07/02/18  11:13 AM

## 2018-07-02 NOTE — CONSULTS
The patient is unavailable for consultation today as he is in dialysis.  I will attempt to see the patient tomorrow.

## 2018-07-02 NOTE — PLAN OF CARE
Problem: Sepsis/Septic Shock (Adult)  Goal: Signs and Symptoms of Listed Potential Problems Will be Absent, Minimized or Managed (Sepsis/Septic Shock)  Outcome: Outcome(s) achieved Date Met: 07/02/18      Problem: Wound (Includes Pressure Injury) (Adult)  Goal: Signs and Symptoms of Listed Potential Problems Will be Absent, Minimized or Managed (Wound)  Outcome: Outcome(s) achieved Date Met: 07/02/18

## 2018-07-03 LAB
ANION GAP SERPL CALCULATED.3IONS-SCNC: 18.4 MMOL/L
BACTERIA SPEC AEROBE CULT: NO GROWTH
BASOPHILS # BLD AUTO: 0.03 10*3/MM3 (ref 0–0.2)
BASOPHILS NFR BLD AUTO: 0.2 % (ref 0–1.5)
BUN BLD-MCNC: 48 MG/DL (ref 6–20)
BUN/CREAT SERPL: 8.3 (ref 7–25)
CALCIUM SPEC-SCNC: 7.7 MG/DL (ref 8.6–10.5)
CHLORIDE SERPL-SCNC: 92 MMOL/L (ref 98–107)
CO2 SERPL-SCNC: 24.6 MMOL/L (ref 22–29)
CREAT BLD-MCNC: 5.79 MG/DL (ref 0.76–1.27)
DEPRECATED RDW RBC AUTO: 54.4 FL (ref 37–54)
EOSINOPHIL # BLD AUTO: 0.13 10*3/MM3 (ref 0–0.7)
EOSINOPHIL NFR BLD AUTO: 0.9 % (ref 0.3–6.2)
ERYTHROCYTE [DISTWIDTH] IN BLOOD BY AUTOMATED COUNT: 15 % (ref 11.5–14.5)
GFR SERPL CREATININE-BSD FRML MDRD: 10 ML/MIN/1.73
GLUCOSE BLD-MCNC: 89 MG/DL (ref 65–99)
GLUCOSE BLDC GLUCOMTR-MCNC: 114 MG/DL (ref 70–130)
GLUCOSE BLDC GLUCOMTR-MCNC: 151 MG/DL (ref 70–130)
GLUCOSE BLDC GLUCOMTR-MCNC: 152 MG/DL (ref 70–130)
GLUCOSE BLDC GLUCOMTR-MCNC: 90 MG/DL (ref 70–130)
GRAM STN SPEC: NORMAL
HCT VFR BLD AUTO: 34.3 % (ref 40.4–52.2)
HGB BLD-MCNC: 10.4 G/DL (ref 13.7–17.6)
IMM GRANULOCYTES # BLD: 0.25 10*3/MM3 (ref 0–0.03)
IMM GRANULOCYTES NFR BLD: 1.7 % (ref 0–0.5)
INR PPP: 2.09 (ref 0.9–1.1)
LYMPHOCYTES # BLD AUTO: 1.67 10*3/MM3 (ref 0.9–4.8)
LYMPHOCYTES NFR BLD AUTO: 11.5 % (ref 19.6–45.3)
MCH RBC QN AUTO: 30.1 PG (ref 27–32.7)
MCHC RBC AUTO-ENTMCNC: 30.3 G/DL (ref 32.6–36.4)
MCV RBC AUTO: 99.4 FL (ref 79.8–96.2)
MONOCYTES # BLD AUTO: 0.61 10*3/MM3 (ref 0.2–1.2)
MONOCYTES NFR BLD AUTO: 4.2 % (ref 5–12)
NEUTROPHILS # BLD AUTO: 12.04 10*3/MM3 (ref 1.9–8.1)
NEUTROPHILS NFR BLD AUTO: 83.2 % (ref 42.7–76)
NRBC BLD MANUAL-RTO: 0 /100 WBC (ref 0–0)
OVALOCYTES BLD QL SMEAR: NORMAL
PLAT MORPH BLD: NORMAL
PLATELET # BLD AUTO: 271 10*3/MM3 (ref 140–500)
PMV BLD AUTO: 9.7 FL (ref 6–12)
POTASSIUM BLD-SCNC: 4.2 MMOL/L (ref 3.5–5.2)
PROTHROMBIN TIME: 23.1 SECONDS (ref 11.7–14.2)
RBC # BLD AUTO: 3.45 10*6/MM3 (ref 4.6–6)
SODIUM BLD-SCNC: 135 MMOL/L (ref 136–145)
WBC MORPH BLD: NORMAL
WBC NRBC COR # BLD: 14.48 10*3/MM3 (ref 4.5–10.7)

## 2018-07-03 PROCEDURE — 97110 THERAPEUTIC EXERCISES: CPT

## 2018-07-03 PROCEDURE — 25010000002 HYDROMORPHONE PER 4 MG: Performed by: INTERNAL MEDICINE

## 2018-07-03 PROCEDURE — 85007 BL SMEAR W/DIFF WBC COUNT: CPT | Performed by: INTERNAL MEDICINE

## 2018-07-03 PROCEDURE — 25010000002 HEPARIN (PORCINE) PER 1000 UNITS: Performed by: INTERNAL MEDICINE

## 2018-07-03 PROCEDURE — 82962 GLUCOSE BLOOD TEST: CPT

## 2018-07-03 PROCEDURE — 80048 BASIC METABOLIC PNL TOTAL CA: CPT | Performed by: INTERNAL MEDICINE

## 2018-07-03 PROCEDURE — 63710000001 INSULIN ASPART PER 5 UNITS: Performed by: INTERNAL MEDICINE

## 2018-07-03 PROCEDURE — 85025 COMPLETE CBC W/AUTO DIFF WBC: CPT | Performed by: INTERNAL MEDICINE

## 2018-07-03 PROCEDURE — 25010000003 CEFTRIAXONE PER 250 MG: Performed by: INTERNAL MEDICINE

## 2018-07-03 PROCEDURE — 85610 PROTHROMBIN TIME: CPT | Performed by: INTERNAL MEDICINE

## 2018-07-03 RX ORDER — OXYCODONE HYDROCHLORIDE AND ACETAMINOPHEN 5; 325 MG/1; MG/1
1 TABLET ORAL EVERY 6 HOURS PRN
Status: DISCONTINUED | OUTPATIENT
Start: 2018-07-03 | End: 2018-07-06 | Stop reason: HOSPADM

## 2018-07-03 RX ADMIN — MORPHINE SULFATE 100 MG: 100 TABLET, EXTENDED RELEASE ORAL at 08:20

## 2018-07-03 RX ADMIN — OXYCODONE HYDROCHLORIDE AND ACETAMINOPHEN 1 TABLET: 5; 325 TABLET ORAL at 16:30

## 2018-07-03 RX ADMIN — LEVOTHYROXINE SODIUM 175 MCG: 175 TABLET ORAL at 06:15

## 2018-07-03 RX ADMIN — POLYETHYLENE GLYCOL 3350 17 G: 17 POWDER, FOR SOLUTION ORAL at 08:36

## 2018-07-03 RX ADMIN — INSULIN ASPART 2 UNITS: 100 INJECTION, SOLUTION INTRAVENOUS; SUBCUTANEOUS at 17:10

## 2018-07-03 RX ADMIN — LEVETIRACETAM 1000 MG: 500 TABLET, FILM COATED ORAL at 08:20

## 2018-07-03 RX ADMIN — HYDROMORPHONE HYDROCHLORIDE 1 MG: 1 INJECTION, SOLUTION INTRAMUSCULAR; INTRAVENOUS; SUBCUTANEOUS at 00:47

## 2018-07-03 RX ADMIN — ASPIRIN 81 MG: 81 TABLET, DELAYED RELEASE ORAL at 08:20

## 2018-07-03 RX ADMIN — LEVETIRACETAM 1000 MG: 500 TABLET, FILM COATED ORAL at 21:33

## 2018-07-03 RX ADMIN — DILTIAZEM HYDROCHLORIDE 120 MG: 120 CAPSULE, COATED, EXTENDED RELEASE ORAL at 08:20

## 2018-07-03 RX ADMIN — HYDROMORPHONE HYDROCHLORIDE 1 MG: 1 INJECTION, SOLUTION INTRAMUSCULAR; INTRAVENOUS; SUBCUTANEOUS at 06:24

## 2018-07-03 RX ADMIN — DOCUSATE SODIUM -SENNOSIDES 2 TABLET: 50; 8.6 TABLET, COATED ORAL at 08:20

## 2018-07-03 RX ADMIN — CEFTRIAXONE SODIUM 2 G: 2 INJECTION, SOLUTION INTRAVENOUS at 16:08

## 2018-07-03 RX ADMIN — HEPARIN SODIUM 5000 UNITS: 5000 INJECTION INTRAVENOUS; SUBCUTANEOUS at 21:33

## 2018-07-03 RX ADMIN — HEPARIN SODIUM 5000 UNITS: 5000 INJECTION INTRAVENOUS; SUBCUTANEOUS at 16:09

## 2018-07-03 RX ADMIN — HEPARIN SODIUM 5000 UNITS: 5000 INJECTION INTRAVENOUS; SUBCUTANEOUS at 06:14

## 2018-07-03 RX ADMIN — DOCUSATE SODIUM -SENNOSIDES 2 TABLET: 50; 8.6 TABLET, COATED ORAL at 21:33

## 2018-07-03 RX ADMIN — HYDROMORPHONE HYDROCHLORIDE 1 MG: 1 INJECTION, SOLUTION INTRAMUSCULAR; INTRAVENOUS; SUBCUTANEOUS at 10:30

## 2018-07-03 NOTE — PLAN OF CARE
Problem: Patient Care Overview  Goal: Plan of Care Review  Outcome: Ongoing (interventions implemented as appropriate)   07/03/18 1734   Coping/Psychosocial   Plan of Care Reviewed With patient   Plan of Care Review   Progress improving   OTHER   Outcome Summary Patient received dialysis today. Dressing to left leg was changed. Vital signs are stable. Patient complained of pain and was medicated. Will continue to monitor.        Problem: Pain, Acute (Adult)  Goal: Acceptable Pain Control/Comfort Level  Outcome: Ongoing (interventions implemented as appropriate)      Problem: Breathing Pattern Ineffective (Adult)  Goal: Effective Oxygenation/Ventilation  Outcome: Ongoing (interventions implemented as appropriate)    Goal: Anxiety/Fear Reduction  Outcome: Ongoing (interventions implemented as appropriate)      Problem: Fall Risk (Adult)  Goal: Absence of Fall  Outcome: Ongoing (interventions implemented as appropriate)      Problem: Skin Injury Risk (Adult)  Goal: Skin Health and Integrity  Outcome: Ongoing (interventions implemented as appropriate)      Problem: Diabetes, Type 2 (Adult)  Goal: Signs and Symptoms of Listed Potential Problems Will be Absent, Minimized or Managed (Diabetes, Type 2)  Outcome: Ongoing (interventions implemented as appropriate)

## 2018-07-03 NOTE — PROGRESS NOTES
Name: Armando Gill ADMIT: 2018   : 1960  PCP: Luis Antonio Abarca MD    MRN: [id number] LOS: 10 days   AGE/SEX: 57 y.o. male  ROOM: 420/1   Subjective   CC: right wrist pain  Not as confused today, much better spirits  Seen in HD   Pain is well controlled and denies other complaints     Objective   Vital Signs  Temp:  [97.2 °F (36.2 °C)-99 °F (37.2 °C)] 97.7 °F (36.5 °C)  Heart Rate:  [79-94] 91  Resp:  [16-20] 20  BP: (131-152)/(66-98) 148/88  SpO2:  [91 %-100 %] 100 %  on  Flow (L/min):  [1-2] 2;   Device (Oxygen Therapy): nasal cannula  Body mass index is 49.02 kg/m².    Physical Exam   Constitutional: No distress.   HENT:   Head: Normocephalic and atraumatic.   Mouth/Throat: Oropharynx is clear and moist.   Eyes: Conjunctivae and EOM are normal. Pupils are equal, round, and reactive to light.   Neck: Normal range of motion. Neck supple.   Cardiovascular: Normal rate, regular rhythm and intact distal pulses.    Pulmonary/Chest: Effort normal and breath sounds normal.   Abdominal: Soft. Bowel sounds are normal. There is no tenderness.   Musculoskeletal: He exhibits edema (2-3+ BLE edema (Chronic)). Tenderness: right wrist.   Right wrist dressed   Neurological: He is alert. He is disoriented.       Skin: Skin is warm and dry. He is not diaphoretic.   Chronic venous stasis BLE     Psychiatric: He has a normal mood and affect. His speech is normal and behavior is normal. His mood appears not anxious.   Nursing note and vitals reviewed.      Results Review:       I reviewed the patient's new clinical results.    Results from last 7 days  Lab Units 18  0524 18  0425 18  0521 18  0355   WBC 10*3/mm3 14.48* 14.53* 15.94* 16.44*   HEMOGLOBIN g/dL 10.4* 10.8* 11.1* 10.6*   PLATELETS 10*3/mm3 271 279 295 241       Results from last 7 days  Lab Units 18  0524 18  0425 18  0521 18  0355   SODIUM mmol/L 135* 135* 129* 133*   POTASSIUM mmol/L 4.2 4.6 3.9 3.9    CHLORIDE mmol/L 92* 89* 85* 89*   CO2 mmol/L 24.6 21.6* 22.0 23.4   BUN mg/dL 48* 76* 66* 65*   CREATININE mg/dL 5.79* 8.18* 6.38* 6.54*   GLUCOSE mg/dL 89 105* 120* 127*   Estimated Creatinine Clearance: 20 mL/min (A) (by C-G formula based on SCr of 5.79 mg/dL (H)).    Results from last 7 days  Lab Units 07/03/18  0524 07/02/18  0425 07/01/18  0521 06/29/18  0355 06/28/18  0414 06/27/18  0612   CALCIUM mg/dL 7.7* 8.8 8.7 8.4* 8.4* 8.1*   ALBUMIN g/dL  --   --   --   --  3.50 3.40*         aspirin 81 mg Oral Daily   ceftriaxone 2 g Intravenous Q24H   diltiaZEM  mg Oral Q24H   heparin (porcine) 5,000 Units Subcutaneous Q8H   insulin aspart 0-7 Units Subcutaneous 4x Daily With Meals & Nightly   levETIRAcetam 1,000 mg Oral Q12H   levothyroxine 175 mcg Oral Daily   Morphine 100 mg Oral Daily   polyethylene glycol 17 g Oral Daily   sennosides-docusate sodium 2 tablet Oral BID   Vortioxetine HBr 20 mg Oral Nightly       lactated ringers 9 mL/hr   Diet Regular; Thin; Consistent Carbohydrate, Renal      Assessment/Plan      Active Hospital Problems    Diagnosis Date Noted   • Hyponatremia [E87.1] 07/01/2018   • Opioid dependence (CMS/[facility]) [F11.20] 06/28/2018   • Chronic pain syndrome [G89.4] 06/28/2018   • Bacteremia due to Streptococcus [R78.81] 06/25/2018   • Streptococcal arthritis of right wrist (CMS/[facility]) [M00.231] 06/25/2018   • Fever [R50.9] 06/23/2018   • Sepsis (CMS/[facility]) [A41.9] 06/23/2018   • Nausea and vomiting [R11.2] 06/23/2018   • Atrial fibrillation (CMS/[facility]) [I48.91] 06/23/2018   • Long term current use of anticoagulant [Z79.01] 06/23/2018   • Acute pain of right wrist [M25.531] 06/23/2018   • DNR (do not resuscitate) [Z66] 06/23/2018   • End stage renal disease (CMS/HCC) [N18.6] 05/04/2016      Resolved Hospital Problems    Diagnosis Date Noted Date Resolved   No resolved problems to display.     Lethargy and confusion  -resolved    Severe anxiety  -much better today  -Increased his Ativan to 1 mg  every 8 hours as needed   -Consulted access Center, and needs psychiatry to see him  -Suspect this is related to his acute illness and being in the hospital  -He is very anxious about going to rehabilitation but his wife clearly states she cannot take care of him at home.    Sepsis  - due to bacteremia-Blood Cx's from [facility] growing strep  - on ceftriaxone per ID, change to cefazolin on dialysis days on discharge  - repeat blood cx's NGTD  - appreciate ID input    Right Wrist Septic Arthritis  - s/p arthrocentesis 6/25 with 124,000 WBC, crystal exam negative and GPCs on gm stain suggesting septic arthritis  - s/p washout with implantation of tobramycin beads 6/26/18  - also has left shoulder and left ankle pain which are improving  -no plans for further surgeries per hand ortho at this point but if culture from 6/30 grows then he may need a washout.  D/W Dr. Arredondo      ESRD  - [facility]  - HTN meds adjusted per nephrology  - appreciate nephrology recs    Hyponatremia  -improved  -will improve with HD    Type 2 DM  - BG acceptable  - continue low dose ssi  - A1C 5.1 last month by patient report    Afib:  -holding warfarin incase more procedures planned for right hand    Chronic Pain/Opioid Dependence  - on large doses of MS Contin chronically-will keep these the same for now given acute surgical issues but may consider dose reduction over long term    DVT Prophylaxis  SubQ heparin, on AC with coumadin but this is held/reversed for surgery.    Thanks to consultants.    DISPO: Ideally to subacute rehabilitation soon if we can obtain precertification.  If patient refuses to go to rehabilitation and then we are stuck with home with home health which is not ideal.    Jacob Price MD  [facility]  07/03/18  4:51 PM

## 2018-07-03 NOTE — PROGRESS NOTES
"   LOS: 10 days    Patient Care Team:  Luis Antonio Abarca MD as PCP - General (Family Medicine)    Chief Complaint:  No chief complaint on file.    Follow up ESRD  Subjective     Interval History:   On dialysis.  Anxious.   Wants to come off early.  Explained reason for 3.5 hour run time to remove volume. Says he wants to run 4 days a week for shorter time.  Cannot do home hemo because his wife does not like needles.     Review of Systems:   Bowels moving.  Not soa. Edema better.  Joint pains better.     Objective     Vital Signs  Temp:  [97.6 °F (36.4 °C)-99 °F (37.2 °C)] 97.6 °F (36.4 °C)  Heart Rate:  [79-94] 94  Resp:  [16-20] 20  BP: (135-152)/(74-98) 142/98    Flowsheet Rows      First Filed Value   Admission Height  172.7 cm (68\") Documented at 06/23/2018 1100   Admission Weight   150 kg (330 lb 11 oz) Documented at 06/23/2018 1100          I/O this shift:  In: 600 [P.O.:600]  Out: -   I/O last 3 completed shifts:  In: 600 [P.O.:600]  Out: 3300 [Urine:300; Other:3000]    Intake/Output Summary (Last 24 hours) at 07/03/18 1409  Last data filed at 07/03/18 1042   Gross per 24 hour   Intake             1200 ml   Output              300 ml   Net              900 ml       Physical Exam:  Middle age WM. On dialysis for UF.  .   systolic.  Goal 3.6 kg.   Oral mucosa dry. No jvd.  Heart irreg, irreg.  Lungs clear to auscultation.  Abd obese, soft, nontender.  Ext right wrist dressing.  Edema lower ext still tight, but better.  Left hand less edema.     LLE dressing in place.       Results Review:      Results from last 7 days  Lab Units 07/03/18  0524 07/02/18  0425 07/01/18  0521  06/28/18  0414 06/27/18  0612   SODIUM mmol/L 135* 135* 129*  < > 130* 136   POTASSIUM mmol/L 4.2 4.6 3.9  < > 3.9 4.7   CHLORIDE mmol/L 92* 89* 85*  < > 87* 92*   CO2 mmol/L 24.6 21.6* 22.0  < > 24.3 23.9   BUN mg/dL 48* 76* 66*  < > 46* 71*   CREATININE mg/dL 5.79* 8.18* 6.38*  < > 4.87* 6.29*   CALCIUM mg/dL 7.7* 8.8 8.7  < > " 8.4* 8.1*   BILIRUBIN mg/dL  --   --   --   --  0.4 0.4   ALK PHOS U/L  --   --   --   --  70 65   ALT (SGPT) U/L  --   --   --   --  50* 65*   AST (SGOT) U/L  --   --   --   --  15 23   GLUCOSE mg/dL 89 105* 120*  < > 106* 112*   < > = values in this interval not displayed.    Estimated Creatinine Clearance: 20 mL/min (A) (by C-G formula based on SCr of 5.79 mg/dL (H)).                  Results from last 7 days  Lab Units 07/03/18 0524 07/02/18  0425 07/01/18  0521 06/29/18  0355 06/28/18  0414   WBC 10*3/mm3 14.48* 14.53* 15.94* 16.44* 17.22*   HEMOGLOBIN g/dL 10.4* 10.8* 11.1* 10.6* 10.9*   PLATELETS 10*3/mm3 271 279 295 241 201         Results from last 7 days  Lab Units 07/03/18  0524 07/02/18 0425 07/01/18  0521 06/30/18  0545 06/29/18  0355   INR  2.09* 1.76* 1.55* 1.36* 1.39*         Imaging Results (last 24 hours)     Procedure Component Value Units Date/Time    XR Wrist 3+ View Right [[id number]] Collected:  07/02/18 1554     Updated:  07/02/18 1601    Narrative:       XR WRIST 3+ VW RIGHT-, XR HAND 3+ VW RIGHT-     INDICATIONS:    Trauma     TECHNIQUE: 4 VIEWS OF THE RIGHT WRIST, 3 views of the right hand     COMPARISON: None available     FINDINGS:      No acute fracture is identified. Degenerative changes are seen,  predominantly at the lateral aspect of the wrist. Arterial  calcifications are conspicuous. Soft tissue swelling is present  diffusely about the hand and wrist, may be result of injury,  inflammation, infection, correlate clinically. Widening of the  scapholunate interval, 3.6 mm, may be degenerative in nature, or could  be evidence of scapholunate ligament injury, correlate clinically.       Impression:          Soft tissue swelling. Widening of the scapholunate interval, correlate  clinically. No acute fracture identified in the right hand or right  wrist. If there is clinical suspicion for radiographically occult  fracture or osteomyelitis, MRI could be considered for  further  evaluation.     This report was finalized on 7/2/2018 3:57 PM by Dr. Rah Cervantes M.D.       XR Hand 3+ View Right [[id number]] Collected:  07/02/18 1554     Updated:  07/02/18 1601    Narrative:       XR WRIST 3+ VW RIGHT-, XR HAND 3+ VW RIGHT-     INDICATIONS:    Trauma     TECHNIQUE: 4 VIEWS OF THE RIGHT WRIST, 3 views of the right hand     COMPARISON: None available     FINDINGS:      No acute fracture is identified. Degenerative changes are seen,  predominantly at the lateral aspect of the wrist. Arterial  calcifications are conspicuous. Soft tissue swelling is present  diffusely about the hand and wrist, may be result of injury,  inflammation, infection, correlate clinically. Widening of the  scapholunate interval, 3.6 mm, may be degenerative in nature, or could  be evidence of scapholunate ligament injury, correlate clinically.       Impression:          Soft tissue swelling. Widening of the scapholunate interval, correlate  clinically. No acute fracture identified in the right hand or right  wrist. If there is clinical suspicion for radiographically occult  fracture or osteomyelitis, MRI could be considered for further  evaluation.     This report was finalized on 7/2/2018 3:57 PM by Dr. Rah Cervantes M.D.             aspirin 81 mg Oral Daily   ceftriaxone 2 g Intravenous Q24H   diltiaZEM  mg Oral Q24H   heparin (porcine) 5,000 Units Subcutaneous Q8H   insulin aspart 0-7 Units Subcutaneous 4x Daily With Meals & Nightly   levETIRAcetam 1,000 mg Oral Q12H   levothyroxine 175 mcg Oral Daily   Morphine 100 mg Oral Daily   polyethylene glycol 17 g Oral Daily   sennosides-docusate sodium 2 tablet Oral BID   Vortioxetine HBr 20 mg Oral Nightly       lactated ringers 9 mL/hr       Medication Review:   Current Facility-Administered Medications   Medication Dose Route Frequency Provider Last Rate Last Dose   • acetaminophen (TYLENOL) tablet 650 mg  650 mg Oral Q4H PRN Mark Helms MD        • albumin human 25 % IV SOLN 12.5 g  12.5 g Intravenous Q1H PRN Angle Nicholson MD       • aspirin EC tablet 81 mg  81 mg Oral Daily Mark Helms MD   81 mg at 07/03/18 0820   • bisacodyl (DULCOLAX) EC tablet 5 mg  5 mg Oral Daily PRN Mark Helms MD       • cefTRIAXone (ROCEPHIN) IVPB 2 g  2 g Intravenous Q24H Amarilys Barry  mL/hr at 07/02/18 1640 2 g at 07/02/18 1640   • dextrose (D50W) solution 25 g  25 g Intravenous Q15 Min PRN Mark Helms MD       • dextrose (GLUTOSE) oral gel 15 g  15 g Oral Q15 Min PRN Mark Helms MD       • diltiaZEM CD (CARDIZEM CD) 24 hr capsule 120 mg  120 mg Oral Q24H Angle Nicholson MD   120 mg at 07/03/18 0820   • famotidine (PEPCID) tablet 40 mg  40 mg Oral BID PRN Mark Helms MD       • glucagon (human recombinant) (GLUCAGEN DIAGNOSTIC) injection 1 mg  1 mg Subcutaneous PRN Mark Helms MD       • heparin (porcine) 5000 UNIT/ML injection 5,000 Units  5,000 Units Subcutaneous Q8H Mark Helms MD   5,000 Units at 07/03/18 0614   • hydrALAZINE (APRESOLINE) tablet 25 mg  25 mg Oral Q8H PRN Mark Helms MD       • insulin aspart (novoLOG) injection 0-7 Units  0-7 Units Subcutaneous 4x Daily With Meals & Nightly Mark Helms MD   2 Units at 06/30/18 1210   • lactated ringers infusion  9 mL/hr Intravenous Continuous PRN Beto Schrader MD       • levETIRAcetam (KEPPRA) tablet 1,000 mg  1,000 mg Oral Q12H Mark Helms MD   1,000 mg at 07/03/18 0820   • levothyroxine (SYNTHROID, LEVOTHROID) tablet 175 mcg  175 mcg Oral Daily Mark Helms MD   175 mcg at 07/03/18 0615   • LORazepam (ATIVAN) tablet 1 mg  1 mg Oral Q8H PRN Jacob Price MD   1 mg at 07/02/18 2018   • Morphine (MS CONTIN) 12 hr tablet 100 mg  100 mg Oral Daily Mark Helms MD   100 mg at 07/03/18 0820   • Naloxone HCl (NARCAN) injection 0.4 mg  0.4 mg Intravenous Q5 Min PRN Angle Nicholson MD       • nitroglycerin (NITROSTAT) SL tablet 0.4  mg  0.4 mg Sublingual Q5 Min PRN Mark Helms MD       • ondansetron (ZOFRAN) tablet 4 mg  4 mg Oral Q6H PRN Mark Helms MD        Or   • ondansetron ODT (ZOFRAN-ODT) disintegrating tablet 4 mg  4 mg Oral Q6H PRN Mark Helms MD        Or   • ondansetron (ZOFRAN) injection 4 mg  4 mg Intravenous Q6H PRN Mark Helms MD       • oxyCODONE-acetaminophen (PERCOCET) 5-325 MG per tablet 1 tablet  1 tablet Oral Q6H PRN Jacob Price MD       • polyethylene glycol 3350 powder (packet)  17 g Oral Daily Mark Helms MD   17 g at 07/03/18 0836   • sennosides-docusate sodium (SENOKOT-S) 8.6-50 MG tablet 2 tablet  2 tablet Oral BID Mark Helms MD   2 tablet at 07/03/18 0820   • sodium chloride 0.9 % flush 1-10 mL  1-10 mL Intravenous PRN Mark Helms MD       • Vortioxetine HBr tablet 20 mg  20 mg Oral Nightly Mark Helms MD   20 mg at 06/30/18 2027       Assessment/Plan   1.  ESRD: isolated uF today .  Removing  volume.   2.  Sepsis syndrome; Strep bacteremia and right wrist infection.  SP washout right wrist, AB beads.   Joint pain , left shoulder, left ankle improved. WBC 14 K.   On rocephin .   3. Chronic pain with acute exacerbation with right wrist, left shoulder, left ankle pain.   4.  Obesity   5.  DM2 very well controlled on diet.   6.  Elevated LFT's. Improving.   7.  pAfib on AC. Received. Vit K pre-op.   8.  HTN on lower dose cardizem.  Off hydralazine for now. BP labile when in pain. Removing volume on HD  9.  Anemia CKD.  At goal.  Dc epo.   10. Hypothyroid on replacement.        Angle Nicholson MD  07/03/18  2:09 PM

## 2018-07-03 NOTE — CONSULTS
IDENTIFYING INFORMATION: The patient is a 57-year-old white male admitted with septic arthritis.  He is seen by psychiatry after he had made a passive suicidal threat while in dialysis.    CHIEF COMPLAINT:  None given    INFORMANT:  Patient wife and chart    RELIABILITY:  Good    HISTORY OF PRESENT ILLNESS: The patient is a 57-year-old  white male who has been a dialysis dependent for the past 3 years.  He was admitted with septic arthritis of the wrist.  During a dialysis on 629 the patient had voiced some passive suicidal elation but vehemently denies current suicidal thinking or any wish to end his life.  The patient denies prior suicide attempt or gestures and denies prior psychiatric contact.  The patient is prescribed trintellix by his primary care physician and reports a history of positive response to this medication.  Patient denies recent changes in sleep or appetite.  He is not a candidate for transplant secondary to his morbid obesity.  Patient denies abuse of any psychoactive substances.  He lives at home with his wife and 2 adult children.  All are doing well per his report.    PAST PSYCHIATRIC HISTORY: As above    PAST MEDICAL HISTORY:  As above    MEDICATIONS:   Current Facility-Administered Medications   Medication Dose Route Frequency Provider Last Rate Last Dose   • acetaminophen (TYLENOL) tablet 650 mg  650 mg Oral Q4H PRN Mark Helms MD       • albumin human 25 % IV SOLN 12.5 g  12.5 g Intravenous Q1H PRN Angle Nicholson MD       • aspirin EC tablet 81 mg  81 mg Oral Daily Mark Helms MD   81 mg at 07/03/18 0820   • bisacodyl (DULCOLAX) EC tablet 5 mg  5 mg Oral Daily PRN Mark Helms MD       • cefTRIAXone (ROCEPHIN) IVPB 2 g  2 g Intravenous Q24H Amarilys Barry  mL/hr at 07/02/18 1640 2 g at 07/02/18 1640   • dextrose (D50W) solution 25 g  25 g Intravenous Q15 Min PRN Mark Helms MD       • dextrose (GLUTOSE) oral gel 15 g  15 g Oral Q15 Min PRN Mark PARKER  MD Scooter       • diltiaZEM CD (CARDIZEM CD) 24 hr capsule 120 mg  120 mg Oral Q24H Angle Nicholson MD   120 mg at 07/03/18 0820   • famotidine (PEPCID) tablet 40 mg  40 mg Oral BID PRN Mark Helms MD       • glucagon (human recombinant) (GLUCAGEN DIAGNOSTIC) injection 1 mg  1 mg Subcutaneous PRN Mark Helms MD       • heparin (porcine) 5000 UNIT/ML injection 5,000 Units  5,000 Units Subcutaneous Q8H Mark Helms MD   5,000 Units at 07/03/18 0614   • hydrALAZINE (APRESOLINE) tablet 25 mg  25 mg Oral Q8H PRN Mark Helms MD       • HYDROmorphone (DILAUDID) injection 1 mg  1 mg Intravenous Q2H PRN Angle Nicholson MD   1 mg at 07/03/18 1030    And   • Naloxone HCl (NARCAN) injection 0.4 mg  0.4 mg Intravenous Q5 Min PRN Angle Nicholson MD       • insulin aspart (novoLOG) injection 0-7 Units  0-7 Units Subcutaneous 4x Daily With Meals & Nightly Mark Helms MD   2 Units at 06/30/18 1210   • lactated ringers infusion  9 mL/hr Intravenous Continuous PRN Beto Schrader MD       • levETIRAcetam (KEPPRA) tablet 1,000 mg  1,000 mg Oral Q12H Mark Helms MD   1,000 mg at 07/03/18 0820   • levothyroxine (SYNTHROID, LEVOTHROID) tablet 175 mcg  175 mcg Oral Daily Mark Helms MD   175 mcg at 07/03/18 0615   • LORazepam (ATIVAN) tablet 1 mg  1 mg Oral Q8H PRN Jacob Price MD   1 mg at 07/02/18 2018   • Morphine (MS CONTIN) 12 hr tablet 100 mg  100 mg Oral Daily Mark Helms MD   100 mg at 07/03/18 0820   • nitroglycerin (NITROSTAT) SL tablet 0.4 mg  0.4 mg Sublingual Q5 Min PRN Mark Helms MD       • ondansetron (ZOFRAN) tablet 4 mg  4 mg Oral Q6H PRN Mark Helms MD        Or   • ondansetron ODT (ZOFRAN-ODT) disintegrating tablet 4 mg  4 mg Oral Q6H PRN Mark Helms MD        Or   • ondansetron (ZOFRAN) injection 4 mg  4 mg Intravenous Q6H PRN Mark Helms MD       • oxyCODONE-acetaminophen (PERCOCET) 5-325 MG per tablet 1 tablet  1 tablet Oral  Q6H PRN Mark Helms MD   1 tablet at 07/01/18 0441   • polyethylene glycol 3350 powder (packet)  17 g Oral Daily Mark Helms MD   17 g at 07/03/18 0836   • sennosides-docusate sodium (SENOKOT-S) 8.6-50 MG tablet 2 tablet  2 tablet Oral BID Mark Helms MD   2 tablet at 07/03/18 0820   • sodium chloride 0.9 % flush 1-10 mL  1-10 mL Intravenous PRN Mark Helms MD       • Vortioxetine HBr tablet 20 mg  20 mg Oral Nightly Mark Helms MD   20 mg at 06/30/18 2027         ALLERGIES:  Latex and sulfa adhesive tape    FAMILY HISTORY:  Noncontributory    SOCIAL HISTORY: The patient lives with his wife and 2 adult children.  There is no reported use of alcohol tobacco or street drugs.    MENTAL STATUS EXAM: The patient is a morbidly obese and somewhat ill-appearing white male appearing stated age.  He is dressed in hospital garb.  He is awake alert and oriented in all spheres.  His mood is euthymic his affect congruent.  Speech is relevant and coherent.  There are no gross sepsis memory cognition noted.  Intelligence is judged to be average range based on fund of knowledge the patient's cooperative throughout the interview.  He denies current suicidal homicidal ideation or psychotic features.  His judgment and insight appear to be intact.    ASSETS/LIABILITIES: Motivation for change/multiple health issues    DIAGNOSTIC IMPRESSION: Major depressive disorder in partial remission, end-stage renal disease, morbid obesity, septic arthritis, sepsis    PLAN:  At this point the patient is denying any suicidal thinking and reports that his statement was made out of frustration over his arduous recent medical course.  I would recommend continuation of the previously prescribed trintellix and should the patient wish, ranges could be made for follow-up through the auspices of community mental health resources though the patient at this point seems satisfied with the psychiatric treatment he is receiving from  his primary care physician.    Thank you very much for the opportunity to see this patient.

## 2018-07-03 NOTE — THERAPY TREATMENT NOTE
Acute Care - Physical Therapy Treatment Note  [facility]     Patient Name: Armando Gill  : 1960  MRN: [id number]  Today's Date: 7/3/2018             Admit Date: 2018    Visit Dx:    ICD-10-CM ICD-9-CM   1. Generalized weakness R53.1 780.79   2. Septicemia (CMS/HCC) A41.9 038.9     Patient Active Problem List   Diagnosis   • End stage renal disease (CMS/HCC)   • Secondary hyperparathyroidism of renal origin (CMS/[facility])   • Fever   • Sepsis (CMS/HCC)   • Nausea and vomiting   • Atrial fibrillation (CMS/HCC)   • Long term current use of anticoagulant   • Acute pain of right wrist   • DNR (do not resuscitate)   • Bacteremia due to Streptococcus   • Streptococcal arthritis of right wrist (CMS/[facility])   • Opioid dependence (CMS/[facility])   • Chronic pain syndrome   • Hyponatremia       Therapy Treatment          Rehabilitation Treatment Summary     Row Name 18 1000             Treatment Time/Intention    Discipline physical therapy assistant  -CW      Document Type therapy note (daily note)  -CW      Subjective Information complains of;weakness;fatigue  -CW      Mode of Treatment physical therapy  -CW      Therapy Frequency (PT Clinical Impression) daily  -CW      Patient Effort adequate  -CW      Existing Precautions/Restrictions fall;oxygen therapy device and L/min  -CW      Recorded by [CW] Justin Holguin, AIRAM 18 1040      Row Name 18 1000             Vital Signs    O2 Delivery Pre Treatment supplemental O2  -CW      O2 Delivery Intra Treatment supplemental O2  -CW      O2 Delivery Post Treatment supplemental O2  -CW      Recorded by [CW] Justin Holguin, AIRAM 18 1040      Row Name 18 1000             Cognitive Assessment/Intervention- PT/OT    Orientation Status (Cognition) oriented to;person;place;situation  -CW      Follows Commands (Cognition) follows one step commands;50-74% accuracy;repetition of directions required  -CW      Personal Safety Interventions fall  prevention program maintained;gait belt;muscle strengthening facilitated;nonskid shoes/slippers when out of bed  -CW      Recorded by [CW] Justin Holguin, [facility] 07/03/18 1040      Row Name 07/03/18 1000             Bed Mobility Assessment/Treatment    Supine-Sit [place] (Bed Mobility) not tested  -CW      Comment (Bed Mobility) in chair  -CW      Recorded by [CW] Justin Holguin, [facility] 07/03/18 1040      Row Name 07/03/18 1000             Transfer Assessment/Treatment    Transfer Assessment/Treatment sit-stand transfer;stand-sit transfer  -CW      Recorded by [CW] Justin Holguin, [facility] 07/03/18 1040      Row Name 07/03/18 1000             Sit-Stand Transfer    Sit-Stand [place] (Transfers) minimum assist (75% patient effort);2 person assist  -CW      Assistive Device (Sit-Stand Transfers) cane, quad  -CW      Recorded by [CW] Justin Holguin, [facility] 07/03/18 1040      Row Name 07/03/18 1000             Stand-Sit Transfer    Stand-Sit [place] (Transfers) minimum assist (75% patient effort);2 person assist  -CW      Assistive Device (Stand-Sit Transfers) cane, quad  -CW      Recorded by [CW] Justin Holguin, [facility] 07/03/18 1040      Row Name 07/03/18 1000             Gait/Stairs Assessment/Training    [place] Level (Gait) minimum assist (75% patient effort);2 person assist  -CW      Assistive Device (Gait) cane, quad  -CW      Distance in Feet (Gait) 50  -CW      Deviations/Abnormal Patterns (Gait) antalgic;da decreased;stride length decreased;gait speed decreased  -CW      Bilateral Gait Deviations forward flexed posture  -CW      Recorded by [CW] Justin Holguin, [facility] 07/03/18 1040      Row Name 07/03/18 1000             Positioning and Restraints    Pre-Treatment Position sitting in chair/recliner  -CW      Post Treatment Position chair  -CW      In Chair notified nsg;sitting;call light within reach;encouraged to call for assist;with family/caregiver  -CW      Recorded by [CW]  Justin Holguin, AIRAM 07/03/18 1040      Row Name                Wound 06/24/18 1104 Left medial;anterior calf blisters    Wound - Properties Group Date first assessed: 06/24/18 [SANJU] Time first assessed: 1104 [SANJU] Present On Admission : no;picture taken [SANJU] Side: Left [SANJU] Orientation: medial;anterior [SANJU] Location: calf [SANJU] Type: blisters [SANJU] Additional Comments: dressing present on left leg, clean, dry and intact 6/26/2018 [DK] Recorded by:  [DK] Brenda Addison RN 06/26/18 1621 [SANJU] Saman Leroy RN 06/24/18 1105    Row Name                Wound 06/24/18 1108 Left lower other (see notes) ulceration, venous    Wound - Properties Group Date first assessed: 06/24/18 [SANJU] Time first assessed: 1108 [SANJU] Present On Admission : yes [SANJU] Side: Left [SANJU] Orientation: lower [SANJU] Location: other (see notes) [SANJU], under great toe   Type: ulceration, venous [SANJU] Recorded by:  [SANJU] Saman Leroy RN 06/24/18 1109    Row Name                Wound 06/26/18 1857 Right wrist incision    Wound - Properties Group Date first assessed: 06/26/18 [SL] Time first assessed: 1857 [SL] Side: Right [SL] Location: wrist [SL] Type: incision [SL] Recorded by:  [SL] Sheron Dutton RN 06/26/18 1857    Row Name                Wound 06/27/18 0847 Left anterior foot blisters    Wound - Properties Group Date first assessed: 06/27/18 [LF] Time first assessed: 0847 [LF] Present On Admission : no [LF] Side: Left [LF] Orientation: anterior [LF] Location: foot [LF] Type: blisters [LF] Recorded by:  [LF] Luisa Quesada RN 06/27/18 1449    Row Name 07/03/18 1000             Outcome Summary/Treatment Plan (PT)    Anticipated Discharge Disposition (PT) skilled nursing facility  -      Recorded by [CW] Justin Holguin, AIRAM 07/03/18 1040        User Key  (r) = Recorded By, (t) = Taken By, (c) = Cosigned By    Initials Name Effective Dates Discipline    TRIPP Addison RN 06/16/16 -  Nurse    MARLO Quesada, RN 06/16/16 -  Nurse    SL  Sheron Dutton, RN 06/16/16 -  Nurse    SANJU Leroy, RN 06/16/16 -  Nurse    CW Justin Holguin, PTA 03/07/18 -  PT          Wound 06/24/18 1104 Left medial;anterior calf blisters (Active)   Dressing Appearance intact;no drainage;dry 7/3/2018  8:20 AM   Closure None 7/3/2018 12:47 AM   Base yellow 7/2/2018  2:41 PM   Periwound swelling;redness 7/3/2018 12:47 AM   Periwound Temperature warm 7/3/2018 12:47 AM   Periwound Skin Turgor firm 7/3/2018 12:47 AM   Drainage Characteristics/Odor yellow 7/2/2018  2:41 PM   Drainage Amount scant 7/2/2018  2:41 PM   Dressing Care, Wound dressing changed;dressing removed;petroleum-based 7/2/2018  2:41 PM   Periwound Care, Wound absorptive dressing applied 7/2/2018  8:07 PM       Wound 06/24/18 1108 Left lower other (see notes) ulceration, venous (Active)   Dressing Appearance dried drainage 7/2/2018  2:41 PM   Closure None 7/3/2018 12:47 AM   Base dressing in place, unable to visualize 7/3/2018  8:20 AM   Periwound intact 7/2/2018  2:41 PM   Periwound Temperature warm 7/2/2018  2:17 PM   Periwound Skin Turgor firm 7/2/2018  2:17 PM   Edges callused 7/2/2018  2:41 PM   Drainage Amount none 7/3/2018 12:47 AM   Dressing Care, Wound dressing changed;dressing removed 7/2/2018  2:41 PM       Wound 06/26/18 1857 Right wrist incision (Active)   Dressing Appearance intact;dry;no drainage 7/3/2018  8:20 AM   Closure Sutures 7/3/2018  8:20 AM   Drainage Amount none 7/3/2018  8:20 AM   Dressing Care, Wound gauze 7/3/2018  8:20 AM       Wound 06/27/18 0847 Left anterior foot blisters (Active)   Dressing Appearance dry;intact 7/3/2018  8:20 AM   Closure KOURTNEY 7/3/2018  8:20 AM   Base yellow;red/granulating 7/2/2018  2:41 PM   Periwound swelling;redness 7/3/2018 12:47 AM   Periwound Temperature warm 7/3/2018 12:47 AM   Periwound Skin Turgor firm 7/3/2018 12:47 AM   Care, Wound other (see comments) 7/3/2018  8:20 AM   Dressing Care, Wound gauze 7/3/2018  8:20 AM              Physical Therapy Education     Title: PT OT SLP Therapies (Done)     Topic: Physical Therapy (Done)     Point: Mobility training (Done)    Learning Progress Summary     Learner Status Readiness Method Response Comment Documented by    Patient Done Acceptance E,TB VU,DU  CW 07/03/18 1040     Done Acceptance E,TB VU safety, gait, purse lipped breathing GR 07/01/18 1313     Done Acceptance E VU,NR  PM 06/29/18 1108     Done Acceptance E VU,NR   06/28/18 1001    Family Done Acceptance E,TB VU safety, gait, purse lipped breathing GR 07/01/18 1313          Point: Home exercise program (Done)    Learning Progress Summary     Learner Status Readiness Method Response Comment Documented by    Patient Done Acceptance E,TB VU,DU  CW 07/03/18 1040     Done Acceptance E,TB VU safety, gait, purse lipped breathing GR 07/01/18 1313     Done Acceptance E VU,NR  PM 06/29/18 1108    Family Done Acceptance E,TB VU safety, gait, purse lipped breathing GR 07/01/18 1313          Point: Body mechanics (Done)    Learning Progress Summary     Learner Status Readiness Method Response Comment Documented by    Patient Done Acceptance E,TB VU,DU  CW 07/03/18 1040     Done Acceptance E,TB VU safety, gait, purse lipped breathing GR 07/01/18 1313     Done Acceptance E VU,NR  PM 06/29/18 1108     Done Acceptance E VU,NR   06/28/18 1001    Family Done Acceptance E,TB VU safety, gait, purse lipped breathing GR 07/01/18 1313          Point: Precautions (Done)    Learning Progress Summary     Learner Status Readiness Method Response Comment Documented by    Patient Done Acceptance E,TB VU,DU  CW 07/03/18 1040     Done Acceptance E,TB VU safety, gait, purse lipped breathing GR 07/01/18 1313     Done Acceptance E VU,NR  PM 06/29/18 1108     Done Acceptance E VU,NR   06/28/18 1001    Family Done Acceptance E,TB VU safety, gait, purse lipped breathing GR 07/01/18 1313                      User Key     Initials Effective Dates Name Provider  Type Discipline     06/08/18 -  Karli Quispe, PT Physical Therapist PT    GR 10/03/16 -  Kevin Livingston, PT Physical Therapist PT    CW 03/07/18 -  Justin Holguin PTA Physical Therapy Assistant PT    PM 06/25/18 -  Luis Curry, PT Student PT Student PT                    PT Recommendation and Plan  Anticipated Discharge Disposition (PT): skilled nursing facility  Therapy Frequency (PT Clinical Impression): daily  Outcome Summary/Treatment Plan (PT)  Anticipated Discharge Disposition (PT): skilled nursing facility  Plan of Care Reviewed With: patient  Progress: improving  Outcome Summary: Pt limited amb due to fatigue and weakness in B LE pt was just given meds that cause him to be al little out of it          Outcome Measures     Row Name 07/03/18 1000 07/01/18 1300          How much help from another person do you currently need...    Turning from your back to your side while in flat bed without using bedrails? 2  -CW 2  -GR     Moving from lying on back to sitting on the side of a flat bed without bedrails? 2  -CW 2  -GR     Moving to and from a bed to a chair (including a wheelchair)? 3  -CW 2  -GR     Standing up from a chair using your arms (e.g., wheelchair, bedside chair)? 3  -CW 3  -GR     Climbing 3-5 steps with a railing? 1  -CW 1  -GR     To walk in hospital room? 3  -CW 3  -GR     AM-PAC 6 Clicks Score 14  -CW 13  -GR        Functional Assessment    Outcome Measure Options AM-PAC 6 Clicks Basic Mobility (PT)  -CW AM-PAC 6 Clicks Basic Mobility (PT)  -GR       User Key  (r) = Recorded By, (t) = Taken By, (c) = Cosigned By    Initials Name Provider Type    GR Kevin Livingston, PT Physical Therapist    CW Justin Holguin, AIRAM Physical Therapy Assistant           Time Calculation:         PT Charges     Row Name 07/03/18 1041 07/03/18 0802          Time Calculation    Start Time 1024  -CW  --     Stop Time 1041  -CW  --     Time Calculation (min) 17 min  -CW  --     PT Received On  07/03/18  -  --     PT - Next Appointment 07/05/18  -GENESIS  --     PT Goal Re-Cert Due Date  -- 07/05/18  -       User Key  (r) = Recorded By, (t) = Taken By, (c) = Cosigned By    Initials Name Provider Type     Mague Lemus, PT Physical Therapist    CW Justin Holguin, PTA Physical Therapy Assistant        Therapy Suggested Charges     Code   Minutes Charges    95361 (CPT®) Hc Pt Neuromusc Re Education Ea 15 Min      25210 (CPT®) Hc Pt Ther Proc Ea 15 Min      64759 (CPT®) Hc Gait Training Ea 15 Min      72427 (CPT®) Hc Pt Therapeutic Act Ea 15 Min 15 1    77272 (CPT®) Hc Pt Manual Therapy Ea 15 Min      03135 (CPT®) Hc Pt Iontophoresis Ea 15 Min      07500 (CPT®) Hc Pt Elec Stim Ea-Per 15 Min      25014 (CPT®) Hc Pt Ultrasound Ea 15 Min      10509 (CPT®) Hc Pt Self Care/Mgmt/Train Ea 15 Min      Total  15 1        Therapy Charges for Today     Code Description Service Date Service Provider Modifiers Qty    80759087721 HC PT THER PROC EA 15 MIN 7/3/2018 Justin Holguin, PTA GP 1    37287588093 HC PT THER SUPP EA 15 MIN 7/3/2018 Justin Holguin PTA GP 1          PT G-Codes  Outcome Measure Options: AM-PAC 6 Clicks Basic Mobility (PT)    Justin Holguin PTA  7/3/2018

## 2018-07-03 NOTE — PROGRESS NOTES
Continued Stay Note  [facility]     Patient Name: Armando Gill  MRN: [id number]  Today's Date: 7/3/2018    Admit Date: 6/23/2018          Discharge Plan     Row Name 07/03/18 1236       Plan    Plan pending referral to [facility] for rehab; pending pre-cert     Patient/Family in Agreement with Plan yes    Plan Comments [facility]CP [facility] with [facility]; [facility], CCP needs to clarify if patient is agreeable for rehab and will need HD chair. CCP attempted to follow up with patient to see if he is agreeable to rehab; patient was off the floor. CCP will f/u to see if patient is agreeable to rehab at [facility]. Sammi BA               Discharge Codes    No documentation.       Expected Discharge Date and Time     Expected Discharge Date Expected Discharge Time    Jul 2, 2018             JESENIA Mesa

## 2018-07-03 NOTE — PLAN OF CARE
Problem: Patient Care Overview  Goal: Plan of Care Review  Outcome: Ongoing (interventions implemented as appropriate)   07/03/18 0400   Coping/Psychosocial   Plan of Care Reviewed With patient   Plan of Care Review   Progress no change   OTHER   Outcome Summary vss thus far; up n chair for several hours; c/o pain in leg and arm /medicated as per mar; pt resting comfortably and without complaint at this time     Goal: Individualization and Mutuality  Outcome: Ongoing (interventions implemented as appropriate)    Goal: Discharge Needs Assessment  Outcome: Ongoing (interventions implemented as appropriate)      Problem: Pain, Acute (Adult)  Goal: Acceptable Pain Control/Comfort Level  Outcome: Ongoing (interventions implemented as appropriate)      Problem: Breathing Pattern Ineffective (Adult)  Goal: Effective Oxygenation/Ventilation  Outcome: Ongoing (interventions implemented as appropriate)      Problem: Skin Injury Risk (Adult)  Goal: Skin Health and Integrity  Outcome: Ongoing (interventions implemented as appropriate)      Problem: Diabetes, Type 2 (Adult)  Goal: Signs and Symptoms of Listed Potential Problems Will be Absent, Minimized or Managed (Diabetes, Type 2)  Outcome: Ongoing (interventions implemented as appropriate)

## 2018-07-03 NOTE — PROGRESS NOTES
.                                                                                                                                                Patient Care Team:  Luis Antonio Abarca MD as PCP - General (Family Medicine)    Subjective:  Feeling OK. No new complaint.    Vital Signs   Temp:  [97.5 °F (36.4 °C)-99 °F (37.2 °C)] 98 °F (36.7 °C)  Heart Rate:  [79-85] 79  Resp:  [16] 16  BP: (113-152)/(56-92) 149/79      Physical Exam:     General Appearance:    Somewhat sleepy, in no acute distress   Head:    Normocephalic, without obvious abnormality, atraumatic   Eyes:            Lids and lashes normal, conjunctivae and sclerae normal, no   icterus, no pallor, corneas clear, PERRLA   Ears:    Ears appear intact with no abnormalities noted   Throat:   No oral lesions, no thrush, oral mucosa moist   Neck:   No adenopathy, supple, trachea midline, no thyromegaly, no   carotid bruit, no JVD   Back:     No kyphosis present, no scoliosis present, no skin lesions,      erythema or scars, no tenderness to percussion or                   palpation,   range of motion normal   Lungs:     Clear to auscultation,respirations regular, even and                  unlabored    Heart:    Regular rhythm and normal rate, normal S1 and S2, no            murmur, no gallop, no rub, no click   Abdomen:     Normal bowel sounds, no masses, no organomegaly, soft        non-tender, non-distended, no guarding, no rebound                tenderness   Rectal:     Deferred   Extremities:   Moves all extremities well, no edema, no cyanosis, no             redness    RUE: viable with good blanching and capillary refills; R wrist: mild diffuse swelling; mild drainage; mild limitation of ROM.       Results Review:   I reviewed the patient's new clinical results.Results for DARÍO GONZALEZ (MRN [id number]) as of 7/2/2018 23:59   Ref. Range 7/1/2018 05:21 7/2/2018 04:25   WBC Latest Ref Range: 4.50 - 10.70 10*3/mm3 15.94 (H) 14.53 (H)   RBC Latest Ref  Range: 4.60 - 6.00 10*6/mm3 3.61 (L) 3.58 (L)   Hemoglobin Latest Ref Range: 13.7 - 17.6 g/dL 11.1 (L) 10.8 (L)   Hematocrit Latest Ref Range: 40.4 - 52.2 % 35.4 (L) 35.4 (L)       6/25/18: R wrist arthrocentesis aspirate culture: Scant growth (1+) Streptococcus dysgalactiae ssp equisimilis    6/26/18: R wrist arthrotomy fluid culture:     Scant growth (1+) Streptococcus, Beta Hemolytic, Group G        6/25, 6/26/18: joint fluid: no crystal    6/30/18: R wrist wound swab culture: (-) but preliminary.     Active Problems:    End stage renal disease    Fever    Sepsis    Nausea and vomiting    Atrial fibrillation    Long term current use of anticoagulant    Acute pain of right wrist    DNR (do not resuscitate)    Bacteremia due to Streptococcus    Streptococcal arthritis of right wrist    Opioid dependence    Chronic pain syndrome     Impression:  Right wrist septic arthritis s/p Right wrist: arthrotomy of radiocarpal and midcarpal joint; incision and drainage; placement of Tobramycin beads     Plans:  Continue observation. No evidence for persistent infection over R wrist. Continue IV abx.    Wilian Arredondo MD  07/02/18  11:56 PM  Office phone: [phone]

## 2018-07-03 NOTE — PLAN OF CARE
Problem: Patient Care Overview  Goal: Plan of Care Review  Outcome: Ongoing (interventions implemented as appropriate)   07/03/18 1040   Coping/Psychosocial   Plan of Care Reviewed With patient   Plan of Care Review   Progress improving   OTHER   Outcome Summary Pt limited amb due to fatigue and weakness in B LE pt was just given meds that cause him to be al little out of it

## 2018-07-04 LAB
ANION GAP SERPL CALCULATED.3IONS-SCNC: 14.6 MMOL/L
BUN BLD-MCNC: 50 MG/DL (ref 6–20)
BUN/CREAT SERPL: 8.4 (ref 7–25)
CALCIUM SPEC-SCNC: 8.5 MG/DL (ref 8.6–10.5)
CHLORIDE SERPL-SCNC: 90 MMOL/L (ref 98–107)
CO2 SERPL-SCNC: 27.4 MMOL/L (ref 22–29)
CREAT BLD-MCNC: 5.92 MG/DL (ref 0.76–1.27)
DEPRECATED RDW RBC AUTO: 53.5 FL (ref 37–54)
EOSINOPHIL # BLD MANUAL: 0.18 10*3/MM3 (ref 0–0.7)
EOSINOPHIL NFR BLD MANUAL: 1 % (ref 0.3–6.2)
ERYTHROCYTE [DISTWIDTH] IN BLOOD BY AUTOMATED COUNT: 14.8 % (ref 11.5–14.5)
GFR SERPL CREATININE-BSD FRML MDRD: 10 ML/MIN/1.73
GLUCOSE BLD-MCNC: 107 MG/DL (ref 65–99)
GLUCOSE BLDC GLUCOMTR-MCNC: 126 MG/DL (ref 70–130)
GLUCOSE BLDC GLUCOMTR-MCNC: 135 MG/DL (ref 70–130)
GLUCOSE BLDC GLUCOMTR-MCNC: 146 MG/DL (ref 70–130)
GLUCOSE BLDC GLUCOMTR-MCNC: 99 MG/DL (ref 70–130)
HCT VFR BLD AUTO: 34.5 % (ref 40.4–52.2)
HGB BLD-MCNC: 10.6 G/DL (ref 13.7–17.6)
INR PPP: 1.65 (ref 0.9–1.1)
LYMPHOCYTES # BLD MANUAL: 1.84 10*3/MM3 (ref 0.9–4.8)
LYMPHOCYTES NFR BLD MANUAL: 10 % (ref 19.6–45.3)
LYMPHOCYTES NFR BLD MANUAL: 7 % (ref 5–12)
MCH RBC QN AUTO: 30.3 PG (ref 27–32.7)
MCHC RBC AUTO-ENTMCNC: 30.7 G/DL (ref 32.6–36.4)
MCV RBC AUTO: 98.6 FL (ref 79.8–96.2)
MONOCYTES # BLD AUTO: 1.29 10*3/MM3 (ref 0.2–1.2)
NEUTROPHILS # BLD AUTO: 14.88 10*3/MM3 (ref 1.9–8.1)
NEUTROPHILS NFR BLD MANUAL: 81 % (ref 42.7–76)
PLAT MORPH BLD: NORMAL
PLATELET # BLD AUTO: 262 10*3/MM3 (ref 140–500)
PMV BLD AUTO: 9.7 FL (ref 6–12)
POTASSIUM BLD-SCNC: 3.9 MMOL/L (ref 3.5–5.2)
PROTHROMBIN TIME: 19.3 SECONDS (ref 11.7–14.2)
RBC # BLD AUTO: 3.5 10*6/MM3 (ref 4.6–6)
RBC MORPH BLD: NORMAL
SCAN SLIDE: NORMAL
SODIUM BLD-SCNC: 132 MMOL/L (ref 136–145)
VARIANT LYMPHS NFR BLD MANUAL: 1 % (ref 0–5)
WBC MORPH BLD: NORMAL
WBC NRBC COR # BLD: 18.37 10*3/MM3 (ref 4.5–10.7)

## 2018-07-04 PROCEDURE — 87070 CULTURE OTHR SPECIMN AEROBIC: CPT | Performed by: SURGERY

## 2018-07-04 PROCEDURE — 85007 BL SMEAR W/DIFF WBC COUNT: CPT | Performed by: INTERNAL MEDICINE

## 2018-07-04 PROCEDURE — 85610 PROTHROMBIN TIME: CPT | Performed by: INTERNAL MEDICINE

## 2018-07-04 PROCEDURE — 82962 GLUCOSE BLOOD TEST: CPT

## 2018-07-04 PROCEDURE — 25010000003 CEFAZOLIN 1-4 GM/50ML-% SOLUTION: Performed by: INTERNAL MEDICINE

## 2018-07-04 PROCEDURE — 85025 COMPLETE CBC W/AUTO DIFF WBC: CPT | Performed by: INTERNAL MEDICINE

## 2018-07-04 PROCEDURE — 80048 BASIC METABOLIC PNL TOTAL CA: CPT | Performed by: INTERNAL MEDICINE

## 2018-07-04 PROCEDURE — 87205 SMEAR GRAM STAIN: CPT | Performed by: SURGERY

## 2018-07-04 PROCEDURE — 25010000002 HEPARIN (PORCINE) PER 1000 UNITS: Performed by: INTERNAL MEDICINE

## 2018-07-04 PROCEDURE — 99232 SBSQ HOSP IP/OBS MODERATE 35: CPT | Performed by: INTERNAL MEDICINE

## 2018-07-04 RX ORDER — LACTULOSE 10 G/15ML
10 SOLUTION ORAL DAILY
Status: DISCONTINUED | OUTPATIENT
Start: 2018-07-04 | End: 2018-07-06 | Stop reason: HOSPADM

## 2018-07-04 RX ORDER — CEFAZOLIN SODIUM 1 G/50ML
1 INJECTION, SOLUTION INTRAVENOUS EVERY 24 HOURS
Status: DISCONTINUED | OUTPATIENT
Start: 2018-07-04 | End: 2018-07-06 | Stop reason: HOSPADM

## 2018-07-04 RX ADMIN — LACTULOSE 10 G: 10 SOLUTION ORAL at 14:27

## 2018-07-04 RX ADMIN — OXYCODONE HYDROCHLORIDE AND ACETAMINOPHEN 1 TABLET: 5; 325 TABLET ORAL at 06:27

## 2018-07-04 RX ADMIN — DOCUSATE SODIUM -SENNOSIDES 2 TABLET: 50; 8.6 TABLET, COATED ORAL at 21:46

## 2018-07-04 RX ADMIN — POLYETHYLENE GLYCOL 3350 17 G: 17 POWDER, FOR SOLUTION ORAL at 08:58

## 2018-07-04 RX ADMIN — BISACODYL 5 MG: 5 TABLET, COATED ORAL at 12:08

## 2018-07-04 RX ADMIN — LEVETIRACETAM 1000 MG: 500 TABLET, FILM COATED ORAL at 21:46

## 2018-07-04 RX ADMIN — OXYCODONE HYDROCHLORIDE AND ACETAMINOPHEN 1 TABLET: 5; 325 TABLET ORAL at 21:47

## 2018-07-04 RX ADMIN — HEPARIN SODIUM 5000 UNITS: 5000 INJECTION INTRAVENOUS; SUBCUTANEOUS at 14:27

## 2018-07-04 RX ADMIN — HEPARIN SODIUM 5000 UNITS: 5000 INJECTION INTRAVENOUS; SUBCUTANEOUS at 21:47

## 2018-07-04 RX ADMIN — LEVETIRACETAM 1000 MG: 500 TABLET, FILM COATED ORAL at 08:58

## 2018-07-04 RX ADMIN — LORAZEPAM 1 MG: 1 TABLET ORAL at 10:59

## 2018-07-04 RX ADMIN — ASPIRIN 81 MG: 81 TABLET, DELAYED RELEASE ORAL at 08:58

## 2018-07-04 RX ADMIN — CEFAZOLIN SODIUM 1 G: 1 INJECTION, SOLUTION INTRAVENOUS at 12:08

## 2018-07-04 RX ADMIN — LEVOTHYROXINE SODIUM 175 MCG: 175 TABLET ORAL at 06:27

## 2018-07-04 RX ADMIN — MORPHINE SULFATE 100 MG: 100 TABLET, EXTENDED RELEASE ORAL at 08:58

## 2018-07-04 RX ADMIN — HEPARIN SODIUM 5000 UNITS: 5000 INJECTION INTRAVENOUS; SUBCUTANEOUS at 06:27

## 2018-07-04 RX ADMIN — DOCUSATE SODIUM -SENNOSIDES 2 TABLET: 50; 8.6 TABLET, COATED ORAL at 08:58

## 2018-07-04 RX ADMIN — OXYCODONE HYDROCHLORIDE AND ACETAMINOPHEN 1 TABLET: 5; 325 TABLET ORAL at 12:12

## 2018-07-04 RX ADMIN — DILTIAZEM HYDROCHLORIDE 120 MG: 120 CAPSULE, COATED, EXTENDED RELEASE ORAL at 08:58

## 2018-07-04 NOTE — PROGRESS NOTES
Name: Armando Gill ADMIT: 2018   : 1960  PCP: Luis Antonio Abarca MD    MRN: [id number] LOS: 11 days   AGE/SEX: 57 y.o. male  ROOM: 420/1   Subjective   CC: right wrist pain  Not confused today, much better spirits today  Seen in HD   Pain is well controlled and denies other complaints     Objective   Vital Signs  Temp:  [97.2 °F (36.2 °C)-98 °F (36.7 °C)] 98 °F (36.7 °C)  Heart Rate:  [74-94] 82  Resp:  [18-20] 18  BP: (113-148)/(66-98) 129/66  SpO2:  [94 %-100 %] 100 %  on  Flow (L/min):  [2] 2;   Device (Oxygen Therapy): nasal cannula  Body mass index is 47.53 kg/m².    Physical Exam   Constitutional: No distress.   HENT:   Head: Normocephalic and atraumatic.   Mouth/Throat: Oropharynx is clear and moist.   Eyes: Conjunctivae and EOM are normal. Pupils are equal, round, and reactive to light.   Neck: Normal range of motion. Neck supple.   Cardiovascular: Normal rate, regular rhythm and intact distal pulses.    Pulmonary/Chest: Effort normal and breath sounds normal.   Abdominal: Soft. Bowel sounds are normal. There is no tenderness.   Musculoskeletal: He exhibits edema (2-3+ BLE edema (Chronic)). Tenderness: right wrist.   Right wrist dressed   Neurological: He is alert. He is disoriented.       Skin: Skin is warm and dry. He is not diaphoretic.   Chronic venous stasis BLE     Psychiatric: He has a normal mood and affect. His speech is normal and behavior is normal. His mood appears not anxious.   Nursing note and vitals reviewed.      Results Review:       I reviewed the patient's new clinical results.    Results from last 7 days  Lab Units 18  0534 18  0524 18  0425 18  0521   WBC 10*3/mm3 18.37* 14.48* 14.53* 15.94*   HEMOGLOBIN g/dL 10.6* 10.4* 10.8* 11.1*   PLATELETS 10*3/mm3 262 271 279 295       Results from last 7 days  Lab Units 18  0534 18  0524 18  0425 18  0521   SODIUM mmol/L 132* 135* 135* 129*   POTASSIUM mmol/L 3.9 4.2 4.6 3.9    CHLORIDE mmol/L 90* 92* 89* 85*   CO2 mmol/L 27.4 24.6 21.6* 22.0   BUN mg/dL 50* 48* 76* 66*   CREATININE mg/dL 5.92* 5.79* 8.18* 6.38*   GLUCOSE mg/dL 107* 89 105* 120*   Estimated Creatinine Clearance: 19.3 mL/min (A) (by C-G formula based on SCr of 5.92 mg/dL (H)).    Results from last 7 days  Lab Units 07/04/18  0534 07/03/18  0524 07/02/18  0425 07/01/18  0521  06/28/18  0414   CALCIUM mg/dL 8.5* 7.7* 8.8 8.7  < > 8.4*   ALBUMIN g/dL  --   --   --   --   --  3.50   < > = values in this interval not displayed.      aspirin 81 mg Oral Daily   ceftriaxone 2 g Intravenous Q24H   diltiaZEM  mg Oral Q24H   heparin (porcine) 5,000 Units Subcutaneous Q8H   insulin aspart 0-7 Units Subcutaneous 4x Daily With Meals & Nightly   levETIRAcetam 1,000 mg Oral Q12H   levothyroxine 175 mcg Oral Daily   Morphine 100 mg Oral Daily   polyethylene glycol 17 g Oral Daily   sennosides-docusate sodium 2 tablet Oral BID   Vortioxetine HBr 20 mg Oral Nightly       lactated ringers 9 mL/hr   Diet Regular; Thin; Consistent Carbohydrate, Renal      Assessment/Plan      Active Hospital Problems    Diagnosis Date Noted   • Hyponatremia [E87.1] 07/01/2018   • Opioid dependence (CMS/[facility]) [F11.20] 06/28/2018   • Chronic pain syndrome [G89.4] 06/28/2018   • Bacteremia due to Streptococcus [R78.81] 06/25/2018   • Streptococcal arthritis of right wrist (CMS/[facility]) [M00.231] 06/25/2018   • Fever [R50.9] 06/23/2018   • Sepsis (CMS/[facility]) [A41.9] 06/23/2018   • Nausea and vomiting [R11.2] 06/23/2018   • Atrial fibrillation (CMS/[facility]) [I48.91] 06/23/2018   • Long term current use of anticoagulant [Z79.01] 06/23/2018   • Acute pain of right wrist [M25.531] 06/23/2018   • DNR (do not resuscitate) [Z66] 06/23/2018   • End stage renal disease (CMS/[facility]) [N18.6] 05/04/2016      Resolved Hospital Problems    Diagnosis Date Noted Date Resolved   No resolved problems to display.     Lethargy and confusion  -resolved    Severe anxiety  -much better  today  -Increased his Ativan to 1 mg every 8 hours as needed   -Consulted access Center, and needs psychiatry to see him  -Suspect this is related to his acute illness and being in the hospital  -He has agreed to rehab    Sepsis  - due to bacteremia-Blood Cx's from [facility] growing strep  - on ceftriaxone per ID, change to cefazolin on dialysis days on discharge  - repeat blood cx's NGTD  - appreciate ID input    Right Wrist Septic Arthritis  - s/p arthrocentesis 6/25 with 124,000 WBC, crystal exam negative and GPCs on gm stain suggesting septic arthritis  - s/p washout with implantation of tobramycin beads 6/26/18  - also has left shoulder and left ankle pain which are improving  -no plans for further surgeries per hand ortho at this point but if culture from 6/30 grows then he may need a washout.  D/W Dr. Arredondo  -At discharge transition to cefazolin 2g IV after dialysis on Monday and Wednesday and 3g on Fir day x 6 week course of IV abx as above. Abx stop date 8/7      ESRD  - MWF  - HTN meds adjusted per nephrology  - appreciate nephrology recs    Hyponatremia  -improved  -will improve with HD    Type 2 DM  - BG acceptable  - continue low dose ssi  - A1C 5.1 last month by patient report    Afib:  -holding warfarin incase more procedures planned for right hand    Chronic Pain/Opioid Dependence  - on large doses of MS Contin chronically-will keep these the same for now given acute surgical issues but may consider dose reduction over long term    DVT Prophylaxis  SubQ heparin, on AC with coumadin but this is held/reversed for surgery.    Thanks to consultants.    DISPO:He agrees to rehab, medically stable to discharge to rehab at anytime     Jacob Price MD  [facility]  07/04/18  9:01 AM

## 2018-07-04 NOTE — PLAN OF CARE
Problem: Patient Care Overview  Goal: Plan of Care Review  Outcome: Ongoing (interventions implemented as appropriate)   07/04/18 9649   Coping/Psychosocial   Plan of Care Reviewed With patient   Plan of Care Review   Progress improving   OTHER   Outcome Summary vss, no c/o pain. wound care provided. possible discharge tomorrow depending on placement. resting well throughout shift. will continue to monitor

## 2018-07-04 NOTE — PROGRESS NOTES
"   LOS: 11 days    Patient Care Team:  Luis Antonio Abarca MD as PCP - General (Family Medicine)    Chief Complaint:  No chief complaint on file.    Follow up ESRD  Subjective     Interval History:   Sitting in chair.  Looks better.  Right wrist  culture by Dr. Arredondo.     Review of Systems:   Constipated.  Not soa. Edema better.  Joint pains better.     Objective     Vital Signs  Temp:  [97.7 °F (36.5 °C)-98 °F (36.7 °C)] 98 °F (36.7 °C)  Heart Rate:  [74-82] 82  Resp:  [18-20] 20  BP: (113-148)/(66-88) 129/66    Flowsheet Rows      First Filed Value   Admission Height  172.7 cm (68\") Documented at 06/23/2018 1100   Admission Weight   150 kg (330 lb 11 oz) Documented at 06/23/2018 1100          No intake/output data recorded.  I/O last 3 completed shifts:  In: 1060 [P.O.:1010; IV Piggyback:50]  Out: 300 [Urine:300]    Intake/Output Summary (Last 24 hours) at 07/04/18 1555  Last data filed at 07/03/18 1700   Gross per 24 hour   Intake              100 ml   Output                0 ml   Net              100 ml       Physical Exam:  Awake, alert.  Sitting in chair.   Oral mucosa moist.  No jvd.  Heart irreg, irreg.  Lungs clear to auscultation.  Abd obese, soft, nontender.  Ext right wrist dressing.  Edema lower ext  better.  Left hand less edema.     LLE dressing in place.       Results Review:      Results from last 7 days  Lab Units 07/04/18  0534 07/03/18  0524 07/02/18  0425  06/28/18  0414   SODIUM mmol/L 132* 135* 135*  < > 130*   POTASSIUM mmol/L 3.9 4.2 4.6  < > 3.9   CHLORIDE mmol/L 90* 92* 89*  < > 87*   CO2 mmol/L 27.4 24.6 21.6*  < > 24.3   BUN mg/dL 50* 48* 76*  < > 46*   CREATININE mg/dL 5.92* 5.79* 8.18*  < > 4.87*   CALCIUM mg/dL 8.5* 7.7* 8.8  < > 8.4*   BILIRUBIN mg/dL  --   --   --   --  0.4   ALK PHOS U/L  --   --   --   --  70   ALT (SGPT) U/L  --   --   --   --  50*   AST (SGOT) U/L  --   --   --   --  15   GLUCOSE mg/dL 107* 89 105*  < > 106*   < > = values in this interval not " displayed.    Estimated Creatinine Clearance: 19.3 mL/min (A) (by C-G formula based on SCr of 5.92 mg/dL (H)).                  Results from last 7 days  Lab Units 07/04/18  0534 07/03/18  0524 07/02/18  0425 07/01/18  0521 06/29/18  0355   WBC 10*3/mm3 18.37* 14.48* 14.53* 15.94* 16.44*   HEMOGLOBIN g/dL 10.6* 10.4* 10.8* 11.1* 10.6*   PLATELETS 10*3/mm3 262 271 279 295 241         Results from last 7 days  Lab Units 07/04/18  0534 07/03/18  0524 07/02/18  0425 07/01/18  0521 06/30/18  0545   INR  1.65* 2.09* 1.76* 1.55* 1.36*         Imaging Results (last 24 hours)     ** No results found for the last 24 hours. **          aspirin 81 mg Oral Daily   ceFAZolin 1 g Intravenous Q24H   diltiaZEM  mg Oral Q24H   heparin (porcine) 5,000 Units Subcutaneous Q8H   insulin aspart 0-7 Units Subcutaneous 4x Daily With Meals & Nightly   lactulose 10 g Oral Daily   levETIRAcetam 1,000 mg Oral Q12H   levothyroxine 175 mcg Oral Daily   Morphine 100 mg Oral Daily   sennosides-docusate sodium 2 tablet Oral BID   Vortioxetine HBr 20 mg Oral Nightly       lactated ringers 9 mL/hr       Medication Review:   Current Facility-Administered Medications   Medication Dose Route Frequency Provider Last Rate Last Dose   • acetaminophen (TYLENOL) tablet 650 mg  650 mg Oral Q4H PRN Mark Helms MD       • albumin human 25 % IV SOLN 12.5 g  12.5 g Intravenous Q1H PRN Angle Nicholson MD       • aspirin EC tablet 81 mg  81 mg Oral Daily Mark Helms MD   81 mg at 07/04/18 0858   • bisacodyl (DULCOLAX) EC tablet 5 mg  5 mg Oral Daily PRN Mark Helms MD   5 mg at 07/04/18 1208   • ceFAZolin (ANCEF) IVPB 1 g  1 g Intravenous Q24H Amarilys Barry MD   1 g at 07/04/18 1208   • dextrose (D50W) solution 25 g  25 g Intravenous Q15 Min PRN Mark Helms MD       • dextrose (GLUTOSE) oral gel 15 g  15 g Oral Q15 Min PRN Mark Helms MD       • diltiaZEM CD (CARDIZEM CD) 24 hr capsule 120 mg  120 mg Oral Q24H Angle Devi  MD Bharat   120 mg at 07/04/18 0858   • famotidine (PEPCID) tablet 40 mg  40 mg Oral BID PRN Mark Helms MD       • glucagon (human recombinant) (GLUCAGEN DIAGNOSTIC) injection 1 mg  1 mg Subcutaneous PRN Mark Helms MD       • heparin (porcine) 5000 UNIT/ML injection 5,000 Units  5,000 Units Subcutaneous Q8H Mark Helms MD   5,000 Units at 07/04/18 1427   • hydrALAZINE (APRESOLINE) tablet 25 mg  25 mg Oral Q8H PRN Mark Helms MD       • insulin aspart (novoLOG) injection 0-7 Units  0-7 Units Subcutaneous 4x Daily With Meals & Nightly Mark Helms MD   2 Units at 07/03/18 1710   • lactated ringers infusion  9 mL/hr Intravenous Continuous PRN Beto Schrader MD       • lactulose (CHRONULAC) 10 GM/15ML solution 10 g  10 g Oral Daily Angle Nicholson MD   10 g at 07/04/18 1427   • levETIRAcetam (KEPPRA) tablet 1,000 mg  1,000 mg Oral Q12H Mark Helms MD   1,000 mg at 07/04/18 0858   • levothyroxine (SYNTHROID, LEVOTHROID) tablet 175 mcg  175 mcg Oral Daily Mark Helms MD   175 mcg at 07/04/18 0627   • LORazepam (ATIVAN) tablet 1 mg  1 mg Oral Q8H PRN Jacob Price MD   1 mg at 07/04/18 1059   • Morphine (MS CONTIN) 12 hr tablet 100 mg  100 mg Oral Daily Mark Helms MD   100 mg at 07/04/18 0858   • Naloxone HCl (NARCAN) injection 0.4 mg  0.4 mg Intravenous Q5 Min PRN Angle Nicholson MD       • nitroglycerin (NITROSTAT) SL tablet 0.4 mg  0.4 mg Sublingual Q5 Min PRN Mark Helms MD       • ondansetron (ZOFRAN) tablet 4 mg  4 mg Oral Q6H PRN Mark Helms MD        Or   • ondansetron ODT (ZOFRAN-ODT) disintegrating tablet 4 mg  4 mg Oral Q6H PRN Mark Helms MD        Or   • ondansetron (ZOFRAN) injection 4 mg  4 mg Intravenous Q6H PRN Mark Helms MD       • oxyCODONE-acetaminophen (PERCOCET) 5-325 MG per tablet 1 tablet  1 tablet Oral Q6H PRN Jacob Price MD   1 tablet at 07/04/18 1212   • sennosides-docusate sodium (SENOKOT-S)  8.6-50 MG tablet 2 tablet  2 tablet Oral BID Mark Helms MD   2 tablet at 07/04/18 0858   • sodium chloride 0.9 % flush 1-10 mL  1-10 mL Intravenous PRN Mark Helms MD       • Vortioxetine HBr tablet 20 mg  20 mg Oral Nightly Mark Helms MD   20 mg at 06/30/18 2027       Assessment/Plan   1.  ESRD: usual dialysis schedule MWF.  Dialysis Monday and UF yesterday.  He does not want to dialyze tomorrow, but I explained that he will likely need HD prior to Friday since he required extra UF.  He may refuse dialysis tomorrow.  I reiterated importance .   2.  Sepsis syndrome; Strep bacteremia and right wrist infection.  SP washout right wrist, AB beads.   Joint pain , left shoulder, left ankle improved. WBC up some today.  Changed to cefazolin today.   3. Chronic pain with acute exacerbation with right wrist, left shoulder, left ankle pain.   4.  Obesity   5.  DM2 very well controlled on diet.   6.  Elevated LFT's. Recheck.   7.  pAfib on AC. Received. Vit K pre-op.   8.  HTN on lower dose cardizem.  Controlled.    9.  Anemia CKD.    10. Hypothyroid on replacement.        Angle Nicholson MD  07/04/18  3:55 PM

## 2018-07-04 NOTE — PROGRESS NOTES
LOS: 11 days     Chief Complaint:  Sepsis    Interval History:  Afebrile, states he feels a lot better.  Continues to report improvement in his right wrist.  States shoulder and left ankle also continued to improve.  Denies any abdominal pain nausea vomiting or diarrhea.  Reports constipation.  No shortness of breath or cough.  Tolerating antibiotics without a rash.      Vital Signs  Temp:  [97.2 °F (36.2 °C)-98 °F (36.7 °C)] 98 °F (36.7 °C)  Heart Rate:  [74-94] 82  Resp:  [18-20] 20  BP: (113-148)/(66-98) 129/66    Physical Exam:  General: In no acute distress  Cardiovascular: RRR, + LE edema   Respiratory: Breathing comfortably on RA  GI: obese, Soft, NT/ND, + bowel sounds bilaterally  Skin: No rashes   Extremities: right wrist wrapped in dressing, L ankle with erythema more consistent with capillary leak and bruising been cellulitis, looks like swelling is decreased somewhat.  + lymphedema     Antibiotics:  Ceftriaxone 2 g IV every 24 hours     Results Review:      Lab Results   Component Value Date    WBC 18.37 (H) 07/04/2018    HGB 10.6 (L) 07/04/2018    HCT 34.5 (L) 07/04/2018    MCV 98.6 (H) 07/04/2018     07/04/2018     Lab Results   Component Value Date    GLUCOSE 107 (H) 07/04/2018    BUN 50 (H) 07/04/2018    CREATININE 5.92 (H) 07/04/2018    EGFRIFNONA 10 (L) 07/04/2018    BCR 8.4 07/04/2018    CO2 27.4 07/04/2018    CALCIUM 8.5 (L) 07/04/2018    ALBUMIN 3.50 06/28/2018    LABIL2 0.9 06/28/2018    AST 15 06/28/2018    ALT 50 (H) 06/28/2018     Arthrocentesis of the right wrist 124,400 WBC (65%)    Microbiology:  6/26 R wrist OR cx Streptococcus dysgalactiae   6/25 R wirst cx Streptococcus dysgalactiae   6/23 BCx Neg x 2    6/23 OSH BCx 2/2 Strep    Assessment/Plan   1.  Sepsis with streptococcal bacteremia from outside blood cultures   - will transition to cefazolin 1g IV q24hrs in anticipation of discharge. At discharge  switch to cefazolin 2 g postdialysis on Monday and Wednesday 3 g on  Friday.- Repeat Blood cultures are negative to date  - TTE negative for endocarditis      2.  Right wrist septic arthritis s/p I&D on 6/26  - surgery following  - f/u OR cx  - At discharge transition to cefazolin 2g IV after dialysis on Monday and Wednesday and 3g on Fir day x 6 week course of IV abx as above. Abx stop date 8/7  - will arrange for ID follow up on 8/6  - Monitor weekly CBC with differential and faxed to the infectious disease clinic at [phone]     3.  Leukocytosis - increased  - No evidence of persistent or new infection  - If continues to increase will consider additional workup  - Continue to monitor     4.  End-stage renal disease on hemodialysis complicating above  - Antibiotics dosed appropriately     5.  Type 2 diabetes complicating above      ID will follow PRN. Please call with questions or concerns.

## 2018-07-04 NOTE — PROGRESS NOTES
.                                                                                                                                                Patient Care Team:  Luis Antonio Abarca MD as PCP - General (Family Medicine)    Subjecti      Vital Signs   Temp:  [97.2 °F (36.2 °C)-98.2 °F (36.8 °C)] 98 °F (36.7 °C)  Heart Rate:  [74-94] 81  Resp:  [16-20] 18  BP: (113-148)/(66-98) 114/77      Physical Exam:     General Appearance:    Alert, cooperative, in no acute distress   Head:    Normocephalic, without obvious abnormality, atraumatic   Eyes:            Lids and lashes normal, conjunctivae and sclerae normal, no   icterus, no pallor, corneas clear, PERRLA   Ears:    Ears appear intact with no abnormalities noted   Throat:   No oral lesions, no thrush, oral mucosa moist   Neck:   No adenopathy, supple, trachea midline, no thyromegaly, no   carotid bruit, no JVD   Back:     No kyphosis present, no scoliosis present, no skin lesions,      erythema or scars, no tenderness to percussion or                   palpation,   range of motion normal   Lungs:     Clear to auscultation,respirations regular, even and                  unlabored    Heart:    Regular rhythm and normal rate, normal S1 and S2, no            murmur, no gallop, no rub, no click   Abdomen:     Normal bowel sounds, no masses, no organomegaly, soft        non-tender, non-distended, no guarding, no rebound                tenderness   Rectal:     Deferred   Extremities:   Moves all extremities well, no edema, no cyanosis, no             redness    RUE: viable with good blanching and capillary refills;  R wrist: mild diffuse swelling; minimal redness; wound is clean with mild clear fluid on edge of wound; no sign of ROM; moderate limited wrist ROM but not pain with motion.       Results Review:   I reviewed the patient's new clinical results.  No new lab today yet.  6/25/18: R wrist arthrocentesis aspirate culture: Scant growth (1+) Streptococcus dysgalactiae  ssp equisimilis    6/26/18: R wrist arthrotomy fluid culture:     Scant growth (1+) Streptococcus, Beta Hemolytic, Group G        6/25, 6/26/18: joint fluid: no crystal     6/30/18: R wrist wound swab culture: (-)        Active Problems:    End stage renal disease    Fever    Sepsis    Nausea and vomiting    Atrial fibrillation    Long term current use of anticoagulant    Acute pain of right wrist    DNR (do not resuscitate)    Bacteremia due to Streptococcus    Streptococcal arthritis of right wrist    Opioid dependence    Chronic pain syndrome     Impression:  Right wrist septic arthritis s/p Right wrist: arthrotomy of radiocarpal and midcarpal joint; incision and drainage; placement of Tobramycin beads     Plans:  1. R wrist infection improved a lot. There is only mild diffuse swelling with minimal redness; better ROM. However, I took another round of culture just to reconfirm there is no active infection. Continue IV abx.    Wilian Arredondo MD  07/04/18  7:16 AM  Office phone: [phone]

## 2018-07-04 NOTE — PROGRESS NOTES
Met w/ patient and reviewed chart.  He plans on going to SNU.  No SI or HI.  Wants to proceed w/ dialysis.  Pleasant.  Once discharged from SNU he may benefit from a  Mental Health RN following him briefly.  His ability to get to a mental health physician's office may be difficult.  Will probably sign off in next day or so.

## 2018-07-04 NOTE — PLAN OF CARE
Problem: Patient Care Overview  Goal: Plan of Care Review  Outcome: Ongoing (interventions implemented as appropriate)   07/04/18 8636   Coping/Psychosocial   Plan of Care Reviewed With patient   Plan of Care Review   Progress improving   OTHER   Outcome Summary VSS today, PRN pain and anxiety medication. Wound care as ordered. Abx changed per ID. up to chair w/meals and PRN. To be discharged soon to rehab. will closely monitor.       Problem: Pain, Acute (Adult)  Goal: Acceptable Pain Control/Comfort Level  Outcome: Ongoing (interventions implemented as appropriate)      Problem: Breathing Pattern Ineffective (Adult)  Goal: Effective Oxygenation/Ventilation  Outcome: Ongoing (interventions implemented as appropriate)    Goal: Anxiety/Fear Reduction  Outcome: Ongoing (interventions implemented as appropriate)      Problem: Fall Risk (Adult)  Goal: Absence of Fall  Outcome: Ongoing (interventions implemented as appropriate)      Problem: Skin Injury Risk (Adult)  Goal: Skin Health and Integrity  Outcome: Ongoing (interventions implemented as appropriate)      Problem: Diabetes, Type 2 (Adult)  Goal: Signs and Symptoms of Listed Potential Problems Will be Absent, Minimized or Managed (Diabetes, Type 2)  Outcome: Ongoing (interventions implemented as appropriate)

## 2018-07-05 LAB
ALBUMIN SERPL-MCNC: 3.6 G/DL (ref 3.5–5.2)
ALBUMIN/GLOB SERPL: 0.9 G/DL
ALP SERPL-CCNC: 66 U/L (ref 39–117)
ALT SERPL W P-5'-P-CCNC: 11 U/L (ref 1–41)
ANION GAP SERPL CALCULATED.3IONS-SCNC: 22.1 MMOL/L
AST SERPL-CCNC: 8 U/L (ref 1–40)
BASOPHILS # BLD AUTO: 0.04 10*3/MM3 (ref 0–0.2)
BASOPHILS NFR BLD AUTO: 0.2 % (ref 0–1.5)
BILIRUB SERPL-MCNC: 0.3 MG/DL (ref 0.1–1.2)
BUN BLD-MCNC: 68 MG/DL (ref 6–20)
BUN/CREAT SERPL: 9 (ref 7–25)
CALCIUM SPEC-SCNC: 9 MG/DL (ref 8.6–10.5)
CHLORIDE SERPL-SCNC: 89 MMOL/L (ref 98–107)
CO2 SERPL-SCNC: 22.9 MMOL/L (ref 22–29)
CREAT BLD-MCNC: 7.58 MG/DL (ref 0.76–1.27)
DEPRECATED RDW RBC AUTO: 53.5 FL (ref 37–54)
EOSINOPHIL # BLD AUTO: 0.2 10*3/MM3 (ref 0–0.7)
EOSINOPHIL NFR BLD AUTO: 1.1 % (ref 0.3–6.2)
ERYTHROCYTE [DISTWIDTH] IN BLOOD BY AUTOMATED COUNT: 14.8 % (ref 11.5–14.5)
GFR SERPL CREATININE-BSD FRML MDRD: 7 ML/MIN/1.73
GLOBULIN UR ELPH-MCNC: 4.1 GM/DL
GLUCOSE BLD-MCNC: 107 MG/DL (ref 65–99)
GLUCOSE BLDC GLUCOMTR-MCNC: 110 MG/DL (ref 70–130)
GLUCOSE BLDC GLUCOMTR-MCNC: 121 MG/DL (ref 70–130)
GLUCOSE BLDC GLUCOMTR-MCNC: 128 MG/DL (ref 70–130)
GLUCOSE BLDC GLUCOMTR-MCNC: 152 MG/DL (ref 70–130)
HCT VFR BLD AUTO: 34.9 % (ref 40.4–52.2)
HGB BLD-MCNC: 11.1 G/DL (ref 13.7–17.6)
IMM GRANULOCYTES # BLD: 0.36 10*3/MM3 (ref 0–0.03)
IMM GRANULOCYTES NFR BLD: 2 % (ref 0–0.5)
INR PPP: 1.33 (ref 0.9–1.1)
LYMPHOCYTES # BLD AUTO: 2.49 10*3/MM3 (ref 0.9–4.8)
LYMPHOCYTES NFR BLD AUTO: 14 % (ref 19.6–45.3)
MCH RBC QN AUTO: 31.3 PG (ref 27–32.7)
MCHC RBC AUTO-ENTMCNC: 31.8 G/DL (ref 32.6–36.4)
MCV RBC AUTO: 98.3 FL (ref 79.8–96.2)
MONOCYTES # BLD AUTO: 1.17 10*3/MM3 (ref 0.2–1.2)
MONOCYTES NFR BLD AUTO: 6.6 % (ref 5–12)
NEUTROPHILS # BLD AUTO: 13.83 10*3/MM3 (ref 1.9–8.1)
NEUTROPHILS NFR BLD AUTO: 78.1 % (ref 42.7–76)
PHOSPHATE SERPL-MCNC: 7.6 MG/DL (ref 2.5–4.5)
PLAT MORPH BLD: NORMAL
PLATELET # BLD AUTO: 273 10*3/MM3 (ref 140–500)
PMV BLD AUTO: 9.8 FL (ref 6–12)
POTASSIUM BLD-SCNC: 4.5 MMOL/L (ref 3.5–5.2)
PROT SERPL-MCNC: 7.7 G/DL (ref 6–8.5)
PROTHROMBIN TIME: 16.2 SECONDS (ref 11.7–14.2)
RBC # BLD AUTO: 3.55 10*6/MM3 (ref 4.6–6)
RBC MORPH BLD: NORMAL
SODIUM BLD-SCNC: 134 MMOL/L (ref 136–145)
WBC MORPH BLD: NORMAL
WBC NRBC COR # BLD: 17.73 10*3/MM3 (ref 4.5–10.7)

## 2018-07-05 PROCEDURE — 84100 ASSAY OF PHOSPHORUS: CPT | Performed by: INTERNAL MEDICINE

## 2018-07-05 PROCEDURE — 82962 GLUCOSE BLOOD TEST: CPT

## 2018-07-05 PROCEDURE — 97110 THERAPEUTIC EXERCISES: CPT

## 2018-07-05 PROCEDURE — 85007 BL SMEAR W/DIFF WBC COUNT: CPT | Performed by: INTERNAL MEDICINE

## 2018-07-05 PROCEDURE — 63710000001 INSULIN ASPART PER 5 UNITS: Performed by: INTERNAL MEDICINE

## 2018-07-05 PROCEDURE — 85610 PROTHROMBIN TIME: CPT | Performed by: INTERNAL MEDICINE

## 2018-07-05 PROCEDURE — 85025 COMPLETE CBC W/AUTO DIFF WBC: CPT | Performed by: INTERNAL MEDICINE

## 2018-07-05 PROCEDURE — 80053 COMPREHEN METABOLIC PANEL: CPT | Performed by: INTERNAL MEDICINE

## 2018-07-05 PROCEDURE — 25010000002 HEPARIN (PORCINE) PER 1000 UNITS: Performed by: INTERNAL MEDICINE

## 2018-07-05 PROCEDURE — 25010000003 CEFAZOLIN 1-4 GM/50ML-% SOLUTION: Performed by: INTERNAL MEDICINE

## 2018-07-05 RX ORDER — WARFARIN SODIUM 5 MG/1
5 TABLET ORAL
Status: DISCONTINUED | OUTPATIENT
Start: 2018-07-05 | End: 2018-07-06 | Stop reason: HOSPADM

## 2018-07-05 RX ADMIN — INSULIN ASPART 2 UNITS: 100 INJECTION, SOLUTION INTRAVENOUS; SUBCUTANEOUS at 21:55

## 2018-07-05 RX ADMIN — DOCUSATE SODIUM -SENNOSIDES 2 TABLET: 50; 8.6 TABLET, COATED ORAL at 21:55

## 2018-07-05 RX ADMIN — DOCUSATE SODIUM -SENNOSIDES 2 TABLET: 50; 8.6 TABLET, COATED ORAL at 08:14

## 2018-07-05 RX ADMIN — CEFAZOLIN SODIUM 1 G: 1 INJECTION, SOLUTION INTRAVENOUS at 12:51

## 2018-07-05 RX ADMIN — HEPARIN SODIUM 5000 UNITS: 5000 INJECTION INTRAVENOUS; SUBCUTANEOUS at 14:16

## 2018-07-05 RX ADMIN — HEPARIN SODIUM 5000 UNITS: 5000 INJECTION INTRAVENOUS; SUBCUTANEOUS at 21:55

## 2018-07-05 RX ADMIN — OXYCODONE HYDROCHLORIDE AND ACETAMINOPHEN 1 TABLET: 5; 325 TABLET ORAL at 04:34

## 2018-07-05 RX ADMIN — LEVETIRACETAM 1000 MG: 500 TABLET, FILM COATED ORAL at 08:14

## 2018-07-05 RX ADMIN — DILTIAZEM HYDROCHLORIDE 120 MG: 120 CAPSULE, COATED, EXTENDED RELEASE ORAL at 08:14

## 2018-07-05 RX ADMIN — LEVOTHYROXINE SODIUM 175 MCG: 175 TABLET ORAL at 06:21

## 2018-07-05 RX ADMIN — LEVETIRACETAM 1000 MG: 500 TABLET, FILM COATED ORAL at 21:55

## 2018-07-05 RX ADMIN — ASPIRIN 81 MG: 81 TABLET, DELAYED RELEASE ORAL at 08:14

## 2018-07-05 RX ADMIN — LACTULOSE 10 G: 10 SOLUTION ORAL at 08:14

## 2018-07-05 RX ADMIN — MORPHINE SULFATE 100 MG: 100 TABLET, EXTENDED RELEASE ORAL at 08:14

## 2018-07-05 RX ADMIN — WARFARIN SODIUM 5 MG: 5 TABLET ORAL at 17:39

## 2018-07-05 RX ADMIN — HEPARIN SODIUM 5000 UNITS: 5000 INJECTION INTRAVENOUS; SUBCUTANEOUS at 06:21

## 2018-07-05 NOTE — PLAN OF CARE
Problem: Patient Care Overview  Goal: Plan of Care Review  Outcome: Ongoing (interventions implemented as appropriate)   07/05/18 1068   Coping/Psychosocial   Plan of Care Reviewed With patient   OTHER   Outcome Summary Pt continues to make steady progress with PT as he required less assistance for bed mobility. Pt continues to required Leeroy for transfers and gait. He also requires cuing for safe gait and cane placement. PT will continue to follow to address strength, mobility, and gait.

## 2018-07-05 NOTE — PROGRESS NOTES
Continued Stay Note  [facility]     Patient Name: Armando Gill  MRN: [id number]  Today's Date: 7/5/2018    Admit Date: 6/23/2018          Discharge Plan     Row Name 07/05/18 1642       Plan    Plan Precert for [facility] skilled rehab and HD chair is set up    Patient/Family in Agreement with Plan yes    Plan Comments Per Dania/ Skye, patient has precert obtained for skilled rehab for [facility] and a HD chair set up.  Roxie Gill, spouse notfied via HIPPA compliant VM.  ............................Yarelis Campoverde RN              Discharge Codes    No documentation.       Expected Discharge Date and Time     Expected Discharge Date Expected Discharge Time    Jul 2, 2018             Yarelis Campoverde RN

## 2018-07-05 NOTE — PLAN OF CARE
Problem: Patient Care Overview  Goal: Plan of Care Review  Outcome: Ongoing (interventions implemented as appropriate)   07/05/18 4817   Coping/Psychosocial   Plan of Care Reviewed With patient   Plan of Care Review   Progress improving   OTHER   Outcome Summary vss, infrequent c/o pain, prn pain med administered. patient had bm, possible dialysis today. possible discharge. resting well throughout shift, will continue to monitor

## 2018-07-05 NOTE — PROGRESS NOTES
Name: Armando Gill ADMIT: 2018   : 1960  PCP: Luis Antonio Abarca MD    MRN: [id number] LOS: 12 days   AGE/SEX: 57 y.o. male  ROOM: 420/1   Subjective   CC: right wrist pain  Not confused today, continues to be in better spirits    No chest pain or soa  Had 2 BMs today  Pain is well controlled and denies other complaints     Objective   Vital Signs  Temp:  [98 °F (36.7 °C)-98.5 °F (36.9 °C)] 98 °F (36.7 °C)  Heart Rate:  [65-75] 75  Resp:  [18-20] 18  BP: (101-136)/(67-80) 120/67  SpO2:  [95 %-100 %] 100 %  on  Flow (L/min):  [2] 2;   Device (Oxygen Therapy): nasal cannula  Body mass index is 47.53 kg/m².    Physical Exam   Constitutional: He is oriented to person, place, and time. No distress.   HENT:   Head: Normocephalic and atraumatic.   Mouth/Throat: Oropharynx is clear and moist.   Eyes: Conjunctivae and EOM are normal. Pupils are equal, round, and reactive to light.   Neck: Normal range of motion. Neck supple.   Cardiovascular: Normal rate, regular rhythm and intact distal pulses.    Pulmonary/Chest: Effort normal and breath sounds normal.   Abdominal: Soft. Bowel sounds are normal. There is no tenderness.   Musculoskeletal: He exhibits edema (2-3+ BLE edema (Chronic)). Tenderness: right wrist.   Right wrist dressed   Neurological: He is alert and oriented to person, place, and time. GCS eye subscore is 4. GCS verbal subscore is 5. GCS motor subscore is 6.       Skin: Skin is warm and dry. He is not diaphoretic.   Chronic venous stasis BLE     Psychiatric: He has a normal mood and affect. His speech is normal and behavior is normal. His mood appears not anxious.   Nursing note and vitals reviewed.      Results Review:       I reviewed the patient's new clinical results.    Results from last 7 days  Lab Units 18  0440 18  0534 18  0524 18  0425   WBC 10*3/mm3 17.73* 18.37* 14.48* 14.53*   HEMOGLOBIN g/dL 11.1* 10.6* 10.4* 10.8*   PLATELETS 10*3/mm3 273 425 551 066        Results from last 7 days  Lab Units 07/05/18 0440 07/04/18 0534 07/03/18  0524 07/02/18  0425   SODIUM mmol/L 134* 132* 135* 135*   POTASSIUM mmol/L 4.5 3.9 4.2 4.6   CHLORIDE mmol/L 89* 90* 92* 89*   CO2 mmol/L 22.9 27.4 24.6 21.6*   BUN mg/dL 68* 50* 48* 76*   CREATININE mg/dL 7.58* 5.92* 5.79* 8.18*   GLUCOSE mg/dL 107* 107* 89 105*   Estimated Creatinine Clearance: 15.1 mL/min (A) (by C-G formula based on SCr of 7.58 mg/dL (H)).    Results from last 7 days  Lab Units 07/05/18 0440 07/04/18 0534 07/03/18 0524 07/02/18  0425   CALCIUM mg/dL 9.0 8.5* 7.7* 8.8   ALBUMIN g/dL 3.60  --   --   --    PHOSPHORUS mg/dL 7.6*  --   --   --          aspirin 81 mg Oral Daily   ceFAZolin 1 g Intravenous Q24H   diltiaZEM  mg Oral Q24H   heparin (porcine) 5,000 Units Subcutaneous Q8H   insulin aspart 0-7 Units Subcutaneous 4x Daily With Meals & Nightly   lactulose 10 g Oral Daily   levETIRAcetam 1,000 mg Oral Q12H   levothyroxine 175 mcg Oral Daily   Morphine 100 mg Oral Daily   sennosides-docusate sodium 2 tablet Oral BID   Vortioxetine HBr 20 mg Oral Nightly   warfarin 5 mg Oral Daily       lactated ringers 9 mL/hr   Diet Regular; Thin; Consistent Carbohydrate, Renal      Assessment/Plan      Active Hospital Problems    Diagnosis Date Noted   • Hyponatremia [E87.1] 07/01/2018   • Opioid dependence (CMS/[facility]) [F11.20] 06/28/2018   • Chronic pain syndrome [G89.4] 06/28/2018   • Bacteremia due to Streptococcus [R78.81] 06/25/2018   • Streptococcal arthritis of right wrist (CMS/[facility]) [M00.231] 06/25/2018   • Fever [R50.9] 06/23/2018   • Sepsis (CMS/[facility]) [A41.9] 06/23/2018   • Nausea and vomiting [R11.2] 06/23/2018   • Atrial fibrillation (CMS/[facility]) [I48.91] 06/23/2018   • Long term current use of anticoagulant [Z79.01] 06/23/2018   • Acute pain of right wrist [M25.531] 06/23/2018   • DNR (do not resuscitate) [Z66] 06/23/2018   • End stage renal disease (CMS/[facility]) [N18.6] 05/04/2016      Resolved Hospital Problems     Diagnosis Date Noted Date Resolved   No resolved problems to display.     Lethargy and confusion  -resolved    Severe anxiety  -much better after increasing his Ativan to 1 mg every 8 hours as needed   -Consulted access Center, and psychiatry   -Suspect this is related to his acute illness and being in the hospital  -He has agreed to rehab    Sepsis  - due to bacteremia-Blood Cx's from [facility] growing strep  - on ceftriaxone per ID, change to cefazolin on dialysis days on discharge  - repeat blood cx's NGTD  - appreciate ID input    Right Wrist Septic Arthritis  - s/p arthrocentesis 6/25 with 124,000 WBC, crystal exam negative and GPCs on gm stain suggesting septic arthritis  - s/p washout with implantation of tobramycin beads 6/26/18  - also has left shoulder and left ankle pain which are improving  -no plans for further surgeries per hand ortho at this point but if culture from 6/30 grows then he may need a washout.  D/W Dr. Arredondo  -At discharge transition to cefazolin 2g IV after dialysis on Monday and Wednesday and 3g on Friday x 6 week course of IV abx as above. Abx stop date 8/7      ESRD  - MWF  - HTN meds adjusted per nephrology  - appreciate nephrology recs    Hyponatremia  -improved  -will improve with HD    Type 2 DM  - BG acceptable  - continue low dose ssi  - A1C 5.1 last month by patient report    Afib:  -was holding warfarin   -restart today    Chronic Pain/Opioid Dependence  - on large doses of MS Contin chronically-will keep these the same for now given acute surgical issues but may consider dose reduction over long term    DVT Prophylaxis  SubQ heparin, on AC with coumadin but this is subtherapeutic    Thanks to consultants.    DISPO:He agrees to rehab, medically stable to discharge to rehab at anytime, awaiting precert to Skye Price MD  [facility]  07/05/18  1:08 PM

## 2018-07-05 NOTE — PROGRESS NOTES
Continued Stay Note  [facility]     Patient Name: Armando Gill  MRN: [id number]  Today's Date: 7/5/2018    Admit Date: 6/23/2018          Discharge Plan     Row Name 07/05/18 1035       Plan    Plan Skye accepts pending precert for rehab, PT notes are needed for patient then Skye will start precert    Plan Comments Spoke with Dania/ Skye.  She states she needs PT notes from this am and then she can submit a precert.  HD chair needs to be set up.  DENAE Canas notified of need for PT for precert.......................Yarelis Campoverde RN              Discharge Codes    No documentation.       Expected Discharge Date and Time     Expected Discharge Date Expected Discharge Time    Jul 2, 2018             Yarelis Campovedre RN

## 2018-07-05 NOTE — PROGRESS NOTES
Continued Stay Note  [facility]     Patient Name: Armando Gill  MRN: [id number]  Today's Date: 7/5/2018    Admit Date: 6/23/2018          Discharge Plan     Row Name 07/05/18 6347       Plan    Plan Precert started 7/5 before noon for [facility] bed and HD chair to be set up at [facility]    Patient/Family in Agreement with Plan yes    Plan Comments Spoke with spouse and notified that we are awaiting precert for [facility].  [facility]  Per Dania precert was started around noon today...........................Yarelis Campoverde RN    Row Name 07/05/18 5565       Plan    Plan [facility] accepts pending precert for rehab, PT notes are needed for patient then [facility] will start precert    Plan Comments Spoke with Dania/ Skye.  She states she needs PT notes from this am and then she can submit a precert.  HD chair needs to be set up.  DENAE Canas notified of need for PT for precert.......................Yarelis Campoverde RN              Discharge Codes    No documentation.       Expected Discharge Date and Time     Expected Discharge Date Expected Discharge Time    Jul 2, 2018             Yarelis Campoverde RN

## 2018-07-05 NOTE — THERAPY TREATMENT NOTE
Acute Care - Physical Therapy Treatment Note  [facility]     Patient Name: Armando Gill  : 1960  MRN: [id number]  Today's Date: 2018             Admit Date: 2018    Visit Dx:    ICD-10-CM ICD-9-CM   1. Generalized weakness R53.1 780.79   2. Septicemia (CMS/HCC) A41.9 038.9     Patient Active Problem List   Diagnosis   • End stage renal disease (CMS/HCC)   • Secondary hyperparathyroidism of renal origin (CMS/[facility])   • Fever   • Sepsis (CMS/[facility])   • Nausea and vomiting   • Atrial fibrillation (CMS/HCC)   • Long term current use of anticoagulant   • Acute pain of right wrist   • DNR (do not resuscitate)   • Bacteremia due to Streptococcus   • Streptococcal arthritis of right wrist (CMS/[facility])   • Opioid dependence (CMS/[facility])   • Chronic pain syndrome   • Hyponatremia       Therapy Treatment          Rehabilitation Treatment Summary     Row Name 18 1130             Treatment Time/Intention    Discipline physical therapist  -      Document Type therapy note (daily note)  -CH      Subjective Information no complaints  -CH      Mode of Treatment physical therapy  -      Patient/Family Observations pt supine in bed, no acute distress noted at rest, R wrist and L LE wrapped in gauze, wife present  -CH      Patient Effort good  -CH      Existing Precautions/Restrictions fall  -CH      Recorded by [CH] Mague Lemus, PT 18 1154      Row Name 18 1130             Cognitive Assessment/Intervention    Additional Documentation Cognitive Assessment/Intervention (Group)  -CH      Recorded by [CH] Mague Lemus, PT 18 1154      Row Name 18 1130             Cognitive Assessment/Intervention- PT/OT    Orientation Status (Cognition) oriented x 3  -CH      Follows Commands (Cognition) WFL  -CH      Personal Safety Interventions fall prevention program maintained;gait belt;nonskid shoes/slippers when out of bed  -CH      Recorded by [CH] Mague Lemus, PT 18 8181       Row Name 07/05/18 1130             Bed Mobility Assessment/Treatment    Supine-Sit [place] (Bed Mobility) verbal cues;nonverbal cues (demo/gesture);contact guard;2 person assist  -CH      Sit-Supine [place] (Bed Mobility) not tested   sitting in chair  -CH      Recorded by [] Mague S Mariah, PT 07/05/18 1154      Row Name 07/05/18 1130             Sit-Stand Transfer    Sit-Stand [place] (Transfers) nonverbal cues (demo/gesture);verbal cues;minimum assist (75% patient effort);2 person assist  -      Assistive Device (Sit-Stand Transfers) cane, quad  -CH      Recorded by [] Mague Lemus, PT 07/05/18 1154      Row Name 07/05/18 1130             Stand-Sit Transfer    Stand-Sit [place] (Transfers) verbal cues;nonverbal cues (demo/gesture);minimum assist (75% patient effort);2 person assist  -CH      Assistive Device (Stand-Sit Transfers) cane, quad  -CH      Recorded by [] Mague Lemus, PT 07/05/18 1154      Row Name 07/05/18 1130             Gait/Stairs Assessment/Training    Gait/Stairs Assessment/Training gait/ambulation independence  -      [place] Level (Gait) verbal cues;nonverbal cues (demo/gesture);minimum assist (75% patient effort);2 person assist  -CH      Assistive Device (Gait) cane, quad  -CH      Distance in Feet (Gait) 50  -CH      Deviations/Abnormal Patterns (Gait) antalgic;da decreased;gait speed decreased  -CH      Comment (Gait/Stairs) 3 breif standing rest breaks secondary to fatigue and soreness, repeated cuing for positioning of cane  -CH      Recorded by [] Mague Lemus, PT 07/05/18 1154      Row Name 07/05/18 1130             Therapeutic Exercise    Lower Extremity (Therapeutic Exercise) LAQ (long arc quad), bilateral   ankle pumps  -CH      Position (Therapeutic Exercise) seated  -CH      Sets/Reps (Therapeutic Exercise) 10  -CH      Recorded by [] Mague Lemus, PT 07/05/18 1154      Row Name 07/05/18 1130              Positioning and Restraints    Pre-Treatment Position in bed  -CH      Post Treatment Position chair  -CH      In Chair sitting;call light within reach;encouraged to call for assist;with family/caregiver  -CH      Recorded by [CH] Mague Lemus, PT 07/05/18 1154      Row Name                Wound 06/24/18 1104 Left medial;anterior calf blisters    Wound - Properties Group Date first assessed: 06/24/18 [SANJU] Time first assessed: 1104 [SANJU] Present On Admission : no;picture taken [SANJU] Side: Left [SANJU] Orientation: medial;anterior [SANJU] Location: calf [SANJU] Type: blisters [SANJU] Additional Comments: dressing present on left leg, clean, dry and intact 6/26/2018 [DK] Recorded by:  [DK] Brenda Addison RN 06/26/18 1621 [SANJU] Saman Leroy RN 06/24/18 1105    Row Name                Wound 06/24/18 1108 Left lower other (see notes) ulceration, venous    Wound - Properties Group Date first assessed: 06/24/18 [SANJU] Time first assessed: 1108 [SANJU] Present On Admission : yes [SANJU] Side: Left [SANJU] Orientation: lower [SANJU] Location: other (see notes) [SANJU], under great toe   Type: ulceration, venous [SANJU] Recorded by:  [SANJU] Saman Leroy RN 06/24/18 1109    Row Name                Wound 06/26/18 1857 Right wrist incision    Wound - Properties Group Date first assessed: 06/26/18 [SL] Time first assessed: 1857 [SL] Side: Right [SL] Location: wrist [SL] Type: incision [SL] Recorded by:  [SL] Sheron Dutton RN 06/26/18 1857    Row Name                Wound 06/27/18 0847 Left anterior foot blisters    Wound - Properties Group Date first assessed: 06/27/18 [LF] Time first assessed: 0847 [LF] Present On Admission : no [LF] Side: Left [LF] Orientation: anterior [LF] Location: foot [LF] Type: blisters [LF] Recorded by:  [LF] Luisa Quesada RN 06/27/18 1449      User Key  (r) = Recorded By, (t) = Taken By, (c) = Cosigned By    Initials Name Effective Dates Discipline    CH Mague Lemus, PT 04/03/18 -  PT    TRIPP Addison, RN  06/16/16 -  Nurse    MARLO Quesada, DENAE 06/16/16 -  Nurse    RODRÍGUEZ Dutton, DENAE 06/16/16 -  Nurse    SANJU Leroy, DENAE 06/16/16 -  Nurse          Wound 06/24/18 1104 Left medial;anterior calf blisters (Active)   Dressing Appearance no drainage 7/5/2018  8:00 AM   Closure None 7/5/2018  6:46 AM   Base dressing in place, unable to visualize 7/5/2018  8:00 AM   Periwound swelling;redness 7/5/2018  6:46 AM   Periwound Temperature warm 7/5/2018  6:46 AM   Periwound Skin Turgor firm 7/5/2018  6:46 AM   Drainage Characteristics/Odor serous;yellow 7/5/2018  6:46 AM   Drainage Amount moderate 7/5/2018  6:46 AM   Care, Wound cleansed with;sterile normal saline 7/5/2018  6:46 AM   Dressing Care, Wound dressing changed;petroleum-based;gauze 7/5/2018  6:46 AM       Wound 06/24/18 1108 Left lower other (see notes) ulceration, venous (Active)   Dressing Appearance intact;dry 7/5/2018  8:00 AM   Base dressing in place, unable to visualize 7/5/2018  8:00 AM   Dressing Care, Wound low-adherent 7/4/2018  2:16 PM       Wound 06/26/18 1857 Right wrist incision (Active)   Dressing Appearance intact;dry 7/5/2018  8:00 AM   Closure KOURTNEY 7/4/2018  2:16 PM   Base dressing in place, unable to visualize 7/5/2018  8:00 AM   Drainage Amount small 7/4/2018  2:16 PM       Wound 06/27/18 0847 Left anterior foot blisters (Active)   Dressing Appearance dry;intact 7/5/2018  8:00 AM   Closure None 7/5/2018  6:46 AM   Base dressing in place, unable to visualize 7/5/2018  8:00 AM   Periwound swelling;redness 7/5/2018  6:46 AM   Periwound Temperature warm 7/5/2018  6:46 AM   Periwound Skin Turgor firm 7/5/2018  6:46 AM   Drainage Characteristics/Odor serous 7/5/2018  6:46 AM   Drainage Amount small 7/5/2018  6:46 AM   Care, Wound cleansed with;sterile normal saline 7/5/2018  6:46 AM   Dressing Care, Wound dressing changed;gauze;petroleum-based 7/5/2018  6:46 AM               PT Rehab Goals     Row Name 07/05/18 1100             Bed  Mobility Goal 1 (PT)    Time Frame (Bed Mobility Goal 1, PT) 1 week  -CH      Progress/Outcomes (Bed Mobility Goal 1, PT) goal ongoing  -CH         Transfer Goal 1 (PT)    Activity/Assistive Device (Transfer Goal 1, PT) transfers, all;cane, quad  -CH      [place] Level/Cues Needed (Transfer Goal 1, PT) contact guard assist  -CH      Time Frame (Transfer Goal 1, PT) 1 week  -CH      Progress/Outcome (Transfer Goal 1, PT) goal revised this date  -CH         Gait Training Goal 1 (PT)    Activity/Assistive Device (Gait Training Goal 1, PT) gait (walking locomotion);cane, straight  -CH      [place] Level (Gait Training Goal 1, PT) contact guard assist  -CH      Distance (Gait Goal 1, PT) 75  -CH      Time Frame (Gait Training Goal 1, PT) 1 week  -CH      Progress/Outcome (Gait Training Goal 1, PT) goal revised this date  -CH         Stairs Goal 1 (PT)    Progress/Outcome (Stairs Goal 1, PT) goal no longer appropriate  -CH         Patient Education Goal (PT)    Time Frame (Patient Education Goal, PT) 1 week  -CH      Progress/Outcome (Patient Education Goal, PT) goal ongoing  -CH        User Key  (r) = Recorded By, (t) = Taken By, (c) = Cosigned By    Initials Name Provider Type Discipline    VITOR Lemus, PT Physical Therapist PT          Physical Therapy Education     Title: PT OT SLP Therapies (Done)     Topic: Physical Therapy (Done)     Point: Mobility training (Done)    Learning Progress Summary     Learner Status Readiness Method Response Comment Documented by    Patient Done Acceptance E,TB,D VU,NR  CH 07/05/18 1154     Done Acceptance E,TB VU,DU  CW 07/03/18 1040     Done Acceptance E,TB VU safety, gait, purse lipped breathing GR 07/01/18 1313     Done Acceptance E VU,NR  PM 06/29/18 1108     Done Acceptance E VU,NR  KH 06/28/18 1001    Family Done Acceptance E,TB VU safety, gait, purse lipped breathing GR 07/01/18 1313          Point: Home exercise program (Done)    Learning Progress  Summary     Learner Status Readiness Method Response Comment Documented by    Patient Done Acceptance E,TB,D VU,NR   07/05/18 1154     Done Acceptance E,TB VU,DU   07/03/18 1040     Done Acceptance E,TB VU safety, gait, purse lipped breathing  07/01/18 1313     Done Acceptance E VU,NR  PM 06/29/18 1108    Family Done Acceptance E,TB VU safety, gait, purse lipped breathing GR 07/01/18 1313          Point: Body mechanics (Done)    Learning Progress Summary     Learner Status Readiness Method Response Comment Documented by    Patient Done Acceptance E,TB,D VU,NR   07/05/18 1154     Done Acceptance E,TB VU,DU   07/03/18 1040     Done Acceptance E,TB VU safety, gait, purse lipped breathing  07/01/18 1313     Done Acceptance E VU,NR  PM 06/29/18 1108     Done Acceptance E VU,NR   06/28/18 1001    Family Done Acceptance E,TB VU safety, gait, purse lipped breathing  07/01/18 1313          Point: Precautions (Done)    Learning Progress Summary     Learner Status Readiness Method Response Comment Documented by    Patient Done Acceptance E,TB,D VU,NR   07/05/18 1154     Done Acceptance E,TB VU,DU   07/03/18 1040     Done Acceptance E,TB VU safety, gait, purse lipped breathing  07/01/18 1313     Done Acceptance E VU,NR  PM 06/29/18 1108     Done Acceptance E VU,NR   06/28/18 1001    Family Done Acceptance E,TB VU safety, gait, purse lipped breathing  07/01/18 1313                      User Key     Initials Effective Dates Name Provider Type Discipline     06/08/18 -  Karli Quispe, PT Physical Therapist PT     10/03/16 -  Kevin Livingston, PT Physical Therapist PT     04/03/18 -  Mague Lemus, PT Physical Therapist PT     03/07/18 -  Justin Holguin, PTA Physical Therapy Assistant PT    PM 06/25/18 -  uLis Curry, PT Student PT Student PT                    PT Recommendation and Plan     Plan of Care Reviewed With: patient  Outcome Summary: Pt continues to make steady  progress with PT as he required less assistance for bed mobility. Pt continues to required [place] for transfers and gait. He also requires cuing for safe gait and cane placement. PT will continue to follow to address strength, mobility, and gait.          Outcome Measures     Row Name 07/05/18 1100 07/03/18 1000          How much help from another person do you currently need...    Turning from your back to your side while in flat bed without using bedrails? 3  -CH 2  -CW     Moving from lying on back to sitting on the side of a flat bed without bedrails? 3  -CH 2  -CW     Moving to and from a bed to a chair (including a wheelchair)? 3  -CH 3  -CW     Standing up from a chair using your arms (e.g., wheelchair, bedside chair)? 3  -CH 3  -CW     Climbing 3-5 steps with a railing? 1  -CH 1  -CW     To walk in hospital room? 3  -CH 3  -CW     AM-PAC 6 Clicks Score 16  -CH 14  -CW        Functional Assessment    Outcome Measure Options AM-PAC 6 Clicks Basic Mobility (PT)  -CH AM-PAC 6 Clicks Basic Mobility (PT)  -CW       User Key  (r) = Recorded By, (t) = Taken By, (c) = Cosigned By    Initials Name Provider Type    VITOR Lemus, PT Physical Therapist    CW Justin Holguin, PTA Physical Therapy Assistant           Time Calculation:         PT Charges     Row Name 07/05/18 1158             Time Calculation    Start Time 1110  -      Stop Time 1130  -      Time Calculation (min) 20 min  -      PT Received On 07/05/18  -      PT - Next Appointment 07/06/18  -      PT Goal Re-Cert Due Date 07/12/18  -         Time Calculation- PT    Total Timed Code Minutes- PT 20 minute(s)  -        User Key  (r) = Recorded By, (t) = Taken By, (c) = Cosigned By    Initials Name Provider Type    VITOR Lemus, PT Physical Therapist        Therapy Suggested Charges     Code   Minutes Charges    16864 (CPT®)  Pt Neuromusc Re Education Ea 15 Min      30266 (CPT®) Hc Pt Ther Proc Ea 15 Min      81540 (CPT®)   Gait Training Ea 15 Min      52916 (CPT®) Hc Pt Therapeutic Act Ea 15 Min 15 1    89809 (CPT®) Hc Pt Manual Therapy Ea 15 Min      43105 (CPT®) Hc Pt Iontophoresis Ea 15 Min      44199 (CPT®) Hc Pt Elec Stim Ea-Per 15 Min      46080 (CPT®) Hc Pt Ultrasound Ea 15 Min      03902 (CPT®) Hc Pt Self Care/Mgmt/Train Ea 15 Min      Total  15 1        Therapy Charges for Today     Code Description Service Date Service Provider Modifiers Qty    85207684592 HC PT THER PROC EA 15 MIN 7/5/2018 Mague Lemus, PT GP 1    88654463090 HC PT THER SUPP EA 15 MIN 7/5/2018 Mague Lemus, PT GP 1          PT G-Codes  Outcome Measure Options: AM-PAC 6 Clicks Basic Mobility (PT)    Mague Lemus, PT  7/5/2018

## 2018-07-05 NOTE — PROGRESS NOTES
Continued Stay Note  [facility]     Patient Name: Armando Gill  MRN: [id number]  Today's Date: 7/5/2018    Admit Date: 6/23/2018          Discharge Plan     Row Name 07/05/18 3518       Plan    Plan Precert for [facility] skilled rehab and HD chair is set up    Plan Comments Notified [facility] coordinator that precert has been obtained.  Met with patient and spouse, Roxie at bedside.  Ambulance disclaimer discussed and they agree with plan for ambulance transfer.  They [facility].  They prefer [facility] for any DME.  Packet with report and fax in [facility] office..................Yarelis Campoverde RN    Row Name 07/05/18 1343       Plan    Plan Precert for [facility] skilled rehab and HD chair is set up    Patient/Family in Agreement with Plan yes    Plan Comments Per Dania/ Skye, patient has precert obtained for skilled rehab for [facility] and a HD chair set up.  Roxie Gill, spouse notfied via HIPPA compliant VM.  ............................Yarelis Campoverde RN              Discharge Codes    No documentation.       Expected Discharge Date and Time     Expected Discharge Date Expected Discharge Time    Jul 2, 2018             Yarelis Campoverde RN

## 2018-07-05 NOTE — PROGRESS NOTES
"   LOS: 12 days    Patient Care Team:  Luis Antonio Abarca MD as PCP - General (Family Medicine)    Chief Complaint:  No chief complaint on file.    Follow up ESRD  Subjective     Interval History:     Review of Systems:   Seen and examined.  Wife is on bedside.  He stated that he feels better.  He does not want dialysis  today because he not ready for more dialysis.    Objective     Vital Signs  Temp:  [98 °F (36.7 °C)-98.5 °F (36.9 °C)] 98 °F (36.7 °C)  Heart Rate:  [65-75] 75  Resp:  [18-20] 18  BP: (101-136)/(67-80) 120/67    Flowsheet Rows      First Filed Value   Admission Height  172.7 cm (68\") Documented at 06/23/2018 1100   Admission Weight   150 kg (330 lb 11 oz) Documented at 06/23/2018 1100          No intake/output data recorded.  No intake/output data recorded.  No intake or output data in the 24 hours ending 07/05/18 1456    Physical Exam:  Awake, alert.  Sitting in chair.     Oral mucosa moist.  No jvd.  Heart irreg, irreg.    Lungs clear to auscultation.    Abd obese, soft, nontender.    Ext right wrist dressing.    Edema lower ext  better.    Left hand less edema.     LLE dressing in place.       Results Review:      Results from last 7 days  Lab Units 07/05/18  0440 07/04/18  0534 07/03/18  0524   SODIUM mmol/L 134* 132* 135*   POTASSIUM mmol/L 4.5 3.9 4.2   CHLORIDE mmol/L 89* 90* 92*   CO2 mmol/L 22.9 27.4 24.6   BUN mg/dL 68* 50* 48*   CREATININE mg/dL 7.58* 5.92* 5.79*   CALCIUM mg/dL 9.0 8.5* 7.7*   BILIRUBIN mg/dL 0.3  --   --    ALK PHOS U/L 66  --   --    ALT (SGPT) U/L 11  --   --    AST (SGOT) U/L 8  --   --    GLUCOSE mg/dL 107* 107* 89       Estimated Creatinine Clearance: 15.1 mL/min (A) (by C-G formula based on SCr of 7.58 mg/dL (H)).      Results from last 7 days  Lab Units 07/05/18  0440   PHOSPHORUS mg/dL 7.6*               Results from last 7 days  Lab Units 07/05/18  0440 07/04/18  0534 07/03/18  0524 07/02/18  0425 07/01/18  0521   WBC 10*3/mm3 17.73* 18.37* 14.48* 14.53* 15.94* "   HEMOGLOBIN g/dL 11.1* 10.6* 10.4* 10.8* 11.1*   PLATELETS 10*3/mm3 273 262 271 279 295         Results from last 7 days  Lab Units 07/05/18  0440 07/04/18  0534 07/03/18  0524 07/02/18  0425 07/01/18  0521   INR  1.33* 1.65* 2.09* 1.76* 1.55*         Imaging Results (last 24 hours)     ** No results found for the last 24 hours. **          aspirin 81 mg Oral Daily   ceFAZolin 1 g Intravenous Q24H   diltiaZEM  mg Oral Q24H   heparin (porcine) 5,000 Units Subcutaneous Q8H   insulin aspart 0-7 Units Subcutaneous 4x Daily With Meals & Nightly   lactulose 10 g Oral Daily   levETIRAcetam 1,000 mg Oral Q12H   levothyroxine 175 mcg Oral Daily   Morphine 100 mg Oral Daily   sennosides-docusate sodium 2 tablet Oral BID   Vortioxetine HBr 20 mg Oral Nightly   warfarin 5 mg Oral Daily       lactated ringers 9 mL/hr       Medication Review:   Current Facility-Administered Medications   Medication Dose Route Frequency Provider Last Rate Last Dose   • acetaminophen (TYLENOL) tablet 650 mg  650 mg Oral Q4H PRN Mark Helms MD       • albumin human 25 % IV SOLN 12.5 g  12.5 g Intravenous Q1H PRN Angle Nicholson MD       • aspirin EC tablet 81 mg  81 mg Oral Daily Mark Helms MD   81 mg at 07/05/18 0814   • bisacodyl (DULCOLAX) EC tablet 5 mg  5 mg Oral Daily PRN Mark Helms MD   5 mg at 07/04/18 1208   • ceFAZolin (ANCEF) IVPB 1 g  1 g Intravenous Q24H Amarilys Barry MD   1 g at 07/05/18 1251   • dextrose (D50W) solution 25 g  25 g Intravenous Q15 Min PRN Mark Helms MD       • dextrose (GLUTOSE) oral gel 15 g  15 g Oral Q15 Min PRN Mark Helms MD       • diltiaZEM CD (CARDIZEM CD) 24 hr capsule 120 mg  120 mg Oral Q24H Angle Nicholson MD   120 mg at 07/05/18 0814   • famotidine (PEPCID) tablet 40 mg  40 mg Oral BID PRN Mark Helms MD       • glucagon (human recombinant) (GLUCAGEN DIAGNOSTIC) injection 1 mg  1 mg Subcutaneous PRN Mark Helms MD       • heparin (porcine) 5000  UNIT/ML injection 5,000 Units  5,000 Units Subcutaneous Q8H Mark Hlems MD   5,000 Units at 07/05/18 1416   • hydrALAZINE (APRESOLINE) tablet 25 mg  25 mg Oral Q8H PRN Mark Helms MD       • insulin aspart (novoLOG) injection 0-7 Units  0-7 Units Subcutaneous 4x Daily With Meals & Nightly Mark Helms MD   2 Units at 07/03/18 1710   • lactated ringers infusion  9 mL/hr Intravenous Continuous PRN Beto Schrader MD       • lactulose (CHRONULAC) 10 GM/15ML solution 10 g  10 g Oral Daily Angle Nicholson MD   10 g at 07/05/18 0814   • levETIRAcetam (KEPPRA) tablet 1,000 mg  1,000 mg Oral Q12H Mark Helms MD   1,000 mg at 07/05/18 0814   • levothyroxine (SYNTHROID, LEVOTHROID) tablet 175 mcg  175 mcg Oral Daily Mark Helms MD   175 mcg at 07/05/18 0621   • LORazepam (ATIVAN) tablet 1 mg  1 mg Oral Q8H PRN Jacob Price MD   1 mg at 07/04/18 1059   • Morphine (MS CONTIN) 12 hr tablet 100 mg  100 mg Oral Daily Mark Helms MD   100 mg at 07/05/18 0814   • Naloxone HCl (NARCAN) injection 0.4 mg  0.4 mg Intravenous Q5 Min PRN Angle Nicholson MD       • nitroglycerin (NITROSTAT) SL tablet 0.4 mg  0.4 mg Sublingual Q5 Min PRN Mark Helms MD       • ondansetron (ZOFRAN) tablet 4 mg  4 mg Oral Q6H PRN Mark Helms MD        Or   • ondansetron ODT (ZOFRAN-ODT) disintegrating tablet 4 mg  4 mg Oral Q6H PRN Mark Helms MD        Or   • ondansetron (ZOFRAN) injection 4 mg  4 mg Intravenous Q6H PRN Mark Helms MD       • oxyCODONE-acetaminophen (PERCOCET) 5-325 MG per tablet 1 tablet  1 tablet Oral Q6H PRN Jacob Price MD   1 tablet at 07/05/18 0434   • sennosides-docusate sodium (SENOKOT-S) 8.6-50 MG tablet 2 tablet  2 tablet Oral BID Mark Helms MD   2 tablet at 07/05/18 0814   • sodium chloride 0.9 % flush 1-10 mL  1-10 mL Intravenous PRN Mark Helms MD       • Vortioxetine HBr tablet 20 mg  20 mg Oral Nightly Mark Helms MD   20  mg at 06/30/18 2027   • warfarin (COUMADIN) tablet 5 mg  5 mg Oral Daily Jacob Price MD           Assessment/Plan   1.  ESRD: usual dialysis schedule MWF.  Patient refused dialysis today.  The plan for additional dialysis that tomorrow patient will definitely need more aggressive dialysis but patient is hesitant to comply.  .   2.  Sepsis syndrome; Strep bacteremia and right wrist infection.  SP washout right wrist, AB beads.   Joint pain , left shoulder, left ankle improved. WBC up some today.    3. Chronic pain with acute exacerbation with right wrist, left shoulder, left ankle pain.   4.  Obesity   5.  DM2 very well controlled on diet.   6.  Elevated LFT's. Recheck.   7.  pAfib on AC. Received. Vit K pre-op.   8.  HTN on lower dose cardizem.  Controlled.    9.  Anemia CKD.    10. Hypothyroid on replacement.        Andres Hall MD  07/05/18  2:56 PM

## 2018-07-06 VITALS
OXYGEN SATURATION: 97 % | BODY MASS INDEX: 47.03 KG/M2 | WEIGHT: 310.3 LBS | HEIGHT: 68 IN | HEART RATE: 89 BPM | SYSTOLIC BLOOD PRESSURE: 120 MMHG | RESPIRATION RATE: 18 BRPM | DIASTOLIC BLOOD PRESSURE: 62 MMHG | TEMPERATURE: 97.9 F

## 2018-07-06 LAB
ANION GAP SERPL CALCULATED.3IONS-SCNC: 20.3 MMOL/L
BASOPHILS # BLD AUTO: 0.03 10*3/MM3 (ref 0–0.2)
BASOPHILS NFR BLD AUTO: 0.2 % (ref 0–1.5)
BUN BLD-MCNC: 85 MG/DL (ref 6–20)
BUN/CREAT SERPL: 9.5 (ref 7–25)
CALCIUM SPEC-SCNC: 8.5 MG/DL (ref 8.6–10.5)
CHLORIDE SERPL-SCNC: 88 MMOL/L (ref 98–107)
CO2 SERPL-SCNC: 22.7 MMOL/L (ref 22–29)
CREAT BLD-MCNC: 8.95 MG/DL (ref 0.76–1.27)
DEPRECATED RDW RBC AUTO: 52.5 FL (ref 37–54)
EOSINOPHIL # BLD AUTO: 0.18 10*3/MM3 (ref 0–0.7)
EOSINOPHIL NFR BLD AUTO: 1.2 % (ref 0.3–6.2)
ERYTHROCYTE [DISTWIDTH] IN BLOOD BY AUTOMATED COUNT: 14.7 % (ref 11.5–14.5)
GFR SERPL CREATININE-BSD FRML MDRD: 6 ML/MIN/1.73
GLUCOSE BLD-MCNC: 93 MG/DL (ref 65–99)
GLUCOSE BLDC GLUCOMTR-MCNC: 109 MG/DL (ref 70–130)
GLUCOSE BLDC GLUCOMTR-MCNC: 118 MG/DL (ref 70–130)
GLUCOSE BLDC GLUCOMTR-MCNC: 123 MG/DL (ref 70–130)
GLUCOSE BLDC GLUCOMTR-MCNC: 174 MG/DL (ref 70–130)
HCT VFR BLD AUTO: 32.2 % (ref 40.4–52.2)
HGB BLD-MCNC: 9.9 G/DL (ref 13.7–17.6)
HYPOCHROMIA BLD QL: NORMAL
IMM GRANULOCYTES # BLD: 0.18 10*3/MM3 (ref 0–0.03)
IMM GRANULOCYTES NFR BLD: 1.2 % (ref 0–0.5)
INR PPP: 1.25 (ref 0.9–1.1)
LYMPHOCYTES # BLD AUTO: 2.28 10*3/MM3 (ref 0.9–4.8)
LYMPHOCYTES NFR BLD AUTO: 14.6 % (ref 19.6–45.3)
MCH RBC QN AUTO: 29.8 PG (ref 27–32.7)
MCHC RBC AUTO-ENTMCNC: 30.7 G/DL (ref 32.6–36.4)
MCV RBC AUTO: 97 FL (ref 79.8–96.2)
MONOCYTES # BLD AUTO: 0.8 10*3/MM3 (ref 0.2–1.2)
MONOCYTES NFR BLD AUTO: 5.1 % (ref 5–12)
NEUTROPHILS # BLD AUTO: 12.34 10*3/MM3 (ref 1.9–8.1)
NEUTROPHILS NFR BLD AUTO: 78.9 % (ref 42.7–76)
NRBC BLD MANUAL-RTO: 0 /100 WBC (ref 0–0)
PLAT MORPH BLD: NORMAL
PLATELET # BLD AUTO: 240 10*3/MM3 (ref 140–500)
PMV BLD AUTO: 9.9 FL (ref 6–12)
POTASSIUM BLD-SCNC: 4.4 MMOL/L (ref 3.5–5.2)
PROTHROMBIN TIME: 15.5 SECONDS (ref 11.7–14.2)
RBC # BLD AUTO: 3.32 10*6/MM3 (ref 4.6–6)
SODIUM BLD-SCNC: 131 MMOL/L (ref 136–145)
WBC MORPH BLD: NORMAL
WBC NRBC COR # BLD: 15.63 10*3/MM3 (ref 4.5–10.7)

## 2018-07-06 PROCEDURE — 85610 PROTHROMBIN TIME: CPT | Performed by: INTERNAL MEDICINE

## 2018-07-06 PROCEDURE — 82962 GLUCOSE BLOOD TEST: CPT

## 2018-07-06 PROCEDURE — 63710000001 INSULIN ASPART PER 5 UNITS: Performed by: INTERNAL MEDICINE

## 2018-07-06 PROCEDURE — 25010000003 CEFAZOLIN 1-4 GM/50ML-% SOLUTION: Performed by: INTERNAL MEDICINE

## 2018-07-06 PROCEDURE — 85007 BL SMEAR W/DIFF WBC COUNT: CPT | Performed by: INTERNAL MEDICINE

## 2018-07-06 PROCEDURE — 85025 COMPLETE CBC W/AUTO DIFF WBC: CPT | Performed by: INTERNAL MEDICINE

## 2018-07-06 PROCEDURE — 97110 THERAPEUTIC EXERCISES: CPT

## 2018-07-06 PROCEDURE — 80048 BASIC METABOLIC PNL TOTAL CA: CPT | Performed by: INTERNAL MEDICINE

## 2018-07-06 PROCEDURE — 25010000002 HEPARIN (PORCINE) PER 1000 UNITS: Performed by: INTERNAL MEDICINE

## 2018-07-06 RX ORDER — DILTIAZEM HYDROCHLORIDE 120 MG/1
120 CAPSULE, COATED, EXTENDED RELEASE ORAL
Start: 2018-07-06 | End: 2018-10-10 | Stop reason: HOSPADM

## 2018-07-06 RX ORDER — LORAZEPAM 0.5 MG/1
0.5 TABLET ORAL EVERY 8 HOURS PRN
Qty: 9 TABLET | Refills: 0 | Status: SHIPPED | OUTPATIENT
Start: 2018-07-06

## 2018-07-06 RX ORDER — MORPHINE SULFATE 100 MG/1
100 TABLET ORAL DAILY
Qty: 3 TABLET | Refills: 0 | Status: SHIPPED | OUTPATIENT
Start: 2018-07-06

## 2018-07-06 RX ORDER — LACTULOSE 10 G/15ML
10 SOLUTION ORAL DAILY
Start: 2018-07-07 | End: 2019-02-27

## 2018-07-06 RX ORDER — FAMOTIDINE 40 MG/1
40 TABLET, FILM COATED ORAL 2 TIMES DAILY PRN
Status: ON HOLD
Start: 2018-07-06 | End: 2019-01-24 | Stop reason: SDUPTHER

## 2018-07-06 RX ORDER — OXYCODONE HYDROCHLORIDE AND ACETAMINOPHEN 5; 325 MG/1; MG/1
1 TABLET ORAL EVERY 8 HOURS PRN
Qty: 9 TABLET | Refills: 0 | Status: ON HOLD | OUTPATIENT
Start: 2018-07-06 | End: 2019-01-23

## 2018-07-06 RX ORDER — SENNA AND DOCUSATE SODIUM 50; 8.6 MG/1; MG/1
2 TABLET, FILM COATED ORAL 2 TIMES DAILY
Start: 2018-07-06 | End: 2019-02-27

## 2018-07-06 RX ADMIN — LACTULOSE 10 G: 10 SOLUTION ORAL at 09:12

## 2018-07-06 RX ADMIN — DOCUSATE SODIUM -SENNOSIDES 2 TABLET: 50; 8.6 TABLET, COATED ORAL at 09:10

## 2018-07-06 RX ADMIN — ASPIRIN 81 MG: 81 TABLET, DELAYED RELEASE ORAL at 09:10

## 2018-07-06 RX ADMIN — OXYCODONE HYDROCHLORIDE AND ACETAMINOPHEN 1 TABLET: 5; 325 TABLET ORAL at 09:11

## 2018-07-06 RX ADMIN — HEPARIN SODIUM 5000 UNITS: 5000 INJECTION INTRAVENOUS; SUBCUTANEOUS at 19:41

## 2018-07-06 RX ADMIN — WARFARIN SODIUM 5 MG: 5 TABLET ORAL at 19:41

## 2018-07-06 RX ADMIN — DILTIAZEM HYDROCHLORIDE 120 MG: 120 CAPSULE, COATED, EXTENDED RELEASE ORAL at 09:10

## 2018-07-06 RX ADMIN — INSULIN ASPART 2 UNITS: 100 INJECTION, SOLUTION INTRAVENOUS; SUBCUTANEOUS at 20:08

## 2018-07-06 RX ADMIN — HEPARIN SODIUM 5000 UNITS: 5000 INJECTION INTRAVENOUS; SUBCUTANEOUS at 06:20

## 2018-07-06 RX ADMIN — LEVOTHYROXINE SODIUM 175 MCG: 175 TABLET ORAL at 06:20

## 2018-07-06 RX ADMIN — OXYCODONE HYDROCHLORIDE AND ACETAMINOPHEN 1 TABLET: 5; 325 TABLET ORAL at 17:20

## 2018-07-06 RX ADMIN — CEFAZOLIN SODIUM 1 G: 1 INJECTION, SOLUTION INTRAVENOUS at 12:12

## 2018-07-06 RX ADMIN — DOCUSATE SODIUM -SENNOSIDES 2 TABLET: 50; 8.6 TABLET, COATED ORAL at 20:07

## 2018-07-06 RX ADMIN — MORPHINE SULFATE 100 MG: 100 TABLET, EXTENDED RELEASE ORAL at 10:34

## 2018-07-06 RX ADMIN — LEVETIRACETAM 1000 MG: 500 TABLET, FILM COATED ORAL at 20:08

## 2018-07-06 RX ADMIN — LEVETIRACETAM 1000 MG: 500 TABLET, FILM COATED ORAL at 09:10

## 2018-07-06 NOTE — PLAN OF CARE
Problem: Patient Care Overview  Goal: Plan of Care Review  Outcome: Ongoing (interventions implemented as appropriate)   07/06/18 6891   Coping/Psychosocial   Plan of Care Reviewed With patient   Plan of Care Review   Progress improving   OTHER   Outcome Summary vss, no c/o pain during shift. discharge today - patient eager to go to rehab. no suicidal thoughts during shift. dialysis planned for this morning prior to discharge. resting well throughout shift, will continue to monitor

## 2018-07-06 NOTE — PROGRESS NOTES
.                                                                                                                                                Patient Care Team:  Luis Antonio Abarca MD as PCP - General (Family Medicine)    Subjective:  No new issue    Vital Signs   Temp:  [98 °F (36.7 °C)-98.5 °F (36.9 °C)] 98.5 °F (36.9 °C)  Heart Rate:  [65-80] 80  Resp:  [18-20] 18  BP: (101-143)/(67-80) 143/75      Physical Exam:     General Appearance:    Alert, cooperative, in no acute distress   Head:    Normocephalic, without obvious abnormality, atraumatic   Eyes:            Lids and lashes normal, conjunctivae and sclerae normal, no   icterus, no pallor, corneas clear, PERRLA   Ears:    Ears appear intact with no abnormalities noted   Throat:   No oral lesions, no thrush, oral mucosa moist   Neck:   No adenopathy, supple, trachea midline, no thyromegaly, no   carotid bruit, no JVD   Back:     No kyphosis present, no scoliosis present, no skin lesions,      erythema or scars, no tenderness to percussion or                   palpation,   range of motion normal   Lungs:     Clear to auscultation,respirations regular, even and                  unlabored    Heart:    Regular rhythm and normal rate, normal S1 and S2, no            murmur, no gallop, no rub, no click   Abdomen:     Normal bowel sounds, no masses, no organomegaly, soft        non-tender, non-distended, no guarding, no rebound                tenderness   Rectal:     Deferred   Extremities:   Moves all extremities well, no edema, no cyanosis, no             redness    RUE: viable with good blanching and capillary refills;  R wrist: minimal swelling and redness; wound is clean;       Results Review:   I reviewed the patient's new clinical results.  6/25/18: R wrist arthrocentesis aspirate culture: Scant growth (1+) Streptococcus dysgalactiae ssp equisimilis    6/26/18: R wrist arthrotomy fluid culture:     Scant growth (1+) Streptococcus, Beta Hemolytic, Group G         6/25, 6/26/18: joint fluid: no crystal     6/30/18: R wrist wound swab culture: (-)   7/4/2018: R dorsal wrist wound culture: (-)      Active Problems:    End stage renal disease    Fever    Sepsis    Nausea and vomiting    Atrial fibrillation    Long term current use of anticoagulant    Acute pain of right wrist    DNR (do not resuscitate)    Bacteremia due to Streptococcus    Streptococcal arthritis of right wrist    Opioid dependence    Chronic pain syndrome     Impression:  Right wrist septic arthritis s/p Right wrist: arthrotomy of radiocarpal and midcarpal joint; incision and drainage; placement of Tobramycin beads     Plans:  1. Improving overall. No plan for further surgical intervention. Continue antibiotics and continue dressing change. Patient may go to rehab from hand surgery standpoint.      Wilian Arredondo MD  07/05/18  9:53 PM  Office phone: [phone]

## 2018-07-06 NOTE — PROGRESS NOTES
"   LOS: 13 days    Patient Care Team:  Luis Antonio Abarca MD as PCP - General (Family Medicine)    Chief Complaint:  No chief complaint on file.    Follow up ESRD  Subjective     Interval History:     Review of Systems:   Seen and examined.   He stated that he feels better. Feels stronger oveall. Wants to go home. D/W nurse on bed side  Objective     Vital Signs  Temp:  [98 °F (36.7 °C)-98.5 °F (36.9 °C)] 98 °F (36.7 °C)  Heart Rate:  [65-80] 68  Resp:  [16-18] 16  BP: ()/(58-75) 124/75    Flowsheet Rows      First Filed Value   Admission Height  172.7 cm (68\") Documented at 06/23/2018 1100   Admission Weight   150 kg (330 lb 11 oz) Documented at 06/23/2018 1100          I/O this shift:  In: 210 [P.O.:210]  Out: -   No intake/output data recorded.    Intake/Output Summary (Last 24 hours) at 07/06/18 1213  Last data filed at 07/06/18 0930   Gross per 24 hour   Intake              210 ml   Output                0 ml   Net              210 ml       Physical Exam:  Awake, alert.  Sitting in chair.     Oral mucosa moist.  No jvd.  Heart irreg, irreg.    Lungs clear to auscultation.    Abd obese, soft, nontender.    Ext right wrist dressing.    Edema lower ext  better.    Left hand less edema.     LLE dressing in place.       Results Review:      Results from last 7 days  Lab Units 07/06/18  0527 07/05/18  0440 07/04/18  0534   SODIUM mmol/L 131* 134* 132*   POTASSIUM mmol/L 4.4 4.5 3.9   CHLORIDE mmol/L 88* 89* 90*   CO2 mmol/L 22.7 22.9 27.4   BUN mg/dL 85* 68* 50*   CREATININE mg/dL 8.95* 7.58* 5.92*   CALCIUM mg/dL 8.5* 9.0 8.5*   BILIRUBIN mg/dL  --  0.3  --    ALK PHOS U/L  --  66  --    ALT (SGPT) U/L  --  11  --    AST (SGOT) U/L  --  8  --    GLUCOSE mg/dL 93 107* 107*       Estimated Creatinine Clearance: 12.7 mL/min (A) (by C-G formula based on SCr of 8.95 mg/dL (H)).      Results from last 7 days  Lab Units 07/05/18  0440   PHOSPHORUS mg/dL 7.6*               Results from last 7 days  Lab Units " 07/06/18  0527 07/05/18  0440 07/04/18  0534 07/03/18  0524 07/02/18  0425   WBC 10*3/mm3 15.63* 17.73* 18.37* 14.48* 14.53*   HEMOGLOBIN g/dL 9.9* 11.1* 10.6* 10.4* 10.8*   PLATELETS 10*3/mm3 240 273 262 271 279         Results from last 7 days  Lab Units 07/06/18  0527 07/05/18 0440 07/04/18  0534 07/03/18  0524 07/02/18  0425   INR  1.25* 1.33* 1.65* 2.09* 1.76*         Imaging Results (last 24 hours)     ** No results found for the last 24 hours. **          aspirin 81 mg Oral Daily   ceFAZolin 1 g Intravenous Q24H   diltiaZEM  mg Oral Q24H   heparin (porcine) 5,000 Units Subcutaneous Q8H   insulin aspart 0-7 Units Subcutaneous 4x Daily With Meals & Nightly   lactulose 10 g Oral Daily   levETIRAcetam 1,000 mg Oral Q12H   levothyroxine 175 mcg Oral Daily   Morphine 100 mg Oral Daily   sennosides-docusate sodium 2 tablet Oral BID   Vortioxetine HBr 20 mg Oral Nightly   warfarin 5 mg Oral Daily       lactated ringers 9 mL/hr       Medication Review:   Current Facility-Administered Medications   Medication Dose Route Frequency Provider Last Rate Last Dose   • acetaminophen (TYLENOL) tablet 650 mg  650 mg Oral Q4H PRN Mark Helms MD       • albumin human 25 % IV SOLN 12.5 g  12.5 g Intravenous Q1H PRN Angle Nicholson MD       • aspirin EC tablet 81 mg  81 mg Oral Daily Mark Helms MD   81 mg at 07/06/18 0910   • bisacodyl (DULCOLAX) EC tablet 5 mg  5 mg Oral Daily PRN Mark Helms MD   5 mg at 07/04/18 1208   • ceFAZolin (ANCEF) IVPB 1 g  1 g Intravenous Q24H Amarilys Barry MD   1 g at 07/06/18 1212   • dextrose (D50W) solution 25 g  25 g Intravenous Q15 Min PRN Mark Helms MD       • dextrose (GLUTOSE) oral gel 15 g  15 g Oral Q15 Min PRN Mark Helms MD       • diltiaZEM CD (CARDIZEM CD) 24 hr capsule 120 mg  120 mg Oral Q24H Angle Nicholson MD   120 mg at 07/06/18 0910   • famotidine (PEPCID) tablet 40 mg  40 mg Oral BID PRN Mark Helms MD       • glucagon (human  recombinant) (GLUCAGEN DIAGNOSTIC) injection 1 mg  1 mg Subcutaneous PRN Mark Helms MD       • heparin (porcine) 5000 UNIT/ML injection 5,000 Units  5,000 Units Subcutaneous Q8H Mark Helms MD   5,000 Units at 07/06/18 0620   • hydrALAZINE (APRESOLINE) tablet 25 mg  25 mg Oral Q8H PRN Mark Helms MD       • insulin aspart (novoLOG) injection 0-7 Units  0-7 Units Subcutaneous 4x Daily With Meals & Nightly Mark Helms MD   2 Units at 07/05/18 2155   • lactated ringers infusion  9 mL/hr Intravenous Continuous PRN Beto Schrader MD       • lactulose (CHRONULAC) 10 GM/15ML solution 10 g  10 g Oral Daily Angle Nicholson MD   10 g at 07/06/18 0912   • levETIRAcetam (KEPPRA) tablet 1,000 mg  1,000 mg Oral Q12H Mark Helms MD   1,000 mg at 07/06/18 0910   • levothyroxine (SYNTHROID, LEVOTHROID) tablet 175 mcg  175 mcg Oral Daily Mark Helms MD   175 mcg at 07/06/18 0620   • LORazepam (ATIVAN) tablet 1 mg  1 mg Oral Q8H PRN Jacob Price MD   1 mg at 07/04/18 1059   • Morphine (MS CONTIN) 12 hr tablet 100 mg  100 mg Oral Daily Mark Helms MD   100 mg at 07/06/18 1034   • Naloxone HCl (NARCAN) injection 0.4 mg  0.4 mg Intravenous Q5 Min PRN Angle Nicholson MD       • nitroglycerin (NITROSTAT) SL tablet 0.4 mg  0.4 mg Sublingual Q5 Min PRN Mark Helms MD       • ondansetron (ZOFRAN) tablet 4 mg  4 mg Oral Q6H PRN Mark Helms MD        Or   • ondansetron ODT (ZOFRAN-ODT) disintegrating tablet 4 mg  4 mg Oral Q6H PRN Mark Helms MD        Or   • ondansetron (ZOFRAN) injection 4 mg  4 mg Intravenous Q6H PRN Mark Helms MD       • oxyCODONE-acetaminophen (PERCOCET) 5-325 MG per tablet 1 tablet  1 tablet Oral Q6H PRN Jacob Price MD   1 tablet at 07/06/18 0911   • sennosides-docusate sodium (SENOKOT-S) 8.6-50 MG tablet 2 tablet  2 tablet Oral BID Mark Helms MD   2 tablet at 07/06/18 0910   • sodium chloride 0.9 % flush 1-10 mL  1-10  mL Intravenous PRN Mark Helms MD       • Vortioxetine HBr tablet 20 mg  20 mg Oral Nightly Mark Helms MD   20 mg at 06/30/18 2027   • warfarin (COUMADIN) tablet 5 mg  5 mg Oral Daily Jacob Price MD   5 mg at 07/05/18 1739       Assessment/Plan   1.  ESRD: usual dialysis schedule MWF.Plan for dialysis today .   .   2.  Sepsis syndrome; Strep bacteremia and right wrist infection.  SP washout right wrist, AB beads.   Joint pain , left shoulder, left ankle improved.  3. Chronic pain with acute exacerbation with right wrist, left shoulder, left ankle pain.   4.  Obesity   5.  DM2 very well controlled on diet.   6.  Elevated LFT's. Recheck.   7.  pAfib on AC. Received. Vit K pre-op.   8.  HTN on lower dose cardizem.  Controlled.    9.  Anemia CKD.    10. Hypothyroid on replacement.        Andres Hall MD  07/06/18  12:13 PM

## 2018-07-06 NOTE — THERAPY TREATMENT NOTE
Acute Care - Physical Therapy Treatment Note  [facility]     Patient Name: Armando Gill  : 1960  MRN: [id number]  Today's Date: 2018             Admit Date: 2018    Visit Dx:    ICD-10-CM ICD-9-CM   1. Generalized weakness R53.1 780.79   2. Septicemia (CMS/HCC) A41.9 038.9     Patient Active Problem List   Diagnosis   • End stage renal disease (CMS/HCC)   • Secondary hyperparathyroidism of renal origin (CMS/HCC)   • Fever   • Sepsis (CMS/HCC)   • Nausea and vomiting   • Atrial fibrillation (CMS/HCC)   • Long term current use of anticoagulant   • Acute pain of right wrist   • DNR (do not resuscitate)   • Bacteremia due to Streptococcus   • Streptococcal arthritis of right wrist (CMS/HCC)   • Opioid dependence (CMS/[facility])   • Chronic pain syndrome   • Hyponatremia       Therapy Treatment          Rehabilitation Treatment Summary     Row Name 18 1112             Treatment Time/Intention    Discipline physical therapist  -      Document Type therapy note (daily note)  -      Subjective Information complains of;fatigue  -CH      Mode of Treatment physical therapy  -CH      Patient/Family Observations pt sitting EOB, no acute distress noted at rest  -CH      Patient Effort good  -CH      Existing Precautions/Restrictions fall  -CH      Recorded by [CH] Mague Lemus, PT 18 1204      Row Name 18 1112             Cognitive Assessment/Intervention    Additional Documentation Cognitive Assessment/Intervention (Group)  -CH      Recorded by [CH] Mague Lemus, PT 18 1204      Row Name 18 1112             Cognitive Assessment/Intervention- PT/OT    Orientation Status (Cognition) oriented x 4  -CH      Follows Commands (Cognition) WFL  -CH      Cognitive Function (Cognitive) WFL  -CH      Personal Safety Interventions fall prevention program maintained;gait belt;nonskid shoes/slippers when out of bed  -CH      Recorded by [CH] Mague Lemus, PT 18 1204       Row Name 07/06/18 1112             Bed Mobility Assessment/Treatment    Supine-Sit [place] (Bed Mobility) not tested  -CH      Sit-Supine [place] (Bed Mobility) verbal cues;nonverbal cues (demo/gesture);minimum assist (75% patient effort);2 person assist  -CH      Recorded by [] Mague Lemus, PT 07/06/18 1204      Row Name 07/06/18 1112             Sit-Stand Transfer    Sit-Stand [place] (Transfers) verbal cues;nonverbal cues (demo/gesture);contact guard;2 person assist  -CH      Assistive Device (Sit-Stand Transfers) cane, quad  -CH      Recorded by [] Mague Lemus, PT 07/06/18 1204      Row Name 07/06/18 1112             Stand-Sit Transfer    Stand-Sit [place] (Transfers) verbal cues;nonverbal cues (demo/gesture);contact guard;2 person assist  -CH      Assistive Device (Stand-Sit Transfers) cane, quad  -CH      Recorded by [] Mague Lemus, PT 07/06/18 1204      Row Name 07/06/18 1112             Gait/Stairs Assessment/Training    Gait/Stairs Assessment/Training gait/ambulation independence  -CH      [place] Level (Gait) verbal cues;nonverbal cues (demo/gesture);contact guard;2 person assist  -CH      Assistive Device (Gait) cane, quad  -CH      Distance in Feet (Gait) 80  -CH      Deviations/Abnormal Patterns (Gait) antalgic;base of support, wide;da decreased;stride length decreased  -CH      Comment (Gait/Stairs) pt required 3 standing rest breaks during latter half of gait training due to fatigue and SOA  -CH      Recorded by [] Mague Lemus, PT 07/06/18 1204      Row Name 07/06/18 1112             Positioning and Restraints    Pre-Treatment Position in bed  -CH      Post Treatment Position bed  -CH      In Bed supine;call light within reach;exit alarm on;encouraged to call for assist  -CH      Recorded by [] Mague Lemus, PT 07/06/18 1204      Row Name 07/06/18 1112             Pain Assessment    Additional Documentation Pain Scale: Numbers  Pre/Post-Treatment (Group)  -CH      Recorded by [CH] Mague Lemus, PT 07/06/18 1204      Row Name 07/06/18 1112             Pain Scale: Numbers Pre/Post-Treatment    Pain Scale: Numbers, Post-Treatment 7/10  -CH      Pain Location - Side Right  -CH      Pain Location wrist  -CH      Recorded by [CH] Mague Lemus, PT 07/06/18 1204      Row Name                Wound 06/24/18 1104 Left medial;anterior calf blisters    Wound - Properties Group Date first assessed: 06/24/18 [SANJU] Time first assessed: 1104 [SANJU] Present On Admission : no;picture taken [SANJU] Side: Left [SANJU] Orientation: medial;anterior [SANJU] Location: calf [SANJU] Type: blisters [SANJU] Additional Comments: dressing present on left leg, clean, dry and intact 6/26/2018 [DK] Recorded by:  [DK] Brenda Addison RN 06/26/18 1621 [SANJU] Saman Leroy RN 06/24/18 1105    Row Name                Wound 06/24/18 1108 Left lower other (see notes) ulceration, venous    Wound - Properties Group Date first assessed: 06/24/18 [SANJU] Time first assessed: 1108 [SANJU] Present On Admission : yes [SANJU] Side: Left [SANJU] Orientation: lower [SANJU] Location: other (see notes) [SANJU], under great toe   Type: ulceration, venous [SANJU] Recorded by:  [SANJU] Saman Leroy RN 06/24/18 1109    Row Name                Wound 06/26/18 1857 Right wrist incision    Wound - Properties Group Date first assessed: 06/26/18 [SL] Time first assessed: 1857 [SL] Side: Right [SL] Location: wrist [SL] Type: incision [SL] Recorded by:  [SL] Sheron Dutton RN 06/26/18 1857    Row Name                Wound 06/27/18 0847 Left anterior foot blisters    Wound - Properties Group Date first assessed: 06/27/18 [LF] Time first assessed: 0847 [LF] Present On Admission : no [LF] Side: Left [LF] Orientation: anterior [LF] Location: foot [LF] Type: blisters [LF] Recorded by:  [LF] Luisa Quesada RN 06/27/18 1449    Row Name 07/06/18 1112             Plan of Care Review    Plan of Care Reviewed With patient  -CH       Recorded by [CH] Mague Olguinfield, PT 07/06/18 1204      Row Name 07/06/18 1112             Outcome Summary/Treatment Plan (PT)    Anticipated Discharge Disposition (PT) skilled nursing facility  -      Recorded by [CH] Mague Lemus, PT 07/06/18 1204        User Key  (r) = Recorded By, (t) = Taken By, (c) = Cosigned By    Initials Name Effective Dates Discipline     Mague S Mariah, PT 04/03/18 -  PT    TRIPP Addison, RN 06/16/16 -  Nurse    MARLO Quesada, DENAE 06/16/16 -  Nurse    RODRÍGUEZ Dutton, DENAE 06/16/16 -  Nurse    SANJU Leroy, DENAE 06/16/16 -  Nurse          Wound 06/24/18 1104 Left medial;anterior calf blisters (Active)   Dressing Appearance no drainage 7/6/2018  9:10 AM   Base dressing in place, unable to visualize 7/6/2018  9:10 AM       Wound 06/24/18 1108 Left lower other (see notes) ulceration, venous (Active)   Dressing Appearance intact;dry 7/6/2018  9:10 AM   Closure None 7/5/2018  5:00 PM   Base dressing in place, unable to visualize 7/6/2018  9:10 AM   Periwound Temperature warm 7/5/2018  5:00 PM   Periwound Skin Turgor soft 7/5/2018  5:00 PM   Edges callused 7/5/2018  5:00 PM   Drainage Amount none 7/5/2018  5:00 PM   Dressing Care, Wound low-adherent;dressing changed 7/5/2018  5:00 PM       Wound 06/26/18 1857 Right wrist incision (Active)   Dressing Appearance intact;dry 7/6/2018  9:10 AM   Base dressing in place, unable to visualize 7/6/2018  9:10 AM       Wound 06/27/18 0847 Left anterior foot blisters (Active)   Dressing Appearance dry;intact 7/6/2018  9:10 AM   Base dressing in place, unable to visualize 7/6/2018  9:10 AM             Physical Therapy Education     Title: PT OT SLP Therapies (Done)     Topic: Physical Therapy (Done)     Point: Mobility training (Done)    Learning Progress Summary     Learner Status Readiness Method Response Comment Documented by    Patient Done Acceptance E,TB,KEITH KINSEY,KATEY   07/06/18 1204     Done Acceptance E,TB,D AMELIE,NR  CH  07/05/18 1154     Done Acceptance E,TB VU,DU   07/03/18 1040     Done Acceptance E,TB VU safety, gait, purse lipped breathing GR 07/01/18 1313     Done Acceptance E VU,NR  PM 06/29/18 1108     Done Acceptance E VU,NR   06/28/18 1001    Family Done Acceptance E,TB VU safety, gait, purse lipped breathing GR 07/01/18 1313          Point: Home exercise program (Done)    Learning Progress Summary     Learner Status Readiness Method Response Comment Documented by    Patient Done Acceptance E,TB,D VU,NR   07/05/18 1154     Done Acceptance E,TB VU,DU   07/03/18 1040     Done Acceptance E,TB VU safety, gait, purse lipped breathing GR 07/01/18 1313     Done Acceptance E VU,NR  PM 06/29/18 1108    Family Done Acceptance E,TB VU safety, gait, purse lipped breathing GR 07/01/18 1313          Point: Body mechanics (Done)    Learning Progress Summary     Learner Status Readiness Method Response Comment Documented by    Patient Done Acceptance E,TB,D VU,NR   07/06/18 1204     Done Acceptance E,TB,D VU,NR   07/05/18 1154     Done Acceptance E,TB VU,DU   07/03/18 1040     Done Acceptance E,TB VU safety, gait, purse lipped breathing GR 07/01/18 1313     Done Acceptance E VU,NR  PM 06/29/18 1108     Done Acceptance E VU,NR   06/28/18 1001    Family Done Acceptance E,TB VU safety, gait, purse lipped breathing GR 07/01/18 1313          Point: Precautions (Done)    Learning Progress Summary     Learner Status Readiness Method Response Comment Documented by    Patient Done Acceptance E,TB,D VU,NR   07/06/18 1204     Done Acceptance E,TB,D VU,NR   07/05/18 1154     Done Acceptance E,TB VU,DU   07/03/18 1040     Done Acceptance E,TB VU safety, gait, purse lipped breathing GR 07/01/18 1313     Done Acceptance E VU,NR  PM 06/29/18 1108     Done Acceptance E VU,NR   06/28/18 1001    Family Done Acceptance E,TB VU safety, gait, purse lipped breathing GR 07/01/18 1313                      User Key     Initials Effective  Dates Name Provider Type Discipline     06/08/18 -  Karli Quispe, PT Physical Therapist PT    GR 10/03/16 -  Kevin Livingston, PT Physical Therapist PT     04/03/18 -  Mague Leums, PT Physical Therapist PT    CW 03/07/18 -  Justin Holguin, PTA Physical Therapy Assistant PT    PM 06/25/18 -  Luis Curry, PT Student PT Student PT                    PT Recommendation and Plan  Anticipated Discharge Disposition (PT): skilled nursing facility  Outcome Summary/Treatment Plan (PT)  Anticipated Discharge Disposition (PT): skilled nursing facility  Plan of Care Reviewed With: patient  Outcome Summary: Pt demonstrates some increased activity tolerance and functional strength as he was able to increase his gait distance and required decreased assistance. Pt educated on importance of trying to do simple tasks independently to improve strength and mobility.          Outcome Measures     Row Name 07/06/18 1200 07/05/18 1100          How much help from another person do you currently need...    Turning from your back to your side while in flat bed without using bedrails? 3  -CH 3  -CH     Moving from lying on back to sitting on the side of a flat bed without bedrails? 3  -CH 3  -CH     Moving to and from a bed to a chair (including a wheelchair)? 3  -CH 3  -CH     Standing up from a chair using your arms (e.g., wheelchair, bedside chair)? 3  -CH 3  -CH     Climbing 3-5 steps with a railing? 1  -CH 1  -CH     To walk in hospital room? 3  -CH 3  -CH     AM-PAC 6 Clicks Score 16  -CH 16  -CH        Functional Assessment    Outcome Measure Options AM-PAC 6 Clicks Basic Mobility (PT)  -CH AM-PAC 6 Clicks Basic Mobility (PT)  -CH       User Key  (r) = Recorded By, (t) = Taken By, (c) = Cosigned By    Initials Name Provider Type     Mague Lemus, PT Physical Therapist           Time Calculation:         PT Charges     Row Name 07/06/18 1207             Time Calculation    Start Time 1058  -CH      Stop  Time 1112  -      Time Calculation (min) 14 min  -      PT Received On 07/06/18  -      PT - Next Appointment 07/07/18  -         Time Calculation- PT    Total Timed Code Minutes- PT 14 minute(s)  -        User Key  (r) = Recorded By, (t) = Taken By, (c) = Cosigned By    Initials Name Provider Type     Mague Lemus, PT Physical Therapist        Therapy Suggested Charges     Code   Minutes Charges    94819 (CPT®) Hc Pt Neuromusc Re Education Ea 15 Min      81095 (CPT®) Hc Pt Ther Proc Ea 15 Min      17543 (CPT®) Hc Gait Training Ea 15 Min      06308 (CPT®) Hc Pt Therapeutic Act Ea 15 Min 15 1    80200 (CPT®) Hc Pt Manual Therapy Ea 15 Min      58534 (CPT®) Hc Pt Iontophoresis Ea 15 Min      73105 (CPT®) Hc Pt Elec Stim Ea-Per 15 Min      94159 (CPT®) Hc Pt Ultrasound Ea 15 Min      57144 (CPT®) Hc Pt Self Care/Mgmt/Train Ea 15 Min      Total  15 1        Therapy Charges for Today     Code Description Service Date Service Provider Modifiers Qty    69290448950 HC PT THER PROC EA 15 MIN 7/5/2018 Mague Lemus, PT GP 1    63202999871 HC PT THER SUPP EA 15 MIN 7/5/2018 Mague Lemus, PT GP 1    47424143672 HC PT THER PROC EA 15 MIN 7/6/2018 Mague Lemus, PT GP 1    63388895550 HC PT THER SUPP EA 15 MIN 7/6/2018 Mague Lemus, PT GP 1          PT G-Codes  Outcome Measure Options: AM-PAC 6 Clicks Basic Mobility (PT)    Mague Lemus, PT  7/6/2018

## 2018-07-06 NOTE — DISCHARGE SUMMARY
"Date of Admission: 6/23/2018  Date of Discharge:  7/6/2018  Primary Care Physician: Luis Antonio Abarca MD     Discharge Diagnosis:  Active Hospital Problems    Diagnosis Date Noted   • Hyponatremia [E87.1] 07/01/2018   • Opioid dependence (CMS/[facility]) [F11.20] 06/28/2018   • Chronic pain syndrome [G89.4] 06/28/2018   • Bacteremia due to Streptococcus [R78.81] 06/25/2018   • Streptococcal arthritis of right wrist (CMS/[facility]) [M00.231] 06/25/2018   • Fever [R50.9] 06/23/2018   • Sepsis (CMS/[facility]) [A41.9] 06/23/2018   • Nausea and vomiting [R11.2] 06/23/2018   • Atrial fibrillation (CMS/HCC) [I48.91] 06/23/2018   • Long term current use of anticoagulant [Z79.01] 06/23/2018   • Acute pain of right wrist [M25.531] 06/23/2018   • DNR (do not resuscitate) [Z66] 06/23/2018   • End stage renal disease (CMS/[facility]) [N18.6] 05/04/2016      Resolved Hospital Problems    Diagnosis Date Noted Date Resolved   No resolved problems to display.       Presenting Problem/History of Present Illness:  Fever [R50.9]     Mr. Gill is a 57 y.o. male with a history of ESRD on HD MWF that presents to [facility] complaining of right wrist pain with swelling as well as fever and chills that woke him up form sleep today at about 3AM.  He denies injury to the wrist.  He does have some left shoulder pain but this is to a much lesser extent.  In the background he has been having nausea, vomiting, and non-bloody diarrhea for the past 1-2 days which was so severe that he missed his hemodialysis appointment yesterday.  He has had a chronic cough which has been unchanged.  He denies abdominal pain.  He states that there has been a \"GI Bug\" going around the HD clinics. Additionally he has been fasting for 12 hours daily for the past couple weeks in the hopes of losing enough weight to be considered for renal transplant.  He had a course of ciprofloxacin in May of this year for a respiratory infection but no abx since.  The patient presented " to [facility] with the above complaints and was found to have a markedly elevated WBC count.  He was given vancomycin and zosyn.  I have directly admitted him for further workup and treatment.    Hospital Course:  The patient is a 57 y.o. male who presented with septic arthritis and sepsis. He was admitted and Dr Arredondo, Hand Surgery, Dr Barry, Infectious Disease, Dr Schwartz, Orthopedic Surgery, and Dr Rome, Nephrology, were consulted. He had bacteremia from [facility] blood cultures. He was given empiric antibiotics. He underwent arthrocentesis in 6/25 of his right wrist. Washout with tobramycin beads on 06/26. His repeat blood cultures have been negative. He is on HD for ESRD. He also takes chronic high doses of narcotics for chronic pain. He will take Cefazolin for methicillin sensitive staph septicemia through 08/07/18 (2g M and W, 3g F after HD sessions). He will need Hand Surgery and Infectious disease follow ups. In addition he should follow up with Nephrology as planned.    He has Afib and coumadin was held for procedured. INR is still low and he has been resumed on home dose of coumadin. Repeat INR ordered for monitoring.    He worked with PT and OT and SNF recommended. Precert obtained and his can be discharged to SNF for continued therapy and HD.    Exam Today:  Constitutional: Alert. No distress.   HENT:   Head: Normocephalic and atraumatic.   Mouth/Throat: Oropharynx is clear and moist.   Eyes: Conjunctivae and EOM are normal. Pupils are equal, round, and reactive to light.   Neck: Normal range of motion. Neck supple.   Cardiovascular: Normal rate, irregular rhythm and intact distal pulses.    Pulmonary/Chest: Effort normal and breath sounds normal.   Abdominal: Soft. Bowel sounds are normal. There is no tenderness.   Musculoskeletal: He exhibits edema (2-3+ BLE edema (Chronic)). Right wrist dressed.  Neurological: He is alert and oriented to person, place. CN2-12 grossly intact.  Skin: Skin is warm  "and dry. He is not diaphoretic. Chronic venous stasis BLE. LLE dressed.  Psychiatric: He has a normal mood and affect. His speech is normal.     Results:  Specimen Information: Wrist, Right; Synovial Fluid        BF Culture Scant growth (1+) Streptococcus dysgalactiae ssp equisimilis        STREP GROUPING G              Gram Stain Result  Rare (1+) WBCs seen      No organisms seen            Resulting Agency: [facility]   Susceptibility      Streptococcus dysgalactiae ssp equisimilis     ZAKI     Ceftriaxone <=0.12 ug/ml\"><=0.12 ug/ml Susceptible     Clindamycin <=0.25 ug/ml\"><=0.25 ug/ml Susceptible     Erythromycin <=0.12 ug/ml\"><=0.12 ug/ml Susceptible     Levofloxacin 0.5 ug/ml Susceptible     Penicillin G <=0.06 ug/ml\"><=0.06 ug/ml Susceptible     Vancomycin 0.5 ug/ml Susceptible               CT Abdomen/Pelvis  1. Appearance of diffuse wall thickening concerning for nonspecific  colitis, as described, correlate clinically. Colonic diverticula, do not  appear inflamed, follow-up as indications persist.  2. Possible small choledocholithiasis, further evaluation could be  obtained.  3. The exam is limited by extensive artifact; an area of low-density  liver may be entirely artifactual, but could be evidence of a lesion,  advise follow-up evaluation with dedicated liver imaging.  4. Nonspecific prominent left groin/left femoral lymph nodes, clinical  correlation follow-up evaluation recommended.  5. Small indeterminate left lower lobe nodule.    TTE  · Calculated EF = 53%.  · Left ventricular systolic function is normal.  · Left ventricular wall thickness is consistent with borderline concentric hypertrophy.  · Right ventricular cavity is mildly dilated.  · Left atrial cavity size is mildly dilated.  · Mild tricuspid valve regurgitation is present.  · Technically difficult study with poor visualization of valvular structures. All measurements are estimates.    Procedures Performed:  Procedure(s):  RIGHT WRIST " INCISION AND DRAINAGE       Consults:   Consults     Date and Time Order Name Status Description    7/1/2018 1139 Inpatient Psychiatrist Consult Completed     6/25/2018 1839 Inpatient Hand Surgery Consult Completed     6/25/2018 1518 Inpatient Orthopedic Surgery Consult Completed     6/25/2018 1434 Inpatient Orthopedic Surgery Consult      6/24/2018 1009 Inpatient Infectious Diseases Consult Completed     6/23/2018 1243 Inpatient Nephrology Consult             Discharge Disposition:  Skilled Nursing Facility (DC - External)    Discharge Medications:     Discharge Medications      New Medications      Instructions Start Date   ceFAZolin 2-3 GM-% IVPB  Commonly known as:  ANCEF   2g IV after HD on Mondays and Wednesdays. 3g IV after HD on Fridays. Dispense QS through 08/07/18.      famotidine 40 MG tablet  Commonly known as:  PEPCID   40 mg, Oral, 2 Times Daily PRN      lactulose 10 GM/15ML solution  Commonly known as:  CHRONULAC   10 g, Oral, Daily   Start Date:  7/7/2018     oxyCODONE-acetaminophen 5-325 MG per tablet  Commonly known as:  PERCOCET   1 tablet, Oral, Every 8 Hours PRN      sennosides-docusate sodium 8.6-50 MG tablet  Commonly known as:  SENOKOT-S   2 tablets, Oral, 2 Times Daily, Hold for loose stool         Changes to Medications      Instructions Start Date   diltiaZEM  MG 24 hr capsule  Commonly known as:  CARDIZEM CD  What changed:  · medication strength  · how much to take   120 mg, Oral, Every Night at Bedtime         Continue These Medications      Instructions Start Date   aspirin 81 MG EC tablet   81 mg, Oral      levETIRAcetam 1000 MG tablet  Commonly known as:  KEPPRA   1,000 mg, Oral, 2 Times Daily      LORazepam 0.5 MG tablet  Commonly known as:  ATIVAN   0.5 mg, Oral, Every 8 Hours PRN      Morphine 100 MG 12 hr tablet  Commonly known as:  MS CONTIN   100 mg, Oral, Daily      pravastatin 40 MG tablet  Commonly known as:  PRAVACHOL   40 mg, Oral, Daily      SYNTHROID 175 MCG  tablet  Generic drug:  levothyroxine   175 mcg, Oral, Daily      TRINTELLIX 10 MG tablet  Generic drug:  Vortioxetine HBr   20 mg, Oral, Nightly      vitamin D 83682 units capsule capsule  Commonly known as:  ERGOCALCIFEROL   50,000 Units, Oral, Every 30 Days      warfarin 2.5 MG tablet  Commonly known as:  COUMADIN   5 mg, Oral, Daily Warfarin         Stop These Medications    bumetanide 1 MG tablet  Commonly known as:  BUMEX     hydrALAZINE 25 MG tablet  Commonly known as:  APRESOLINE            Discharge Diet:   Diet Instructions     Diet: Consistent Carbohydrate, Renal       Discharge Diet:   Consistent Carbohydrate  Renal             Activity at Discharge:   Activity Instructions     Activity as Tolerated       With restrictions per hand surgery.    With restrictions per hand surgery.          Follow-up Appointments:  Future Appointments  Date Time Provider Department Center   7/12/2018 9:00 AM Luis Astudillo MD [facility]   8/6/2018 11:20 AM Amarilys Barry MD [facility] None     Additional Instructions for the Follow-ups that You Need to Schedule     Discharge Follow-up with PCP    As directed      Currently Documented PCP:  Luis Antonio Abarca MD  PCP Phone Number:  [phone]    Follow Up Details:  1-2 weeks         Discharge Follow-up with Specialty: Infectious Disease    As directed      Specialty:  Infectious Disease    Follow Up Details:  as scheduled         Discharge Follow-up with Specified Provider: Hand Surgery    As directed      To:  Hand Surgery    Follow Up Details:  as scheduled         Discharge Follow-up with Specified Provider: Nephrology    As directed      To:  Nephrology    Follow Up Details:  as scheduled         Protime-INR     Jul 11, 2018 (Approximate)      Send to facility MD. [facility].    Order Comments:  Send to facility MD. [facility].     Is Patient on anti-coag:  Yes         CBC & Differential  (Once a week)   Jul 06, 2019      Fax to [phone], Dr Barry (Infectious Disease).  Thanks.    Order Comments:  Fax to [phone], Dr Barry (Infectious Disease). Thanks.     Manual Differential:  No               Test Results Pending at Discharge:   Order Current Status    Wound Culture - Surgical Site, Wrist, Right Preliminary result           Luis Soler MD  07/06/18  12:16 PM    Time Spent on Discharge Activities: >30 minutes

## 2018-07-06 NOTE — PROGRESS NOTES
Continued Stay Note   Rakan     Patient Name: Armando Gill  MRN: [id number]  Today's Date: 7/6/2018    Admit Date: 6/23/2018          Discharge Plan     Row Name 07/06/18 1316       Plan    Plan [facility]    Patient/Family in Agreement with Plan yes    Plan Comments DC orders in [facility].  Dialysis is scheduled for 2:00 p.m.  Spoke to patient and wife at bedside, [facility] scheduled for 8:30 p.m.  Spoke with [facility] - patient will go to skilled bed.  Packet to [facility].  BHumeniuk RN            Expected Discharge Date and Time     Expected Discharge Date Expected Discharge Time    Jul 6, 2018             Becky S. Humeniuk, RN

## 2018-07-06 NOTE — DISCHARGE INSTRUCTIONS
Wound care right wrist: Dry dressing changes every other day    Wound care left leg: apply vaseline gauze, cover with ABD pad and kerlix roll. Change PRN    Wound care left great toe: apply silicone border dressing Left plantar great toe. Change PRN

## 2018-07-06 NOTE — PLAN OF CARE
Problem: Patient Care Overview  Goal: Plan of Care Review  Outcome: Ongoing (interventions implemented as appropriate)   07/06/18 1201   Coping/Psychosocial   Plan of Care Reviewed With patient   OTHER   Outcome Summary Pt demonstrates some increased activity tolerance and functional strength as he was able to increase his gait distance and required decreased assistance. Pt educated on importance of trying to do simple tasks independently to improve strength and mobility.

## 2018-07-07 LAB
BACTERIA SPEC AEROBE CULT: NO GROWTH
GRAM STN SPEC: NORMAL
GRAM STN SPEC: NORMAL

## 2018-07-10 NOTE — PROGRESS NOTES
Case Management Discharge Note    Final Note: Discharged to [facility], [facility].     Destination - Selection Complete     Service Request Status Selected Specialties Address Phone Number Fax Number    [facility] [address] [place] [id number] [phone]      Durable Medical Equipment     No service has been selected for the patient.      Dialysis/Infusion     No service has been selected for the patient.      Home Medical Care     No service has been selected for the patient.      Social Care     No service has been selected for the patient.        [facility]: [facility]    Final Discharge Disposition Code: 03 - skilled nursing facility (SNF)

## 2018-07-12 ENCOUNTER — OFFICE VISIT (OUTPATIENT)
Dept: WOUND CARE | Facility: HOSPITAL | Age: 58
End: 2018-07-12
Attending: SURGERY

## 2018-07-19 ENCOUNTER — OFFICE VISIT (OUTPATIENT)
Dept: WOUND CARE | Facility: HOSPITAL | Age: 58
End: 2018-07-19
Attending: SURGERY

## 2018-07-19 ENCOUNTER — APPOINTMENT (OUTPATIENT)
Dept: WOUND CARE | Facility: HOSPITAL | Age: 58
End: 2018-07-19
Attending: SURGERY

## 2018-07-26 ENCOUNTER — TELEPHONE (OUTPATIENT)
Dept: INFECTIOUS DISEASES | Facility: CLINIC | Age: 58
End: 2018-07-26

## 2018-07-26 ENCOUNTER — OFFICE VISIT (OUTPATIENT)
Dept: WOUND CARE | Facility: HOSPITAL | Age: 58
End: 2018-07-26
Attending: SURGERY

## 2018-07-26 PROCEDURE — G0463 HOSPITAL OUTPT CLINIC VISIT: HCPCS

## 2018-07-26 NOTE — TELEPHONE ENCOUNTER
Patient has an appointment w/Dr. Barry on 08/06.  However, patient has dialysis on MWF and has to be there at 11:45 located in [place]. Patient is wondering if Dr. Barry could possibly see him earlier, so that he can make his dialysis appointment.

## 2018-08-06 ENCOUNTER — OFFICE VISIT (OUTPATIENT)
Dept: INFECTIOUS DISEASES | Facility: CLINIC | Age: 58
End: 2018-08-06

## 2018-08-06 VITALS
HEIGHT: 68 IN | SYSTOLIC BLOOD PRESSURE: 122 MMHG | HEART RATE: 81 BPM | BODY MASS INDEX: 47.74 KG/M2 | TEMPERATURE: 98 F | WEIGHT: 315 LBS | DIASTOLIC BLOOD PRESSURE: 74 MMHG

## 2018-08-06 DIAGNOSIS — B95.5 STREPTOCOCCAL BACTEREMIA: Primary | ICD-10-CM

## 2018-08-06 DIAGNOSIS — R78.81 STREPTOCOCCAL BACTEREMIA: Primary | ICD-10-CM

## 2018-08-06 DIAGNOSIS — M00.231 STREPTOCOCCAL ARTHRITIS OF RIGHT WRIST (HCC): ICD-10-CM

## 2018-08-06 PROCEDURE — 99214 OFFICE O/P EST MOD 30 MIN: CPT | Performed by: INTERNAL MEDICINE

## 2018-08-06 RX ORDER — BUSPIRONE HYDROCHLORIDE 5 MG/1
5 TABLET ORAL 2 TIMES DAILY
COMMUNITY
End: 2019-02-27

## 2018-08-06 RX ORDER — CLINDAMYCIN HYDROCHLORIDE 300 MG/1
300 CAPSULE ORAL 3 TIMES DAILY
COMMUNITY
End: 2018-10-10 | Stop reason: HOSPADM

## 2018-08-06 NOTE — PROGRESS NOTES
Reason for clinic visits: Follow up for streptococcal bacteremia and right wrist septic arthritis    HPI: Armando Gill is a 57 y.o. male who was last seen in the hospital on July 4.  He was seen by hand surgery and started empirically on clindamycin due to persistent drainage from the right wrist wound.  This was done on July 20.  Since that time he has done well.  He has very minimal drainage at this point.  No erythema.  He denies any fevers chills or night sweats.  He denies any abdominal pain nausea vomiting or diarrhea.  No shortness of breath or cough.  His lower extremities are doing well although swelling persists.  No rashes.    Past Medical History:   Diagnosis Date   • Anxiety    • Arthritis    • Asthma    • Asymptomatic hyperuricemia    • Atrial fibrillation (CMS/HCC)    • Callus of foot     LEFT FOOT , PRESENTLY ATTENDING WOUND CLINIC [facility]   • Cancer of pituitary gland (CMS/HCC)    • Depression    • Diabetes mellitus (CMS/HCC)     type 2, A1C of 5 1 month ago by patient report   • Dialysis patient (CMS/HCC)     MON WED FRI   • Dyslipidemia    • End stage renal disease (CMS/HCC)    • Gout    • History of anemia    • History of colon polyps    • Hyperparathyroidism (CMS/HCC)    • Hyperprolactinemia (CMS/HCC)    • Hypertension    • Hypogonadism, male    • Hypothyroidism    • Male erectile disorder of organic origin    • Neuropathy    • Obstructive sleep apnea    • Presence of arterial-venous shunt (for dialysis) (CMS/HCC)    • Seizure disorder (CMS/HCC)      2 YRS AGO   • Vitamin D deficiency disease        Past Surgical History:   Procedure Laterality Date   • CATARACT EXTRACTION Bilateral 2009   • CHOLECYSTECTOMY  2002    Performed in [place]   • COLONOSCOPY W/ POLYPECTOMY N/A 11/2015    Dr. Bolivar   • INCISION AND DRAINAGE ARM Right 6/26/2018    Procedure: RIGHT WRIST INCISION AND DRAINAGE;  Surgeon: Wilian Arredondo MD;  Location: [facility] OR;  Service: Hand   • INGUINAL HERNIA REPAIR Left 2002     Open Inguinal   • THYROIDECTOMY Right 1/6/2017    Procedure: FOUR GLAND PARATHYROIDECTOMY WITH AUTOTRANSPLANT INTO RIGHT FOREARM, INTRA-OPERATIVE PTH MEASUREMENT, PARTIAL THYMECTOMY;  Surgeon: Freida Morfin MD;  Location: [facility];  Service:    • THYROIDECTOMY N/A 8/2/2017    Procedure: left thyroid lobectomy, Left superior parathyroidectomy with autotransplant into right forearm, intra-operative pth monitoring;  Surgeon: Freida Morfin MD;  Location: [facility];  Service:    • TRACHEOSTOMY N/A 10/22/2007    W/ REVISION OF THYROID ISTHMUS, DUE TO SLEEP APNEA; ALLOWED TO GROW CLOSED AFTER ONE YEAR DUE TO LACK OF RELIEF OF SLEEP APNEA SYMPTOMS, DR. MAXIMO BELL   • TRACHEOSTOMY N/A 05/21/2009    REVISION, DR. MAXIMO BELL   • TRACHEOSTOMY N/A 04/25/2009    REVISION, DR. MAXIMO BELL   • VASCULAR SURGERY Left 2014    Acess in Left Arm-Dr. Guzman       Social History   reports that he has never smoked. He has never used smokeless tobacco. He reports that he does not drink alcohol or use drugs.    Family History  family history includes Heart disease in his father and sister.    Allergies   Allergen Reactions   • Adhesive Tape Itching and Other (See Comments)     Pulls of the patient's skin on arms      • Latex Rash   • Sulfa Antibiotics Other (See Comments)     Causes headaches         The medication list has been reviewed and updated.     Review of Systems  Pertinent items are noted in HPI, all other systems reviewed and negative    Vital Signs   Temp:  [98 °F (36.7 °C)] 98 °F (36.7 °C)  Heart Rate:  [81] 81  BP: (122)/(74) 122/74    Physical Exam:   General: In no acute distress  Cardiovascular: Normal rate, regular rhythm, trish S1 and S2, no murmurs, rubs, or gallops    Respiratory: Lungs are clear to ascultation bilaterally, no wheezing   GI: Abdomen is soft, non-tender, non-distended, positive bowel sounds bilaterallydeformity  Skin: No rashes, right wrist incision with no purulence or erythema,  left arm fistula with good thrill and bruit    Lab Results   Component Value Date    WBC 15.63 (H) 07/06/2018    HGB 9.9 (L) 07/06/2018    HCT 32.2 (L) 07/06/2018    MCV 97.0 (H) 07/06/2018     07/06/2018       Lab Results   Component Value Date    GLUCOSE 93 07/06/2018    BUN 85 (H) 07/06/2018    CREATININE 8.95 (H) 07/06/2018    EGFRIFNONA 6 (L) 07/06/2018    BCR 9.5 07/06/2018    CO2 22.7 07/06/2018    CALCIUM 8.5 (L) 07/06/2018    ALBUMIN 3.60 07/05/2018    AST 8 07/05/2018    ALT 11 07/05/2018       Lab Results   Component Value Date    SEDRATE 25 (H) 06/13/2017       Lab Results   Component Value Date    CRP 10.97 (H) 06/28/2018 6/26 R wrist OR cx Streptococcus dysgalactiae   6/25 R wirst cx Streptococcus dysgalactiae   6/23 BCx Neg x 2     6/23 OSH BCx 2/2 Strep    Assessment:  This is a 57 y.o. male who presents to clinic today for follow up for streptococcal bacteremia and right wrist septic arthritis status post incision and drainage on June 26.  At this time the patient completed a 6 week course of IV cefazolin that has been dosed with dialysis.  His labs have remained stable.  He is also on clindamycin that was started by his hand surgeon.  His last dose of cefazolin is today.  I want him to have repeat blood cultures 1 week after stopping all antibiotics.  I provided him with a prescription for blood cultures to be given to his dialysis center.    Plan:   1.  Discontinue cefazolin after today's dose  2.  Clindamycin as per hand surgery  3.  Obtain blood cultures ×2 one week after stopping all antibiotics    Return to Infectious Disease clinic in [place]

## 2018-08-13 ENCOUNTER — TELEPHONE (OUTPATIENT)
Dept: INFECTIOUS DISEASES | Facility: CLINIC | Age: 58
End: 2018-08-13

## 2018-08-13 NOTE — TELEPHONE ENCOUNTER
I spoke w/pts wife, Roxie to confirm that pt completed clindamycin on 08/10/18.  Roxie states that pt did complete clindamycin on 08/10.  So therefore, we will fax an order for blood cultures to [facility] to be drawn on 08/17/18.    [phone] p  [phone] f

## 2018-08-17 NOTE — TELEPHONE ENCOUNTER
I spoke w/Lzu NICHOLE at [facility] and confirmed that patients cultures are to be drawn today.  Luz NICHOLE states that she believes cultures were drawn on Wed, but she will check.  I informed her that we need a set drawn today as results needed to be 1 week post abx tx.  Luz NICHOLE voiced understanding and will repeat cultures.

## 2018-10-07 ENCOUNTER — APPOINTMENT (OUTPATIENT)
Dept: CT IMAGING | Facility: HOSPITAL | Age: 58
End: 2018-10-07

## 2018-10-07 ENCOUNTER — HOSPITAL ENCOUNTER (INPATIENT)
Facility: HOSPITAL | Age: 58
LOS: 3 days | Discharge: HOME | End: 2018-10-10
Attending: EMERGENCY MEDICINE | Admitting: INTERNAL MEDICINE

## 2018-10-07 DIAGNOSIS — R56.9 SEIZURE (HCC): Primary | ICD-10-CM

## 2018-10-07 DIAGNOSIS — E87.5 HYPERKALEMIA: ICD-10-CM

## 2018-10-07 DIAGNOSIS — Z99.2 ESRD ON DIALYSIS (HCC): ICD-10-CM

## 2018-10-07 DIAGNOSIS — N18.6 ESRD ON DIALYSIS (HCC): ICD-10-CM

## 2018-10-07 LAB
ALBUMIN SERPL-MCNC: 4.8 G/DL (ref 3.5–5.2)
ALBUMIN/GLOB SERPL: 1.2 G/DL
ALP SERPL-CCNC: 62 U/L (ref 39–117)
ALT SERPL W P-5'-P-CCNC: 14 U/L (ref 1–41)
ANION GAP SERPL CALCULATED.3IONS-SCNC: 15.1 MMOL/L
ANION GAP SERPL CALCULATED.3IONS-SCNC: 18 MMOL/L
AST SERPL-CCNC: 10 U/L (ref 1–40)
BASOPHILS # BLD AUTO: 0.05 10*3/MM3 (ref 0–0.2)
BASOPHILS NFR BLD AUTO: 0.5 % (ref 0–1.5)
BILIRUB SERPL-MCNC: 0.4 MG/DL (ref 0.1–1.2)
BUN BLD-MCNC: 74 MG/DL (ref 6–20)
BUN BLD-MCNC: 76 MG/DL (ref 6–20)
BUN/CREAT SERPL: 10.4 (ref 7–25)
BUN/CREAT SERPL: 9.8 (ref 7–25)
CALCIUM SPEC-SCNC: 10.5 MG/DL (ref 8.6–10.5)
CALCIUM SPEC-SCNC: 10.9 MG/DL (ref 8.6–10.5)
CHLORIDE SERPL-SCNC: 90 MMOL/L (ref 98–107)
CHLORIDE SERPL-SCNC: 91 MMOL/L (ref 98–107)
CO2 SERPL-SCNC: 28.9 MMOL/L (ref 22–29)
CO2 SERPL-SCNC: 29 MMOL/L (ref 22–29)
CREAT BLD-MCNC: 7.31 MG/DL (ref 0.76–1.27)
CREAT BLD-MCNC: 7.52 MG/DL (ref 0.76–1.27)
D-LACTATE SERPL-SCNC: 1.3 MMOL/L (ref 0.5–2)
DEPRECATED RDW RBC AUTO: 57.2 FL (ref 37–54)
EOSINOPHIL # BLD AUTO: 0.05 10*3/MM3 (ref 0–0.7)
EOSINOPHIL NFR BLD AUTO: 0.5 % (ref 0.3–6.2)
ERYTHROCYTE [DISTWIDTH] IN BLOOD BY AUTOMATED COUNT: 16.9 % (ref 11.5–14.5)
GFR SERPL CREATININE-BSD FRML MDRD: 8 ML/MIN/1.73
GFR SERPL CREATININE-BSD FRML MDRD: 8 ML/MIN/1.73
GFR SERPL CREATININE-BSD FRML MDRD: ABNORMAL ML/MIN/1.73
GFR SERPL CREATININE-BSD FRML MDRD: ABNORMAL ML/MIN/1.73
GLOBULIN UR ELPH-MCNC: 3.9 GM/DL
GLUCOSE BLD-MCNC: 187 MG/DL (ref 65–99)
GLUCOSE BLD-MCNC: 270 MG/DL (ref 65–99)
GLUCOSE BLDC GLUCOMTR-MCNC: 168 MG/DL (ref 70–130)
HCT VFR BLD AUTO: 46.9 % (ref 40.4–52.2)
HGB BLD-MCNC: 14.6 G/DL (ref 13.7–17.6)
IMM GRANULOCYTES # BLD: 0.01 10*3/MM3 (ref 0–0.03)
IMM GRANULOCYTES NFR BLD: 0.1 % (ref 0–0.5)
INR PPP: 2.05 (ref 0.9–1.1)
LYMPHOCYTES # BLD AUTO: 0.45 10*3/MM3 (ref 0.9–4.8)
LYMPHOCYTES NFR BLD AUTO: 4.9 % (ref 19.6–45.3)
MCH RBC QN AUTO: 28.5 PG (ref 27–32.7)
MCHC RBC AUTO-ENTMCNC: 31.1 G/DL (ref 32.6–36.4)
MCV RBC AUTO: 91.6 FL (ref 79.8–96.2)
MONOCYTES # BLD AUTO: 0.43 10*3/MM3 (ref 0.2–1.2)
MONOCYTES NFR BLD AUTO: 4.7 % (ref 5–12)
NEUTROPHILS # BLD AUTO: 8.24 10*3/MM3 (ref 1.9–8.1)
NEUTROPHILS NFR BLD AUTO: 89.4 % (ref 42.7–76)
NRBC BLD MANUAL-RTO: 0 /100 WBC (ref 0–0)
PLATELET # BLD AUTO: 183 10*3/MM3 (ref 140–500)
PMV BLD AUTO: 10.2 FL (ref 6–12)
POTASSIUM BLD-SCNC: 7.8 MMOL/L (ref 3.5–5.2)
POTASSIUM BLD-SCNC: 7.9 MMOL/L (ref 3.5–5.2)
PROCALCITONIN SERPL-MCNC: 0.24 NG/ML (ref 0.1–0.25)
PROT SERPL-MCNC: 8.7 G/DL (ref 6–8.5)
PROTHROMBIN TIME: 22.8 SECONDS (ref 11.7–14.2)
RBC # BLD AUTO: 5.12 10*6/MM3 (ref 4.6–6)
SODIUM BLD-SCNC: 135 MMOL/L (ref 136–145)
SODIUM BLD-SCNC: 137 MMOL/L (ref 136–145)
WBC NRBC COR # BLD: 9.22 10*3/MM3 (ref 4.5–10.7)

## 2018-10-07 PROCEDURE — 85025 COMPLETE CBC W/AUTO DIFF WBC: CPT | Performed by: EMERGENCY MEDICINE

## 2018-10-07 PROCEDURE — 70450 CT HEAD/BRAIN W/O DYE: CPT

## 2018-10-07 PROCEDURE — 25010000002 ONDANSETRON PER 1 MG: Performed by: EMERGENCY MEDICINE

## 2018-10-07 PROCEDURE — 83605 ASSAY OF LACTIC ACID: CPT | Performed by: EMERGENCY MEDICINE

## 2018-10-07 PROCEDURE — 25010000002 LORAZEPAM PER 2 MG

## 2018-10-07 PROCEDURE — 99285 EMERGENCY DEPT VISIT HI MDM: CPT

## 2018-10-07 PROCEDURE — 63710000001 INSULIN REGULAR HUMAN PER 5 UNITS: Performed by: EMERGENCY MEDICINE

## 2018-10-07 PROCEDURE — 25010000002 CALCIUM GLUCONATE PER 10 ML: Performed by: EMERGENCY MEDICINE

## 2018-10-07 PROCEDURE — 25010000003 LEVETIRACETAM IN NACL 0.75% 1000 MG/100ML SOLUTION: Performed by: EMERGENCY MEDICINE

## 2018-10-07 PROCEDURE — 84145 PROCALCITONIN (PCT): CPT | Performed by: EMERGENCY MEDICINE

## 2018-10-07 PROCEDURE — 85610 PROTHROMBIN TIME: CPT | Performed by: EMERGENCY MEDICINE

## 2018-10-07 PROCEDURE — 80053 COMPREHEN METABOLIC PANEL: CPT | Performed by: EMERGENCY MEDICINE

## 2018-10-07 PROCEDURE — 93010 ELECTROCARDIOGRAM REPORT: CPT | Performed by: INTERNAL MEDICINE

## 2018-10-07 PROCEDURE — 93005 ELECTROCARDIOGRAM TRACING: CPT | Performed by: EMERGENCY MEDICINE

## 2018-10-07 PROCEDURE — 82962 GLUCOSE BLOOD TEST: CPT

## 2018-10-07 RX ORDER — LORAZEPAM 2 MG/ML
1 INJECTION INTRAMUSCULAR ONCE
Status: COMPLETED | OUTPATIENT
Start: 2018-10-07 | End: 2018-10-07

## 2018-10-07 RX ORDER — SODIUM POLYSTYRENE SULFONATE 15 G/60ML
30 SUSPENSION ORAL; RECTAL ONCE
Status: COMPLETED | OUTPATIENT
Start: 2018-10-07 | End: 2018-10-07

## 2018-10-07 RX ORDER — ONDANSETRON 2 MG/ML
4 INJECTION INTRAMUSCULAR; INTRAVENOUS ONCE
Status: COMPLETED | OUTPATIENT
Start: 2018-10-07 | End: 2018-10-07

## 2018-10-07 RX ORDER — SODIUM CHLORIDE 0.9 % (FLUSH) 0.9 %
10 SYRINGE (ML) INJECTION AS NEEDED
Status: DISCONTINUED | OUTPATIENT
Start: 2018-10-07 | End: 2018-10-10 | Stop reason: HOSPADM

## 2018-10-07 RX ORDER — LEVETIRACETAM 10 MG/ML
1000 INJECTION INTRAVASCULAR ONCE
Status: COMPLETED | OUTPATIENT
Start: 2018-10-07 | End: 2018-10-07

## 2018-10-07 RX ORDER — DEXTROSE MONOHYDRATE 25 G/50ML
50 INJECTION, SOLUTION INTRAVENOUS ONCE
Status: COMPLETED | OUTPATIENT
Start: 2018-10-07 | End: 2018-10-07

## 2018-10-07 RX ORDER — LORAZEPAM 2 MG/ML
INJECTION INTRAMUSCULAR
Status: COMPLETED
Start: 2018-10-07 | End: 2018-10-07

## 2018-10-07 RX ADMIN — LORAZEPAM 1 MG: 2 INJECTION INTRAMUSCULAR at 23:22

## 2018-10-07 RX ADMIN — ONDANSETRON 4 MG: 2 INJECTION INTRAMUSCULAR; INTRAVENOUS at 22:00

## 2018-10-07 RX ADMIN — SODIUM BICARBONATE 50 MEQ: 84 INJECTION, SOLUTION INTRAVENOUS at 22:50

## 2018-10-07 RX ADMIN — LEVETIRACETAM 1000 MG: 10 INJECTION INTRAVENOUS at 23:26

## 2018-10-07 RX ADMIN — LORAZEPAM 1 MG: 2 INJECTION INTRAMUSCULAR; INTRAVENOUS at 23:22

## 2018-10-07 RX ADMIN — HUMAN INSULIN 10 UNITS: 100 INJECTION, SOLUTION SUBCUTANEOUS at 22:46

## 2018-10-07 RX ADMIN — DEXTROSE MONOHYDRATE 50 ML: 25 INJECTION, SOLUTION INTRAVENOUS at 22:47

## 2018-10-07 RX ADMIN — SODIUM POLYSTYRENE SULFONATE 30 G: 15 SUSPENSION ORAL; RECTAL at 22:51

## 2018-10-07 RX ADMIN — CALCIUM GLUCONATE 1 G: 98 INJECTION, SOLUTION INTRAVENOUS at 23:40

## 2018-10-08 ENCOUNTER — APPOINTMENT (OUTPATIENT)
Dept: MRI IMAGING | Facility: HOSPITAL | Age: 58
End: 2018-10-08

## 2018-10-08 PROBLEM — E11.9 DM TYPE 2 (DIABETES MELLITUS, TYPE 2) (HCC): Status: ACTIVE | Noted: 2018-10-08

## 2018-10-08 PROBLEM — E29.1 EUNUCHOIDISM: Status: ACTIVE | Noted: 2018-10-08

## 2018-10-08 PROBLEM — Z99.81 ON HOME OXYGEN THERAPY: Status: ACTIVE | Noted: 2018-10-08

## 2018-10-08 PROBLEM — E78.5 HYPERLIPIDEMIA LDL GOAL <70: Status: ACTIVE | Noted: 2018-10-08

## 2018-10-08 PROBLEM — G47.33 OSA (OBSTRUCTIVE SLEEP APNEA): Status: ACTIVE | Noted: 2018-10-08

## 2018-10-08 PROBLEM — N18.9 CKD (CHRONIC KIDNEY DISEASE): Status: ACTIVE | Noted: 2018-10-08

## 2018-10-08 PROBLEM — R53.83 FATIGUE: Status: ACTIVE | Noted: 2018-10-08

## 2018-10-08 PROBLEM — E03.9 ADULT HYPOTHYROIDISM: Status: ACTIVE | Noted: 2018-10-08

## 2018-10-08 PROBLEM — D49.7 NEOPLASM OF PITUITARY GLAND: Status: ACTIVE | Noted: 2018-10-08

## 2018-10-08 PROBLEM — E78.5 DYSLIPIDEMIA: Status: ACTIVE | Noted: 2018-10-08

## 2018-10-08 PROBLEM — I10 ESSENTIAL HYPERTENSION: Status: ACTIVE | Noted: 2018-10-08

## 2018-10-08 PROBLEM — E55.9 AVITAMINOSIS D: Status: ACTIVE | Noted: 2018-10-08

## 2018-10-08 PROBLEM — J45.909 ASTHMA: Status: ACTIVE | Noted: 2018-05-14

## 2018-10-08 PROBLEM — E79.0 ELEVATED BLOOD URIC ACID LEVEL: Status: ACTIVE | Noted: 2018-10-08

## 2018-10-08 PROBLEM — I27.20 PULMONARY HYPERTENSION (HCC): Status: ACTIVE | Noted: 2018-10-08

## 2018-10-08 PROBLEM — I25.10 CAD IN NATIVE ARTERY: Status: ACTIVE | Noted: 2018-10-08

## 2018-10-08 PROBLEM — E22.1 HYPERPROLACTINEMIA (HCC): Status: ACTIVE | Noted: 2018-10-08

## 2018-10-08 LAB
ALBUMIN SERPL-MCNC: 4.2 G/DL (ref 3.5–5.2)
ANION GAP SERPL CALCULATED.3IONS-SCNC: 15.4 MMOL/L
ANION GAP SERPL CALCULATED.3IONS-SCNC: 16.9 MMOL/L
BASOPHILS # BLD AUTO: 0.03 10*3/MM3 (ref 0–0.2)
BASOPHILS NFR BLD AUTO: 0.4 % (ref 0–1.5)
BUN BLD-MCNC: 35 MG/DL (ref 6–20)
BUN BLD-MCNC: 77 MG/DL (ref 6–20)
BUN/CREAT SERPL: 10.6 (ref 7–25)
BUN/CREAT SERPL: 7.7 (ref 7–25)
CALCIUM SPEC-SCNC: 11 MG/DL (ref 8.6–10.5)
CALCIUM SPEC-SCNC: 9.8 MG/DL (ref 8.6–10.5)
CHLORIDE SERPL-SCNC: 92 MMOL/L (ref 98–107)
CHLORIDE SERPL-SCNC: 97 MMOL/L (ref 98–107)
CO2 SERPL-SCNC: 22.6 MMOL/L (ref 22–29)
CO2 SERPL-SCNC: 29.1 MMOL/L (ref 22–29)
CORTIS SERPL-MCNC: 12.48 MCG/DL
CORTIS SERPL-MCNC: 14.53 MCG/DL
CORTIS SERPL-MCNC: 25.51 MCG/DL
CORTIS SERPL-MCNC: 30.03 MCG/DL
CREAT BLD-MCNC: 4.56 MG/DL (ref 0.76–1.27)
CREAT BLD-MCNC: 7.29 MG/DL (ref 0.76–1.27)
DEPRECATED RDW RBC AUTO: 58.1 FL (ref 37–54)
EOSINOPHIL # BLD AUTO: 0.04 10*3/MM3 (ref 0–0.7)
EOSINOPHIL NFR BLD AUTO: 0.5 % (ref 0.3–6.2)
ERYTHROCYTE [DISTWIDTH] IN BLOOD BY AUTOMATED COUNT: 17.1 % (ref 11.5–14.5)
GFR SERPL CREATININE-BSD FRML MDRD: 13 ML/MIN/1.73
GFR SERPL CREATININE-BSD FRML MDRD: 8 ML/MIN/1.73
GFR SERPL CREATININE-BSD FRML MDRD: ABNORMAL ML/MIN/1.73
GFR SERPL CREATININE-BSD FRML MDRD: ABNORMAL ML/MIN/1.73
GLUCOSE BLD-MCNC: 103 MG/DL (ref 65–99)
GLUCOSE BLD-MCNC: 136 MG/DL (ref 65–99)
GLUCOSE BLDC GLUCOMTR-MCNC: 121 MG/DL (ref 70–130)
GLUCOSE BLDC GLUCOMTR-MCNC: 131 MG/DL (ref 70–130)
GLUCOSE BLDC GLUCOMTR-MCNC: 152 MG/DL (ref 70–130)
GLUCOSE BLDC GLUCOMTR-MCNC: 98 MG/DL (ref 70–130)
HBV SURFACE AG SERPL QL IA: NORMAL
HCT VFR BLD AUTO: 40.4 % (ref 40.4–52.2)
HCT VFR BLD AUTO: 41.3 % (ref 40.4–52.2)
HGB BLD-MCNC: 12.3 G/DL (ref 13.7–17.6)
HGB BLD-MCNC: 12.6 G/DL (ref 13.7–17.6)
IMM GRANULOCYTES # BLD: 0.01 10*3/MM3 (ref 0–0.03)
IMM GRANULOCYTES NFR BLD: 0.1 % (ref 0–0.5)
LYMPHOCYTES # BLD AUTO: 1.69 10*3/MM3 (ref 0.9–4.8)
LYMPHOCYTES NFR BLD AUTO: 19.7 % (ref 19.6–45.3)
MCH RBC QN AUTO: 28.4 PG (ref 27–32.7)
MCHC RBC AUTO-ENTMCNC: 30.5 G/DL (ref 32.6–36.4)
MCV RBC AUTO: 93.2 FL (ref 79.8–96.2)
MONOCYTES # BLD AUTO: 0.39 10*3/MM3 (ref 0.2–1.2)
MONOCYTES NFR BLD AUTO: 4.6 % (ref 5–12)
NEUTROPHILS # BLD AUTO: 6.42 10*3/MM3 (ref 1.9–8.1)
NEUTROPHILS NFR BLD AUTO: 74.8 % (ref 42.7–76)
NRBC BLD MANUAL-RTO: 0 /100 WBC (ref 0–0)
PHOSPHATE SERPL-MCNC: 4.3 MG/DL (ref 2.5–4.5)
PLATELET # BLD AUTO: 171 10*3/MM3 (ref 140–500)
PMV BLD AUTO: 10.3 FL (ref 6–12)
POTASSIUM BLD-SCNC: 5.1 MMOL/L (ref 3.5–5.2)
POTASSIUM BLD-SCNC: 7.5 MMOL/L (ref 3.5–5.2)
RBC # BLD AUTO: 4.43 10*6/MM3 (ref 4.6–6)
SODIUM BLD-SCNC: 135 MMOL/L (ref 136–145)
SODIUM BLD-SCNC: 138 MMOL/L (ref 136–145)
TROPONIN T SERPL-MCNC: 0.09 NG/ML (ref 0–0.03)
WBC NRBC COR # BLD: 8.57 10*3/MM3 (ref 4.5–10.7)

## 2018-10-08 PROCEDURE — 99221 1ST HOSP IP/OBS SF/LOW 40: CPT | Performed by: PSYCHIATRY & NEUROLOGY

## 2018-10-08 PROCEDURE — 82962 GLUCOSE BLOOD TEST: CPT

## 2018-10-08 PROCEDURE — 25010000003 LEVETIRACETAM IN NACL 0.75% 1000 MG/100ML SOLUTION: Performed by: INTERNAL MEDICINE

## 2018-10-08 PROCEDURE — 94640 AIRWAY INHALATION TREATMENT: CPT

## 2018-10-08 PROCEDURE — 85014 HEMATOCRIT: CPT | Performed by: INTERNAL MEDICINE

## 2018-10-08 PROCEDURE — 93005 ELECTROCARDIOGRAM TRACING: CPT | Performed by: INTERNAL MEDICINE

## 2018-10-08 PROCEDURE — 85025 COMPLETE CBC W/AUTO DIFF WBC: CPT | Performed by: INTERNAL MEDICINE

## 2018-10-08 PROCEDURE — 87340 HEPATITIS B SURFACE AG IA: CPT | Performed by: INTERNAL MEDICINE

## 2018-10-08 PROCEDURE — P9047 ALBUMIN (HUMAN), 25%, 50ML: HCPCS | Performed by: INTERNAL MEDICINE

## 2018-10-08 PROCEDURE — 25010000002 ALBUMIN HUMAN 25% PER 50 ML

## 2018-10-08 PROCEDURE — 80048 BASIC METABOLIC PNL TOTAL CA: CPT | Performed by: EMERGENCY MEDICINE

## 2018-10-08 PROCEDURE — 82533 TOTAL CORTISOL: CPT | Performed by: INTERNAL MEDICINE

## 2018-10-08 PROCEDURE — 80069 RENAL FUNCTION PANEL: CPT | Performed by: INTERNAL MEDICINE

## 2018-10-08 PROCEDURE — 94799 UNLISTED PULMONARY SVC/PX: CPT

## 2018-10-08 PROCEDURE — P9047 ALBUMIN (HUMAN), 25%, 50ML: HCPCS

## 2018-10-08 PROCEDURE — 25010000002 COSYNTROPIN PER 0.25 MG: Performed by: INTERNAL MEDICINE

## 2018-10-08 PROCEDURE — 82024 ASSAY OF ACTH: CPT | Performed by: INTERNAL MEDICINE

## 2018-10-08 PROCEDURE — 85018 HEMOGLOBIN: CPT | Performed by: INTERNAL MEDICINE

## 2018-10-08 PROCEDURE — 25010000002 PHENYLEPHRINE 10 MG/ML SOLUTION 5 ML VIAL: Performed by: INTERNAL MEDICINE

## 2018-10-08 PROCEDURE — 93010 ELECTROCARDIOGRAM REPORT: CPT | Performed by: INTERNAL MEDICINE

## 2018-10-08 PROCEDURE — 25010000002 ALBUMIN HUMAN 25% PER 50 ML: Performed by: INTERNAL MEDICINE

## 2018-10-08 PROCEDURE — 84484 ASSAY OF TROPONIN QUANT: CPT | Performed by: INTERNAL MEDICINE

## 2018-10-08 RX ORDER — LORAZEPAM 2 MG/ML
0.5 INJECTION INTRAMUSCULAR EVERY 4 HOURS PRN
Status: DISCONTINUED | OUTPATIENT
Start: 2018-10-08 | End: 2018-10-10 | Stop reason: HOSPADM

## 2018-10-08 RX ORDER — SODIUM CHLORIDE 0.9 % (FLUSH) 0.9 %
3-10 SYRINGE (ML) INJECTION AS NEEDED
Status: DISCONTINUED | OUTPATIENT
Start: 2018-10-08 | End: 2018-10-10 | Stop reason: HOSPADM

## 2018-10-08 RX ORDER — ONDANSETRON 2 MG/ML
4 INJECTION INTRAMUSCULAR; INTRAVENOUS EVERY 6 HOURS PRN
Status: DISCONTINUED | OUTPATIENT
Start: 2018-10-08 | End: 2018-10-10 | Stop reason: HOSPADM

## 2018-10-08 RX ORDER — ACETAMINOPHEN 650 MG/1
650 SUPPOSITORY RECTAL EVERY 4 HOURS PRN
Status: DISCONTINUED | OUTPATIENT
Start: 2018-10-08 | End: 2018-10-10 | Stop reason: HOSPADM

## 2018-10-08 RX ORDER — OXYCODONE HYDROCHLORIDE AND ACETAMINOPHEN 5; 325 MG/1; MG/1
1 TABLET ORAL EVERY 8 HOURS PRN
Status: DISCONTINUED | OUTPATIENT
Start: 2018-10-08 | End: 2018-10-10 | Stop reason: HOSPADM

## 2018-10-08 RX ORDER — ALBUMIN (HUMAN) 12.5 G/50ML
50 SOLUTION INTRAVENOUS ONCE
Status: COMPLETED | OUTPATIENT
Start: 2018-10-08 | End: 2018-10-08

## 2018-10-08 RX ORDER — ACETAMINOPHEN 325 MG/1
650 TABLET ORAL EVERY 4 HOURS PRN
Status: DISCONTINUED | OUTPATIENT
Start: 2018-10-08 | End: 2018-10-10 | Stop reason: HOSPADM

## 2018-10-08 RX ORDER — WARFARIN SODIUM 5 MG/1
5 TABLET ORAL
Status: DISCONTINUED | OUTPATIENT
Start: 2018-10-08 | End: 2018-10-09

## 2018-10-08 RX ORDER — ALBUMIN (HUMAN) 12.5 G/50ML
SOLUTION INTRAVENOUS
Status: COMPLETED
Start: 2018-10-08 | End: 2018-10-08

## 2018-10-08 RX ORDER — MORPHINE SULFATE 100 MG/1
100 TABLET ORAL DAILY
Status: DISCONTINUED | OUTPATIENT
Start: 2018-10-08 | End: 2018-10-08

## 2018-10-08 RX ORDER — ONDANSETRON 4 MG/1
4 TABLET, FILM COATED ORAL EVERY 6 HOURS PRN
Status: DISCONTINUED | OUTPATIENT
Start: 2018-10-08 | End: 2018-10-10 | Stop reason: HOSPADM

## 2018-10-08 RX ORDER — COSYNTROPIN 0.25 MG/ML
0.25 INJECTION, POWDER, FOR SOLUTION INTRAMUSCULAR; INTRAVENOUS ONCE
Status: COMPLETED | OUTPATIENT
Start: 2018-10-08 | End: 2018-10-08

## 2018-10-08 RX ORDER — LEVETIRACETAM 10 MG/ML
1000 INJECTION INTRAVASCULAR EVERY 12 HOURS SCHEDULED
Status: DISCONTINUED | OUTPATIENT
Start: 2018-10-08 | End: 2018-10-09

## 2018-10-08 RX ORDER — SODIUM CHLORIDE 0.9 % (FLUSH) 0.9 %
3 SYRINGE (ML) INJECTION EVERY 12 HOURS SCHEDULED
Status: DISCONTINUED | OUTPATIENT
Start: 2018-10-08 | End: 2018-10-10 | Stop reason: HOSPADM

## 2018-10-08 RX ORDER — IPRATROPIUM BROMIDE AND ALBUTEROL SULFATE 2.5; .5 MG/3ML; MG/3ML
3 SOLUTION RESPIRATORY (INHALATION) EVERY 6 HOURS PRN
Status: DISCONTINUED | OUTPATIENT
Start: 2018-10-08 | End: 2018-10-10 | Stop reason: HOSPADM

## 2018-10-08 RX ORDER — ONDANSETRON 4 MG/1
4 TABLET, ORALLY DISINTEGRATING ORAL EVERY 6 HOURS PRN
Status: DISCONTINUED | OUTPATIENT
Start: 2018-10-08 | End: 2018-10-10 | Stop reason: HOSPADM

## 2018-10-08 RX ORDER — LEVOTHYROXINE SODIUM 175 UG/1
175 TABLET ORAL
Status: DISCONTINUED | OUTPATIENT
Start: 2018-10-08 | End: 2018-10-10 | Stop reason: HOSPADM

## 2018-10-08 RX ORDER — PROMETHAZINE HYDROCHLORIDE 25 MG/ML
25 INJECTION, SOLUTION INTRAMUSCULAR; INTRAVENOUS ONCE
Status: DISCONTINUED | OUTPATIENT
Start: 2018-10-08 | End: 2018-10-08

## 2018-10-08 RX ADMIN — Medication 3 ML: at 21:00

## 2018-10-08 RX ADMIN — ALBUTEROL SULFATE 10 MG: 2.5 SOLUTION RESPIRATORY (INHALATION) at 04:17

## 2018-10-08 RX ADMIN — ALBUMIN HUMAN 25 G: 0.25 SOLUTION INTRAVENOUS at 09:51

## 2018-10-08 RX ADMIN — ALBUMIN HUMAN 25 G: 0.25 SOLUTION INTRAVENOUS at 10:19

## 2018-10-08 RX ADMIN — WARFARIN SODIUM 5 MG: 5 TABLET ORAL at 18:57

## 2018-10-08 RX ADMIN — LEVETIRACETAM 1000 MG: 10 INJECTION INTRAVENOUS at 08:46

## 2018-10-08 RX ADMIN — COSYNTROPIN 0.25 MG: 0.25 INJECTION, POWDER, LYOPHILIZED, FOR SOLUTION INTRAMUSCULAR; INTRAVENOUS at 15:46

## 2018-10-08 RX ADMIN — LEVOTHYROXINE SODIUM 175 MCG: 175 TABLET ORAL at 07:06

## 2018-10-08 RX ADMIN — PHENYLEPHRINE HYDROCHLORIDE 0.5 MCG/KG/MIN: 10 INJECTION INTRAVENOUS at 17:01

## 2018-10-08 RX ADMIN — SODIUM CHLORIDE 500 ML: 9 INJECTION, SOLUTION INTRAVENOUS at 15:04

## 2018-10-08 RX ADMIN — LEVETIRACETAM 1000 MG: 10 INJECTION INTRAVENOUS at 20:02

## 2018-10-08 NOTE — PROGRESS NOTES
Seen by Dr. Marin last night.  Somnolent but arouses.  Lungs weak effort (sleep apnea)    Per wife recent behavioral changes.  Also known seizure disorder since the last 12 years of unknown cause.  Came in with seizures last night.  He has been compliant with his home Keppra.    Now status post hemodialysis.  Awaiting repeat labs prior to transfer out of the units.  Awaiting urology input for breakthrough seizures.  Would also like opinion on advanced cerebral atrophy in 58-year-old.  Hold MS Contin scheduled.

## 2018-10-08 NOTE — H&P
History and Physical    Patient Name: Armando Gill  Age/Sex: 58 y.o. male  : 1960  MRN: [id number]    Date of Admission: 10/7/2018  Date of Encounter Visit: 10/08/18  Encounter Provider: Ethan Marin MD  Place of Service: [facility]   Patient Care Team:  Luis Antonio Abarca MD as PCP - General (Family Medicine)      Subjective:     Chief Complaint: headache and seizure    History of Present Illness:  Armando Gill is a 58 y.o. male with a history of CKD on HD MWF, seizures, atrial fibrillation, asthma, SHABNAM on O2 at 2 LPM at night, DM and multiple endocrine issues with possible history of cancer of the pituitary gland. Last seizure was 4 years ago when he had status epilepticus and he has been on chronic Keppra. He was recently admitted to the hospital in 2018 with sepsis from streptococcus bacteremia with streptococcal arthritis of the right wrist.     He had been doing well since discharge and attended dialysis on Friday with no issues. On 10/7/18 he started having complaints of a right sided headache and was found to have an elevated blood pressure by his wife. He had a witness seizure and has been confused, but wife denied patient having and recent fever or head injury. He denies any changes in diet.     While in the ER he was found to have hyperkalemia with potassium level of 7.9 confirmed with repeat blood test. Nephrology was consulted and plans to have emergent dialysis. He had another witness seizure in the ER and was treated with IV ativan and Keppra. CT of the head showed no acute disease process, but did note enlarged ventricles.     Echo: 18  · Calculated EF = 53%.  · Left ventricular systolic function is normal.  · Left ventricular wall thickness is consistent with borderline concentric hypertrophy.  · Right ventricular cavity is mildly dilated.  · Left atrial cavity size is mildly dilated.  · Mild tricuspid valve regurgitation is present.  · Technically difficult study  with poor visualization of valvular structures. All measurements are estimates.    Review of Systems:   Review of Systems   Unable to perform ROS: Mental status change   Constitutional: Positive for fatigue. Negative for fever.   Cardiovascular: Positive for leg swelling. Negative for chest pain and palpitations.   Gastrointestinal: Negative for abdominal pain.   Musculoskeletal: Positive for myalgias.   Neurological: Positive for seizures, weakness and headaches (right sided).   Psychiatric/Behavioral: Positive for confusion.     Past Medical History:  Past Medical History:   Diagnosis Date   • Anxiety    • Arthritis    • Asthma    • Asymptomatic hyperuricemia    • Atrial fibrillation (CMS/HCC)    • Callus of foot     LEFT FOOT , PRESENTLY ATTENDING WOUND CLINIC [facility]   • Cancer of pituitary gland (CMS/HCC)    • Depression    • Diabetes mellitus (CMS/HCC)     type 2, A1C of 5 1 month ago by patient report   • Dialysis patient (CMS/HCC)     MON WED FRI   • Dyslipidemia    • End stage renal disease (CMS/HCC)    • Gout    • History of anemia    • History of colon polyps    • Hyperparathyroidism (CMS/HCC)    • Hyperprolactinemia (CMS/HCC)    • Hypertension    • Hypogonadism, male    • Hypothyroidism    • Male erectile disorder of organic origin    • Neuropathy    • Obstructive sleep apnea    • Presence of arterial-venous shunt (for dialysis) (CMS/HCC)    • Seizure disorder (CMS/HCC)      2 YRS AGO   • Vitamin D deficiency disease        Past Surgical History:   Procedure Laterality Date   • CATARACT EXTRACTION Bilateral 2009   • CHOLECYSTECTOMY  2002    Performed in [place]   • COLONOSCOPY W/ POLYPECTOMY N/A 11/2015    Dr. Bolivar   • INCISION AND DRAINAGE ARM Right 6/26/2018    Procedure: RIGHT WRIST INCISION AND DRAINAGE;  Surgeon: Wilian Arredondo MD;  Location: [facility] OR;  Service: Hand   • INGUINAL HERNIA REPAIR Left 2002    Open Inguinal   • THYROIDECTOMY Right 1/6/2017    Procedure: FOUR GLAND  PARATHYROIDECTOMY WITH AUTOTRANSPLANT INTO RIGHT FOREARM, INTRA-OPERATIVE PTH MEASUREMENT, PARTIAL THYMECTOMY;  Surgeon: Freida Morfin MD;  Location: [facility] OR;  Service:    • THYROIDECTOMY N/A 8/2/2017    Procedure: left thyroid lobectomy, Left superior parathyroidectomy with autotransplant into right forearm, intra-operative pth monitoring;  Surgeon: Freida Morfin MD;  Location: [facility];  Service:    • TRACHEOSTOMY N/A 10/22/2007    W/ REVISION OF THYROID ISTHMUS, DUE TO SLEEP APNEA; ALLOWED TO GROW CLOSED AFTER ONE YEAR DUE TO LACK OF RELIEF OF SLEEP APNEA SYMPTOMS, DR. MAXIMO BELL   • TRACHEOSTOMY N/A 05/21/2009    REVISION, DR. MAXIMO BELL   • TRACHEOSTOMY N/A 04/25/2009    REVISION, DR. MAXIMO BELL   • VASCULAR SURGERY Left 2014    Acess in Left Arm-Dr. Guzman       Home Medications:     (Not in a hospital admission)    Inpatient Medications:  Scheduled Meds:   Continuous Infusions:   PRN Meds:.•  Insert peripheral IV **AND** sodium chloride    Allergies:  Allergies   Allergen Reactions   • Adhesive Tape Itching and Other (See Comments)     Pulls of the patient's skin on arms      • Latex Rash   • Sulfa Antibiotics Other (See Comments)     Causes headaches         Past Social History:  Social History     Social History   • Marital status:      Social History Main Topics   • Smoking status: Never Smoker   • Smokeless tobacco: Never Used   • Alcohol use No   • Drug use: No   • Sexual activity: Defer     Other Topics Concern   • Not on file       Past Family History:  Family History   Problem Relation Age of Onset   • Heart disease Sister    • Heart disease Father    • Kidney disease Neg Hx    • Malig Hyperthermia Neg Hx            Objective:   Temp:  [98.3 °F (36.8 °C)] 98.3 °F (36.8 °C)  Heart Rate:  [73-99] 96  Resp:  [18] 18  BP: (128-164)/(65-88) 143/83   SpO2:  [93 %-96 %] 96 %  on  Flow (L/min):  [2-4] 2 Device (Oxygen Therapy): nasal cannula  No intake or output data in the 24  hours ending 10/08/18 0127  Body mass index is 48.09 kg/m².  1    10/07/18  2126   Weight: (!) 143 kg (316 lb 4.8 oz)     Weight change:     Physical Exam:   Physical Exam   General:    No acute distress, alert and oriented x4, pleasant, on room air                   Head:    Normocephalic, atraumatic.   Eyes:          Conjunctivae and sclerae normal, no icterus, left pupil 1 mm with barely reactive. Right pupil 3 mm and reactive to light.    Throat:   No oral lesions, no thrush, oral mucosa dry. mallampati IV airway    Neck:   Supple, trachea midline.   Lungs:     Normal chest on inspection, CTAB, no wheezes. No rhonchi. No crackles. Respirations regular, even and unlabored.     Heart:    Irregularly irregular and slightly tachycardic with skipped beats and large t waves noted on telemetry.   No murmurs, gallops, or rubs noted.   Abdomen:     Soft, non-tender, non-distended, positive bowel sounds.    Extremities:   No clubbing, cyanosis. 2+ edema, hyperpigmentation of lower extremities.     Pulses:   Pulses palpable and equal bilaterally.    Skin:   No bleeding or rash.   Neuro:   Non-focal.  Moves all extremities well.    Psychiatric:   Normal mood and affect.     Lab Review:     Results from last 7 days  Lab Units 10/07/18  2254 10/07/18  2143   SODIUM mmol/L 135* 137   POTASSIUM mmol/L 7.8* 7.9*   CHLORIDE mmol/L 91* 90*   CO2 mmol/L 28.9 29.0   BUN mg/dL 76* 74*   CREATININE mg/dL 7.31* 7.52*   GLUCOSE mg/dL 270* 187*   CALCIUM mg/dL 10.5 10.9*   AST (SGOT) U/L  --  10   ALT (SGPT) U/L  --  14   ALBUMIN g/dL  --  4.80           Results from last 7 days  Lab Units 10/07/18  2143   WBC 10*3/mm3 9.22   HEMOGLOBIN g/dL 14.6   HEMATOCRIT % 46.9   PLATELETS 10*3/mm3 183   MCV fL 91.6   MCH pg 28.5   MCHC g/dL 31.1*   RDW % 16.9*   RDW-SD fl 57.2*   MPV fL 10.2   NEUTROPHIL % % 89.4*   LYMPHOCYTE % % 4.9*   MONOCYTES % % 4.7*   EOSINOPHIL % % 0.5   BASOPHIL % % 0.5   IMM GRAN % % 0.1   NEUTROS ABS 10*3/mm3 8.24*    LYMPHS ABS 10*3/mm3 0.45*   MONOS ABS 10*3/mm3 0.43   EOS ABS 10*3/mm3 0.05   BASOS ABS 10*3/mm3 0.05   IMMATURE GRANS (ABS) 10*3/mm3 0.01   NRBC /100 WBC 0.0       Results from last 7 days  Lab Units 10/07/18  2143   INR  2.05*               Invalid input(s): LDLCALC                Results from last 7 days  Lab Units 10/07/18  2143   PROCALCITONIN ng/mL 0.24   LACTATE mmol/L 1.3                               EKG:       Imaging:  Imaging Results (most recent)     Procedure Component Value Units Date/Time    CT Head Without Contrast [[id number]] Collected:  10/07/18 2218     Updated:  10/07/18 2225    Narrative:       CT OF THE HEAD WITHOUT CONTRAST     HISTORY: Headache and seizure     COMPARISON: None available.     TECHNIQUE: Axial CT imaging was obtained from the vertex of the skull  and skull base. No IV contrast was administered.        FINDINGS:  No acute intracranial hemorrhage is seen. There is diffuse cerebral  atrophy, with compensatory ventricular dilatation, which is advanced for  this patient's age of 58. There is no midline shift or mass effect.  There is periventricular and deep white matter microangiopathic disease,  although it is relatively mild. No focal areas of decreased attenuation  are seen. There is atherosclerotic involvement of the cavernous carotid  arteries. The visualized paranasal sinuses and mastoid air cells appear  clear. No aggressive osseous anomalies are seen. There are no focal soft  tissue abnormalities.       Impression:          1. No acute intracranial process seen.  2. Advanced cerebral atrophy.     Radiation dose reduction techniques were utilized, including automated  exposure control and exposure modulation based on body size.     This report was finalized on 10/7/2018 10:22 PM by Dr. Mariela Orozco M.D.             I personally viewed and interpreted the patient's imaging studies.    Assessment:     1. Seizure  2. Hyperkalemia   3. ESRD on HD MWF  4. Atrial  fibrillation on coumadin  5. Diabetes mellitus  6. Hypertension with elevated blood pressure  7. Hx of neoplasm of pituitary   8. SHABNAM noncompliant with CPAP on O2 at 2 LPM at night  9. Chronic pain syndrome  10. Anemia of chronic disease  11. Morbid obesity    Plan:     Admit to ICU  Emergent HD per nephrology  Repeat labs  Consider MRI of the head, will consult neurology to decide  Keppra IV  Ativan PRN for seizure  Seizure precautions  Resume home anticoagulation/pain medication and thyroid medication  No need for ORSA prophylaxis  Mechanical DVT prophylaxis      Time: Critical care 38 min    Ethan Marin MD  [facility]   10/08/18  1:27 AM    Dictated utilizing Dragon dictation

## 2018-10-08 NOTE — NURSING NOTE
10/08/18 1130   Wound 10/08/18 0200 Left posterior toe diabetic/neuropathic ulceration   Date first assessed/Time first assessed: 10/08/18 0200   Side: Left  Orientation: posterior  Location: (c) toe  Type: diabetic/neuropathic ulceration  Additional Comments: clarificat of LDA; not pressure   Dressing Appearance open to air   Base dry   Periwound dry;blistered;other (see comments)  (callus with dark blood filled blister)   Wound Length (cm) 2 cm   Wound Width (cm) 3 cm   CWOCN consult to assess left great toe.  Patient with history of callus and diabetic wound treated at wound care center.  Recent exacerbation of diabetic ulcer with new shoes and walking.  Area firm with callus with dark center and blood filled blister- softer at lateral edge.  Recommended to spouse that patient should follow up with wound care center upon discharge.  Treatment for hospitalization will be to keep clean and dry with betadine daily, leave open to air.

## 2018-10-08 NOTE — ED NOTES
Called to pt's room pt having focal seizure Dr Hodge aware. Ativan and keppra ordered and airway protected at this time      Gaby Johnston RN  10/07/18 6116

## 2018-10-08 NOTE — ED PROVIDER NOTES
EMERGENCY DEPARTMENT ENCOUNTER    CHIEF COMPLAINT  Chief Complaint: HA  History given by: patient, daughter, spouse  History limited by: HOSEA  Room Number: 30/30  PMD: Luis Antonio Abarca MD  Nephrologist- Dr. Castro    HPI:  Pt is a 58 y.o. male who presents to the ED with a reported right sided HA that began earlier this afternoon. Pt's spouse states that the pt does not have a history of similar HAs and that she measured the pt's BP at 210/111. Pt's spouse states that the pt had a brief seizure just PTA and that the pt has been confused since the seizure resolved. Pt's spouse denies the pt have a fever or injury to his head. Pt denies CP, SOA, abd pain or N/V/D. Pt is on 2L O2 at night, is on coumadin for a-fib and is a M/W/F dialysis patient. Pt uses a cane to ambulate at baseline. Pt's spouse states that the pt's last seizure was about four years ago and the pt had status epilepticus at that time.    Duration/Onset/Timing: several hours, gradual, constant  Location: right side of head  Radiation: none  Quality: unspecified  Intensity/Severity: severe at worst, moderate at this time  Associated Symptoms: seizure, confusion  Aggravating or Alleviating Factors: none  Previous Episodes: Pt's spouse denies the pt having a history of similar HAs previously      PAST MEDICAL HISTORY  Active Ambulatory Problems     Diagnosis Date Noted   • End stage renal disease (CMS/HCC) 05/04/2016   • Secondary hyperparathyroidism of renal origin (CMS/HCC) 01/06/2017   • Fever 06/23/2018   • Sepsis (CMS/HCC) 06/23/2018   • Nausea and vomiting 06/23/2018   • Atrial fibrillation (CMS/HCC) 06/23/2018   • Long term current use of anticoagulant 06/23/2018   • Acute pain of right wrist 06/23/2018   • DNR (do not resuscitate) 06/23/2018   • Bacteremia due to Streptococcus 06/25/2018   • Streptococcal arthritis of right wrist (CMS/HCC) 06/25/2018   • Opioid dependence (CMS/HCC) 06/28/2018   • Chronic pain syndrome 06/28/2018   •  Hyponatremia 07/01/2018     Resolved Ambulatory Problems     Diagnosis Date Noted   • No Resolved Ambulatory Problems     Past Medical History:   Diagnosis Date   • Anxiety    • Arthritis    • Asthma    • Asymptomatic hyperuricemia    • Atrial fibrillation (CMS/HCC)    • Callus of foot    • Cancer of pituitary gland (CMS/HCC)    • Depression    • Diabetes mellitus (CMS/HCC)    • Dialysis patient (CMS/HCC)    • Dyslipidemia    • End stage renal disease (CMS/HCC)    • Gout    • History of anemia    • History of colon polyps    • Hyperparathyroidism (CMS/HCC)    • Hyperprolactinemia (CMS/HCC)    • Hypertension    • Hypogonadism, male    • Hypothyroidism    • Male erectile disorder of organic origin    • Neuropathy    • Obstructive sleep apnea    • Presence of arterial-venous shunt (for dialysis) (CMS/HCC)    • Seizure disorder (CMS/HCC)    • Vitamin D deficiency disease        PAST SURGICAL HISTORY  Past Surgical History:   Procedure Laterality Date   • CATARACT EXTRACTION Bilateral 2009   • CHOLECYSTECTOMY  2002    Performed in [place]   • COLONOSCOPY W/ POLYPECTOMY N/A 11/2015    Dr. Bolivar   • INCISION AND DRAINAGE ARM Right 6/26/2018    Procedure: RIGHT WRIST INCISION AND DRAINAGE;  Surgeon: Wilian Arredondo MD;  Location: [facility];  Service: Hand   • INGUINAL HERNIA REPAIR Left 2002    Open Inguinal   • THYROIDECTOMY Right 1/6/2017    Procedure: FOUR GLAND PARATHYROIDECTOMY WITH AUTOTRANSPLANT INTO RIGHT FOREARM, INTRA-OPERATIVE PTH MEASUREMENT, PARTIAL THYMECTOMY;  Surgeon: Freida Morfin MD;  Location: [facility] OR;  Service:    • THYROIDECTOMY N/A 8/2/2017    Procedure: left thyroid lobectomy, Left superior parathyroidectomy with autotransplant into right forearm, intra-operative pth monitoring;  Surgeon: Freida Morfin MD;  Location: [facility];  Service:    • TRACHEOSTOMY N/A 10/22/2007    W/ REVISION OF THYROID ISTHMUS, DUE TO SLEEP APNEA; ALLOWED TO GROW CLOSED AFTER ONE YEAR DUE TO LACK OF  RELIEF OF SLEEP APNEA SYMPTOMS, DR. MAXIMO BELL   • TRACHEOSTOMY N/A 05/21/2009    REVISION, DR. MAXIMO BELL   • TRACHEOSTOMY N/A 04/25/2009    REVISION, DR. MAXIMO BELL   • VASCULAR SURGERY Left 2014    Acess in Left Arm-Dr. Guzman       FAMILY HISTORY  Family History   Problem Relation Age of Onset   • Heart disease Sister    • Heart disease Father    • Kidney disease Neg Hx    • Malig Hyperthermia Neg Hx        SOCIAL HISTORY  Social History     Social History   • Marital status:      Spouse name: N/A   • Number of children: N/A   • Years of education: N/A     Occupational History   • Not on file.     Social History Main Topics   • Smoking status: Never Smoker   • Smokeless tobacco: Never Used   • Alcohol use No   • Drug use: No   • Sexual activity: Defer     Other Topics Concern   • Not on file     Social History Narrative   • No narrative on file       ALLERGIES  Adhesive tape; Latex; and Sulfa antibiotics    REVIEW OF SYSTEMS  Review of Systems   Unable to perform ROS: Mental status change   Respiratory: Negative for shortness of breath.    Cardiovascular: Negative for chest pain.   Gastrointestinal: Negative for abdominal pain, nausea and vomiting.   Neurological: Positive for seizures and headaches (right sided).   Psychiatric/Behavioral: Positive for confusion.       PHYSICAL EXAM  ED Triage Vitals   Temp Heart Rate Resp BP SpO2   10/07/18 2118 10/07/18 2117 10/07/18 2118 10/07/18 2117 10/07/18 2117   98.3 °F (36.8 °C) 86 18 158/86 96 %      Temp src Heart Rate Source Patient Position BP Location FiO2 (%)   10/07/18 2118 10/07/18 2118 -- -- --   Oral Monitor          Physical Exam   Constitutional: No distress.   Pt appears older than stated age   HENT:   Head: Normocephalic and atraumatic.   Mouth/Throat: Oropharynx is clear and moist.   Eyes: Conjunctivae are normal.   Cardiovascular: Normal rate.  An irregularly irregular rhythm present.   Pulmonary/Chest: No respiratory distress. He has rhonchi  (bilaterally).   O2=97% on 2L   Abdominal: There is no tenderness.   Pt is obese   Musculoskeletal: He exhibits edema (2+ edema to BLE). He exhibits no tenderness.   AV fistula in LUE with a palpable thrill   Neurological: He displays weakness (mild, generalized).   Pt will follow commands and answer questions but is lethargic. Pt has occasional myoclonic twitches   Skin: No rash noted.   Chronic venous stasis changes to BLE   Nursing note and vitals reviewed.      LAB RESULTS  Lab Results (last 24 hours)     Procedure Component Value Units Date/Time    POC Glucose Once [[id number]]  (Abnormal) Collected:  10/07/18 2123    Specimen:  Blood Updated:  10/07/18 2125     Glucose 168 (H) mg/dL     Narrative:       Meter: [id number] : [id number] Pj Franceslila De La Cruz    CBC & Differential [[id number]] Collected:  10/07/18 2143    Specimen:  Blood Updated:  10/07/18 2159    Narrative:       The following orders were created for panel order CBC & Differential.  Procedure                               Abnormality         Status                     ---------                               -----------         ------                     CBC Auto Differential[[id number]]        Abnormal            Final result                 Please view results for these tests on the individual orders.    Comprehensive Metabolic Panel [[id number]]  (Abnormal) Collected:  10/07/18 2143    Specimen:  Blood Updated:  10/07/18 2219     Glucose 187 (H) mg/dL      BUN 74 (H) mg/dL      Creatinine 7.52 (H) mg/dL      Sodium 137 mmol/L      Potassium 7.9 (C) mmol/L      Chloride 90 (L) mmol/L      CO2 29.0 mmol/L      Calcium 10.9 (H) mg/dL      Total Protein 8.7 (H) g/dL      Albumin 4.80 g/dL      ALT (SGPT) 14 U/L      AST (SGOT) 10 U/L      Alkaline Phosphatase 62 U/L      Total Bilirubin 0.4 mg/dL      eGFR Non African Amer 8 (L) mL/min/1.73      Comment: <15 Indicative of kidney failure.        eGFR   Amer -- mL/min/1.73      Comment: <15  "Indicative of kidney failure.        Globulin 3.9 gm/dL      A/G Ratio 1.2 g/dL      BUN/Creatinine Ratio 9.8     Anion Gap 18.0 mmol/L     Lactic Acid, Plasma [[id number]]  (Normal) Collected:  10/07/18 2143    Specimen:  Blood Updated:  10/07/18 2205     Lactate 1.3 mmol/L     Procalcitonin [[id number]]  (Normal) Collected:  10/07/18 2143    Specimen:  Blood Updated:  10/07/18 2220     Procalcitonin 0.24 ng/mL     Narrative:       As a Marker for Sepsis (Non-Neonates):   1. <0.5 ng/mL represents a low risk of severe sepsis and/or septic shock.  1. >2 ng/mL represents a high risk of severe sepsis and/or septic shock.    As a Marker for Lower Respiratory Tract Infections that require antibiotic therapy:  PCT on Admission     Antibiotic Therapy             6-12 Hrs later  > 0.5                Strongly Recommended            >0.25 - <0.5         Recommended  0.1 - 0.25           Discouraged                   Remeasure/reassess PCT  <0.1                 Strongly Discouraged          Remeasure/reassess PCT      As 28 day mortality risk marker: \"Change in Procalcitonin Result\" (> 80 % or <=80 %) if Day 0 (or Day 1) and Day 4 values are available. Refer to http://www.[employer]-pct-calculator.com/   Change in PCT <=80 %   A decrease of PCT levels below or equal to 80 % defines a positive change in PCT test result representing a higher risk for 28-day all-cause mortality of patients diagnosed with severe sepsis or septic shock.  Change in PCT > 80 %   A decrease of PCT levels of more than 80 % defines a negative change in PCT result representing a lower risk for 28-day all-cause mortality of patients diagnosed with severe sepsis or septic shock.                Protime-INR [[id number]]  (Abnormal) Collected:  10/07/18 2143    Specimen:  Blood Updated:  10/07/18 2204     Protime 22.8 (H) Seconds      INR 2.05 (H)    CBC Auto Differential [[id number]]  (Abnormal) Collected:  10/07/18 2143    Specimen:  Blood Updated:  10/07/18 2159 "     WBC 9.22 10*3/mm3      RBC 5.12 10*6/mm3      Hemoglobin 14.6 g/dL      Hematocrit 46.9 %      MCV 91.6 fL      MCH 28.5 pg      MCHC 31.1 (L) g/dL      RDW 16.9 (H) %      RDW-SD 57.2 (H) fl      MPV 10.2 fL      Platelets 183 10*3/mm3      Neutrophil % 89.4 (H) %      Lymphocyte % 4.9 (L) %      Monocyte % 4.7 (L) %      Eosinophil % 0.5 %      Basophil % 0.5 %      Immature Grans % 0.1 %      Neutrophils, Absolute 8.24 (H) 10*3/mm3      Lymphocytes, Absolute 0.45 (L) 10*3/mm3      Monocytes, Absolute 0.43 10*3/mm3      Eosinophils, Absolute 0.05 10*3/mm3      Basophils, Absolute 0.05 10*3/mm3      Immature Grans, Absolute 0.01 10*3/mm3      nRBC 0.0 /100 WBC     Basic Metabolic Panel [[id number]] Collected:  10/07/18 2254    Specimen:  Blood Updated:  10/07/18 2258          I ordered the above labs and reviewed the results    RADIOLOGY  CT Head Without Contrast   Final Result       1. No acute intracranial process seen.   2. Advanced cerebral atrophy.       Radiation dose reduction techniques were utilized, including automated   exposure control and exposure modulation based on body size.       This report was finalized on 10/7/2018 10:22 PM by Dr. Mariela Orozco M.D.               I ordered the above noted radiological studies. Interpreted by radiologist. Reviewed by me in PACS.       PROCEDURES  Critical Care  Performed by: MICHELLE MCKENZIE  Authorized by: MICHELLE MCKENZIE     Critical care provider statement:     Critical care time (minutes):  40    Critical care time was exclusive of:  Separately billable procedures and treating other patients    Critical care was necessary to treat or prevent imminent or life-threatening deterioration of the following conditions:  Metabolic crisis    Critical care was time spent personally by me on the following activities:  Development of treatment plan with patient or surrogate, discussions with consultants, evaluation of patient's response to treatment, examination  of patient, obtaining history from patient or surrogate, ordering and performing treatments and interventions, ordering and review of laboratory studies, ordering and review of radiographic studies, pulse oximetry, re-evaluation of patient's condition and review of old charts        EKG           EKG time: 2301  Rhythm/Rate: a-fib, 77  P waves and NH: a-fib  QRS, axis: normal   ST and T waves: flattened T waves in lead III     Interpreted Contemporaneously by me, independently viewed  unchanged compared to prior on 6/2018    PROGRESS AND CONSULTS     2138- Discussed the plan to order lab and imaging studies, including a CT head, for further evaluation of the pt's AMS. Pt understands and agrees with the plan, all questions answered.    2141- Ordered blood work, PT with INR, procalcitonin, lactic acid and CT head for further evaluation.    2156- Per RN, the pt is now vomiting after returning from CT. Ordered zofran for nausea.    2240- Ordered hyperkalemia protocol. Ordered repeat BMP. Placed call to pulmonology for admission.    2241- Rechecked pt. Pt is resting comfortably and appears more alert at this time. Notified pt and family of the pt's lab and imaging results, including the pt's CT head and elevated potassium. Pt's spouse denies the pt missing any dialysis recently and that the pt was last dialyzed on 10/7/18. Notified pt and family that hyperkalemia can cause the pt's symptoms, including his myoclonic jerking. Discussed the plan to recheck the pt's potassium and to treat the pt's hyperkalemia prior to admitting the pt. Pt understands and agrees with the plan, all questions answered.    2249- Placed call to nephrology for consult.    2255- Discussed the pt's case with Dr. Sanchez (nephrology), who states that he will arrange for emergent dialysis.    2258- Rechecked pt. Pt is resting comfortably. Notified pt and family of my discussion with Dr. Sanchez (nephrology) and that he will arrange for emergent  dialysis. Discussed the plan to admit the pt to the ICU for further treatment and evaluation. Pt and family agree with the plan and all questions were addressed.    2312- Discussed the pt's case with Dr. Marin (pulmonology), who agrees to admit the pt to the ICU.    2320 - Pt had GTC Sz in ER lasted few minutes with LOC and twitching.  Given IV Ativan and IV Keppra.    MEDICAL DECISION MAKING  Results were reviewed/discussed with the patient and they were also made aware of online access. Pt also made aware that some labs, such as cultures, will not be resulted during ER visit and follow up with PMD is necessary.     MDM  Number of Diagnoses or Management Options  ESRD on dialysis (CMS/HCC):   Hyperkalemia:   Seizure (CMS/HCC):      Amount and/or Complexity of Data Reviewed  Clinical lab tests: ordered and reviewed (Potassium=7.9)  Tests in the radiology section of CPT®: ordered and reviewed (CT Head shows nothing acute)  Tests in the medicine section of CPT®: reviewed and ordered (See EKG procedure note)  Decide to obtain previous medical records or to obtain history from someone other than the patient: yes  Obtain history from someone other than the patient: yes (family)  Review and summarize past medical records: yes (Pt was admitted in June 2018 with sepsis secondary to streptococcus. )  Discuss the patient with other providers: yes (Dr. Sanchez (nephrology), Dr. Marin (pulmonology))  Independent visualization of images, tracings, or specimens: yes    Critical Care  Total time providing critical care: 30-74 minutes         DIAGNOSIS  Final diagnoses:   Seizure (CMS/HCC)   Hyperkalemia   ESRD on dialysis (CMS/[facility])       DISPOSITION  ADMISSION    Discussed treatment plan and reason for admission with pt/family and admitting physician.  Pt/family voiced understanding of the plan for admission for further testing/treatment as needed.     Latest Documented Vital Signs:  As of 11:17 PM  BP- 164/87 HR- 78 Temp- 98.3 °F  (36.8 °C) (Oral) O2 sat- 96%    --  Documentation assistance provided by rafael Rahman for Dr. Hodge.  Information recorded by the scribe was done at my direction and has been verified and validated by me.     Linda Rahman  10/07/18 2315       Jacob Hodge MD  10/07/18 1766       Jacob Hodge MD  10/07/18 9907

## 2018-10-08 NOTE — CONSULTS
Patient Identification:  NAME:  Armando Gill  Age:  58 y.o.   Sex:  male   :  1960   MRN:  [id number]       Chief complaint: He does not have one, reason for consult seizure last night    History of present illness:  This patient is a 58-year-old right-handed white male with history of depression hypertension hyper lipidemia and seizure disorder for which he takes Keppra he comes to the hospital after having a seizure he was noted to have a potassium level of 7.9 and had to undergo emergency dialysis.  He has been on dialysis he is had no further seizure activity associated symptoms he may be having some behavioral changes according to his wife who is not present at the time of this interview but I told the nurse later location it was a generalized seizure quality tonic-clonic modifying factors Keppra 1000 IV every 12 hours now      Past medical history:  Past Medical History:   Diagnosis Date   • Anxiety    • Arthritis    • Asthma    • Asymptomatic hyperuricemia    • Atrial fibrillation (CMS/HCC)    • Callus of foot     LEFT FOOT , PRESENTLY ATTENDING WOUND CLINIC [facility]   • Cancer of pituitary gland (CMS/HCC)    • Depression    • Diabetes mellitus (CMS/HCC)     type 2, A1C of 5 1 month ago by patient report   • Dialysis patient (CMS/HCC)        • Dyslipidemia    • End stage renal disease (CMS/HCC)    • Gout    • History of anemia    • History of colon polyps    • Hyperparathyroidism (CMS/HCC)    • Hyperprolactinemia (CMS/HCC)    • Hypertension    • Hypogonadism, male    • Hypothyroidism    • Male erectile disorder of organic origin    • Neuropathy    • Obstructive sleep apnea    • Presence of arterial-venous shunt (for dialysis) (CMS/HCC)    • Seizure disorder (CMS/HCC)      2 YRS AGO   • Vitamin D deficiency disease        Past surgical history:  Past Surgical History:   Procedure Laterality Date   • CATARACT EXTRACTION Bilateral    • CHOLECYSTECTOMY      Performed in [place]    • COLONOSCOPY W/ POLYPECTOMY N/A 11/2015    Dr. Bolivar   • INCISION AND DRAINAGE ARM Right 6/26/2018    Procedure: RIGHT WRIST INCISION AND DRAINAGE;  Surgeon: Wilian Arredondo MD;  Location: [facility];  Service: Hand   • INGUINAL HERNIA REPAIR Left 2002    Open Inguinal   • THYROIDECTOMY Right 1/6/2017    Procedure: FOUR GLAND PARATHYROIDECTOMY WITH AUTOTRANSPLANT INTO RIGHT FOREARM, INTRA-OPERATIVE PTH MEASUREMENT, PARTIAL THYMECTOMY;  Surgeon: Freida Morfin MD;  Location: [facility];  Service:    • THYROIDECTOMY N/A 8/2/2017    Procedure: left thyroid lobectomy, Left superior parathyroidectomy with autotransplant into right forearm, intra-operative pth monitoring;  Surgeon: Freida Morfin MD;  Location: [facility];  Service:    • TRACHEOSTOMY N/A 10/22/2007    W/ REVISION OF THYROID ISTHMUS, DUE TO SLEEP APNEA; ALLOWED TO GROW CLOSED AFTER ONE YEAR DUE TO LACK OF RELIEF OF SLEEP APNEA SYMPTOMS, DR. MAXIMO BELL   • TRACHEOSTOMY N/A 05/21/2009    REVISION, DR. MAXIMO BELL   • TRACHEOSTOMY N/A 04/25/2009    REVISION, DR. MAXIMO BELL   • VASCULAR SURGERY Left 2014    Acess in Left Arm-Dr. Guzman       Allergies:  Adhesive tape; Latex; and Sulfa antibiotics    Home medications:  Prescriptions Prior to Admission   Medication Sig Dispense Refill Last Dose   • aspirin 81 MG EC tablet Take 81 mg by mouth.   Taking   • levETIRAcetam (KEPPRA) 1000 MG tablet Take 1,000 mg by mouth 2 (two) times a day.   Taking   • LORazepam (ATIVAN) 0.5 MG tablet Take 1 tablet by mouth Every 8 (Eight) Hours As Needed for Anxiety. 9 tablet 0 Not Taking   • Morphine (MS CONTIN) 100 MG 12 hr tablet Take 1 tablet by mouth Daily. 3 tablet 0 Taking   • pravastatin (PRAVACHOL) 40 MG tablet Take 40 mg by mouth Daily.   Taking   • SYNTHROID 175 MCG tablet Take 175 mcg by mouth daily.   Taking   • Vortioxetine HBr (TRINTELLIX) 10 MG tablet Take 20 mg by mouth Every Night.   Taking   • warfarin (COUMADIN) 2.5 MG tablet Take 5 mg by  mouth Daily.   Taking   • busPIRone (BUSPAR) 5 MG tablet Take 5 mg by mouth 2 (Two) Times a Day.   Taking   • clindamycin (CLEOCIN) 300 MG capsule Take 300 mg by mouth 3 (Three) Times a Day.   Taking   • diltiaZEM CD (CARDIZEM CD) 120 MG 24 hr capsule Take 1 capsule by mouth every night at bedtime.   Taking   • famotidine (PEPCID) 40 MG tablet Take 1 tablet by mouth 2 (Two) Times a Day As Needed for Indigestion or Heartburn.   Not Taking   • lactulose (CHRONULAC) 10 GM/15ML solution Take 15 mL by mouth Daily.   Not Taking   • oxyCODONE-acetaminophen (PERCOCET) 5-325 MG per tablet Take 1 tablet by mouth Every 8 (Eight) Hours As Needed for Severe Pain . 9 tablet 0 Not Taking   • sennosides-docusate sodium (SENOKOT-S) 8.6-50 MG tablet Take 2 tablets by mouth 2 (Two) Times a Day. Hold for loose stool   Taking   • vitamin D (ERGOCALCIFEROL) 18109 units capsule capsule Take 50,000 Units by mouth Every 30 (Thirty) Days.   Taking        Hospital medications:    cosyntropin 0.25 mg Intravenous Once   levETIRAcetam 1,000 mg Intravenous Q12H   levothyroxine 175 mcg Oral Q AM   sodium chloride 3 mL Intravenous Q12H   warfarin 5 mg Oral Daily       phenylephrine 0.5-3 mcg/kg/min     •  acetaminophen **OR** acetaminophen  •  calcium gluconate  •  ipratropium-albuterol  •  LORazepam  •  metoprolol tartrate  •  ondansetron **OR** ondansetron ODT **OR** ondansetron  •  oxyCODONE-acetaminophen  •  Insert peripheral IV **AND** sodium chloride  •  sodium chloride    Family history:  Family History   Problem Relation Age of Onset   • Heart disease Sister    • Heart disease Father    • Kidney disease Neg Hx    • Malig Hyperthermia Neg Hx        Social history:  Social History   Substance Use Topics   • Smoking status: Never Smoker   • Smokeless tobacco: Never Used   • Alcohol use No       Review of systems:    He cannot give an adequate review of system at this time is lethargic.  No other family was present at this time of this  interview, I have reviewed the chart    Objective:  Vitals Ranges:   Temp:  [98.3 °F (36.8 °C)] 98.3 °F (36.8 °C)  Heart Rate:  [] 60  Resp:  [18] 18  BP: ()/() 64/42      Physical Exam:  Awake lethargic fund of knowledge poor attention span and concentration poor recent remote memory not good language is able to comprehend name and repeat.  Pupils one half some reaction to light eyes are conjugate face symmetrical tongue is midline the spinal cranial nerve she would give me motor not the best effort but seems to move all 4 extremities 4+ out of 5 there is bradykinesia rigidity but no resting tremor reflexes trace throughout symmetrical toes downgoing bilaterally sensation normal light touch face both arms both legs coordination normal in the upper extremity station and gait was not tested for fear of would let him fall heart regular without murmur except extremities there is no clubbing or cyanosis but he has peripheral edema visual acuity normal at 3 feet    Results review:   I reviewed the patient's new clinical results.    Data review:  Lab Results (last 24 hours)     Procedure Component Value Units Date/Time    Cortisol [[id number]]  (Normal) Collected:  10/08/18 1200    Specimen:  Blood Updated:  10/08/18 1338     Cortisol 12.48 mcg/dL     Narrative:       Cortisol Reference Ranges:    Cortisol 6AM - 10AM Range: 6.02-18.40 mcg/dl  Cortisol 4PM - 8PM Range: 2.68-10.50 mcg/dl  Critical AM/PM:    Less than 2 mcg/dl    Renal Function Panel [[id number]]  (Abnormal) Collected:  10/08/18 1200    Specimen:  Blood Updated:  10/08/18 1259     Glucose 103 (H) mg/dL      BUN 35 (H) mg/dL      Creatinine 4.56 (H) mg/dL      Sodium 135 (L) mmol/L      Potassium 5.1 mmol/L      Chloride 97 (L) mmol/L      CO2 22.6 mmol/L      Calcium 9.8 mg/dL      Albumin 4.20 g/dL      Phosphorus 4.3 mg/dL      Anion Gap 15.4 mmol/L      BUN/Creatinine Ratio 7.7     eGFR Non African Amer 13 (L) mL/min/1.73      Comment: <15  Indicative of kidney failure.        eGFR   Amer -- mL/min/1.73      Comment: <15 Indicative of kidney failure.       CBC & Differential [[id number]] Collected:  10/08/18 1200    Specimen:  Blood Updated:  10/08/18 1223    Narrative:       The following orders were created for panel order CBC & Differential.  Procedure                               Abnormality         Status                     ---------                               -----------         ------                     CBC Auto Differential[[id number]]        Abnormal            Final result                 Please view results for these tests on the individual orders.    CBC Auto Differential [[id number]]  (Abnormal) Collected:  10/08/18 1200    Specimen:  Blood Updated:  10/08/18 1223     WBC 8.57 10*3/mm3      RBC 4.43 (L) 10*6/mm3      Hemoglobin 12.6 (L) g/dL      Hematocrit 41.3 %      MCV 93.2 fL      MCH 28.4 pg      MCHC 30.5 (L) g/dL      RDW 17.1 (H) %      RDW-SD 58.1 (H) fl      MPV 10.3 fL      Platelets 171 10*3/mm3      Neutrophil % 74.8 %      Lymphocyte % 19.7 %      Monocyte % 4.6 (L) %      Eosinophil % 0.5 %      Basophil % 0.4 %      Immature Grans % 0.1 %      Neutrophils, Absolute 6.42 10*3/mm3      Lymphocytes, Absolute 1.69 10*3/mm3      Monocytes, Absolute 0.39 10*3/mm3      Eosinophils, Absolute 0.04 10*3/mm3      Basophils, Absolute 0.03 10*3/mm3      Immature Grans, Absolute 0.01 10*3/mm3      nRBC 0.0 /100 WBC     POC Glucose Once [[id number]]  (Abnormal) Collected:  10/08/18 0756    Specimen:  Blood Updated:  10/08/18 0757     Glucose 131 (H) mg/dL     Narrative:       Meter: [id number] : [id number] Jaswinder DE LEON    Hepatitis B Surface Antigen [[id number]]  (Normal) Collected:  10/08/18 0341    Specimen:  Blood Updated:  10/08/18 0437     Hepatitis B Surface Ag Non-Reactive    POC Glucose Once [[id number]]  (Abnormal) Collected:  10/08/18 0204    Specimen:  Blood Updated:  10/08/18 0216     Glucose 152 (H) mg/dL      Narrative:       Meter: [id number] : [id number] Gary Snow RN    Basic Metabolic Panel [[id number]]  (Abnormal) Collected:  10/08/18 0055    Specimen:  Blood Updated:  10/08/18 0132     Glucose 136 (H) mg/dL      BUN 77 (H) mg/dL      Creatinine 7.29 (H) mg/dL      Sodium 138 mmol/L      Potassium 7.5 (C) mmol/L      Chloride 92 (L) mmol/L      CO2 29.1 (H) mmol/L      Calcium 11.0 (H) mg/dL      eGFR  African Amer -- mL/min/1.73      Comment: <15 Indicative of kidney failure.        eGFR Non African Amer 8 (L) mL/min/1.73      Comment: <15 Indicative of kidney failure.        BUN/Creatinine Ratio 10.6     Anion Gap 16.9 mmol/L     Narrative:       GFR Normal >60  Chronic Kidney Disease <60  Kidney Failure <15    Basic Metabolic Panel [[id number]]  (Abnormal) Collected:  10/07/18 2254    Specimen:  Blood Updated:  10/07/18 2325     Glucose 270 (H) mg/dL      BUN 76 (H) mg/dL      Creatinine 7.31 (H) mg/dL      Sodium 135 (L) mmol/L      Potassium 7.8 (C) mmol/L      Chloride 91 (L) mmol/L      CO2 28.9 mmol/L      Calcium 10.5 mg/dL      eGFR  African Amer -- mL/min/1.73      Comment: <15 Indicative of kidney failure.        eGFR Non African Amer 8 (L) mL/min/1.73      Comment: <15 Indicative of kidney failure.        BUN/Creatinine Ratio 10.4     Anion Gap 15.1 mmol/L     Narrative:       GFR Normal >60  Chronic Kidney Disease <60  Kidney Failure <15    Procalcitonin [[id number]]  (Normal) Collected:  10/07/18 2143    Specimen:  Blood Updated:  10/07/18 2220     Procalcitonin 0.24 ng/mL     Narrative:       As a Marker for Sepsis (Non-Neonates):   1. <0.5 ng/mL represents a low risk of severe sepsis and/or septic shock.  1. >2 ng/mL represents a high risk of severe sepsis and/or septic shock.    As a Marker for Lower Respiratory Tract Infections that require antibiotic therapy:  PCT on Admission     Antibiotic Therapy             6-12 Hrs later  > 0.5                Strongly Recommended            >0.25 -  "<0.5         Recommended  0.1 - 0.25           Discouraged                   Remeasure/reassess PCT  <0.1                 Strongly Discouraged          Remeasure/reassess PCT      As 28 day mortality risk marker: \"Change in Procalcitonin Result\" (> 80 % or <=80 %) if Day 0 (or Day 1) and Day 4 values are available. Refer to [email or web address]/   Change in PCT <=80 %   A decrease of PCT levels below or equal to 80 % defines a positive change in PCT test result representing a higher risk for 28-day all-cause mortality of patients diagnosed with severe sepsis or septic shock.  Change in PCT > 80 %   A decrease of PCT levels of more than 80 % defines a negative change in PCT result representing a lower risk for 28-day all-cause mortality of patients diagnosed with severe sepsis or septic shock.                Comprehensive Metabolic Panel [[id number]]  (Abnormal) Collected:  10/07/18 2143    Specimen:  Blood Updated:  10/07/18 2219     Glucose 187 (H) mg/dL      BUN 74 (H) mg/dL      Creatinine 7.52 (H) mg/dL      Sodium 137 mmol/L      Potassium 7.9 (C) mmol/L      Chloride 90 (L) mmol/L      CO2 29.0 mmol/L      Calcium 10.9 (H) mg/dL      Total Protein 8.7 (H) g/dL      Albumin 4.80 g/dL      ALT (SGPT) 14 U/L      AST (SGOT) 10 U/L      Alkaline Phosphatase 62 U/L      Total Bilirubin 0.4 mg/dL      eGFR Non African Amer 8 (L) mL/min/1.73      Comment: <15 Indicative of kidney failure.        eGFR   Amer -- mL/min/1.73      Comment: <15 Indicative of kidney failure.        Globulin 3.9 gm/dL      A/G Ratio 1.2 g/dL      BUN/Creatinine Ratio 9.8     Anion Gap 18.0 mmol/L     Lactic Acid, Plasma [[id number]]  (Normal) Collected:  10/07/18 2143    Specimen:  Blood Updated:  10/07/18 2205     Lactate 1.3 mmol/L     Protime-INR [[id number]]  (Abnormal) Collected:  10/07/18 2143    Specimen:  Blood Updated:  10/07/18 2204     Protime 22.8 (H) Seconds      INR 2.05 (H)    CBC & Differential " [[id number]] Collected:  10/07/18 2143    Specimen:  Blood Updated:  10/07/18 2159    Narrative:       The following orders were created for panel order CBC & Differential.  Procedure                               Abnormality         Status                     ---------                               -----------         ------                     CBC Auto Differential[[id number]]        Abnormal            Final result                 Please view results for these tests on the individual orders.    CBC Auto Differential [[id number]]  (Abnormal) Collected:  10/07/18 2143    Specimen:  Blood Updated:  10/07/18 2159     WBC 9.22 10*3/mm3      RBC 5.12 10*6/mm3      Hemoglobin 14.6 g/dL      Hematocrit 46.9 %      MCV 91.6 fL      MCH 28.5 pg      MCHC 31.1 (L) g/dL      RDW 16.9 (H) %      RDW-SD 57.2 (H) fl      MPV 10.2 fL      Platelets 183 10*3/mm3      Neutrophil % 89.4 (H) %      Lymphocyte % 4.9 (L) %      Monocyte % 4.7 (L) %      Eosinophil % 0.5 %      Basophil % 0.5 %      Immature Grans % 0.1 %      Neutrophils, Absolute 8.24 (H) 10*3/mm3      Lymphocytes, Absolute 0.45 (L) 10*3/mm3      Monocytes, Absolute 0.43 10*3/mm3      Eosinophils, Absolute 0.05 10*3/mm3      Basophils, Absolute 0.05 10*3/mm3      Immature Grans, Absolute 0.01 10*3/mm3      nRBC 0.0 /100 WBC     POC Glucose Once [[id number]]  (Abnormal) Collected:  10/07/18 2123    Specimen:  Blood Updated:  10/07/18 2125     Glucose 168 (H) mg/dL     Narrative:       Meter: [id number] : [id number] Pj De La Cruz           Imaging:  Imaging Results (last 24 hours)     Procedure Component Value Units Date/Time    CT Head Without Contrast [[id number]] Collected:  10/07/18 2218     Updated:  10/07/18 2225    Narrative:       CT OF THE HEAD WITHOUT CONTRAST     HISTORY: Headache and seizure     COMPARISON: None available.     TECHNIQUE: Axial CT imaging was obtained from the vertex of the skull  and skull base. No IV contrast was administered.         FINDINGS:  No acute intracranial hemorrhage is seen. There is diffuse cerebral  atrophy, with compensatory ventricular dilatation, which is advanced for  this patient's age of 58. There is no midline shift or mass effect.  There is periventricular and deep white matter microangiopathic disease,  although it is relatively mild. No focal areas of decreased attenuation  are seen. There is atherosclerotic involvement of the cavernous carotid  arteries. The visualized paranasal sinuses and mastoid air cells appear  clear. No aggressive osseous anomalies are seen. There are no focal soft  tissue abnormalities.       Impression:          1. No acute intracranial process seen.  2. Advanced cerebral atrophy.     Radiation dose reduction techniques were utilized, including automated  exposure control and exposure modulation based on body size.     This report was finalized on 10/7/2018 10:22 PM by Dr. Mariela Orozco M.D.                Assessment and Plan:     Active Problems:    Seizure (CMS/HCC)    By report he suffered a seizure prior to coming into the hospital his Keppra is now 1000 mg IV every 12 hours he has had recent behavioral changes according to the wife and I will proceed with an MRI scan of the brain and further recommendations will be made.  He appears to be seizure-free now there is a metabolic encephalopathy.      Ash Aguilar MD  10/08/18  2:46 PM

## 2018-10-08 NOTE — PLAN OF CARE
Problem: Fall Risk (Adult)  Goal: Identify Related Risk Factors and Signs and Symptoms  Outcome: Outcome(s) achieved Date Met: 10/08/18    Goal: Absence of Fall  Outcome: Outcome(s) achieved Date Met: 10/08/18      Problem: Patient Care Overview  Goal: Plan of Care Review  Outcome: Ongoing (interventions implemented as appropriate)   10/08/18 0521   Coping/Psychosocial   Plan of Care Reviewed With patient   OTHER   Outcome Summary Pt arrived on unit around 0200. Twitching, difficulty staying awake, but can follow commands and answer questions. Failed swallow due to lethargy. Wound nurse consult in for L greater toe. Getting HD now, VSS, heart rhythm in afib controlled. Will continue to monitor       Problem: Pain, Acute (Adult)  Goal: Identify Related Risk Factors and Signs and Symptoms  Outcome: Outcome(s) achieved Date Met: 10/08/18    Goal: Acceptable Pain Control/Comfort Level  Outcome: Ongoing (interventions implemented as appropriate)      Problem: Mobility, Physical Impaired (Adult)  Goal: Identify Related Risk Factors and Signs and Symptoms  Outcome: Outcome(s) achieved Date Met: 10/08/18    Goal: Enhanced Mobility Skills  Outcome: Ongoing (interventions implemented as appropriate)    Goal: Enhanced Functional Ability  Outcome: Ongoing (interventions implemented as appropriate)      Problem: Kidney Disease, Chronic/End Stage Renal Disease (Adult)  Goal: Signs and Symptoms of Listed Potential Problems Will be Absent, Minimized or Managed (Kidney Disease, Chronic/End Stage Renal Disease)  Outcome: Ongoing (interventions implemented as appropriate)

## 2018-10-08 NOTE — ED TRIAGE NOTES
ES reports being called for seizure activity. Pt has Hx of seizures. Family states theye gave Pt keppra but after seeing no changes they also gave ASA and Tylenol.  Pt is dialysis Pt with a fistula on L arm, last tx was Friday with no complications.    Pt wears o2 at home PRN. EMS placed Pt on 4L

## 2018-10-08 NOTE — PROGRESS NOTES
Discharge Planning Assessment  [facility]     Patient Name: Armando Gill  MRN: [id number]  Today's Date: 10/8/2018    Admit Date: 10/7/2018          Discharge Needs Assessment     Row Name 10/08/18 1021       Living Environment    Lives With spouse    Name(s) of Who Lives With Patient Ольга edmondson [phone]    Current Living Arrangements home/apartment/condo    Primary Care Provided by spouse/significant other    Provides Primary Care For no one    Family Caregiver if Needed spouse    Family Caregiver Names Ольга edmondson [phone]    Quality of Family Relationships supportive    Able to Return to Prior Arrangements other (see comments)   TVD       Resource/Environmental Concerns    Resource/Environmental Concerns financial    Transportation Concerns car, none       Transition Planning    Patient/Family Anticipates Transition to home with family    Patient/Family Anticipated Services at Transition home health care;skilled nursing    Transportation Anticipated family or friend will provide       Discharge Needs Assessment    Readmission Within the Last 30 Days no previous admission in last 30 days    Concerns to be Addressed discharge planning;basic needs    Equipment Currently Used at Home oxygen;cane, straight;bipap/cpap;walker, standard;hospital bed    Discharge Facility/Level of Care Needs home with home health;nursing facility, skilled    Current Discharge Risk chronically ill;cognitively impaired;non-alliance;financial support inadequate            Discharge Plan     Row Name 10/08/18 1029       Plan    Plan Home with wife    Patient/Family in Agreement with Plan yes    Plan Comments IMM 10/8/2018.  Met with patient and Ольга Gill wife [phone] at bedside, patient granted me permission to speak while wife remained in the room.  Face sheet verified.  Prior to admission, wife states that patient has been able to walk very short distances with use of cane and or walker, but does spend  majority of his time in bed.  Per wife patient is non-compliant with CPAP.  Patient goes to dialysis M-W-F, wife transports.   Wife shared that patient is becoming more confused and more difficult to manage at home.  Wife states that she has applied for Medicaid but she has been over resource requirements.  Ольга Gill wife [phone] listed on living will as his health care surrogate, requested copy for hospital records.  Discussed discharge plans, in the past patient was at [facility] for about 8 days this summer and had [facility] home health after release from [facility]. Current discharge plans are to return home with wife, wife will transport.  Will monitor for discharge needs.         Destination     No service coordination in this encounter.      Durable Medical Equipment     No service coordination in this encounter.      Dialysis/Infusion     No service coordination in this encounter.      Home Medical Care     No service coordination in this encounter.      Social Care     No service coordination in this encounter.                Demographic Summary     Row Name 10/08/18 1020       General Information    Admission Type inpatient    Arrived From emergency department    Required Notices Provided Important Message from Medicare    Referral Source admission list    Reason for Consult discharge planning    Preferred Language English     Used During This Interaction no       Contact Information    Permission Granted to Share Info With family/designee   Ольга Gill wife [phone]            Functional Status     Row Name 10/08/18 1021       Functional Status    Usual Activity Tolerance moderate    Current Activity Tolerance fair       Functional Status, IADL    Medications assistive person    Meal Preparation assistive person       Mental Status Summary    Recent Changes in Mental Status/Cognitive Functioning unable to assess       Employment/    Employment Status disabled             Psychosocial    No documentation.           Abuse/Neglect    No documentation.           Legal    No documentation.           Substance Abuse    No documentation.           Patient Forms    No documentation.         Tanya Chavira RN

## 2018-10-08 NOTE — CONSULTS
Referring Provider: Ethan Marin M.D.  Reason for Consultation: Management of patient with end-stage renal disease and severe hyperkalemia    Subjective     Chief complaint   Chief Complaint   Patient presents with   • Seizures       History of present illness:    's is a 58 year old  male with history of end-stage renal disease normally has dialysis every Monday, Wednesday and Friday his last dialysis was on Friday was an eventful, history of seizure disorder and atrial fibrillation, obstructive sleep apnea, diabetes mellitus type 2, history of pituitary cancer.  He presented to the emergency department with the right-sided headache, he was having myoclous and weakness, there was a question about seizure prior to admission.  was found to have severe hyperkalemia potassium was 7.9 she was treated with the dextrose and insulin and calcium gluconate and he was started on dialysis urgently.  Upon questioning him about his dietary intake over the weekend he sort of admitted indiscretion.  He denies any chest pain or shortness of air, he had one episode of vomiting upon arrival to the intensive care unit but that has completely resolved, no nausea, no abdominal pain, no headache, no further seizure, no chest pain or shortness of air.    Past Medical History:   Diagnosis Date   • Anxiety    • Arthritis    • Asthma    • Asymptomatic hyperuricemia    • Atrial fibrillation (CMS/HCC)    • Callus of foot     LEFT FOOT , PRESENTLY ATTENDING WOUND CLINIC [facility]   • Cancer of pituitary gland (CMS/HCC)    • Depression    • Diabetes mellitus (CMS/HCC)     type 2, A1C of 5 1 month ago by patient report   • Dialysis patient (CMS/HCC)     MON WED FRI   • Dyslipidemia    • End stage renal disease (CMS/HCC)    • Gout    • History of anemia    • History of colon polyps    • Hyperparathyroidism (CMS/HCC)    • Hyperprolactinemia (CMS/HCC)    • Hypertension    • Hypogonadism, male    • Hypothyroidism    • Male erectile disorder  of organic origin    • Neuropathy    • Obstructive sleep apnea    • Presence of arterial-venous shunt (for dialysis) (CMS/HCC)    • Seizure disorder (CMS/HCC)      2 YRS AGO   • Vitamin D deficiency disease      Past Surgical History:   Procedure Laterality Date   • CATARACT EXTRACTION Bilateral 2009   • CHOLECYSTECTOMY  2002    Performed in [place]   • COLONOSCOPY W/ POLYPECTOMY N/A 11/2015    Dr. Bolivar   • INCISION AND DRAINAGE ARM Right 6/26/2018    Procedure: RIGHT WRIST INCISION AND DRAINAGE;  Surgeon: Wilian Arredondo MD;  Location: [facility];  Service: Hand   • INGUINAL HERNIA REPAIR Left 2002    Open Inguinal   • THYROIDECTOMY Right 1/6/2017    Procedure: FOUR GLAND PARATHYROIDECTOMY WITH AUTOTRANSPLANT INTO RIGHT FOREARM, INTRA-OPERATIVE PTH MEASUREMENT, PARTIAL THYMECTOMY;  Surgeon: Freida Morfin MD;  Location: [facility];  Service:    • THYROIDECTOMY N/A 8/2/2017    Procedure: left thyroid lobectomy, Left superior parathyroidectomy with autotransplant into right forearm, intra-operative pth monitoring;  Surgeon: Freida Morfin MD;  Location: [facility];  Service:    • TRACHEOSTOMY N/A 10/22/2007    W/ REVISION OF THYROID ISTHMUS, DUE TO SLEEP APNEA; ALLOWED TO GROW CLOSED AFTER ONE YEAR DUE TO LACK OF RELIEF OF SLEEP APNEA SYMPTOMS, DR. MAXIMO BELL   • TRACHEOSTOMY N/A 05/21/2009    REVISION, DR. MAXIMO BELL   • TRACHEOSTOMY N/A 04/25/2009    REVISION, DR. MAXIMO BELL   • VASCULAR SURGERY Left 2014    Acess in Left Arm-Dr. Guzman     Family History   Problem Relation Age of Onset   • Heart disease Sister    • Heart disease Father    • Kidney disease Neg Hx    • Malig Hyperthermia Neg Hx        Social History   Substance Use Topics   • Smoking status: Never Smoker   • Smokeless tobacco: Never Used   • Alcohol use No     Prescriptions Prior to Admission   Medication Sig Dispense Refill Last Dose   • aspirin 81 MG EC tablet Take 81 mg by mouth.   Taking   • levETIRAcetam (KEPPRA) 1000 MG  "tablet Take 1,000 mg by mouth 2 (two) times a day.   Taking   • LORazepam (ATIVAN) 0.5 MG tablet Take 1 tablet by mouth Every 8 (Eight) Hours As Needed for Anxiety. 9 tablet 0 Not Taking   • Morphine (MS CONTIN) 100 MG 12 hr tablet Take 1 tablet by mouth Daily. 3 tablet 0 Taking   • pravastatin (PRAVACHOL) 40 MG tablet Take 40 mg by mouth Daily.   Taking   • SYNTHROID 175 MCG tablet Take 175 mcg by mouth daily.   Taking   • Vortioxetine HBr (TRINTELLIX) 10 MG tablet Take 20 mg by mouth Every Night.   Taking   • warfarin (COUMADIN) 2.5 MG tablet Take 5 mg by mouth Daily.   Taking   • busPIRone (BUSPAR) 5 MG tablet Take 5 mg by mouth 2 (Two) Times a Day.   Taking   • clindamycin (CLEOCIN) 300 MG capsule Take 300 mg by mouth 3 (Three) Times a Day.   Taking   • diltiaZEM CD (CARDIZEM CD) 120 MG 24 hr capsule Take 1 capsule by mouth every night at bedtime.   Taking   • famotidine (PEPCID) 40 MG tablet Take 1 tablet by mouth 2 (Two) Times a Day As Needed for Indigestion or Heartburn.   Not Taking   • lactulose (CHRONULAC) 10 GM/15ML solution Take 15 mL by mouth Daily.   Not Taking   • oxyCODONE-acetaminophen (PERCOCET) 5-325 MG per tablet Take 1 tablet by mouth Every 8 (Eight) Hours As Needed for Severe Pain . 9 tablet 0 Not Taking   • sennosides-docusate sodium (SENOKOT-S) 8.6-50 MG tablet Take 2 tablets by mouth 2 (Two) Times a Day. Hold for loose stool   Taking   • vitamin D (ERGOCALCIFEROL) 35662 units capsule capsule Take 50,000 Units by mouth Every 30 (Thirty) Days.   Taking     Allergies:  Adhesive tape; Latex; and Sulfa antibiotics    Review of Systems  14 points review of system was performed and it was negative other than what noted above in the history of present illness    Objective     Vital Signs  Temp:  [98.3 °F (36.8 °C)] 98.3 °F (36.8 °C)  Heart Rate:  [] 79  Resp:  [18] 18  BP: (107-175)/() 110/66    Flowsheet Rows      First Filed Value   Admission Height  172.7 cm (68\") Documented at " 10/07/2018 2126   Admission Weight   143 kg (316 lb 4.8 oz) Documented at 10/07/2018 2126           No intake/output data recorded.  No intake/output data recorded.  No intake or output data in the 24 hours ending 10/08/18 0741    Physical Exam:  General Appearance: alert, oriented x 3, no acute distress, chronically ill.  Skin: warm and dry  HEENT: pupils round and reactive to light, oral mucosa dry, nonicteric sclerae   Neck: supple, no JVD, trachea midline  Lungs: Bilateral rhonchi, unlabored breathing effort  Heart: Irregularly irregular, no rub  Abdomen: soft, non-tender,  present bowel sounds to auscultation  : no palpable bladder,  Joints: No significant deformities noted, no crepitation over the knees or the ankles.  He has changes in his feet associated with the his long term diabetes mellitus and diabetic neuropathy  Lymphatics: No cervical or supraclavicular lymphadenopathy  Extremities: no edema, cyanosis or clubbing, left forearm AV fistula is patent  Neuro: normal speech and mental status    Results Review:  Results for orders placed or performed during the hospital encounter of 10/07/18   Comprehensive Metabolic Panel   Result Value Ref Range    Glucose 187 (H) 65 - 99 mg/dL    BUN 74 (H) 6 - 20 mg/dL    Creatinine 7.52 (H) 0.76 - 1.27 mg/dL    Sodium 137 136 - 145 mmol/L    Potassium 7.9 (C) 3.5 - 5.2 mmol/L    Chloride 90 (L) 98 - 107 mmol/L    CO2 29.0 22.0 - 29.0 mmol/L    Calcium 10.9 (H) 8.6 - 10.5 mg/dL    Total Protein 8.7 (H) 6.0 - 8.5 g/dL    Albumin 4.80 3.50 - 5.20 g/dL    ALT (SGPT) 14 1 - 41 U/L    AST (SGOT) 10 1 - 40 U/L    Alkaline Phosphatase 62 39 - 117 U/L    Total Bilirubin 0.4 0.1 - 1.2 mg/dL    eGFR Non African Amer 8 (L) >60 mL/min/1.73    eGFR  African Amer  >60 mL/min/1.73    Globulin 3.9 gm/dL    A/G Ratio 1.2 g/dL    BUN/Creatinine Ratio 9.8 7.0 - 25.0    Anion Gap 18.0 mmol/L   Lactic Acid, Plasma   Result Value Ref Range    Lactate 1.3 0.5 - 2.0 mmol/L   Procalcitonin    Result Value Ref Range    Procalcitonin 0.24 0.10 - 0.25 ng/mL   Protime-INR   Result Value Ref Range    Protime 22.8 (H) 11.7 - 14.2 Seconds    INR 2.05 (H) 0.90 - 1.10   CBC Auto Differential   Result Value Ref Range    WBC 9.22 4.50 - 10.70 10*3/mm3    RBC 5.12 4.60 - 6.00 10*6/mm3    Hemoglobin 14.6 13.7 - 17.6 g/dL    Hematocrit 46.9 40.4 - 52.2 %    MCV 91.6 79.8 - 96.2 fL    MCH 28.5 27.0 - 32.7 pg    MCHC 31.1 (L) 32.6 - 36.4 g/dL    RDW 16.9 (H) 11.5 - 14.5 %    RDW-SD 57.2 (H) 37.0 - 54.0 fl    MPV 10.2 6.0 - 12.0 fL    Platelets 183 140 - 500 10*3/mm3    Neutrophil % 89.4 (H) 42.7 - 76.0 %    Lymphocyte % 4.9 (L) 19.6 - 45.3 %    Monocyte % 4.7 (L) 5.0 - 12.0 %    Eosinophil % 0.5 0.3 - 6.2 %    Basophil % 0.5 0.0 - 1.5 %    Immature Grans % 0.1 0.0 - 0.5 %    Neutrophils, Absolute 8.24 (H) 1.90 - 8.10 10*3/mm3    Lymphocytes, Absolute 0.45 (L) 0.90 - 4.80 10*3/mm3    Monocytes, Absolute 0.43 0.20 - 1.20 10*3/mm3    Eosinophils, Absolute 0.05 0.00 - 0.70 10*3/mm3    Basophils, Absolute 0.05 0.00 - 0.20 10*3/mm3    Immature Grans, Absolute 0.01 0.00 - 0.03 10*3/mm3    nRBC 0.0 0.0 - 0.0 /100 WBC   Basic Metabolic Panel   Result Value Ref Range    Glucose 136 (H) 65 - 99 mg/dL    BUN 77 (H) 6 - 20 mg/dL    Creatinine 7.29 (H) 0.76 - 1.27 mg/dL    Sodium 138 136 - 145 mmol/L    Potassium 7.5 (C) 3.5 - 5.2 mmol/L    Chloride 92 (L) 98 - 107 mmol/L    CO2 29.1 (H) 22.0 - 29.0 mmol/L    Calcium 11.0 (H) 8.6 - 10.5 mg/dL    eGFR  African Amer  >60 mL/min/1.73    eGFR Non African Amer 8 (L) >60 mL/min/1.73    BUN/Creatinine Ratio 10.6 7.0 - 25.0    Anion Gap 16.9 mmol/L   Basic Metabolic Panel   Result Value Ref Range    Glucose 270 (H) 65 - 99 mg/dL    BUN 76 (H) 6 - 20 mg/dL    Creatinine 7.31 (H) 0.76 - 1.27 mg/dL    Sodium 135 (L) 136 - 145 mmol/L    Potassium 7.8 (C) 3.5 - 5.2 mmol/L    Chloride 91 (L) 98 - 107 mmol/L    CO2 28.9 22.0 - 29.0 mmol/L    Calcium 10.5 8.6 - 10.5 mg/dL    eGFR  African Amer   >60 mL/min/1.73    eGFR Non African Amer 8 (L) >60 mL/min/1.73    BUN/Creatinine Ratio 10.4 7.0 - 25.0    Anion Gap 15.1 mmol/L   Hepatitis B Surface Antigen   Result Value Ref Range    Hepatitis B Surface Ag Non-Reactive Non-Reactive   POC Glucose Once   Result Value Ref Range    Glucose 168 (H) 70 - 130 mg/dL   POC Glucose Once   Result Value Ref Range    Glucose 152 (H) 70 - 130 mg/dL     Imaging Results (last 72 hours)     Procedure Component Value Units Date/Time    CT Head Without Contrast [[id number]] Collected:  10/07/18 2218     Updated:  10/07/18 2225    Narrative:       CT OF THE HEAD WITHOUT CONTRAST     HISTORY: Headache and seizure     COMPARISON: None available.     TECHNIQUE: Axial CT imaging was obtained from the vertex of the skull  and skull base. No IV contrast was administered.        FINDINGS:  No acute intracranial hemorrhage is seen. There is diffuse cerebral  atrophy, with compensatory ventricular dilatation, which is advanced for  this patient's age of 58. There is no midline shift or mass effect.  There is periventricular and deep white matter microangiopathic disease,  although it is relatively mild. No focal areas of decreased attenuation  are seen. There is atherosclerotic involvement of the cavernous carotid  arteries. The visualized paranasal sinuses and mastoid air cells appear  clear. No aggressive osseous anomalies are seen. There are no focal soft  tissue abnormalities.       Impression:          1. No acute intracranial process seen.  2. Advanced cerebral atrophy.     Radiation dose reduction techniques were utilized, including automated  exposure control and exposure modulation based on body size.     This report was finalized on 10/7/2018 10:22 PM by Dr. Mariela Orozco M.D.                 levETIRAcetam 1,000 mg Intravenous Q12H   levothyroxine 175 mcg Oral Q AM   Morphine 100 mg Oral Daily   promethazine 25 mg Intravenous Once   sodium chloride 3 mL Intravenous Q12H    warfarin 5 mg Oral Daily          Assessment/Plan   1.  End-stage renal disease secondary diabetic nephropathy on maintenance hemodialysis, every Monday, Wednesday and Friday via an AV fistula.  He presented with weakness noted to have potassium of 7.9 on admission.  The patient is undergoing an emergent dialysis  2.  Diabetes mellitus type II with multiple end organ complications  3.  History of seizure disorder  4.  Chronic A. fib, chronically anticoagulated  5.  Obesity  6.  Hypothyroidism on replacement therapy      Plan:  1.  Will check his lab 3 hours after the end of dialysis to determine his potassium level.  2.  Will follow strict renal diet with 2 g sodium and 2 g potassium restriction  3. Surveillance labs     thank you Dr. Marin for asking me to see this patient in consultation    I discussed the patients findings and my recommendations with the patient and his nurse     Mushtaq Sanchez MD  10/08/18  7:41 AM

## 2018-10-08 NOTE — CONSULTS
Adult Nutrition  Assessment/PES    Patient Name:  Armando Gill  YOB: 1960  MRN: [id number]  Admit Date:  10/7/2018    Assessment Date:  10/8/2018    Comments:  Nutrition screen completed.          Reason for Assessment     Row Name 10/08/18 1325          Reason for Assessment    Reason For Assessment diagnosis/disease state     Diagnosis --   seizure d/o, ESRD, DM, heart failure, obesity     Identified At Risk by Screening Criteria MST SCORE 2+               Anthropometrics     Row Name 10/08/18 1326          Usual Body Weight (UBW)    Usual Body Weight --   usual wt 310-330lb        Body Mass Index (BMI)    BMI Assessment BMI 40 or greater: obesity grade III             Labs/Tests/Procedures/Meds     Row Name 10/08/18 1327          Labs/Procedures/Meds    Lab Results Reviewed reviewed     Lab Results Comments na,glu, bun, cr, H/H        Diagnostic Tests/Procedures    Diagnostic Test/Procedure Reviewed reviewed        Medications    Pertinent Medications Reviewed reviewed     Pertinent Medications Comments keppra, coumadin             Physical Findings     Row Name 10/08/18 1327          Physical Findings    Overall Physical Appearance obese     Skin --   DM ulcer left toe               Nutrition Prescription Ordered     Row Name 10/08/18 1328          Nutrition Prescription PO    Current PO Diet NPO               Problem/Interventions:        Problem 1     Row Name 10/08/18 1333          Nutrition Diagnoses Problem 1    Problem 1 Nutrition Appropriate for Condition at this Time     Etiology (related to) MNT for Treatment/Condition                     Intervention Goal     Row Name 10/08/18 1333          Intervention Goal    General Maintain nutrition     PO Initiate feeding;Tolerate PO     Weight No significant weight loss             Nutrition Intervention     Row Name 10/08/18 1333          Nutrition Intervention    RD/Tech Action Follow Tx progress;Care plan reviewd                Education/Evaluation     Row Name 10/08/18 1333          Education    Education Will Instruct as appropriate        Monitor/Evaluation    Monitor Per protocol         Electronically signed by:  Letha Gamboa RD  10/08/18 1:33 PM

## 2018-10-08 NOTE — ED NOTES
Seizure precautions placed. Pt with both side rails padded and suction at bedside       Naina Horan RN  10/07/18 2752

## 2018-10-08 NOTE — ED NOTES
Called to pts room by wife stating his heart rate was elevated pt had a run of afib rvr hr in the 160's to 120's lasted about 5 mins       Gaby Johnston, RN  10/07/18 1501

## 2018-10-09 LAB
ALBUMIN SERPL-MCNC: 4.3 G/DL (ref 3.5–5.2)
AMPHET+METHAMPHET UR QL: NEGATIVE
ANION GAP SERPL CALCULATED.3IONS-SCNC: 17.3 MMOL/L
BARBITURATES UR QL SCN: NEGATIVE
BASOPHILS # BLD AUTO: 0.04 10*3/MM3 (ref 0–0.2)
BASOPHILS NFR BLD AUTO: 0.5 % (ref 0–1.5)
BENZODIAZ UR QL SCN: NEGATIVE
BUN BLD-MCNC: 49 MG/DL (ref 6–20)
BUN/CREAT SERPL: 8.4 (ref 7–25)
CALCIUM SPEC-SCNC: 9.5 MG/DL (ref 8.6–10.5)
CANNABINOIDS SERPL QL: NEGATIVE
CHLORIDE SERPL-SCNC: 98 MMOL/L (ref 98–107)
CO2 SERPL-SCNC: 23.7 MMOL/L (ref 22–29)
COCAINE UR QL: NEGATIVE
CREAT BLD-MCNC: 5.81 MG/DL (ref 0.76–1.27)
D-LACTATE SERPL-SCNC: 0.9 MMOL/L (ref 0.5–2)
DEPRECATED RDW RBC AUTO: 57.9 FL (ref 37–54)
EOSINOPHIL # BLD AUTO: 0.01 10*3/MM3 (ref 0–0.7)
EOSINOPHIL NFR BLD AUTO: 0.1 % (ref 0.3–6.2)
ERYTHROCYTE [DISTWIDTH] IN BLOOD BY AUTOMATED COUNT: 16.7 % (ref 11.5–14.5)
GFR SERPL CREATININE-BSD FRML MDRD: 10 ML/MIN/1.73
GFR SERPL CREATININE-BSD FRML MDRD: ABNORMAL ML/MIN/1.73
GLUCOSE BLD-MCNC: 81 MG/DL (ref 65–99)
GLUCOSE BLDC GLUCOMTR-MCNC: 120 MG/DL (ref 70–130)
GLUCOSE BLDC GLUCOMTR-MCNC: 126 MG/DL (ref 70–130)
GLUCOSE BLDC GLUCOMTR-MCNC: 71 MG/DL (ref 70–130)
GLUCOSE BLDC GLUCOMTR-MCNC: 75 MG/DL (ref 70–130)
GLUCOSE BLDC GLUCOMTR-MCNC: 82 MG/DL (ref 70–130)
HCT VFR BLD AUTO: 41.5 % (ref 40.4–52.2)
HGB BLD-MCNC: 12.2 G/DL (ref 13.7–17.6)
IMM GRANULOCYTES # BLD: 0 10*3/MM3 (ref 0–0.03)
IMM GRANULOCYTES NFR BLD: 0 % (ref 0–0.5)
INR PPP: 2.71 (ref 0.9–1.1)
LYMPHOCYTES # BLD AUTO: 0.97 10*3/MM3 (ref 0.9–4.8)
LYMPHOCYTES NFR BLD AUTO: 11.8 % (ref 19.6–45.3)
MCH RBC QN AUTO: 27.5 PG (ref 27–32.7)
MCHC RBC AUTO-ENTMCNC: 29.4 G/DL (ref 32.6–36.4)
MCV RBC AUTO: 93.7 FL (ref 79.8–96.2)
METHADONE UR QL SCN: POSITIVE
MONOCYTES # BLD AUTO: 0.96 10*3/MM3 (ref 0.2–1.2)
MONOCYTES NFR BLD AUTO: 11.6 % (ref 5–12)
NEUTROPHILS # BLD AUTO: 6.27 10*3/MM3 (ref 1.9–8.1)
NEUTROPHILS NFR BLD AUTO: 76 % (ref 42.7–76)
OPIATES UR QL: POSITIVE
OXYCODONE UR QL SCN: NEGATIVE
PHOSPHATE SERPL-MCNC: 6 MG/DL (ref 2.5–4.5)
PLATELET # BLD AUTO: 194 10*3/MM3 (ref 140–500)
PMV BLD AUTO: 10.7 FL (ref 6–12)
POTASSIUM BLD-SCNC: 5.4 MMOL/L (ref 3.5–5.2)
PROCALCITONIN SERPL-MCNC: 0.19 NG/ML (ref 0.1–0.25)
PROTHROMBIN TIME: 28.3 SECONDS (ref 11.7–14.2)
RBC # BLD AUTO: 4.43 10*6/MM3 (ref 4.6–6)
SODIUM BLD-SCNC: 139 MMOL/L (ref 136–145)
TSH SERPL DL<=0.05 MIU/L-ACNC: 1.82 MIU/ML (ref 0.27–4.2)
WBC NRBC COR # BLD: 8.25 10*3/MM3 (ref 4.5–10.7)

## 2018-10-09 PROCEDURE — 80307 DRUG TEST PRSMV CHEM ANLYZR: CPT | Performed by: INTERNAL MEDICINE

## 2018-10-09 PROCEDURE — 80069 RENAL FUNCTION PANEL: CPT | Performed by: INTERNAL MEDICINE

## 2018-10-09 PROCEDURE — 85610 PROTHROMBIN TIME: CPT | Performed by: INTERNAL MEDICINE

## 2018-10-09 PROCEDURE — 25010000003 LEVETIRACETAM IN NACL 0.75% 1000 MG/100ML SOLUTION: Performed by: INTERNAL MEDICINE

## 2018-10-09 PROCEDURE — 99231 SBSQ HOSP IP/OBS SF/LOW 25: CPT | Performed by: PSYCHIATRY & NEUROLOGY

## 2018-10-09 PROCEDURE — 84145 PROCALCITONIN (PCT): CPT | Performed by: INTERNAL MEDICINE

## 2018-10-09 PROCEDURE — 85025 COMPLETE CBC W/AUTO DIFF WBC: CPT | Performed by: INTERNAL MEDICINE

## 2018-10-09 PROCEDURE — 83605 ASSAY OF LACTIC ACID: CPT | Performed by: INTERNAL MEDICINE

## 2018-10-09 PROCEDURE — 25010000002 LORAZEPAM PER 2 MG: Performed by: INTERNAL MEDICINE

## 2018-10-09 PROCEDURE — 84443 ASSAY THYROID STIM HORMONE: CPT | Performed by: INTERNAL MEDICINE

## 2018-10-09 PROCEDURE — 82962 GLUCOSE BLOOD TEST: CPT

## 2018-10-09 RX ORDER — LEVETIRACETAM 500 MG/1
1000 TABLET ORAL 2 TIMES DAILY
Status: DISCONTINUED | OUTPATIENT
Start: 2018-10-09 | End: 2018-10-10 | Stop reason: HOSPADM

## 2018-10-09 RX ORDER — WARFARIN SODIUM 2.5 MG/1
2.5 TABLET ORAL
Status: COMPLETED | OUTPATIENT
Start: 2018-10-09 | End: 2018-10-09

## 2018-10-09 RX ORDER — WARFARIN SODIUM 5 MG/1
5 TABLET ORAL
Status: DISCONTINUED | OUTPATIENT
Start: 2018-10-10 | End: 2018-10-10 | Stop reason: HOSPADM

## 2018-10-09 RX ADMIN — Medication 3 ML: at 21:00

## 2018-10-09 RX ADMIN — LORAZEPAM 0.5 MG: 2 INJECTION INTRAMUSCULAR; INTRAVENOUS at 11:55

## 2018-10-09 RX ADMIN — LORAZEPAM 0.5 MG: 2 INJECTION INTRAMUSCULAR; INTRAVENOUS at 20:10

## 2018-10-09 RX ADMIN — Medication 3 ML: at 08:53

## 2018-10-09 RX ADMIN — LEVOTHYROXINE SODIUM 175 MCG: 175 TABLET ORAL at 07:40

## 2018-10-09 RX ADMIN — LEVETIRACETAM 1000 MG: 10 INJECTION INTRAVENOUS at 08:50

## 2018-10-09 RX ADMIN — WARFARIN SODIUM 2.5 MG: 2.5 TABLET ORAL at 17:04

## 2018-10-09 RX ADMIN — LEVETIRACETAM 1000 MG: 500 TABLET, FILM COATED ORAL at 20:15

## 2018-10-09 RX ADMIN — LORAZEPAM 0.5 MG: 2 INJECTION INTRAMUSCULAR; INTRAVENOUS at 02:09

## 2018-10-09 NOTE — PLAN OF CARE
Problem: Seizure Disorder/Epilepsy (Adult)  Goal: Signs and Symptoms of Listed Potential Problems Will be Absent, Minimized or Managed (Seizure Disorder/Epilepsy)  Outcome: Ongoing (interventions implemented as appropriate)

## 2018-10-09 NOTE — PROGRESS NOTES
"   LOS: 2 days    Patient Care Team:  Luis Antonio Abarca MD as PCP - General (Family Medicine)    Chief Complaint:    Chief Complaint   Patient presents with   • Seizures     Follow UP ESRD  Subjective     Interval History:   Anxious, irritable.  Tells me he has not been eating at home due to trying to lose weight for transplant.  Eating breakfast, then crackers during the day.    Review of Systems:   Not soa.  Bowels moving.  Hungry.     Objective     Vital Signs  Temp:  [98.4 °F (36.9 °C)-99.3 °F (37.4 °C)] 99 °F (37.2 °C)  Heart Rate:  [58-87] 69  BP: ()/(42-96) 128/73    Flowsheet Rows      First Filed Value   Admission Height  172.7 cm (68\") Documented at 10/07/2018 2126   Admission Weight   143 kg (316 lb 4.8 oz) Documented at 10/07/2018 2126          No intake/output data recorded.  I/O last 3 completed shifts:  In: 955 [I.V.:955]  Out: -     Intake/Output Summary (Last 24 hours) at 10/09/18 0904  Last data filed at 10/09/18 0454   Gross per 24 hour   Intake              955 ml   Output                0 ml   Net              955 ml       Physical Exam:  Obese, WM.  Lying in bed.  Unusual affect. Irritable and anxious. Heart RRR no s3 or rub. Lungs clear to auscultation.  Abd + bs soft nontender.  Ext LUE AVF thrill, bruit.  No edema Skin no rash.       Results Review:      Results from last 7 days  Lab Units 10/09/18  0641 10/08/18  1200 10/08/18  0055  10/07/18  2143   SODIUM mmol/L 139 135* 138  < > 137   POTASSIUM mmol/L 5.4* 5.1 7.5*  < > 7.9*   CHLORIDE mmol/L 98 97* 92*  < > 90*   CO2 mmol/L 23.7 22.6 29.1*  < > 29.0   BUN mg/dL 49* 35* 77*  < > 74*   CREATININE mg/dL 5.81* 4.56* 7.29*  < > 7.52*   CALCIUM mg/dL 9.5 9.8 11.0*  < > 10.9*   BILIRUBIN mg/dL  --   --   --   --  0.4   ALK PHOS U/L  --   --   --   --  62   ALT (SGPT) U/L  --   --   --   --  14   AST (SGOT) U/L  --   --   --   --  10   GLUCOSE mg/dL 81 103* 136*  < > 187*   < > = values in this interval not displayed.    Estimated " Creatinine Clearance: 19.5 mL/min (A) (by C-G formula based on SCr of 5.81 mg/dL (H)).      Results from last 7 days  Lab Units 10/09/18  0641 10/08/18  1200   PHOSPHORUS mg/dL 6.0* 4.3               Results from last 7 days  Lab Units 10/09/18  0449 10/08/18  1544 10/08/18  1200 10/07/18  2143   WBC 10*3/mm3 8.25  --  8.57 9.22   HEMOGLOBIN g/dL 12.2* 12.3* 12.6* 14.6   PLATELETS 10*3/mm3 194  --  171 183         Results from last 7 days  Lab Units 10/07/18  2143   INR  2.05*         Imaging Results (last 24 hours)     ** No results found for the last 24 hours. **          levETIRAcetam 1,000 mg Intravenous Q12H   levothyroxine 175 mcg Oral Q AM   sodium chloride 3 mL Intravenous Q12H   warfarin 5 mg Oral Daily       phenylephrine 0.5-3 mcg/kg/min Last Rate: Stopped (10/09/18 0903)       Medication Review:   Current Facility-Administered Medications   Medication Dose Route Frequency Provider Last Rate Last Dose   • acetaminophen (TYLENOL) tablet 650 mg  650 mg Oral Q4H PRN Ethan Marin MD        Or   • acetaminophen (TYLENOL) suppository 650 mg  650 mg Rectal Q4H PRN Ethan Marin MD       • calcium gluconate 1 g in sodium chloride 0.9 % 100 mL IVPB  1 g Intravenous Q4H PRN Ethan Marin MD       • ipratropium-albuterol (DUO-NEB) nebulizer solution 3 mL  3 mL Nebulization Q6H PRN Ethan Marin MD       • levETIRAcetam in NaCl 0.75% (KEPPRA) IVPB 1,000 mg  1,000 mg Intravenous Q12H Ethan Marin MD   1,000 mg at 10/09/18 0850   • levothyroxine (SYNTHROID, LEVOTHROID) tablet 175 mcg  175 mcg Oral Q AM Ethan Marin MD   175 mcg at 10/09/18 0740   • LORazepam (ATIVAN) injection 0.5 mg  0.5 mg Intravenous Q4H PRN Ethan Marin MD   0.5 mg at 10/09/18 0209   • metoprolol tartrate (LOPRESSOR) injection 5 mg  5 mg Intravenous Q6H PRN Ethan Marin MD       • ondansetron (ZOFRAN) tablet 4 mg  4 mg Oral Q6H PRN Ethan Marin MD        Or   • ondansetron ODT (ZOFRAN-ODT) disintegrating tablet 4 mg  4 mg  Oral Q6H PRN Ethan Marin MD        Or   • ondansetron (ZOFRAN) injection 4 mg  4 mg Intravenous Q6H PRN Ethan Marin MD       • oxyCODONE-acetaminophen (PERCOCET) 5-325 MG per tablet 1 tablet  1 tablet Oral Q8H PRN Ethan Marin MD       • phenylephrine (MARIANO-SYNEPHRINE) 50 mg in sodium chloride 0.9 % 250 mL (0.2 mg/mL) infusion  0.5-3 mcg/kg/min Intravenous Titrated Sherwin Ng MD   Stopped at 10/09/18 0903   • sodium chloride 0.9 % flush 10 mL  10 mL Intravenous PRN Jacob Hodge MD       • sodium chloride 0.9 % flush 3 mL  3 mL Intravenous Q12H Ethan Marin MD   3 mL at 10/09/18 0853   • sodium chloride 0.9 % flush 3-10 mL  3-10 mL Intravenous PRN Ethan Marin MD       • warfarin (COUMADIN) tablet 5 mg  5 mg Oral Daily Ethan Marin MD   5 mg at 10/08/18 1857       Assessment/Plan   1. ESRD.  Dialysis today, then tomorrow to get back on schedule.  2. Seizure disorder.  Neuro evaluating.  CT with cerebral atrophy,  3. Hypotension.  Levophed off this am. Cortisol level appropriate. Usually on cardizem at home for HTN.  ECHO 7/18 EF 53%. No fever , procalcitonin low.  May need to add midodrine. WIll watch BP since levophed off.   4. Obesity.  Has not been eating at home.  Trying to lose weight for transplant.  Will ask dietician to educate patient and wife.              Angle Nicholson MD  10/09/18  9:04 AM

## 2018-10-09 NOTE — CONSULTS
Adult Nutrition  Assessment/PES    Patient Name:  Armando Gill  YOB: 1960  MRN: [id number]  Admit Date:  10/7/2018    Assessment Date:  10/9/2018    Comments:  Discussed healthy wt loss with pt and will revisit in am when wife is available.          Reason for Assessment     Row Name 10/09/18 1409          Reason for Assessment    Reason For Assessment physician consult             Nutrition/Diet History     Row Name 10/09/18 1410          Nutrition/Diet History    Typical Food/Fluid Intake pt eating breakfast(3eggs with cheese), and then crackers the rest of the day to loose wt for transplant                     Nutrition Prescription Ordered     Row Name 10/09/18 1411          Nutrition Prescription PO    Current PO Diet Regular     Common Modifiers Consistent Carbohydrate;Renal               Problem/Interventions:          Problem 2     Row Name 10/09/18 1411          Nutrition Diagnoses Problem 2    Problem 2 Knowledge Deficit     Etiology (related to) --   needs appropriate wt loss for tranplant                   Intervention Goal     Row Name 10/09/18 1412          Intervention Goal    General Maintain nutrition     PO Tolerate PO     Weight Appropriate weight loss                 Education/Evaluation     Row Name 10/09/18 1412          Education    Education Provided education regarding     Provided education regarding --   discussed healthy wt loss, will revisit in am when wife is available        Monitor/Evaluation    Monitor Per protocol         Electronically signed by:  Letha Gamboa RD  10/09/18 2:14 PM

## 2018-10-09 NOTE — PLAN OF CARE
Problem: Pain, Acute (Adult)  Goal: Acceptable Pain Control/Comfort Level  Outcome: Ongoing (interventions implemented as appropriate)      Problem: Mobility, Physical Impaired (Adult)  Goal: Enhanced Mobility Skills  Outcome: Ongoing (interventions implemented as appropriate)    Goal: Enhanced Functional Ability  Outcome: Ongoing (interventions implemented as appropriate)      Problem: Kidney Disease, Chronic/End Stage Renal Disease (Adult)  Goal: Signs and Symptoms of Listed Potential Problems Will be Absent, Minimized or Managed (Kidney Disease, Chronic/End Stage Renal Disease)  Outcome: Ongoing (interventions implemented as appropriate)      Problem: Skin Injury Risk (Adult)  Goal: Skin Health and Integrity  Outcome: Ongoing (interventions implemented as appropriate)

## 2018-10-09 NOTE — PROGRESS NOTES
[facility]  Phone: [phone]  Sherwin Ng MD    Subjective:  LOS: 2    He is doing well and wants to go home. He denies ha, dizziness, chest pain, n/v.    Objective   Vital Signs past 24hrs    Temp range: Temp (24hrs), Av.7 °F (37.1 °C), Min:98.4 °F (36.9 °C), Max:99.3 °F (37.4 °C)    BP range: BP: ()/(51-96) 104/63  Pulse range: Heart Rate:  [58-85] 82  Resp rate range:      Device (Oxygen Therapy): room airFlow (L/min):  [2] 2  Oxygen range:SpO2:  [90 %-100 %] 94 %      (!) 143 kg (314 lb 13.1 oz); Body mass index is 47.88 kg/m².    Intake/Output Summary (Last 24 hours) at 10/09/18 1530  Last data filed at 10/09/18 0454   Gross per 24 hour   Intake              755 ml   Output                0 ml   Net              755 ml       Physical Exam   Constitutional: He appears well-developed and well-nourished.   Cardiovascular: Normal rate and regular rhythm.    No murmur heard.  Pulmonary/Chest: He has no wheezes. He has no rales.   Abdominal: Soft. Bowel sounds are normal. There is no tenderness.   Musculoskeletal: He exhibits edema.   Neurological: He is alert.   Nursing note and vitals reviewed.    Results Review:    I have reviewed the laboratory and imaging data since the last note by LPC physician.  My annotations are noted in assessment and plan.    Medication Review:  I have reviewed the current MAR.  My annotations are noted in assessment and plan.      phenylephrine 0.5-3 mcg/kg/min Last Rate: Stopped (10/09/18 0903)     Plan   PCCM Problems  1. Seizure  2. Hyperkalemia   3. ESRD on HD MWF  4. Atrial fibrillation on coumadin  5. Diabetes mellitus  6. Hypertension with elevated blood pressure  7. Hx of neoplasm of pituitary   8. SHABNAM noncompliant with CPAP on O2 at 2 LPM at night  9. Chronic pain syndrome  10. Anemia of chronic disease  11. Morbid obesity  12. Cerebral atrophy with dementia      Plan of Treatment  No further hypotension.  However pending dialysis today.  Plan is  again for dialysis tomorrow.  Thereafter he may possibly go home.  I will keep him for HD today and if no hypotension he can move out of the ICU or potentially just go home tomorrow.    Atrophy seen on the CT head.  I discussed with neurology.  Patient has declined MRI.  He will need to consider this with his PCP outpatient.    Sherwin Ng MD  10/09/18  3:30 PM    Part of this note may be an electronic transcription/translation of spoken language to printed text using the Dragon Dictation System.

## 2018-10-09 NOTE — PROGRESS NOTES
Patient Identification:  NAME:  Armando Gill  Age:  58 y.o.   Sex:  male   :  1960   MRN:  [id number]       Chief complaint: Seizure syncope metabolic encephalopathy    History of present illness: He looks good today much more awake and alert his of 1 notes he is doing very well and he wants to go home he was not able able to get through the MRI scan and does not want to repeat      Past medical history:  Past Medical History:   Diagnosis Date   • Anxiety    • Arthritis    • Asthma    • Asymptomatic hyperuricemia    • Atrial fibrillation (CMS/HCC)    • Callus of foot     LEFT FOOT , PRESENTLY ATTENDING WOUND CLINIC [facility]   • Cancer of pituitary gland (CMS/HCC)    • Depression    • Diabetes mellitus (CMS/[facility])     type 2, A1C of 5 1 month ago by patient report   • Dialysis patient (CMS/[facility])        • Dyslipidemia    • End stage renal disease (CMS/HCC)    • Gout    • History of anemia    • History of colon polyps    • Hyperparathyroidism (CMS/HCC)    • Hyperprolactinemia (CMS/[facility])    • Hypertension    • Hypogonadism, male    • Hypothyroidism    • Male erectile disorder of organic origin    • Neuropathy    • Obstructive sleep apnea    • Presence of arterial-venous shunt (for dialysis) (CMS/[facility])    • Seizure disorder (CMS/[facility])      2 YRS AGO   • Vitamin D deficiency disease        Allergies:  Adhesive tape; Latex; and Sulfa antibiotics    Home medications:  Prescriptions Prior to Admission   Medication Sig Dispense Refill Last Dose   • aspirin 81 MG EC tablet Take 81 mg by mouth.   Taking   • levETIRAcetam (KEPPRA) 1000 MG tablet Take 1,000 mg by mouth 2 (two) times a day.   Taking   • LORazepam (ATIVAN) 0.5 MG tablet Take 1 tablet by mouth Every 8 (Eight) Hours As Needed for Anxiety. 9 tablet 0 Not Taking   • Morphine (MS CONTIN) 100 MG 12 hr tablet Take 1 tablet by mouth Daily. 3 tablet 0 Taking   • pravastatin (PRAVACHOL) 40 MG tablet Take 40 mg by mouth Daily.   Taking   • SYNTHROID 175 MCG  tablet Take 175 mcg by mouth daily.   Taking   • Vortioxetine HBr (TRINTELLIX) 10 MG tablet Take 20 mg by mouth Every Night.   Taking   • warfarin (COUMADIN) 2.5 MG tablet Take 5 mg by mouth Daily.   Taking   • busPIRone (BUSPAR) 5 MG tablet Take 5 mg by mouth 2 (Two) Times a Day.   Taking   • clindamycin (CLEOCIN) 300 MG capsule Take 300 mg by mouth 3 (Three) Times a Day.   Taking   • diltiaZEM CD (CARDIZEM CD) 120 MG 24 hr capsule Take 1 capsule by mouth every night at bedtime.   Taking   • famotidine (PEPCID) 40 MG tablet Take 1 tablet by mouth 2 (Two) Times a Day As Needed for Indigestion or Heartburn.   Not Taking   • lactulose (CHRONULAC) 10 GM/15ML solution Take 15 mL by mouth Daily.   Not Taking   • oxyCODONE-acetaminophen (PERCOCET) 5-325 MG per tablet Take 1 tablet by mouth Every 8 (Eight) Hours As Needed for Severe Pain . 9 tablet 0 Not Taking   • sennosides-docusate sodium (SENOKOT-S) 8.6-50 MG tablet Take 2 tablets by mouth 2 (Two) Times a Day. Hold for loose stool   Taking   • vitamin D (ERGOCALCIFEROL) 08253 units capsule capsule Take 50,000 Units by mouth Every 30 (Thirty) Days.   Taking        Hospital medications:    levETIRAcetam 1,000 mg Intravenous Q12H   levothyroxine 175 mcg Oral Q AM   sodium chloride 3 mL Intravenous Q12H   warfarin 5 mg Oral Daily       phenylephrine 0.5-3 mcg/kg/min Last Rate: Stopped (10/09/18 0903)     •  acetaminophen **OR** acetaminophen  •  calcium gluconate  •  ipratropium-albuterol  •  LORazepam  •  metoprolol tartrate  •  ondansetron **OR** ondansetron ODT **OR** ondansetron  •  oxyCODONE-acetaminophen  •  Insert peripheral IV **AND** sodium chloride  •  sodium chloride      Objective:  Vitals Ranges:   Temp:  [98.4 °F (36.9 °C)-99.3 °F (37.4 °C)] 98.4 °F (36.9 °C)  Heart Rate:  [58-85] 82  BP: ()/(51-96) 104/63      Physical Exam:  Awake alert oriented ×3 today normal cranial nerves II through VII, Ava Solange smile  Reflexes absent throughout toes downgoing  bilaterally  Results review:   I reviewed the patient's new clinical results.    Data review:  Lab Results (last 24 hours)     Procedure Component Value Units Date/Time    POC Glucose Once [[id number]]  (Normal) Collected:  10/09/18 1202    Specimen:  Blood Updated:  10/09/18 1203     Glucose 71 mg/dL     Narrative:       Meter: [id number] : [id number] Joezenaidastephanie DE LEON    Protime-INR [[id number]]  (Abnormal) Collected:  10/09/18 1120    Specimen:  Blood Updated:  10/09/18 1140     Protime 28.3 (H) Seconds      INR 2.71 (H)    TSH [[id number]]  (Normal) Collected:  10/09/18 0641    Specimen:  Blood Updated:  10/09/18 0919     TSH 1.820 mIU/mL     Lactic Acid, Plasma [[id number]]  (Normal) Collected:  10/09/18 0448    Specimen:  Blood Updated:  10/09/18 0748     Lactate 0.9 mmol/L     POC Glucose Once [[id number]]  (Normal) Collected:  10/09/18 0737    Specimen:  Blood Updated:  10/09/18 0739     Glucose 75 mg/dL     Narrative:       Meter: [id number] : [id number] Joezenaidastephanie DE LEON    Renal Function Panel [[id number]]  (Abnormal) Collected:  10/09/18 0641    Specimen:  Blood Updated:  10/09/18 0734     Glucose 81 mg/dL      BUN 49 (H) mg/dL      Creatinine 5.81 (H) mg/dL      Sodium 139 mmol/L      Potassium 5.4 (H) mmol/L      Chloride 98 mmol/L      CO2 23.7 mmol/L      Calcium 9.5 mg/dL      Albumin 4.30 g/dL      Phosphorus 6.0 (H) mg/dL      Anion Gap 17.3 mmol/L      BUN/Creatinine Ratio 8.4     eGFR Non African Amer 10 (L) mL/min/1.73      Comment: <15 Indicative of kidney failure.        eGFR   Amer -- mL/min/1.73      Comment: <15 Indicative of kidney failure.       Procalcitonin [[id number]]  (Normal) Collected:  10/09/18 0449    Specimen:  Blood Updated:  10/09/18 0629     Procalcitonin 0.19 ng/mL     Narrative:       As a Marker for Sepsis (Non-Neonates):   1. <0.5 ng/mL represents a low risk of severe sepsis and/or septic shock.  1. >2 ng/mL represents a high risk of severe sepsis and/or septic  "shock.    As a Marker for Lower Respiratory Tract Infections that require antibiotic therapy:  PCT on Admission     Antibiotic Therapy             6-12 Hrs later  > 0.5                Strongly Recommended            >0.25 - <0.5         Recommended  0.1 - 0.25           Discouraged                   Remeasure/reassess PCT  <0.1                 Strongly Discouraged          Remeasure/reassess PCT      As 28 day mortality risk marker: \"Change in Procalcitonin Result\" (> 80 % or <=80 %) if Day 0 (or Day 1) and Day 4 values are available. Refer to http://www.Edustation.meO[email or web address] Oncologypct-calculator.AgLocal/   Change in PCT <=80 %   A decrease of PCT levels below or equal to 80 % defines a positive change in PCT test result representing a higher risk for 28-day all-cause mortality of patients diagnosed with severe sepsis or septic shock.  Change in PCT > 80 %   A decrease of PCT levels of more than 80 % defines a negative change in PCT result representing a lower risk for 28-day all-cause mortality of patients diagnosed with severe sepsis or septic shock.                CBC & Differential [[id number]] Collected:  10/09/18 0449    Specimen:  Blood Updated:  10/09/18 0626    Narrative:       The following orders were created for panel order CBC & Differential.  Procedure                               Abnormality         Status                     ---------                               -----------         ------                     CBC Auto Differential[[id number]]        Abnormal            Final result                 Please view results for these tests on the individual orders.    CBC Auto Differential [[id number]]  (Abnormal) Collected:  10/09/18 0449    Specimen:  Blood Updated:  10/09/18 0626     WBC 8.25 10*3/mm3      RBC 4.43 (L) 10*6/mm3      Hemoglobin 12.2 (L) g/dL      Hematocrit 41.5 %      MCV 93.7 fL      MCH 27.5 pg      MCHC 29.4 (L) g/dL      RDW 16.7 (H) %      RDW-SD 57.9 (H) fl      MPV 10.7 fL      Platelets 194 10*3/mm3      " Neutrophil % 76.0 %      Lymphocyte % 11.8 (L) %      Monocyte % 11.6 %      Eosinophil % 0.1 (L) %      Basophil % 0.5 %      Immature Grans % 0.0 %      Neutrophils, Absolute 6.27 10*3/mm3      Lymphocytes, Absolute 0.97 10*3/mm3      Monocytes, Absolute 0.96 10*3/mm3      Eosinophils, Absolute 0.01 10*3/mm3      Basophils, Absolute 0.04 10*3/mm3      Immature Grans, Absolute 0.00 10*3/mm3     POC Glucose Once [[id number]]  (Normal) Collected:  10/08/18 1951    Specimen:  Blood Updated:  10/08/18 2003     Glucose 121 mg/dL     Narrative:       Meter: [id number] : [id number] Michael Cabrera RN    Cortisol [[id number]]  (Normal) Collected:  10/08/18 1647    Specimen:  Blood Updated:  10/08/18 1753     Cortisol 30.03 mcg/dL     Narrative:       Cortisol Reference Ranges:    Cortisol 6AM - 10AM Range: 6.02-18.40 mcg/dl  Cortisol 4PM - 8PM Range: 2.68-10.50 mcg/dl  Critical AM/PM:    Less than 2 mcg/dl    Cortisol [[id number]]  (Normal) Collected:  10/08/18 1616    Specimen:  Blood Updated:  10/08/18 1704     Cortisol 25.51 mcg/dL     Narrative:       Cortisol Reference Ranges:    Cortisol 6AM - 10AM Range: 6.02-18.40 mcg/dl  Cortisol 4PM - 8PM Range: 2.68-10.50 mcg/dl  Critical AM/PM:    Less than 2 mcg/dl    Troponin [[id number]]  (Abnormal) Collected:  10/08/18 1544    Specimen:  Blood Updated:  10/08/18 1654     Troponin T 0.087 (H) ng/mL     Narrative:       Troponin T Reference Ranges:  Less than 0.03 ng/mL:    Negative for AMI  0.03 to 0.09 ng/mL:      Indeterminant for AMI  Greater than 0.09 ng/mL: Positive for AMI    Cortisol [[id number]]  (Normal) Collected:  10/08/18 1544    Specimen:  Blood Updated:  10/08/18 1643     Cortisol 14.53 mcg/dL     Narrative:       Cortisol Reference Ranges:    Cortisol 6AM - 10AM Range: 6.02-18.40 mcg/dl  Cortisol 4PM - 8PM Range: 2.68-10.50 mcg/dl  Critical AM/PM:    Less than 2 mcg/dl    Hemoglobin & Hematocrit, Blood [[id number]]  (Abnormal) Collected:  10/08/18 1540     Specimen:  Blood Updated:  10/08/18 1616     Hemoglobin 12.3 (L) g/dL      Hematocrit 40.4 %     POC Glucose Once [[id number]]  (Normal) Collected:  10/08/18 1604    Specimen:  Blood Updated:  10/08/18 1608     Glucose 98 mg/dL     Narrative:       Meter: [id number] : [id number] Sina Morales RN    ACTH [[id number]] Collected:  10/08/18 1544    Specimen:  Blood Updated:  10/08/18 1600           Imaging:  Imaging Results (last 24 hours)     ** No results found for the last 24 hours. **             Assessment and Plan:     His metabolic  encephalopathy is much better I have reviewed the CAT scan of his head which appears to show chronic atrophy only he was not able to get through the MRI scan and that I believe is okay I would not want to sedate him to put him through it I do not believe he has suffered a stroke TIA or primary seizure but rather likely has had syncope secondary to orthostatic hypotension.  Nonetheless he carries a diagnosis of seizure disorder and I will keep him on Keppra 1000 by mouth twice a day indefinitely he is okay to be discharged and does not drive.  Ash Aguilar MD  10/09/18  2:42 PM

## 2018-10-09 NOTE — PLAN OF CARE
Problem: Patient Care Overview  Goal: Plan of Care Review  Outcome: Ongoing (interventions implemented as appropriate)   10/09/18 0632   Coping/Psychosocial   Plan of Care Reviewed With patient   OTHER   Outcome Summary patient remains in ccu overnight with minimal neosynephrine needs- on 0.1mcg/kg/min to keep SBP >100. Labs this morning were hemolyzed. Will redraw.    Plan of Care Review   Progress no change       Problem: Pain, Acute (Adult)  Goal: Acceptable Pain Control/Comfort Level  Outcome: Ongoing (interventions implemented as appropriate)      Problem: Mobility, Physical Impaired (Adult)  Goal: Enhanced Mobility Skills  Outcome: Ongoing (interventions implemented as appropriate)    Goal: Enhanced Functional Ability  Outcome: Ongoing (interventions implemented as appropriate)      Problem: Kidney Disease, Chronic/End Stage Renal Disease (Adult)  Goal: Signs and Symptoms of Listed Potential Problems Will be Absent, Minimized or Managed (Kidney Disease, Chronic/End Stage Renal Disease)  Outcome: Ongoing (interventions implemented as appropriate)      Problem: Skin Injury Risk (Adult)  Goal: Identify Related Risk Factors and Signs and Symptoms  Outcome: Outcome(s) achieved Date Met: 10/09/18    Goal: Skin Health and Integrity  Outcome: Ongoing (interventions implemented as appropriate)

## 2018-10-10 VITALS
RESPIRATION RATE: 18 BRPM | TEMPERATURE: 98.3 F | OXYGEN SATURATION: 94 % | DIASTOLIC BLOOD PRESSURE: 59 MMHG | SYSTOLIC BLOOD PRESSURE: 87 MMHG | HEIGHT: 68 IN | BODY MASS INDEX: 46.64 KG/M2 | HEART RATE: 77 BPM | WEIGHT: 307.76 LBS

## 2018-10-10 PROBLEM — E87.5 ACUTE HYPERKALEMIA: Status: ACTIVE | Noted: 2018-10-10

## 2018-10-10 PROBLEM — G31.9 CEREBRAL ATROPHY (HCC): Status: ACTIVE | Noted: 2018-10-10

## 2018-10-10 LAB
ACTH PLAS-MCNC: 21 PG/ML (ref 7.2–63.3)
ALBUMIN SERPL-MCNC: 4.2 G/DL (ref 3.5–5.2)
ALBUMIN/GLOB SERPL: 1.2 G/DL
ALP SERPL-CCNC: 46 U/L (ref 39–117)
ALT SERPL W P-5'-P-CCNC: 25 U/L (ref 1–41)
ANION GAP SERPL CALCULATED.3IONS-SCNC: 16.2 MMOL/L
AST SERPL-CCNC: 29 U/L (ref 1–40)
BILIRUB SERPL-MCNC: 0.7 MG/DL (ref 0.1–1.2)
BUN BLD-MCNC: 32 MG/DL (ref 6–20)
BUN/CREAT SERPL: 7.8 (ref 7–25)
CALCIUM SPEC-SCNC: 9.2 MG/DL (ref 8.6–10.5)
CHLORIDE SERPL-SCNC: 92 MMOL/L (ref 98–107)
CO2 SERPL-SCNC: 25.8 MMOL/L (ref 22–29)
CREAT BLD-MCNC: 4.12 MG/DL (ref 0.76–1.27)
DEPRECATED RDW RBC AUTO: 55.1 FL (ref 37–54)
ERYTHROCYTE [DISTWIDTH] IN BLOOD BY AUTOMATED COUNT: 16.6 % (ref 11.5–14.5)
GFR SERPL CREATININE-BSD FRML MDRD: 15 ML/MIN/1.73
GLOBULIN UR ELPH-MCNC: 3.6 GM/DL
GLUCOSE BLD-MCNC: 90 MG/DL (ref 65–99)
GLUCOSE BLDC GLUCOMTR-MCNC: 114 MG/DL (ref 70–130)
GLUCOSE BLDC GLUCOMTR-MCNC: 78 MG/DL (ref 70–130)
GLUCOSE BLDC GLUCOMTR-MCNC: 81 MG/DL (ref 70–130)
HCT VFR BLD AUTO: 42.7 % (ref 40.4–52.2)
HGB BLD-MCNC: 13.4 G/DL (ref 13.7–17.6)
INR PPP: 2.51 (ref 0.9–1.1)
MCH RBC QN AUTO: 28.5 PG (ref 27–32.7)
MCHC RBC AUTO-ENTMCNC: 31.4 G/DL (ref 32.6–36.4)
MCV RBC AUTO: 90.9 FL (ref 79.8–96.2)
PLATELET # BLD AUTO: 175 10*3/MM3 (ref 140–500)
PMV BLD AUTO: 10.2 FL (ref 6–12)
POTASSIUM BLD-SCNC: 4.3 MMOL/L (ref 3.5–5.2)
PROT SERPL-MCNC: 7.8 G/DL (ref 6–8.5)
PROTHROMBIN TIME: 26.7 SECONDS (ref 11.7–14.2)
RBC # BLD AUTO: 4.7 10*6/MM3 (ref 4.6–6)
SODIUM BLD-SCNC: 134 MMOL/L (ref 136–145)
WBC NRBC COR # BLD: 8.86 10*3/MM3 (ref 4.5–10.7)

## 2018-10-10 PROCEDURE — 85027 COMPLETE CBC AUTOMATED: CPT | Performed by: INTERNAL MEDICINE

## 2018-10-10 PROCEDURE — 80053 COMPREHEN METABOLIC PANEL: CPT | Performed by: INTERNAL MEDICINE

## 2018-10-10 PROCEDURE — 25010000002 LORAZEPAM PER 2 MG: Performed by: INTERNAL MEDICINE

## 2018-10-10 PROCEDURE — 82962 GLUCOSE BLOOD TEST: CPT

## 2018-10-10 PROCEDURE — 85610 PROTHROMBIN TIME: CPT | Performed by: INTERNAL MEDICINE

## 2018-10-10 RX ORDER — MANNITOL 250 MG/ML
12.5 INJECTION, SOLUTION INTRAVENOUS AS NEEDED
Status: DISCONTINUED | OUTPATIENT
Start: 2018-10-10 | End: 2018-10-10 | Stop reason: HOSPADM

## 2018-10-10 RX ADMIN — LEVETIRACETAM 1000 MG: 500 TABLET, FILM COATED ORAL at 08:21

## 2018-10-10 RX ADMIN — LEVOTHYROXINE SODIUM 175 MCG: 175 TABLET ORAL at 06:49

## 2018-10-10 RX ADMIN — LORAZEPAM 0.5 MG: 2 INJECTION INTRAMUSCULAR; INTRAVENOUS at 02:44

## 2018-10-10 RX ADMIN — Medication 3 ML: at 08:18

## 2018-10-10 NOTE — PROGRESS NOTES
"   LOS: 3 days    Patient Care Team:  Luis Antonio Abarca MD as PCP - General (Family Medicine)    Chief Complaint:    Chief Complaint   Patient presents with   • Seizures     Follow UP ESRD  Subjective     Interval History:   Tolerated dialysis.  One drop in Bp, but came off close to dry weight.  Slept well.   Eating. Bowels moved. Spoke to dietician yesterday, wife now present and Lethakristen Gamboa going in tp speak with both.        Objective     Vital Signs  Temp:  [98 °F (36.7 °C)-98.6 °F (37 °C)] 98.3 °F (36.8 °C)  Heart Rate:  [64-90] 75  BP: ()/() 113/74    Flowsheet Rows      First Filed Value   Admission Height  172.7 cm (68\") Documented at 10/07/2018 2126   Admission Weight   143 kg (316 lb 4.8 oz) Documented at 10/07/2018 2126          No intake/output data recorded.  I/O last 3 completed shifts:  In: 465 [P.O.:210; I.V.:255]  Out: 80 [Urine:80]    Intake/Output Summary (Last 24 hours) at 10/10/18 1013  Last data filed at 10/09/18 2145   Gross per 24 hour   Intake              210 ml   Output               80 ml   Net              130 ml       Physical Exam:  Obese, WM.  Lying in bed. Calm.  Heart RRR no s3 or rub. Lungs clear to auscultation.  Abd + bs soft nontender.  Ext LUE AVF thrill, bruit.  No edema Skin no rash.       Results Review:      Results from last 7 days  Lab Units 10/10/18  0432 10/09/18  0641 10/08/18  1200  10/07/18  2143   SODIUM mmol/L 134* 139 135*  < > 137   POTASSIUM mmol/L 4.3 5.4* 5.1  < > 7.9*   CHLORIDE mmol/L 92* 98 97*  < > 90*   CO2 mmol/L 25.8 23.7 22.6  < > 29.0   BUN mg/dL 32* 49* 35*  < > 74*   CREATININE mg/dL 4.12* 5.81* 4.56*  < > 7.52*   CALCIUM mg/dL 9.2 9.5 9.8  < > 10.9*   BILIRUBIN mg/dL 0.7  --   --   --  0.4   ALK PHOS U/L 46  --   --   --  62   ALT (SGPT) U/L 25  --   --   --  14   AST (SGOT) U/L 29  --   --   --  10   GLUCOSE mg/dL 90 81 103*  < > 187*   < > = values in this interval not displayed.    Estimated Creatinine Clearance: 27.1 mL/min (A) " (by C-G formula based on SCr of 4.12 mg/dL (H)).      Results from last 7 days  Lab Units 10/09/18  0641 10/08/18  1200   PHOSPHORUS mg/dL 6.0* 4.3               Results from last 7 days  Lab Units 10/10/18  0432 10/09/18  0449 10/08/18  1544 10/08/18  1200 10/07/18  2143   WBC 10*3/mm3 8.86 8.25  --  8.57 9.22   HEMOGLOBIN g/dL 13.4* 12.2* 12.3* 12.6* 14.6   PLATELETS 10*3/mm3 175 194  --  171 183         Results from last 7 days  Lab Units 10/10/18  0432 10/09/18  1120 10/07/18  2143   INR  2.51* 2.71* 2.05*         Imaging Results (last 24 hours)     ** No results found for the last 24 hours. **          levETIRAcetam 1,000 mg Oral BID   levothyroxine 175 mcg Oral Q AM   sodium chloride 3 mL Intravenous Q12H   warfarin 5 mg Oral Daily       phenylephrine 0.5-3 mcg/kg/min Last Rate: Stopped (10/09/18 0903)       Medication Review:   Current Facility-Administered Medications   Medication Dose Route Frequency Provider Last Rate Last Dose   • acetaminophen (TYLENOL) tablet 650 mg  650 mg Oral Q4H PRN Ethan Marin MD        Or   • acetaminophen (TYLENOL) suppository 650 mg  650 mg Rectal Q4H PRN Ethan Marin MD       • calcium gluconate 1 g in sodium chloride 0.9 % 100 mL IVPB  1 g Intravenous Q4H PRN Ethan Marin MD       • ipratropium-albuterol (DUO-NEB) nebulizer solution 3 mL  3 mL Nebulization Q6H PRN Ethan Marin MD       • levETIRAcetam (KEPPRA) tablet 1,000 mg  1,000 mg Oral BID Ash Aguilar MD   1,000 mg at 10/10/18 0821   • levothyroxine (SYNTHROID, LEVOTHROID) tablet 175 mcg  175 mcg Oral Q AM Ethan Marin MD   175 mcg at 10/10/18 0649   • LORazepam (ATIVAN) injection 0.5 mg  0.5 mg Intravenous Q4H PRN Ethan Marin MD   0.5 mg at 10/10/18 0244   • mannitol 25% (RENAL) injection  12.5 g Intravenous PRN Mushtaq Sanchez MD       • metoprolol tartrate (LOPRESSOR) injection 5 mg  5 mg Intravenous Q6H PRN Ethan Marin MD       • ondansetron (ZOFRAN) tablet 4 mg  4 mg Oral Q6H PRN  Ethan Marin MD        Or   • ondansetron ODT (ZOFRAN-ODT) disintegrating tablet 4 mg  4 mg Oral Q6H PRN Ethan Marin MD        Or   • ondansetron (ZOFRAN) injection 4 mg  4 mg Intravenous Q6H PRN Ethan Marin MD       • oxyCODONE-acetaminophen (PERCOCET) 5-325 MG per tablet 1 tablet  1 tablet Oral Q8H PRN Ethan Marin MD       • phenylephrine (MARIANO-SYNEPHRINE) 50 mg in sodium chloride 0.9 % 250 mL (0.2 mg/mL) infusion  0.5-3 mcg/kg/min Intravenous Titrated Sherwin Ng MD   Stopped at 10/09/18 0903   • sodium chloride 0.9 % bolus 600 mL  600 mL Intravenous PRN Mushtaq Sanchez MD       • sodium chloride 0.9 % flush 10 mL  10 mL Intravenous PRN Jacob Hodge MD       • sodium chloride 0.9 % flush 3 mL  3 mL Intravenous Q12H Ethan Marin MD   3 mL at 10/10/18 0818   • sodium chloride 0.9 % flush 3-10 mL  3-10 mL Intravenous PRN Ethan Marin MD       • warfarin (COUMADIN) tablet 5 mg  5 mg Oral Daily Sherwin Ng MD           Assessment/Plan   1. ESRD.  Dialysis late last night, so next will be Friday. Advised to watch K intake .   2. Seizure disorder.  Neuro evaluating.  CT with cerebral atrophy, unable to tolerate MRI.  3. Hypotension.  Levophed off this am. Cortisol level appropriate. Usually on cardizem at home for HTN. Will leave off of this,  Wife and patient informed.  ECHO 7/18 EF 53%.   4. Obesity.  Has not been eating at home.  Trying to lose weight for transplant.  Dietician to educate patient and wife this am.      Ok for DC from renal view.         Angle Nicholson MD  10/10/18  10:13 AM

## 2018-10-10 NOTE — PLAN OF CARE
Problem: Patient Care Overview  Goal: Plan of Care Review  Outcome: Ongoing (interventions implemented as appropriate)   10/10/18 0624   Coping/Psychosocial   Plan of Care Reviewed With patient   OTHER   Outcome Summary patient remains in ccu overnight. patient had hd last night and got 4.6L of fluid off. Blood pressure dropped to the 80's but improved with a bolus. Remained oriented overngiht. no changes. plans to move out or discharge today per md note? Will continue to monitor.   Plan of Care Review   Progress improving       Problem: Pain, Acute (Adult)  Goal: Acceptable Pain Control/Comfort Level  Outcome: Outcome(s) achieved Date Met: 10/10/18      Problem: Mobility, Physical Impaired (Adult)  Goal: Enhanced Mobility Skills  Outcome: Ongoing (interventions implemented as appropriate)    Goal: Enhanced Functional Ability  Outcome: Ongoing (interventions implemented as appropriate)      Problem: Kidney Disease, Chronic/End Stage Renal Disease (Adult)  Goal: Signs and Symptoms of Listed Potential Problems Will be Absent, Minimized or Managed (Kidney Disease, Chronic/End Stage Renal Disease)  Outcome: Ongoing (interventions implemented as appropriate)      Problem: Skin Injury Risk (Adult)  Goal: Skin Health and Integrity  Outcome: Ongoing (interventions implemented as appropriate)      Problem: Seizure Disorder/Epilepsy (Adult)  Goal: Signs and Symptoms of Listed Potential Problems Will be Absent, Minimized or Managed (Seizure Disorder/Epilepsy)  Outcome: Ongoing (interventions implemented as appropriate)

## 2018-10-10 NOTE — PLAN OF CARE
Problem: Patient Care Overview  Goal: Discharge Needs Assessment  Outcome: Outcome(s) achieved Date Met: 10/10/18      Problem: Mobility, Physical Impaired (Adult)  Goal: Enhanced Mobility Skills  Outcome: Outcome(s) achieved Date Met: 10/10/18    Goal: Enhanced Functional Ability  Outcome: Outcome(s) achieved Date Met: 10/10/18      Problem: Kidney Disease, Chronic/End Stage Renal Disease (Adult)  Goal: Signs and Symptoms of Listed Potential Problems Will be Absent, Minimized or Managed (Kidney Disease, Chronic/End Stage Renal Disease)  Outcome: Outcome(s) achieved Date Met: 10/10/18      Problem: Skin Injury Risk (Adult)  Goal: Skin Health and Integrity  Outcome: Outcome(s) achieved Date Met: 10/10/18      Problem: Seizure Disorder/Epilepsy (Adult)  Goal: Signs and Symptoms of Listed Potential Problems Will be Absent, Minimized or Managed (Seizure Disorder/Epilepsy)  Outcome: Outcome(s) achieved Date Met: 10/10/18

## 2018-10-10 NOTE — DISCHARGE SUMMARY
Date of Admission: 10/7/2018  Date of Discharge:  10/10/2018    Discharge Diagnosis:    1. Seizure  2. Hyperkalemia   3. ESRD on HD MWF  4. Atrial fibrillation on coumadin  5. Diabetes mellitus  6. Hypertension with elevated blood pressure  7. Hx of neoplasm of pituitary   8. SHABNAM noncompliant with CPAP on O2 at 2 LPM at night  9. Chronic pain syndrome  10. Anemia of chronic disease  11. Morbid obesity  12. Cerebral atrophy with dementia    Hospital Course    Presenting Problem/History of Present Illness  Armando Gill is a 58 y.o. male with a history of CKD on HD MWF, seizures, atrial fibrillation, asthma, SHABNAM on O2 at 2 LPM at night, DM and multiple endocrine issues with possible history of cancer of the pituitary gland. Last seizure was 4 years ago when he had status epilepticus and he has been on chronic Keppra. He was recently admitted to the hospital in 7/2018 with sepsis from streptococcus bacteremia with streptococcal arthritis of the right wrist.      He had been doing well since discharge and attended dialysis on Friday with no issues. On 10/7/18 he started having complaints of a right sided headache and was found to have an elevated blood pressure by his wife. He had a witness seizure and has been confused, but wife denied patient having and recent fever or head injury. He denies any changes in diet.      While in the ER he was found to have hyperkalemia with potassium level of 7.9 confirmed with repeat blood test. Nephrology was consulted and plans to have emergent dialysis. He had another witness seizure in the ER and was treated with IV ativan and Keppra. CT of the head showed no acute disease process, but did note enlarged ventricles.     Subsequent Course of Management    He was admitted to ICU and required emergent HD per nephrology. He was given IV Keppra and PRN IV Ativan. Seizure precautions were utilized. He had recent behavioral changes. Neurology was consulted. CT head was done to evaluate  "cerebral atrophy. MRI was recommended for further evaluation, but pt declined. Neurology believed that he did not have TIA, stroke or primary seizure, but rather syncope stemming from orthostatic hypotension. Neurology recommended using Keppra 1000 mg by mouth BID indefinately.  His neurological status improved. He denied h/a, dizziness, chest pain, or N/V. He was eager to go home. He was asked to hold diltiazem and review his medications with PCP since BP was low. Cautioned re: HR.    Examination on Date of Discharge    /74   Pulse 75   Temp 98.3 °F (36.8 °C) (Oral)   Resp 18   Ht 172.7 cm (68\")   Wt (!) 140 kg (307 lb 12.2 oz)   SpO2 95%   BMI 46.81 kg/m²     Physical Exam  Constitutional: He appears well-developed and well-nourished.   Cardiovascular: Normal rate and irregular rhythm.    No murmur heard.  Pulmonary/Chest: He has no wheezes. He has no rales.   Abdominal: Soft. Bowel sounds are normal. There is no tenderness.   Musculoskeletal: He exhibits edema.   Neurological: He is alert.   Nursing note and vitals reviewed.    Test Results    Results for orders placed during the hospital encounter of 06/23/18   Adult Transthoracic Echo Complete W/ Cont if Necessary Per Protocol    Narrative · Calculated EF = 53%.  · Left ventricular systolic function is normal.  · Left ventricular wall thickness is consistent with borderline concentric   hypertrophy.  · Right ventricular cavity is mildly dilated.  · Left atrial cavity size is mildly dilated.  · Mild tricuspid valve regurgitation is present.  · Technically difficult study with poor visualization of valvular   structures. All measurements are estimates.          Results from last 7 days  Lab Units 10/10/18  0432 10/09/18  0449 10/08/18  1544 10/08/18  1200 10/07/18  2143   WBC 10*3/mm3 8.86 8.25  --  8.57 9.22   HEMOGLOBIN g/dL 13.4* 12.2* 12.3* 12.6* 14.6   HEMATOCRIT % 42.7 41.5 40.4 41.3 46.9   PLATELETS 10*3/mm3 175 194  --  171 183 "         Results from last 7 days  Lab Units 10/10/18  0432 10/09/18  0641 10/08/18  1200 10/08/18  0055 10/07/18  2254 10/07/18  2143   SODIUM mmol/L 134* 139 135* 138 135* 137   POTASSIUM mmol/L 4.3 5.4* 5.1 7.5* 7.8* 7.9*   CHLORIDE mmol/L 92* 98 97* 92* 91* 90*   CO2 mmol/L 25.8 23.7 22.6 29.1* 28.9 29.0   BUN mg/dL 32* 49* 35* 77* 76* 74*   CREATININE mg/dL 4.12* 5.81* 4.56* 7.29* 7.31* 7.52*         Results from last 7 days  Lab Units 10/08/18  1544   TROPONIN T ng/mL 0.087*       Ct Head Without Contrast    Result Date: 10/7/2018  CT OF THE HEAD WITHOUT CONTRAST  HISTORY: Headache and seizure  COMPARISON: None available.  TECHNIQUE: Axial CT imaging was obtained from the vertex of the skull and skull base. No IV contrast was administered.   FINDINGS: No acute intracranial hemorrhage is seen. There is diffuse cerebral atrophy, with compensatory ventricular dilatation, which is advanced for this patient's age of 58. There is no midline shift or mass effect. There is periventricular and deep white matter microangiopathic disease, although it is relatively mild. No focal areas of decreased attenuation are seen. There is atherosclerotic involvement of the cavernous carotid arteries. The visualized paranasal sinuses and mastoid air cells appear clear. No aggressive osseous anomalies are seen. There are no focal soft tissue abnormalities.       1. No acute intracranial process seen. 2. Advanced cerebral atrophy.  Radiation dose reduction techniques were utilized, including automated exposure control and exposure modulation based on body size.  This report was finalized on 10/7/2018 10:22 PM by Dr. Mariela Orozco M.D.        Consulting Physician(s)     Provider Relationship Specialty    Ash Boone MD Consulting Physician Nephrology    Mushtaq Sanchez MD Consulting Physician Nephrology        Discharge Instructions      Dietary Orders     Start     Ordered    10/09/18 1109  Diet Regular; Renal,  Consistent Carbohydrate  Diet Effective Now     Question Answer Comment   Diet Texture / Consistency Regular    Common Modifiers Renal    Common Modifiers Consistent Carbohydrate        10/09/18 4854               Your medication list      ASK your doctor about these medications      Instructions Last Dose Given Next Dose Due   aspirin 81 MG EC tablet      Take 81 mg by mouth.       busPIRone 5 MG tablet  Commonly known as:  BUSPAR      Take 5 mg by mouth 2 (Two) Times a Day.       clindamycin 300 MG capsule  Commonly known as:  CLEOCIN      Take 300 mg by mouth 3 (Three) Times a Day.       diltiaZEM  MG 24 hr capsule  Commonly known as:  CARDIZEM CD      Take 1 capsule by mouth every night at bedtime.       famotidine 40 MG tablet  Commonly known as:  PEPCID      Take 1 tablet by mouth 2 (Two) Times a Day As Needed for Indigestion or Heartburn.       lactulose 10 GM/15ML solution  Commonly known as:  CHRONULAC      Take 15 mL by mouth Daily.       levETIRAcetam 1000 MG tablet  Commonly known as:  KEPPRA      Take 1,000 mg by mouth 2 (two) times a day.       LORazepam 0.5 MG tablet  Commonly known as:  ATIVAN      Take 1 tablet by mouth Every 8 (Eight) Hours As Needed for Anxiety.       Morphine 100 MG 12 hr tablet  Commonly known as:  MS CONTIN      Take 1 tablet by mouth Daily.       oxyCODONE-acetaminophen 5-325 MG per tablet  Commonly known as:  PERCOCET      Take 1 tablet by mouth Every 8 (Eight) Hours As Needed for Severe Pain .       pravastatin 40 MG tablet  Commonly known as:  PRAVACHOL      Take 40 mg by mouth Daily.       sennosides-docusate sodium 8.6-50 MG tablet  Commonly known as:  SENOKOT-S      Take 2 tablets by mouth 2 (Two) Times a Day. Hold for loose stool       SYNTHROID 175 MCG tablet  Generic drug:  levothyroxine      Take 175 mcg by mouth daily.       TRINTELLIX 10 MG tablet  Generic drug:  Vortioxetine HBr      Take 20 mg by mouth Every Night.       vitamin D 99217 units capsule  capsule  Commonly known as:  ERGOCALCIFEROL      Take 50,000 Units by mouth Every 30 (Thirty) Days.       warfarin 2.5 MG tablet  Commonly known as:  COUMADIN      Take 5 mg by mouth Daily.              Condition on Discharge:  Stable     Sherwin Ng MD  10/10/18  9:52 AM    Time: I spent over 30mins in the discharge planning of this patient.    Some of this encounter note is an electronic transcription/translation of spoken language to printed text.

## 2018-10-10 NOTE — PROGRESS NOTES
Case Management Discharge Note    Final Note: Discharged home with wife, denies needs at home. Will resume dialysis.     Destination     No service has been selected for the patient.      Durable Medical Equipment     No service has been selected for the patient.      Dialysis/Infusion     No service has been selected for the patient.      Home Medical Care     No service has been selected for the patient.      Social Care     No service has been selected for the patient.        Other:  (Private auto)    Final Discharge Disposition Code: 01 - home or self-care

## 2018-10-10 NOTE — CONSULTS
Nutrition Services    Patient Name:  Armando Gill  YOB: 1960  MRN: [id number]  Admit Date:  10/7/2018    Completed diet education with wife and patient.    Electronically signed by:  Letha Gamboa RD  10/10/18 11:21 AM

## 2018-10-11 ENCOUNTER — READMISSION MANAGEMENT (OUTPATIENT)
Dept: CALL CENTER | Facility: HOSPITAL | Age: 58
End: 2018-10-11

## 2018-10-11 NOTE — OUTREACH NOTE
Prep Survey      Responses   Facility patient discharged from?  [place]   Is patient eligible?  Yes   Discharge diagnosis  Seizure   Does the patient have one of the following disease processes/diagnoses(primary or secondary)?  Other   Does the patient have Home health ordered?  No   Is there a DME ordered?  No   Comments regarding appointments  HD at [facility]    Prep survey completed?  Yes          Karma Lee RN

## 2018-10-15 ENCOUNTER — READMISSION MANAGEMENT (OUTPATIENT)
Dept: CALL CENTER | Facility: HOSPITAL | Age: 58
End: 2018-10-15

## 2018-10-15 NOTE — OUTREACH NOTE
Medical Week 1 Survey      Responses   Facility patient discharged from?  [place]   Does the patient have one of the following disease processes/diagnoses(primary or secondary)?  Other   Is there a successful TCM telephone encounter documented?  No   Week 1 attempt successful?  No   Unsuccessful attempts  Attempt 1          Sintia Champagne RN

## 2018-10-22 ENCOUNTER — READMISSION MANAGEMENT (OUTPATIENT)
Dept: CALL CENTER | Facility: HOSPITAL | Age: 58
End: 2018-10-22

## 2018-10-22 NOTE — OUTREACH NOTE
Medical Week 2 Survey      Responses   Facility patient discharged from?  [place]   Does the patient have one of the following disease processes/diagnoses(primary or secondary)?  Other   Week 2 attempt successful?  No   Unsuccessful attempts  Attempt 1          Norma Liao RN

## 2018-10-25 ENCOUNTER — READMISSION MANAGEMENT (OUTPATIENT)
Dept: CALL CENTER | Facility: HOSPITAL | Age: 58
End: 2018-10-25

## 2018-10-25 NOTE — OUTREACH NOTE
Medical Week 2 Survey      Responses   Facility patient discharged from?  [place]   Does the patient have one of the following disease processes/diagnoses(primary or secondary)?  Other   Week 2 attempt successful?  No   Unsuccessful attempts  Attempt 2          Sintia Champagne RN

## 2018-10-26 ENCOUNTER — READMISSION MANAGEMENT (OUTPATIENT)
Dept: CALL CENTER | Facility: HOSPITAL | Age: 58
End: 2018-10-26

## 2018-10-26 NOTE — OUTREACH NOTE
Medical Week 2 Survey      Responses   Facility patient discharged from?  [place]   Does the patient have one of the following disease processes/diagnoses(primary or secondary)?  Other   Week 2 attempt successful?  No   Unsuccessful attempts  Attempt 3          Kit Arambula RN

## 2018-11-02 ENCOUNTER — READMISSION MANAGEMENT (OUTPATIENT)
Dept: CALL CENTER | Facility: HOSPITAL | Age: 58
End: 2018-11-02

## 2018-11-02 NOTE — OUTREACH NOTE
Medical Week 3 Survey      Responses   Facility patient discharged from?  [place]   Does the patient have one of the following disease processes/diagnoses(primary or secondary)?  Other   Week 3 attempt successful?  No   Unsuccessful attempts  Attempt 1          Norma Perez RN

## 2018-11-14 ENCOUNTER — APPOINTMENT (OUTPATIENT)
Dept: WOUND CARE | Facility: HOSPITAL | Age: 58
End: 2018-11-14
Attending: SURGERY

## 2018-11-15 ENCOUNTER — APPOINTMENT (OUTPATIENT)
Dept: WOUND CARE | Facility: HOSPITAL | Age: 58
End: 2018-11-15
Attending: SURGERY

## 2019-01-23 ENCOUNTER — HOSPITAL ENCOUNTER (OUTPATIENT)
Facility: HOSPITAL | Age: 59
Setting detail: OBSERVATION
Discharge: HOME | End: 2019-01-24
Attending: HOSPITALIST | Admitting: INTERNAL MEDICINE

## 2019-01-23 PROBLEM — R11.2 NAUSEA & VOMITING: Status: ACTIVE | Noted: 2019-01-23

## 2019-01-23 PROBLEM — R10.13 EPIGASTRIC ABDOMINAL PAIN: Status: ACTIVE | Noted: 2019-01-23

## 2019-01-23 LAB
ALBUMIN SERPL-MCNC: 4.4 G/DL (ref 3.5–5.2)
ALBUMIN/GLOB SERPL: 1.5 G/DL
ALP SERPL-CCNC: 57 U/L (ref 39–117)
ALT SERPL W P-5'-P-CCNC: 23 U/L (ref 1–41)
ANION GAP SERPL CALCULATED.3IONS-SCNC: 16.7 MMOL/L
AST SERPL-CCNC: 20 U/L (ref 1–40)
BASOPHILS # BLD AUTO: 0.03 10*3/MM3 (ref 0–0.2)
BASOPHILS NFR BLD AUTO: 0.3 % (ref 0–1.5)
BILIRUB SERPL-MCNC: 0.4 MG/DL (ref 0.1–1.2)
BUN BLD-MCNC: 36 MG/DL (ref 6–20)
BUN/CREAT SERPL: 7.9 (ref 7–25)
CALCIUM SPEC-SCNC: 9.5 MG/DL (ref 8.6–10.5)
CHLORIDE SERPL-SCNC: 93 MMOL/L (ref 98–107)
CO2 SERPL-SCNC: 27.3 MMOL/L (ref 22–29)
CREAT BLD-MCNC: 4.56 MG/DL (ref 0.76–1.27)
DEPRECATED RDW RBC AUTO: 56.5 FL (ref 37–54)
EOSINOPHIL # BLD AUTO: 0.01 10*3/MM3 (ref 0–0.7)
EOSINOPHIL NFR BLD AUTO: 0.1 % (ref 0.3–6.2)
ERYTHROCYTE [DISTWIDTH] IN BLOOD BY AUTOMATED COUNT: 15.4 % (ref 11.5–14.5)
GFR SERPL CREATININE-BSD FRML MDRD: 13 ML/MIN/1.73
GFR SERPL CREATININE-BSD FRML MDRD: ABNORMAL ML/MIN/1.73
GLOBULIN UR ELPH-MCNC: 2.9 GM/DL
GLUCOSE BLD-MCNC: 168 MG/DL (ref 65–99)
GLUCOSE BLDC GLUCOMTR-MCNC: 126 MG/DL (ref 70–130)
GLUCOSE BLDC GLUCOMTR-MCNC: 144 MG/DL (ref 70–130)
GLUCOSE BLDC GLUCOMTR-MCNC: 156 MG/DL (ref 70–130)
HCT VFR BLD AUTO: 33.5 % (ref 40.4–52.2)
HGB BLD-MCNC: 10.2 G/DL (ref 13.7–17.6)
IMM GRANULOCYTES # BLD AUTO: 0.05 10*3/MM3 (ref 0–0.03)
IMM GRANULOCYTES NFR BLD AUTO: 0.5 % (ref 0–0.5)
INR PPP: 1.65 (ref 0.9–1.1)
LIPASE SERPL-CCNC: 17 U/L (ref 13–60)
LYMPHOCYTES # BLD AUTO: 0.59 10*3/MM3 (ref 0.9–4.8)
LYMPHOCYTES NFR BLD AUTO: 5.3 % (ref 19.6–45.3)
MCH RBC QN AUTO: 30.6 PG (ref 27–32.7)
MCHC RBC AUTO-ENTMCNC: 30.4 G/DL (ref 32.6–36.4)
MCV RBC AUTO: 100.6 FL (ref 79.8–96.2)
MONOCYTES # BLD AUTO: 0.25 10*3/MM3 (ref 0.2–1.2)
MONOCYTES NFR BLD AUTO: 2.3 % (ref 5–12)
NEUTROPHILS # BLD AUTO: 10.23 10*3/MM3 (ref 1.9–8.1)
NEUTROPHILS NFR BLD AUTO: 92 % (ref 42.7–76)
PLATELET # BLD AUTO: 210 10*3/MM3 (ref 140–500)
PMV BLD AUTO: 9.3 FL (ref 6–12)
POTASSIUM BLD-SCNC: 5.8 MMOL/L (ref 3.5–5.2)
PROT SERPL-MCNC: 7.3 G/DL (ref 6–8.5)
PROTHROMBIN TIME: 19.3 SECONDS (ref 11.7–14.2)
RBC # BLD AUTO: 3.33 10*6/MM3 (ref 4.6–6)
SODIUM BLD-SCNC: 137 MMOL/L (ref 136–145)
WBC NRBC COR # BLD: 11.11 10*3/MM3 (ref 4.5–10.7)

## 2019-01-23 PROCEDURE — G0378 HOSPITAL OBSERVATION PER HR: HCPCS

## 2019-01-23 PROCEDURE — 25010000002 HYDROMORPHONE PER 4 MG: Performed by: NURSE PRACTITIONER

## 2019-01-23 PROCEDURE — 96376 TX/PRO/DX INJ SAME DRUG ADON: CPT

## 2019-01-23 PROCEDURE — 99221 1ST HOSP IP/OBS SF/LOW 40: CPT | Performed by: INTERNAL MEDICINE

## 2019-01-23 PROCEDURE — 94799 UNLISTED PULMONARY SVC/PX: CPT

## 2019-01-23 PROCEDURE — 87804 INFLUENZA ASSAY W/OPTIC: CPT | Performed by: INTERNAL MEDICINE

## 2019-01-23 PROCEDURE — 82962 GLUCOSE BLOOD TEST: CPT

## 2019-01-23 PROCEDURE — 83690 ASSAY OF LIPASE: CPT | Performed by: INTERNAL MEDICINE

## 2019-01-23 PROCEDURE — 85610 PROTHROMBIN TIME: CPT | Performed by: NURSE PRACTITIONER

## 2019-01-23 PROCEDURE — 80053 COMPREHEN METABOLIC PANEL: CPT | Performed by: NURSE PRACTITIONER

## 2019-01-23 PROCEDURE — 96374 THER/PROPH/DIAG INJ IV PUSH: CPT

## 2019-01-23 PROCEDURE — 85025 COMPLETE CBC W/AUTO DIFF WBC: CPT | Performed by: NURSE PRACTITIONER

## 2019-01-23 PROCEDURE — 25010000002 HYDROMORPHONE 1 MG/ML SOLUTION: Performed by: NURSE PRACTITIONER

## 2019-01-23 PROCEDURE — 94640 AIRWAY INHALATION TREATMENT: CPT

## 2019-01-23 RX ORDER — ONDANSETRON 4 MG/1
4 TABLET, ORALLY DISINTEGRATING ORAL EVERY 6 HOURS PRN
Status: DISCONTINUED | OUTPATIENT
Start: 2019-01-23 | End: 2019-01-24 | Stop reason: HOSPADM

## 2019-01-23 RX ORDER — FAMOTIDINE 40 MG/1
40 TABLET, FILM COATED ORAL EVERY 24 HOURS
Status: DISCONTINUED | OUTPATIENT
Start: 2019-01-23 | End: 2019-01-24 | Stop reason: HOSPADM

## 2019-01-23 RX ORDER — SUCRALFATE ORAL 1 G/10ML
1 SUSPENSION ORAL EVERY 6 HOURS SCHEDULED
Status: DISCONTINUED | OUTPATIENT
Start: 2019-01-23 | End: 2019-01-24 | Stop reason: HOSPADM

## 2019-01-23 RX ORDER — SODIUM CHLORIDE 0.9 % (FLUSH) 0.9 %
3 SYRINGE (ML) INJECTION EVERY 12 HOURS SCHEDULED
Status: DISCONTINUED | OUTPATIENT
Start: 2019-01-23 | End: 2019-01-24 | Stop reason: HOSPADM

## 2019-01-23 RX ORDER — MORPHINE SULFATE 100 MG/1
100 TABLET ORAL DAILY
Status: DISCONTINUED | OUTPATIENT
Start: 2019-01-23 | End: 2019-01-24 | Stop reason: HOSPADM

## 2019-01-23 RX ORDER — BUSPIRONE HYDROCHLORIDE 5 MG/1
5 TABLET ORAL 2 TIMES DAILY
Status: DISCONTINUED | OUTPATIENT
Start: 2019-01-23 | End: 2019-01-24 | Stop reason: HOSPADM

## 2019-01-23 RX ORDER — BUDESONIDE AND FORMOTEROL FUMARATE DIHYDRATE 80; 4.5 UG/1; UG/1
2 AEROSOL RESPIRATORY (INHALATION)
COMMUNITY
End: 2019-02-27

## 2019-01-23 RX ORDER — DEXTROSE MONOHYDRATE 25 G/50ML
25 INJECTION, SOLUTION INTRAVENOUS
Status: DISCONTINUED | OUTPATIENT
Start: 2019-01-23 | End: 2019-01-24 | Stop reason: HOSPADM

## 2019-01-23 RX ORDER — LEVOTHYROXINE SODIUM 175 UG/1
175 TABLET ORAL DAILY
Status: DISCONTINUED | OUTPATIENT
Start: 2019-01-23 | End: 2019-01-24 | Stop reason: HOSPADM

## 2019-01-23 RX ORDER — NICOTINE POLACRILEX 4 MG
15 LOZENGE BUCCAL
Status: DISCONTINUED | OUTPATIENT
Start: 2019-01-23 | End: 2019-01-24 | Stop reason: HOSPADM

## 2019-01-23 RX ORDER — HYDRALAZINE HYDROCHLORIDE 25 MG/1
25 TABLET, FILM COATED ORAL 2 TIMES DAILY
COMMUNITY
Start: 2015-09-26

## 2019-01-23 RX ORDER — ALBUTEROL SULFATE 90 UG/1
2 AEROSOL, METERED RESPIRATORY (INHALATION) EVERY 4 HOURS PRN
COMMUNITY

## 2019-01-23 RX ORDER — ONDANSETRON 2 MG/ML
4 INJECTION INTRAMUSCULAR; INTRAVENOUS EVERY 6 HOURS PRN
Status: DISCONTINUED | OUTPATIENT
Start: 2019-01-23 | End: 2019-01-24 | Stop reason: HOSPADM

## 2019-01-23 RX ORDER — HYDRALAZINE HYDROCHLORIDE 25 MG/1
25 TABLET, FILM COATED ORAL 2 TIMES DAILY
Status: DISCONTINUED | OUTPATIENT
Start: 2019-01-23 | End: 2019-01-24 | Stop reason: HOSPADM

## 2019-01-23 RX ORDER — ALBUTEROL SULFATE 2.5 MG/3ML
2.5 SOLUTION RESPIRATORY (INHALATION) EVERY 4 HOURS PRN
Status: DISCONTINUED | OUTPATIENT
Start: 2019-01-23 | End: 2019-01-24 | Stop reason: HOSPADM

## 2019-01-23 RX ORDER — LEVETIRACETAM 500 MG/1
1000 TABLET ORAL EVERY 12 HOURS SCHEDULED
Status: DISCONTINUED | OUTPATIENT
Start: 2019-01-23 | End: 2019-01-24

## 2019-01-23 RX ORDER — LORAZEPAM 0.5 MG/1
0.5 TABLET ORAL EVERY 8 HOURS PRN
Status: DISCONTINUED | OUTPATIENT
Start: 2019-01-23 | End: 2019-01-24 | Stop reason: HOSPADM

## 2019-01-23 RX ORDER — BUDESONIDE AND FORMOTEROL FUMARATE DIHYDRATE 80; 4.5 UG/1; UG/1
2 AEROSOL RESPIRATORY (INHALATION)
Status: DISCONTINUED | OUTPATIENT
Start: 2019-01-23 | End: 2019-01-24 | Stop reason: HOSPADM

## 2019-01-23 RX ORDER — ATORVASTATIN CALCIUM 10 MG/1
10 TABLET, FILM COATED ORAL DAILY
Status: DISCONTINUED | OUTPATIENT
Start: 2019-01-23 | End: 2019-01-24 | Stop reason: HOSPADM

## 2019-01-23 RX ORDER — ONDANSETRON 4 MG/1
4 TABLET, FILM COATED ORAL EVERY 6 HOURS PRN
Status: DISCONTINUED | OUTPATIENT
Start: 2019-01-23 | End: 2019-01-24 | Stop reason: HOSPADM

## 2019-01-23 RX ORDER — ASPIRIN 81 MG/1
81 TABLET ORAL DAILY
Status: DISCONTINUED | OUTPATIENT
Start: 2019-01-23 | End: 2019-01-24 | Stop reason: HOSPADM

## 2019-01-23 RX ORDER — ACETAMINOPHEN 325 MG/1
650 TABLET ORAL EVERY 4 HOURS PRN
Status: DISCONTINUED | OUTPATIENT
Start: 2019-01-23 | End: 2019-01-24 | Stop reason: HOSPADM

## 2019-01-23 RX ORDER — HYDROMORPHONE HYDROCHLORIDE 1 MG/ML
0.5 INJECTION, SOLUTION INTRAMUSCULAR; INTRAVENOUS; SUBCUTANEOUS EVERY 4 HOURS PRN
Status: DISCONTINUED | OUTPATIENT
Start: 2019-01-23 | End: 2019-01-24 | Stop reason: HOSPADM

## 2019-01-23 RX ORDER — WARFARIN SODIUM 6 MG/1
6 TABLET ORAL
Status: DISCONTINUED | OUTPATIENT
Start: 2019-01-23 | End: 2019-01-23

## 2019-01-23 RX ORDER — SODIUM CHLORIDE 0.9 % (FLUSH) 0.9 %
3-10 SYRINGE (ML) INJECTION AS NEEDED
Status: DISCONTINUED | OUTPATIENT
Start: 2019-01-23 | End: 2019-01-24 | Stop reason: HOSPADM

## 2019-01-23 RX ORDER — FAMOTIDINE 40 MG/1
40 TABLET, FILM COATED ORAL 2 TIMES DAILY PRN
Status: DISCONTINUED | OUTPATIENT
Start: 2019-01-23 | End: 2019-01-23

## 2019-01-23 RX ADMIN — LEVETIRACETAM 1000 MG: 500 TABLET, FILM COATED ORAL at 12:41

## 2019-01-23 RX ADMIN — HYDROMORPHONE HYDROCHLORIDE 0.5 MG: 10 INJECTION INTRAMUSCULAR; INTRAVENOUS; SUBCUTANEOUS at 17:15

## 2019-01-23 RX ADMIN — HYDROMORPHONE HYDROCHLORIDE 1 MG: 1 INJECTION, SOLUTION INTRAMUSCULAR; INTRAVENOUS; SUBCUTANEOUS at 10:00

## 2019-01-23 RX ADMIN — BUDESONIDE AND FORMOTEROL FUMARATE DIHYDRATE 2 PUFF: 80; 4.5 AEROSOL RESPIRATORY (INHALATION) at 20:57

## 2019-01-23 RX ADMIN — SUCRALFATE 1 G: 1 SUSPENSION ORAL at 19:48

## 2019-01-23 RX ADMIN — SODIUM CHLORIDE, PRESERVATIVE FREE 3 ML: 5 INJECTION INTRAVENOUS at 20:33

## 2019-01-23 RX ADMIN — LEVOTHYROXINE SODIUM 175 MCG: 175 TABLET ORAL at 12:41

## 2019-01-23 RX ADMIN — HYDRALAZINE HYDROCHLORIDE 25 MG: 25 TABLET, FILM COATED ORAL at 12:41

## 2019-01-23 RX ADMIN — MORPHINE SULFATE 100 MG: 100 TABLET, EXTENDED RELEASE ORAL at 13:10

## 2019-01-23 RX ADMIN — LORAZEPAM 0.5 MG: 0.5 TABLET ORAL at 23:00

## 2019-01-23 RX ADMIN — SUCRALFATE 1 G: 1 SUSPENSION ORAL at 23:00

## 2019-01-23 RX ADMIN — LORAZEPAM 0.5 MG: 0.5 TABLET ORAL at 14:07

## 2019-01-23 RX ADMIN — HYDRALAZINE HYDROCHLORIDE 25 MG: 25 TABLET, FILM COATED ORAL at 19:49

## 2019-01-23 RX ADMIN — BUSPIRONE HYDROCHLORIDE 5 MG: 5 TABLET ORAL at 19:49

## 2019-01-23 RX ADMIN — SODIUM CHLORIDE, PRESERVATIVE FREE 10 ML: 5 INJECTION INTRAVENOUS at 10:01

## 2019-01-23 RX ADMIN — ATORVASTATIN CALCIUM 10 MG: 10 TABLET, FILM COATED ORAL at 13:10

## 2019-01-23 RX ADMIN — HYDROMORPHONE HYDROCHLORIDE 0.5 MG: 10 INJECTION INTRAMUSCULAR; INTRAVENOUS; SUBCUTANEOUS at 13:40

## 2019-01-23 RX ADMIN — ASPIRIN 81 MG: 81 TABLET, DELAYED RELEASE ORAL at 12:42

## 2019-01-23 RX ADMIN — LEVETIRACETAM 1000 MG: 500 TABLET, FILM COATED ORAL at 19:49

## 2019-01-23 RX ADMIN — SUCRALFATE 1 G: 1 SUSPENSION ORAL at 12:41

## 2019-01-23 RX ADMIN — BUSPIRONE HYDROCHLORIDE 5 MG: 5 TABLET ORAL at 12:41

## 2019-01-23 RX ADMIN — FAMOTIDINE 40 MG: 40 TABLET, FILM COATED ORAL at 19:49

## 2019-01-23 RX ADMIN — BUDESONIDE AND FORMOTEROL FUMARATE DIHYDRATE 2 PUFF: 80; 4.5 AEROSOL RESPIRATORY (INHALATION) at 11:12

## 2019-01-23 NOTE — CONSULTS
Referring Provider: Irish Johnston APRN   Reason for Consultation:     Subjective     Chief complaint No chief complaint on file.      History of present illness:       58 year old  male with history of end-stage renal disease normally has dialysis every Monday, Wednesday and Friday his last dialysis was on Friday was an eventful, history of seizure disorder and atrial fibrillation, obstructive sleep apnea, diabetes mellitus type 2, history of pituitary cancer.  He presented to the emergency department with complaints of nausea, epigastric pain, and diarrhea  CTA/P with possible mild thickening of sigmoid colon and rectum concerning for mild colitis and small hiatal hernia.  WBC 13.5. He stated that he has HD yesterday and he does not want additional one till Friday.    Past Medical History:   Diagnosis Date   • Anxiety    • Arthritis    • Asthma    • Asymptomatic hyperuricemia    • Atrial fibrillation (CMS/HCC)    • Callus of foot     LEFT FOOT , PRESENT[facility]   • Cancer of pituitary gland (CMS/HCC)    • Depression    • Diabetes mellitus (CMS/HCC)     type 2, A1C of 5 1 month ago by patient report   • Dialysis patient (CMS/HCC)     MON WED FRI   • Dyslipidemia    • End stage renal disease (CMS/HCC)    • Gout    • History of anemia    • History of colon polyps    • Hyperparathyroidism (CMS/HCC)    • Hyperprolactinemia (CMS/HCC)    • Hypertension    • Hypogonadism, male    • Hypothyroidism    • Male erectile disorder of organic origin    • Neuropathy    • Obstructive sleep apnea    • Presence of arterial-venous shunt (for dialysis) (CMS/HCC)    • Seizure disorder (CMS/HCC)      2 YRS AGO   • Vitamin D deficiency disease      Past Surgical History:   Procedure Laterality Date   • CATARACT EXTRACTION Bilateral 2009   • CHOLECYSTECTOMY  2002    Performed in [place]   • COLONOSCOPY W/ POLYPECTOMY N/A 11/2015    Dr. Bolivar   • INCISION AND DRAINAGE ARM Right 6/26/2018     Procedure: RIGHT WRIST INCISION AND DRAINAGE;  Surgeon: Wilian Arredondo MD;  Location: [facility] OR;  Service: Hand   • INGUINAL HERNIA REPAIR Left 2002    Open Inguinal   • THYROIDECTOMY Right 1/6/2017    Procedure: FOUR GLAND PARATHYROIDECTOMY WITH AUTOTRANSPLANT INTO RIGHT FOREARM, INTRA-OPERATIVE PTH MEASUREMENT, PARTIAL THYMECTOMY;  Surgeon: Freida Morfin MD;  Location: [facility] OR;  Service:    • THYROIDECTOMY N/A 8/2/2017    Procedure: left thyroid lobectomy, Left superior parathyroidectomy with autotransplant into right forearm, intra-operative pth monitoring;  Surgeon: Freida Morfin MD;  Location: [facility] OR;  Service:    • TRACHEOSTOMY N/A 10/22/2007    W/ REVISION OF THYROID ISTHMUS, DUE TO SLEEP APNEA; ALLOWED TO GROW CLOSED AFTER ONE YEAR DUE TO LACK OF RELIEF OF SLEEP APNEA SYMPTOMS, DR. MAXIMO BELL   • TRACHEOSTOMY N/A 05/21/2009    REVISION, DR. MAXIMO BELL   • TRACHEOSTOMY N/A 04/25/2009    REVISION, DR. MAXIMO BELL   • VASCULAR SURGERY Left 2014    Acess in Left Arm-Dr. Guzman     Family History   Problem Relation Age of Onset   • Heart disease Sister    • Heart disease Father    • Kidney disease Neg Hx    • Malig Hyperthermia Neg Hx      Social History     Tobacco Use   • Smoking status: Never Smoker   • Smokeless tobacco: Never Used   Substance Use Topics   • Alcohol use: No   • Drug use: No     Medications Prior to Admission   Medication Sig Dispense Refill Last Dose   • albuterol sulfate HFA (PROVENTIL HFA) 108 (90 Base) MCG/ACT inhaler Inhale 2 puffs Every 4 (Four) Hours As Needed for Wheezing.      • aspirin 81 MG EC tablet Take 81 mg by mouth.   Taking   • budesonide-formoterol (SYMBICORT) 80-4.5 MCG/ACT inhaler Inhale 2 puffs 2 (Two) Times a Day.      • busPIRone (BUSPAR) 5 MG tablet Take 5 mg by mouth 2 (Two) Times a Day.   Taking   • Glycopyrrolate-Formoterol 9-4.8 MCG/ACT aerosol Inhale 1 puff 2 (Two) Times a Day.      • hydrALAZINE (APRESOLINE) 25 MG tablet Take 25 mg by  "mouth 2 (Two) Times a Day.      • levETIRAcetam (KEPPRA) 1000 MG tablet Take 1,000 mg by mouth 2 (two) times a day.   Taking   • LORazepam (ATIVAN) 0.5 MG tablet Take 1 tablet by mouth Every 8 (Eight) Hours As Needed for Anxiety. 9 tablet 0 Not Taking   • Morphine (MS CONTIN) 100 MG 12 hr tablet Take 1 tablet by mouth Daily. 3 tablet 0 Taking   • pravastatin (PRAVACHOL) 40 MG tablet Take 40 mg by mouth Daily.   Taking   • SYNTHROID 175 MCG tablet Take 175 mcg by mouth daily.   Taking   • vitamin D (ERGOCALCIFEROL) 94692 units capsule capsule Take 50,000 Units by mouth Every 30 (Thirty) Days.   Taking   • Vortioxetine HBr (TRINTELLIX) 10 MG tablet Take 20 mg by mouth Every Night.   Taking   • warfarin (COUMADIN) 2.5 MG tablet Take 6 mg by mouth Daily.   Taking   • famotidine (PEPCID) 40 MG tablet Take 1 tablet by mouth 2 (Two) Times a Day As Needed for Indigestion or Heartburn.   Not Taking   • lactulose (CHRONULAC) 10 GM/15ML solution Take 15 mL by mouth Daily.   Not Taking   • sennosides-docusate sodium (SENOKOT-S) 8.6-50 MG tablet Take 2 tablets by mouth 2 (Two) Times a Day. Hold for loose stool   Taking     Allergies:  Adhesive tape; Latex; and Sulfa antibiotics    Review of Systems  Pertinent items are noted in HPI.    Objective     Vital Signs  Temp:  [97.5 °F (36.4 °C)-98.5 °F (36.9 °C)] 98.5 °F (36.9 °C)  Heart Rate:  [95] 95  Resp:  [16-18] 16  BP: (180-182)/(105-109) 182/105    Flowsheet Rows      First Filed Value   Admission Height  172.7 cm (68\") Documented at 01/23/2019 0802   Admission Weight  150 kg (330 lb 8 oz)  (Abnormal)  Documented at 01/23/2019 0802           I/O this shift:  In: 120 [P.O.:120]  Out: -   No intake/output data recorded.    Intake/Output Summary (Last 24 hours) at 1/23/2019 1522  Last data filed at 1/23/2019 1300  Gross per 24 hour   Intake 120 ml   Output --   Net 120 ml       Physical Exam:     General Appearance:    Alert, cooperative, in no acute distress   Head:    " Normocephalic, without obvious abnormality, atraumatic   Eyes:            Lids and lashes normal, conjunctivae and sclerae normal, no   icterus, no pallor, corneas clear, PERRLA   Ears:    Ears appear intact with no abnormalities noted   Throat:   No oral lesions, no thrush, oral mucosa moist   Neck:   No adenopathy, supple, trachea midline, no thyromegaly, no   carotid bruit, no JVD   Back:     No kyphosis present, no scoliosis present, no skin lesions,      erythema or scars, no tenderness to percussion or                   palpation,   range of motion normal   Lungs:     Clear to auscultation,respirations regular, even and                  unlabored    Heart:    Irregular rhythm normal S1 and S2, no            murmur, no gallop, no rub, no click   Chest Wall:    No abnormalities observed   Abdomen:     Morbidly obese, Normal bowel sounds, no masses, no organomegaly, soft        Mild tender, non-distended, no guarding, no rebound                tenderness   Rectal:     Deferred   Extremities:   Moves all extremities well, Trace  edema, no cyanosis, no             Redness. Chronic vascular changes, brawny discoloration    Pulses:   Pulses palpable and equal bilaterally   Skin:   No bleeding, bruising or rash   Lymph nodes:   No palpable adenopathy           Results Review:  Results from last 7 days   Lab Units 01/23/19  1207   SODIUM mmol/L 137   POTASSIUM mmol/L 5.8*   CHLORIDE mmol/L 93*   CO2 mmol/L 27.3   BUN mg/dL 36*   CREATININE mg/dL 4.56*   CALCIUM mg/dL 9.5   BILIRUBIN mg/dL 0.4   ALK PHOS U/L 57   ALT (SGPT) U/L 23   AST (SGOT) U/L 20   GLUCOSE mg/dL 168*       Estimated Creatinine Clearance: 25.5 mL/min (A) (by C-G formula based on SCr of 4.56 mg/dL (H)).          Results from last 7 days   Lab Units 01/23/19  1207   WBC 10*3/mm3 11.11*   HEMOGLOBIN g/dL 10.2*   PLATELETS 10*3/mm3 210       Results from last 7 days   Lab Units 01/23/19  1207   INR  1.65*       Active Medications    aspirin 81 mg Oral  Daily   atorvastatin 10 mg Oral Daily   budesonide-formoterol 2 puff Inhalation BID - RT   busPIRone 5 mg Oral BID   famotidine 40 mg Oral Q24H   hydrALAZINE 25 mg Oral BID   insulin lispro 0-9 Units Subcutaneous 4x Daily With Meals & Nightly   levETIRAcetam 1,000 mg Oral Q12H   levothyroxine 175 mcg Oral Daily   Morphine 100 mg Oral Daily   sodium chloride 3 mL Intravenous Q12H   sucralfate 1 g Oral Q6H   Vortioxetine HBr 10 mg Oral Nightly          Assessment/Plan       End stage renal disease (CMS/HCC)    Atrial fibrillation (CMS/HCC)    Chronic pain syndrome    Seizure (CMS/HCC)    Anemia in chronic renal disease    Asthma    Diastolic CHF, chronic (CMS/HCC)    DM type 2 (diabetes mellitus, type 2) (CMS/HCC)    Essential hypertension    Hyperlipidemia LDL goal <70    Adult hypothyroidism    Morbid obesity with BMI of 45.0-49.9, adult (CMS/HCC)    CEE (nonalcoholic steatohepatitis)    SHABNAM (obstructive sleep apnea)    Nausea & vomiting    Epigastric abdominal pain      1.  End-stage renal disease secondary diabetic nephropathy on maintenance hemodialysis, every Monday, Wednesday and Friday via an AV fistula.      2.  Diabetes mellitus type II with multiple end organ complications  3.  Abdominal pain with nausea and vomiting  4.  Chronic A. fib, chronically anticoagulated  5.  Hyperkalemia  6.  Hypothyroidism on replacement therapy  7. Morbid obesity        Plan:  HD for 3 H due to his high potassium. Patient is refusing. Will discuss with him again  Will follow strict renal diet with 2 g sodium and 2 g potassium restriction  Surveillance labs          Andres Hall MD  01/23/19  3:22 PM

## 2019-01-23 NOTE — CONSULTS
[facility], [place], operated by [facility]  Initial Inpatient Consult Note    Referring Provider: Dr. Luis Antonio Abarca    Reason for Consultation: Epigastric pain, nausea, diarrhea    Subjective     History of present illness:    58 y.o. male history of end-stage renal disease on hemodialysis, atrial fibrillation, diabetes mellitus, with recent complaint of nausea, epigastric pain and diarrhea.  He described an aching sensation involving the epigastrium that was similar to symptoms he had in the past associated with pancreatitis.  The pain was nonradiating.  Currently his pain has subsided.  He does have a history of reflux as well as peptic ulcer disease.  CT scan suggested mild thickening in the sigmoid colon and rectum with concern of possible mild colitis.  He denies any recent exposure history.  No reported melena or bright red blood per rectum.    Past Medical History:  Past Medical History:   Diagnosis Date   • Anxiety    • Arthritis    • Asthma    • Asymptomatic hyperuricemia    • Atrial fibrillation (CMS/HCC)    • Callus of foot     LEFT FOOT , PRESENTLY ATTENDING WOUND CLINIC [facility]   • Cancer of pituitary gland (CMS/HCC)    • Depression    • Diabetes mellitus (CMS/HCC)     type 2, A1C of 5 1 month ago by patient report   • Dialysis patient (CMS/HCC)     MON WED FRI   • Dyslipidemia    • End stage renal disease (CMS/HCC)    • Gout    • History of anemia    • History of colon polyps    • Hyperparathyroidism (CMS/HCC)    • Hyperprolactinemia (CMS/HCC)    • Hypertension    • Hypogonadism, male    • Hypothyroidism    • Male erectile disorder of organic origin    • Neuropathy    • Obstructive sleep apnea    • Presence of arterial-venous shunt (for dialysis) (CMS/HCC)    • Seizure disorder (CMS/HCC)      2 YRS AGO   • Vitamin D deficiency disease      Past Surgical History:  Past Surgical History:   Procedure Laterality Date   • CATARACT EXTRACTION Bilateral 2009   • CHOLECYSTECTOMY  2002    Performed in [place]   •  COLONOSCOPY W/ POLYPECTOMY N/A 11/2015    Dr. Bolivar   • INCISION AND DRAINAGE ARM Right 6/26/2018    Procedure: RIGHT WRIST INCISION AND DRAINAGE;  Surgeon: Wilian Arredondo MD;  Location: [facility];  Service: Hand   • INGUINAL HERNIA REPAIR Left 2002    Open Inguinal   • THYROIDECTOMY Right 1/6/2017    Procedure: FOUR GLAND PARATHYROIDECTOMY WITH AUTOTRANSPLANT INTO RIGHT FOREARM, INTRA-OPERATIVE PTH MEASUREMENT, PARTIAL THYMECTOMY;  Surgeon: Freida Morfin MD;  Location: [facility];  Service:    • THYROIDECTOMY N/A 8/2/2017    Procedure: left thyroid lobectomy, Left superior parathyroidectomy with autotransplant into right forearm, intra-operative pth monitoring;  Surgeon: Freida Morfin MD;  Location: [facility];  Service:    • TRACHEOSTOMY N/A 10/22/2007    W/ REVISION OF THYROID ISTHMUS, DUE TO SLEEP APNEA; ALLOWED TO GROW CLOSED AFTER ONE YEAR DUE TO LACK OF RELIEF OF SLEEP APNEA SYMPTOMS, DR. MAXIMO BELL   • TRACHEOSTOMY N/A 05/21/2009    REVISION, DR. MAXIMO BELL   • TRACHEOSTOMY N/A 04/25/2009    REVISION, DR. MAXIMO BELL   • VASCULAR SURGERY Left 2014    Acess in Left Arm-Dr. Guzman      Social History:   Social History     Tobacco Use   • Smoking status: Never Smoker   • Smokeless tobacco: Never Used   Substance Use Topics   • Alcohol use: No      Family History:  Family History   Problem Relation Age of Onset   • Heart disease Sister    • Heart disease Father    • Kidney disease Neg Hx    • Malig Hyperthermia Neg Hx        Home Meds:  Medications Prior to Admission   Medication Sig Dispense Refill Last Dose   • albuterol sulfate HFA (PROVENTIL HFA) 108 (90 Base) MCG/ACT inhaler Inhale 2 puffs Every 4 (Four) Hours As Needed for Wheezing.      • aspirin 81 MG EC tablet Take 81 mg by mouth.   Taking   • budesonide-formoterol (SYMBICORT) 80-4.5 MCG/ACT inhaler Inhale 2 puffs 2 (Two) Times a Day.      • busPIRone (BUSPAR) 5 MG tablet Take 5 mg by mouth 2 (Two) Times a Day.   Taking   •  Glycopyrrolate-Formoterol 9-4.8 MCG/ACT aerosol Inhale 1 puff 2 (Two) Times a Day.      • hydrALAZINE (APRESOLINE) 25 MG tablet Take 25 mg by mouth 2 (Two) Times a Day.      • levETIRAcetam (KEPPRA) 1000 MG tablet Take 1,000 mg by mouth 2 (two) times a day.   Taking   • LORazepam (ATIVAN) 0.5 MG tablet Take 1 tablet by mouth Every 8 (Eight) Hours As Needed for Anxiety. 9 tablet 0 Not Taking   • Morphine (MS CONTIN) 100 MG 12 hr tablet Take 1 tablet by mouth Daily. 3 tablet 0 Taking   • pravastatin (PRAVACHOL) 40 MG tablet Take 40 mg by mouth Daily.   Taking   • SYNTHROID 175 MCG tablet Take 175 mcg by mouth daily.   Taking   • vitamin D (ERGOCALCIFEROL) 28115 units capsule capsule Take 50,000 Units by mouth Every 30 (Thirty) Days.   Taking   • Vortioxetine HBr (TRINTELLIX) 10 MG tablet Take 20 mg by mouth Every Night.   Taking   • warfarin (COUMADIN) 2.5 MG tablet Take 6 mg by mouth Daily.   Taking   • famotidine (PEPCID) 40 MG tablet Take 1 tablet by mouth 2 (Two) Times a Day As Needed for Indigestion or Heartburn.   Not Taking   • lactulose (CHRONULAC) 10 GM/15ML solution Take 15 mL by mouth Daily.   Not Taking   • sennosides-docusate sodium (SENOKOT-S) 8.6-50 MG tablet Take 2 tablets by mouth 2 (Two) Times a Day. Hold for loose stool   Taking     Current Meds:     aspirin 81 mg Oral Daily   atorvastatin 10 mg Oral Daily   budesonide-formoterol 2 puff Inhalation BID - RT   busPIRone 5 mg Oral BID   famotidine 40 mg Oral Q24H   hydrALAZINE 25 mg Oral BID   insulin lispro 0-9 Units Subcutaneous 4x Daily With Meals & Nightly   levETIRAcetam 1,000 mg Oral Q12H   levothyroxine 175 mcg Oral Daily   Morphine 100 mg Oral Daily   sodium chloride 3 mL Intravenous Q12H   sucralfate 1 g Oral Q6H   Vortioxetine HBr 10 mg Oral Nightly     Allergies:  Allergies   Allergen Reactions   • Adhesive Tape Itching and Other (See Comments)     Patient reports skin tears    • Latex Rash   • Sulfa Antibiotics Other (See Comments)      Causes headaches       Review of Systems  Pertinent items are noted in HPI, all other systems reviewed and negative     Objective     Vital Signs  Temp:  [97.5 °F (36.4 °C)-98.5 °F (36.9 °C)] 98.5 °F (36.9 °C)  Heart Rate:  [95] 95  Resp:  [16-18] 16  BP: (180-182)/(105-109) 182/105  Physical Exam:  General Appearance:    Alert, cooperative, in no acute distress, morbidly obese white male.     Head:    Normocephalic, without obvious abnormality, atraumatic   Eyes:            Lids and lashes normal, conjunctivae and sclerae normal, no   icterus   Throat:   No oral lesions, no thrush, oral mucosa moist   Neck:   No adenopathy, supple, trachea midline, no thyromegaly, no   carotid bruit, no JVD   Lungs:     Clear to auscultation,respirations regular, even and                   unlabored    Heart:    Regular rhythm and normal rate, normal S1 and S2, no            murmur, no gallop, no rub, no click   Chest Wall:    No abnormalities observed   Abdomen:     Normal bowel sounds, no masses, no organomegaly, soft        non-tender, non-distended, no guarding, no rebound                 tenderness   Rectal:     Deferred   Extremities:   no edema, no cyanosis, no redness   Skin:   No bleeding, bruising or rash   Lymph nodes:   No palpable adenopathy   Psychiatric:  Judgement and insight: normal   Orientation to person place and time: normal   Mood and affect: normal   Results Review:   I reviewed the patient's new clinical results.    Results from last 7 days   Lab Units 01/23/19  1207   WBC 10*3/mm3 11.11*   HEMOGLOBIN g/dL 10.2*   HEMATOCRIT % 33.5*   PLATELETS 10*3/mm3 210     Results from last 7 days   Lab Units 01/23/19  1207   SODIUM mmol/L 137   POTASSIUM mmol/L 5.8*   CHLORIDE mmol/L 93*   CO2 mmol/L 27.3   BUN mg/dL 36*   CREATININE mg/dL 4.56*   CALCIUM mg/dL 9.5   BILIRUBIN mg/dL 0.4   ALK PHOS U/L 57   ALT (SGPT) U/L 23   AST (SGOT) U/L 20   GLUCOSE mg/dL 168*     Results from last 7 days   Lab Units 01/23/19  1207    INR  1.65*     Lab Results   Lab Value Date/Time    LIPASE 17 01/23/2019 1207       Radiology:  No orders to display       Assessment/Plan   Patient Active Problem List   Diagnosis   • End stage renal disease (CMS/HCC)   • Secondary hyperparathyroidism of renal origin (CMS/HCC)   • Fever   • Sepsis (CMS/HCC)   • Nausea and vomiting   • Atrial fibrillation (CMS/HCC)   • Long term current use of anticoagulant   • Acute pain of right wrist   • DNR (do not resuscitate)   • Bacteremia due to Streptococcus   • Streptococcal arthritis of right wrist (CMS/HCC)   • Opioid dependence (CMS/[facility])   • Chronic pain syndrome   • Hyponatremia   • Seizure (CMS/HCC)   • Anemia in chronic renal disease   • Asthma   • Elevated blood uric acid level   • Benign hypertensive kidney disease with chronic kidney disease stage I through stage IV, or unspecified(403.10)   • CAD in native artery   • CKD (chronic kidney disease)   • Diastolic CHF, chronic (CMS/HCC)   • DM type 2 (diabetes mellitus, type 2) (CMS/[facility])   • DVT prophylaxis   • Dyslipidemia   • Echocardiogram abnormal   • Essential hypertension   • Fatigue   • Hyperlipidemia LDL goal <70   • Hyperprolactinemia (CMS/HCC)   • Eunuchoidism   • Hyposmolality and/or hyponatremia   • Adult hypothyroidism   • Morbid obesity with BMI of 45.0-49.9, adult (CMS/[facility])   • CEE (nonalcoholic steatohepatitis)   • Neoplasm of pituitary gland   • Normal cardiac stress test   • On home oxygen therapy   • SHABNAM (obstructive sleep apnea)   • Other complications due to renal dialysis device, implant, and graft   • Pancreatitis   • Pulmonary hypertension (CMS/[facility])   • Pyelonephritis   • Respiratory failure (CMS/[facility])   • Uremia   • Uremic encephalopathy   • Avitaminosis D   • Acute hyperkalemia   • Cerebral atrophy   • Nausea & vomiting   • Epigastric abdominal pain     Impression  #1 acute onset of nausea with epigastric pain.  Differential would include peptic ulcer disease, gastroenteritis, less likely  to be associated with pancreatitis given negative CT findings.  #2 history of peptic ulcer disease  #3 history of GERD  #4 end-stage renal disease on dialysis  #5 diabetes mellitus      Recommendation  Continue current medical regimen  Defer aggressive GI evaluation unless symptoms recur  I discussed the patients findings and my recommendations with patient.    Valeriy Parekh MD

## 2019-01-23 NOTE — NURSING NOTE
Patient educated on the need for hemodialysis today, patient adamantly refused.  Dr. Hall notified, plan for dialysis in am.

## 2019-01-23 NOTE — H&P
Patient Name:  Armando Gill  YOB: 1960  MRN:  [id number]  Admit Date:  1/23/2019  Patient Care Team:  Luis Antonio Abarca MD as PCP - General (Family Medicine)    Cc- abd pain  No chief complaint on file.    Subjective   Mr. Gill is a 58 y.o. with a history of ESRD on HD MWF, seizures, atrial fibrillation on AC, asthma, SHABNAM on O2 at 2 LPM at night, DM (no current home tx)and multiple endocrine issues with possible history of cancer of the pituitary gland that has been transferred from [facility] with complaints of nausea, epigastric pain, and diarrhea.  Symptoms started yesterday evening and the abdominal pain has become progressively worse.  The pain is described as aching sensation that radiates to his left upper quadrant. No known alleviating or exacerbating factors.  He has had Carafate, morphine, and dillauded since onset without relief.  Patient takes morphine by mouth as needed for diabetic neuropathy. He denies fever, chills, black stool, bloody stool, emesis, or hematemesis.   Patient has a history of GERD controlled on Pepcid.  He denies a history of PUD.  He has a daughter with a recent influenza infection when he states his symptoms are different.  He denies recent travel, or contaminated food  Records reviewed from [facility] this morning.  CTA/P with possible mild thickening of sigmoid colon and rectum concerning for mild colitis and small hiatal hernia.  WBC 13.5, hemoglobin 10.2, sodium 132, potassium normal, lipase normal.  BNP 1365, negative troponin, INR 1.6. EKG reviewed.     History of Present Illness    Past Medical History:   Diagnosis Date   • Anxiety    • Arthritis    • Asthma    • Asymptomatic hyperuricemia    • Atrial fibrillation (CMS/HCC)    • Callus of foot     LEFT FOOT , PRESENTLY ATTENDING WOUND CLINIC [facility]   • Cancer of pituitary gland (CMS/HCC)    • Depression    • Diabetes mellitus (CMS/HCC)     type 2, A1C of 5 1 month ago by  patient report   • Dialysis patient (CMS/HCC)     MON WED FRI   • Dyslipidemia    • End stage renal disease (CMS/HCC)    • Gout    • History of anemia    • History of colon polyps    • Hyperparathyroidism (CMS/HCC)    • Hyperprolactinemia (CMS/HCC)    • Hypertension    • Hypogonadism, male    • Hypothyroidism    • Male erectile disorder of organic origin    • Neuropathy    • Obstructive sleep apnea    • Presence of arterial-venous shunt (for dialysis) (CMS/HCC)    • Seizure disorder (CMS/HCC)      2 YRS AGO   • Vitamin D deficiency disease      Past Surgical History:   Procedure Laterality Date   • CATARACT EXTRACTION Bilateral 2009   • CHOLECYSTECTOMY  2002    Performed in [place]   • COLONOSCOPY W/ POLYPECTOMY N/A 11/2015    Dr. Bolivar   • INCISION AND DRAINAGE ARM Right 6/26/2018    Procedure: RIGHT WRIST INCISION AND DRAINAGE;  Surgeon: Wilian Arredondo MD;  Location: [facility];  Service: Hand   • INGUINAL HERNIA REPAIR Left 2002    Open Inguinal   • THYROIDECTOMY Right 1/6/2017    Procedure: FOUR GLAND PARATHYROIDECTOMY WITH AUTOTRANSPLANT INTO RIGHT FOREARM, INTRA-OPERATIVE PTH MEASUREMENT, PARTIAL THYMECTOMY;  Surgeon: Freida Morfin MD;  Location: [facility];  Service:    • THYROIDECTOMY N/A 8/2/2017    Procedure: left thyroid lobectomy, Left superior parathyroidectomy with autotransplant into right forearm, intra-operative pth monitoring;  Surgeon: Freida Morfin MD;  Location: [facility];  Service:    • TRACHEOSTOMY N/A 10/22/2007    W/ REVISION OF THYROID ISTHMUS, DUE TO SLEEP APNEA; ALLOWED TO GROW CLOSED AFTER ONE YEAR DUE TO LACK OF RELIEF OF SLEEP APNEA SYMPTOMS, DR. MAXIMO BELL   • TRACHEOSTOMY N/A 05/21/2009    REVISION, DR. MAXIMO BELL   • TRACHEOSTOMY N/A 04/25/2009    REVISION, DR. MAXIMO BELL   • VASCULAR SURGERY Left 2014    Acess in Left Arm-Dr. Guzman     Family History   Problem Relation Age of Onset   • Heart disease Sister    • Heart disease Father    • Kidney disease Neg  Hx    • Malig Hyperthermia Neg Hx      Social History     Tobacco Use   • Smoking status: Never Smoker   • Smokeless tobacco: Never Used   Substance Use Topics   • Alcohol use: No   • Drug use: No     Medications Prior to Admission   Medication Sig Dispense Refill Last Dose   • albuterol sulfate HFA (PROVENTIL HFA) 108 (90 Base) MCG/ACT inhaler Inhale 2 puffs Every 4 (Four) Hours As Needed for Wheezing.      • aspirin 81 MG EC tablet Take 81 mg by mouth.   Taking   • budesonide-formoterol (SYMBICORT) 80-4.5 MCG/ACT inhaler Inhale 2 puffs 2 (Two) Times a Day.      • busPIRone (BUSPAR) 5 MG tablet Take 5 mg by mouth 2 (Two) Times a Day.   Taking   • Glycopyrrolate-Formoterol 9-4.8 MCG/ACT aerosol Inhale 1 puff 2 (Two) Times a Day.      • hydrALAZINE (APRESOLINE) 25 MG tablet Take 25 mg by mouth 2 (Two) Times a Day.      • levETIRAcetam (KEPPRA) 1000 MG tablet Take 1,000 mg by mouth 2 (two) times a day.   Taking   • LORazepam (ATIVAN) 0.5 MG tablet Take 1 tablet by mouth Every 8 (Eight) Hours As Needed for Anxiety. 9 tablet 0 Not Taking   • Morphine (MS CONTIN) 100 MG 12 hr tablet Take 1 tablet by mouth Daily. 3 tablet 0 Taking   • pravastatin (PRAVACHOL) 40 MG tablet Take 40 mg by mouth Daily.   Taking   • SYNTHROID 175 MCG tablet Take 175 mcg by mouth daily.   Taking   • vitamin D (ERGOCALCIFEROL) 02403 units capsule capsule Take 50,000 Units by mouth Every 30 (Thirty) Days.   Taking   • Vortioxetine HBr (TRINTELLIX) 10 MG tablet Take 20 mg by mouth Every Night.   Taking   • warfarin (COUMADIN) 2.5 MG tablet Take 6 mg by mouth Daily.   Taking   • famotidine (PEPCID) 40 MG tablet Take 1 tablet by mouth 2 (Two) Times a Day As Needed for Indigestion or Heartburn.   Not Taking   • lactulose (CHRONULAC) 10 GM/15ML solution Take 15 mL by mouth Daily.   Not Taking   • sennosides-docusate sodium (SENOKOT-S) 8.6-50 MG tablet Take 2 tablets by mouth 2 (Two) Times a Day. Hold for loose stool   Taking     Allergies:    Allergies    Allergen Reactions   • Adhesive Tape Itching and Other (See Comments)     Patient reports skin tears    • Latex Rash   • Sulfa Antibiotics Other (See Comments)     Causes headaches         Review of Systems   Constitutional: Negative for appetite change and fatigue.   HENT: Negative for trouble swallowing.    Respiratory: Negative for choking.    Cardiovascular: Negative for chest pain.   Gastrointestinal: Positive for abdominal pain, diarrhea and nausea. Negative for abdominal distention, blood in stool, constipation and vomiting.   Musculoskeletal: Negative for back pain.   Skin: Negative for pallor.   Neurological: Negative for dizziness.        Objective    Vital Signs  Temp:  [97.5 °F (36.4 °C)] 97.5 °F (36.4 °C)  Heart Rate:  [95] 95  Resp:  [16-18] 16  BP: (180)/(109) 180/109  SpO2:  [91 %] 91 %  on  Flow (L/min):  [2] 2;   Device (Oxygen Therapy): room air  Body mass index is 50.25 kg/m².    Physical Exam   Constitutional: He is oriented to person, place, and time. He appears well-developed and well-nourished.   Morbidly  obese, chronically ill-appearing   HENT:   Head: Normocephalic and atraumatic.   Mouth/Throat: Oropharynx is clear and moist.   Eyes: Conjunctivae and EOM are normal. No scleral icterus.   Neck: Normal range of motion. No tracheal deviation present.   Cardiovascular: Normal rate and intact distal pulses. An irregular rhythm present.   Pulmonary/Chest: Effort normal and breath sounds normal. No respiratory distress.   Abdominal: Soft. Bowel sounds are normal. He exhibits no distension and no mass. There is tenderness. There is no rebound and no guarding.   Exam limited by abdominal obesity, abdomen soft, epigastric and left upper quadrant tenderness   Musculoskeletal: Normal range of motion. He exhibits edema.   Chronic vascular changes, brawny discoloration   Neurological: He is alert and oriented to person, place, and time. No cranial nerve deficit.   Skin: Skin is warm and dry.    Scattered ecchymosis   Psychiatric: He has a normal mood and affect. Judgment normal.   Nursing note and vitals reviewed.      Results Review:  I reviewed the patient's new clinical results.  I reviewed the patient's new imaging results and agree with the interpretation.  I reviewed the patient's other test results and agree with the interpretation  I personally viewed and interpreted the patient's EKG/Telemetry data       Lab Results (last 24 hours)     Procedure Component Value Units Date/Time    POC Glucose Once [[id number]]  (Abnormal) Collected:  01/23/19 1048    Specimen:  Blood Updated:  01/23/19 1049     Glucose 156 mg/dL           Imaging Results (last 24 hours)     ** No results found for the last 24 hours. **          No orders to display     Assessment/Plan   Active Hospital Problems    Diagnosis Date Noted   • Nausea & vomiting [R11.2] 01/23/2019   • Epigastric abdominal pain [R10.13] 01/23/2019   • SHABNAM (obstructive sleep apnea) [G47.33] 10/08/2018   • DM type 2 (diabetes mellitus, type 2) (CMS/[facility]) [E11.9] 10/08/2018   • Hyperlipidemia LDL goal <70 [E78.5] 10/08/2018   • Essential hypertension [I10] 10/08/2018   • Adult hypothyroidism [E03.9] 10/08/2018   • Seizure (CMS/[facility]) [R56.9] 10/07/2018   • Chronic pain syndrome [G89.4] 06/28/2018   • Atrial fibrillation (CMS/[facility]) [I48.91] 06/23/2018   • Asthma [J45.909] 05/14/2018   • End stage renal disease (CMS/[facility]) [N18.6] 05/04/2016   • Diastolic CHF, chronic (CMS/[facility]) [I50.32] 05/04/2015   • Morbid obesity with BMI of 45.0-49.9, adult (CMS/[facility]) [E66.01, Z68.42] 04/16/2015   • Anemia in chronic renal disease [N18.9, D63.1] 04/12/2013   • CEE (nonalcoholic steatohepatitis) [K75.81] 02/26/2013      Resolved Hospital Problems   No resolved problems to display.       Mr. Gill is a 58 y.o.  with a extensive medical history including ESRD on HD is been transferred from [facility] to [facility] for further evaluation of abdominal pain, nausea,  diarrhea.    Epigastric pain/ nausea  Check influenza given recent exposure  Analgesics and anti-emetics PRN  Carafate every 6 hours and Pepcid twice a day   Consult GI     Diarrhea  Questionable mild colitis on CT A/P but epigastric pain not consistent with CT findings  Mild leukocytosis  Check C. difficile and GI PCR  Hold on abx until seen by GI     ESRD on HD  MWF  Consult nephrology    DM2  He denies taking home insulin oral agents  POC glucose elevated   Known complications include nephropathy, peripheral neuropathy and peripheral vascular disease  HUMALOG- moderate dose correctional factor as needed.  Monitor glucose QAC and QHS. For any BG less than 70 mg/dL, treat per hospital protocol  Check HgbA1C    NCS diet.     Afib on coumadin   Hold coumadin in case upper endoscopic evaluation is needed  No overt GIB    Hyponatremia   Hold on IVF  Monitor daily  Renal consult     Asthma   Continue home meds     Anemia of chronic disease  Hgb at baseline, monitor daily      Morbid obesity     HTN/ Hypothyroidism    Continue home meds    Chronic Pain  Restart home pain medications    Seizures   Continue keppra    Full code        I discussed the patients findings and my recommendations with patient, family and nursing staff.      DANIELITO Seymour  [facility]  01/23/19  11:28 AM     I supervised care provided by the NP. We have discussed the case. I have reviewed  the note and agree with the plan of treatment. I personally interviewed the patient and examined the patient.    Charly Diop MD  1/23/19  5:16PM

## 2019-01-24 VITALS
BODY MASS INDEX: 47.74 KG/M2 | HEART RATE: 88 BPM | WEIGHT: 315 LBS | OXYGEN SATURATION: 94 % | HEIGHT: 68 IN | TEMPERATURE: 98.2 F | DIASTOLIC BLOOD PRESSURE: 86 MMHG | RESPIRATION RATE: 16 BRPM | SYSTOLIC BLOOD PRESSURE: 127 MMHG

## 2019-01-24 LAB
ANION GAP SERPL CALCULATED.3IONS-SCNC: 15.4 MMOL/L
BUN BLD-MCNC: 45 MG/DL (ref 6–20)
BUN/CREAT SERPL: 8.2 (ref 7–25)
CALCIUM SPEC-SCNC: 9.3 MG/DL (ref 8.6–10.5)
CHLORIDE SERPL-SCNC: 94 MMOL/L (ref 98–107)
CO2 SERPL-SCNC: 26.6 MMOL/L (ref 22–29)
CREAT BLD-MCNC: 5.47 MG/DL (ref 0.76–1.27)
FLUAV AG NPH QL: NEGATIVE
FLUBV AG NPH QL IA: NEGATIVE
GFR SERPL CREATININE-BSD FRML MDRD: 11 ML/MIN/1.73
GFR SERPL CREATININE-BSD FRML MDRD: ABNORMAL ML/MIN/1.73
GLUCOSE BLD-MCNC: 101 MG/DL (ref 65–99)
GLUCOSE BLDC GLUCOMTR-MCNC: 109 MG/DL (ref 70–130)
HBA1C MFR BLD: 4.8 % (ref 4.8–5.6)
INR PPP: 1.71 (ref 0.9–1.1)
POTASSIUM BLD-SCNC: 4.9 MMOL/L (ref 3.5–5.2)
PROTHROMBIN TIME: 19.7 SECONDS (ref 11.7–14.2)
SODIUM BLD-SCNC: 136 MMOL/L (ref 136–145)

## 2019-01-24 PROCEDURE — 85610 PROTHROMBIN TIME: CPT | Performed by: NURSE PRACTITIONER

## 2019-01-24 PROCEDURE — 82962 GLUCOSE BLOOD TEST: CPT

## 2019-01-24 PROCEDURE — 94799 UNLISTED PULMONARY SVC/PX: CPT

## 2019-01-24 PROCEDURE — 80048 BASIC METABOLIC PNL TOTAL CA: CPT | Performed by: NURSE PRACTITIONER

## 2019-01-24 PROCEDURE — 83036 HEMOGLOBIN GLYCOSYLATED A1C: CPT | Performed by: NURSE PRACTITIONER

## 2019-01-24 PROCEDURE — G0378 HOSPITAL OBSERVATION PER HR: HCPCS

## 2019-01-24 RX ORDER — LEVETIRACETAM 500 MG/1
500 TABLET ORAL DAILY PRN
Status: DISCONTINUED | OUTPATIENT
Start: 2019-01-24 | End: 2019-01-24 | Stop reason: HOSPADM

## 2019-01-24 RX ORDER — ONDANSETRON 4 MG/1
4 TABLET, FILM COATED ORAL EVERY 6 HOURS PRN
Qty: 10 TABLET | Refills: 0 | Status: SHIPPED | OUTPATIENT
Start: 2019-01-24

## 2019-01-24 RX ORDER — FAMOTIDINE 40 MG/1
40 TABLET, FILM COATED ORAL DAILY
Qty: 30 TABLET | Refills: 0 | Status: SHIPPED | OUTPATIENT
Start: 2019-01-24 | End: 2019-01-24 | Stop reason: SDUPTHER

## 2019-01-24 RX ORDER — FAMOTIDINE 40 MG/1
40 TABLET, FILM COATED ORAL DAILY
Qty: 30 TABLET | Refills: 0 | Status: SHIPPED | OUTPATIENT
Start: 2019-01-24 | End: 2019-02-27

## 2019-01-24 RX ORDER — SUCRALFATE ORAL 1 G/10ML
1 SUSPENSION ORAL 2 TIMES DAILY
Qty: 420 ML | Refills: 0 | Status: SHIPPED | OUTPATIENT
Start: 2019-01-24

## 2019-01-24 RX ORDER — LEVETIRACETAM 500 MG/1
500 TABLET ORAL DAILY
Status: DISCONTINUED | OUTPATIENT
Start: 2019-01-25 | End: 2019-01-24 | Stop reason: HOSPADM

## 2019-01-24 RX ORDER — LEVETIRACETAM 1000 MG/1
TABLET ORAL
Start: 2019-01-24 | End: 2019-02-27

## 2019-01-24 RX ADMIN — MORPHINE SULFATE 100 MG: 100 TABLET, EXTENDED RELEASE ORAL at 08:00

## 2019-01-24 RX ADMIN — SODIUM CHLORIDE, PRESERVATIVE FREE 3 ML: 5 INJECTION INTRAVENOUS at 08:01

## 2019-01-24 RX ADMIN — LEVOTHYROXINE SODIUM 175 MCG: 175 TABLET ORAL at 08:00

## 2019-01-24 RX ADMIN — ACETAMINOPHEN 650 MG: 325 TABLET, FILM COATED ORAL at 05:41

## 2019-01-24 RX ADMIN — BUSPIRONE HYDROCHLORIDE 5 MG: 5 TABLET ORAL at 08:01

## 2019-01-24 RX ADMIN — ATORVASTATIN CALCIUM 10 MG: 10 TABLET, FILM COATED ORAL at 08:01

## 2019-01-24 RX ADMIN — SUCRALFATE 1 G: 1 SUSPENSION ORAL at 05:03

## 2019-01-24 RX ADMIN — BUDESONIDE AND FORMOTEROL FUMARATE DIHYDRATE 2 PUFF: 80; 4.5 AEROSOL RESPIRATORY (INHALATION) at 08:22

## 2019-01-24 RX ADMIN — LEVETIRACETAM 1000 MG: 500 TABLET, FILM COATED ORAL at 08:01

## 2019-01-24 RX ADMIN — HYDRALAZINE HYDROCHLORIDE 25 MG: 25 TABLET, FILM COATED ORAL at 08:01

## 2019-01-24 RX ADMIN — ASPIRIN 81 MG: 81 TABLET, DELAYED RELEASE ORAL at 08:01

## 2019-01-24 NOTE — PLAN OF CARE
Problem: Patient Care Overview  Goal: Plan of Care Review  Outcome: Ongoing (interventions implemented as appropriate)   01/24/19 0313   Coping/Psychosocial   Plan of Care Reviewed With patient   Plan of Care Review   Progress no change   OTHER   Outcome Summary no complications pt slept most of the night will continue to monitor      Goal: Individualization and Mutuality  Outcome: Ongoing (interventions implemented as appropriate)    Goal: Discharge Needs Assessment  Outcome: Ongoing (interventions implemented as appropriate)    Goal: Interprofessional Rounds/Family Conf  Outcome: Ongoing (interventions implemented as appropriate)      Problem: Pain, Acute (Adult)  Goal: Identify Related Risk Factors and Signs and Symptoms  Outcome: Ongoing (interventions implemented as appropriate)    Goal: Acceptable Pain Control/Comfort Level  Outcome: Ongoing (interventions implemented as appropriate)      Problem: Fall Risk (Adult)  Goal: Identify Related Risk Factors and Signs and Symptoms  Outcome: Ongoing (interventions implemented as appropriate)    Goal: Absence of Fall  Outcome: Ongoing (interventions implemented as appropriate)

## 2019-01-24 NOTE — PROGRESS NOTES
Discharge Planning Assessment  [facility]     Patient Name: Armando Gill  MRN: [id number]  Today's Date: 1/24/2019    Admit Date: 1/23/2019    Discharge Needs Assessment     Row Name 01/24/19 0938       Living Environment    Lives With  spouse    Name(s) of Who Lives With Patient  Spouse  ( Roxie Gill [phone])    Current Living Arrangements  home/apartment/condo    Primary Care Provided by  self    Provides Primary Care For  no one    Family Caregiver if Needed  spouse    Family Caregiver Names  Spouse  ( Roxie Gill [phone])    Quality of Family Relationships  supportive    Able to Return to Prior Arrangements  yes    Living Arrangement Comments  Pt lives in a single story house with spouse  ( Roxie Gill [phone])       Resource/Environmental Concerns    Resource/Environmental Concerns  none    Transportation Concerns  car, none       Transition Planning    Patient/Family Anticipates Transition to  home with family    Patient/Family Anticipated Services at Transition  none    Transportation Anticipated  family or friend will provide       Discharge Needs Assessment    Concerns to be Addressed  no discharge needs identified    Equipment Currently Used at Home  cane, straight;glucometer;grab bar    Anticipated Changes Related to Illness  none    Equipment Needed After Discharge  cane, straight;glucometer;grab bar, tub/shower    Offered/Gave Vendor List  no        Discharge Plan     Row Name 01/24/19 0940       Plan    Plan  Plan home.  DENAE Pandey    Patient/Family in Agreement with Plan  yes    Plan Comments  FACE SHEET VERIFIED/ ZAPATA SIGNED.  Spoke with pt at bedside.  Pt's PCP is Dr. Luis Antonio Abarca.  Pt lives in a single story house with spouse  ( Roxie Gill [phone]). Pt is independent with ADL's.  Pt has a straight cane, glucometer, and grab bars for home use. Pt has home O2 for night.   Pt is a dialysis pt that goes to [facility] on [address].   Pt  gets prescriptions at [facility].  Pt states he has problems affording medications.  Pt gets samples when he can.   Pt is not current with HH.  Pt has been in [place] for skilled care.  Pt denies any discharge needs.  Plan home.  DENAE Pandey        Destination      No service coordination in this encounter.      Durable Medical Equipment      No service coordination in this encounter.      Dialysis/Infusion      No service coordination in this encounter.      Home Medical Care      No service coordination in this encounter.      Community Resources      No service coordination in this encounter.          Demographic Summary     Row Name 01/24/19 0937       General Information    Admission Type  observation    Arrived From  home    Required Notices Provided  Observation Status Notice    Referral Source  admission list    Reason for Consult  discharge planning    Preferred Language  English     Used During This Interaction  no        Functional Status     Row Name 01/24/19 0938       Functional Status    Usual Activity Tolerance  moderate    Current Activity Tolerance  fair       Functional Status, IADL    Medications  independent    Meal Preparation  assistive person    Housekeeping  assistive person    Laundry  assistive person    Shopping  assistive person       Mental Status    General Appearance WDL  WDL       Mental Status Summary    Recent Changes in Mental Status/Cognitive Functioning  no changes        Psychosocial    No documentation.       Abuse/Neglect    No documentation.       Legal    No documentation.       Substance Abuse    No documentation.       Patient Forms    No documentation.           Coral Jacobo, RN

## 2019-01-24 NOTE — DISCHARGE INSTR - APPOINTMENTS
A hospital follow-up appointment has been scheduled for you to see Dr. Abarca on January 31, 2019 at 2:00 p.m.

## 2019-01-24 NOTE — PROGRESS NOTES
Continued Stay Note  [facility]     Patient Name: Armando Gill  MRN: [id number]  Today's Date: 1/24/2019    Admit Date: 1/23/2019    Discharge Plan     Row Name 01/24/19 0940       Plan    Plan  Plan home.  DENAE Pandey    Patient/Family in Agreement with Plan  yes    Plan Comments  FACE SHEET VERIFIED/ ZAPATA SIGNED.  Spoke with pt at bedside.  Pt's PCP is Dr. Luis Antonio Abarca.  Pt lives in a single story house with spouse  ( Roxie Gill [phone]). Pt is independent with ADL's.  Pt has a straight cane, glucometer, and grab bars for home use. Pt has home O2 for night.   Pt is a dialysis pt that goes to [facility] on [address].   Pt gets prescriptions at [facility].  Pt states he has problems affording medications.  Pt gets samples when he can.   Pt is not current with .  Pt has been in [facility] for skilled care.  Pt denies any discharge needs.  Plan home.  DENAE Pandey        Discharge Codes    No documentation.       Expected Discharge Date and Time     Expected Discharge Date Expected Discharge Time    Jan 24, 2019             Coral Jacobo, RN

## 2019-01-24 NOTE — DISCHARGE SUMMARY
NAME: Armando Gill ADMIT: 2019   : 1960  PCP: Luis Antonio Abarca MD    MRN: [id number] LOS: 1 days   AGE/SEX: 58 y.o. male  ROOM: E6/1     Date of Admission:  2019  Date of Discharge:  2019    PCP: Luis Antonio Abarca MD    CHIEF COMPLAINT  No chief complaint on file.  abd pain, n/v    DISCHARGE DIAGNOSIS  Active Hospital Problems    Diagnosis Date Noted   • **Nausea & vomiting [R11.2] 2019   • Epigastric abdominal pain [R10.13] 2019   • SHABNAM (obstructive sleep apnea) [G47.33] 10/08/2018   • DM type 2 (diabetes mellitus, type 2) (CMS/[facility]) [E11.9] 10/08/2018   • Essential hypertension [I10] 10/08/2018   • Adult hypothyroidism [E03.9] 10/08/2018   • Seizure (CMS/[facility]) [R56.9] 10/07/2018   • Chronic pain syndrome [G89.4] 2018   • Atrial fibrillation (CMS/HCC) [I48.91] 2018   • End stage renal disease (CMS/HCC) [N18.6] 2016   • Diastolic CHF, chronic (CMS/HCC) [I50.32] 2015   • Morbid obesity with BMI of 45.0-49.9, adult (CMS/[facility]) [E66.01, Z68.42] 2015   • Anemia in chronic renal disease [N18.9, D63.1] 2013   • CEE (nonalcoholic steatohepatitis) [K75.81] 2013      Resolved Hospital Problems   No resolved problems to display.       SECONDARY DIAGNOSES  Past Medical History:   Diagnosis Date   • Anxiety    • Arthritis    • Asthma    • Asymptomatic hyperuricemia    • Atrial fibrillation (CMS/HCC)    • Callus of foot     LEFT FOOT , PRESENTLY ATTENDING WOUND CLINIC ATE E   • Cancer of pituitary gland (CMS/HCC)    • Depression    • Diabetes mellitus (CMS/HCC)     type 2, A1C of 5 1 month ago by patient report   • Dialysis patient (CMS/HCC)        • Dyslipidemia    • End stage renal disease (CMS/HCC)    • Gout    • History of anemia    • History of colon polyps    • Hyperparathyroidism (CMS/HCC)    • Hyperprolactinemia (CMS/HCC)    • Hypertension    • Hypogonadism, male    • Hypothyroidism    • Male erectile disorder of  organic origin    • Neuropathy    • Obstructive sleep apnea    • Presence of arterial-venous shunt (for dialysis) (CMS/HCC)    • Seizure disorder (CMS/HCC)      2 YRS AGO   • Vitamin D deficiency disease        CONSULTS   Dr. Lainez- Nephrology  Dr. Parekh- [facility] COURSE  Mr. Gill is a 58 y.o. with a history of ESRD on HD MWF, seizures, atrial fibrillation on AC, asthma, SHABNAM on O2 at 2 LPM at night, DM (no current home tx), chronic pain on a large dose of pain medicine daily and multiple endocrine issues with possible history of cancer of the pituitary gland that has been transferred from [facility] with complaints of nausea, epigastric pain, and diarrhea.     His workup was unremarkable both labs as well as imaging. CT at [facility] ER with with possible mild thickening of sigmoid colon and rectum concerning for mild colitis and small hiatal hernia but his symptoms were not c/w this. Placed him on pepcid and carafate. His symptoms resolved. He refused dialysis here and wants to go home. He will have dialysis as an outpatient tomorrow. He can follow up with [facility] to consider EGD or other workup as an outpatient but with resolution of the pain he does not want further workup right now.     Have decreased his keppra as he was on 1000mg BID which is too high of dose for his ESRD. He is to call his Nephrologist and Neurologist to check on dosing.     DIAGNOSTICS    Collected Updated Procedure Result Status     01/24/2019 0654 01/24/2019 0754 Basic Metabolic Panel [[id number]]    (Abnormal)   Blood    Final result Glucose 101 Abnormally high  mg/dL   BUN 45 Abnormally high  mg/dL   Creatinine 5.47 Abnormally high  mg/dL   Sodium 136 mmol/L   Potassium 4.9 mmol/L   Chloride 94 Abnormally low  mmol/L   CO2 26.6 mmol/L   Calcium 9.3 mg/dL   eGFR African Am  mL/min/1.73   eGFR Non African Am 11 Abnormally low   mL/min/1.73   BUN/Creatinine Ratio 8.2    Anion Gap 15.4 mmol/L          01/24/2019 1521  01/24/2019 0607 Hemoglobin A1c [[id number]]    Blood    Final result Hemoglobin A1C 4.80 %          01/24/2019 0443 01/24/2019 0602 Protime-INR [[id number]]   (Abnormal)   Blood    Final result Protime 19.7 Abnormally high  Seconds   INR 1.71 Abnormally high            01/23/2019 1400 01/24/2019 0700 Influenza Antigen, Rapid - Swab, Nasopharynx [[id number]]   Swab from Nasopharynx    Final result Influenza A Ag, EIA Negative   Influenza B Ag, EIA Negative             01/23/2019 1207 01/23/2019 1237 Protime-INR [[id number]]   (Abnormal)   Blood    Final result Protime 19.3 Abnormally high  Seconds   INR 1.65 Abnormally high          01/23/2019 1207 01/23/2019 1226 CBC Auto Differential [[id number]]   (Abnormal)   Blood    Final result WBC 11.11 Abnormally high  10*3/mm3   RBC 3.33 Abnormally low  10*6/mm3   Hemoglobin 10.2 Abnormally low  g/dL   Hematocrit 33.5 Abnormally low  %   .6 Abnormally high  fL   MCH 30.6 pg   MCHC 30.4 Abnormally low  g/dL   RDW 15.4 Abnormally high  %   RDW-SD 56.5 Abnormally high  fl   MPV 9.3 fL   Platelets 210 10*3/mm3   Neutrophil Rel % 92.0 Abnormally high  %   Lymphocyte Rel % 5.3 Abnormally low  %   Monocyte Rel % 2.3 Abnormally low  %   Eosinophil Rel % 0.1 Abnormally low  %   Basophil Rel % 0.3 %   Immature Grans % 0.5 %   Neutrophils Absolute 10.23 Abnormally high  10*3/mm3   Lymphocytes Absolute 0.59 Abnormally low  10*3/mm3   Monocytes Absolute 0.25 10*3/mm3   Eosinophils Absolute 0.01 10*3/mm3   Basophils Absolute 0.03 10*3/mm3   Immature Grans, Absolute 0.05 Abnormally high  10*3/mm3          01/23/2019 1207 01/23/2019 1247 Lipase [[id number]]   Blood    Final result Lipase 17 U/L            PHYSICAL EXAM  Objective     Alert  nad  Soft, nt  No resp distress, lungs fairly clear  CONDITION ON DISCHARGE  Stable.      DISCHARGE DISPOSITION   Home or Self Care      DISCHARGE MEDICATIONS       Your medication list      START taking these medications      Instructions Last Dose  Given Next Dose Due   ondansetron 4 MG tablet  Commonly known as:  ZOFRAN      Take 1 tablet by mouth Every 6 (Six) Hours As Needed for Nausea or Vomiting.       sucralfate 1 GM/10ML suspension  Commonly known as:  CARAFATE      Take 10 mL by mouth 2 (Two) Times a Day.          CHANGE how you take these medications      Instructions Last Dose Given Next Dose Due   famotidine 40 MG tablet  Commonly known as:  PEPCID  What changed:    · when to take this  · reasons to take this      Take 1 tablet by mouth Daily.       levETIRAcetam 1000 MG tablet  Commonly known as:  KEPPRA  What changed:    · how much to take  · how to take this  · when to take this  · additional instructions      1000mg daily po and then 500mg supplemental dose after dialysis. Discuss with your Neurologist and Nephrologist dosing          CONTINUE taking these medications      Instructions Last Dose Given Next Dose Due   aspirin 81 MG EC tablet      Take 81 mg by mouth.       budesonide-formoterol 80-4.5 MCG/ACT inhaler  Commonly known as:  SYMBICORT      Inhale 2 puffs 2 (Two) Times a Day.       busPIRone 5 MG tablet  Commonly known as:  BUSPAR      Take 5 mg by mouth 2 (Two) Times a Day.       Glycopyrrolate-Formoterol 9-4.8 MCG/ACT aerosol      Inhale 1 puff 2 (Two) Times a Day.       hydrALAZINE 25 MG tablet  Commonly known as:  APRESOLINE      Take 25 mg by mouth 2 (Two) Times a Day.       lactulose 10 GM/15ML solution  Commonly known as:  CHRONULAC      Take 15 mL by mouth Daily.       LORazepam 0.5 MG tablet  Commonly known as:  ATIVAN      Take 1 tablet by mouth Every 8 (Eight) Hours As Needed for Anxiety.       Morphine 100 MG 12 hr tablet  Commonly known as:  MS CONTIN      Take 1 tablet by mouth Daily.       pravastatin 40 MG tablet  Commonly known as:  PRAVACHOL      Take 40 mg by mouth Daily.       PROVENTIL  (90 Base) MCG/ACT inhaler  Generic drug:  albuterol sulfate HFA      Inhale 2 puffs Every 4 (Four) Hours As Needed for  Wheezing.       sennosides-docusate sodium 8.6-50 MG tablet  Commonly known as:  SENOKOT-S      Take 2 tablets by mouth 2 (Two) Times a Day. Hold for loose stool       SYNTHROID 175 MCG tablet  Generic drug:  levothyroxine      Take 175 mcg by mouth daily.       TRINTELLIX 10 MG tablet  Generic drug:  Vortioxetine HBr      Take 20 mg by mouth Every Night.       vitamin D 75714 units capsule capsule  Commonly known as:  ERGOCALCIFEROL      Take 50,000 Units by mouth Every 30 (Thirty) Days.       warfarin 2.5 MG tablet  Commonly known as:  COUMADIN      Take 6 mg by mouth Daily.             Where to Get Your Medications      These medications were sent to [facility] (app) [id number] [place] [address] [id number] AT [facility]([address] & ANT - [phone] PH - [phone] FX  [address], [facility] [address]    Phone:  [phone]   · famotidine 40 MG tablet  · ondansetron 4 MG tablet  · sucralfate 1 GM/10ML suspension     Information about where to get these medications is not yet available    Ask your nurse or doctor about these medications  · levETIRAcetam 1000 MG tablet        No future appointments.  Additional Instructions for the Follow-ups that You Need to Schedule     Discharge Follow-up with PCP   As directed       Currently Documented PCP:    Luis Antonio Abarca MD    PCP Phone Number:    [phone]     Follow Up Details:  3-4 weeks         Discharge Follow-up with Specialty: Nephrology   As directed      Specialty:  Nephrology    Follow Up Details:  for regularly scheduled dialysis         Discharge Follow-up with Specified Provider: [facility]; 1 Month   As directed      To:  [facility]    Follow Up:  1 Month         Discharge Follow-up with Specified Provider: INR check   As directed      To:  INR check    Follow Up Details:  next week           Follow-up Information     Luis Antonio Abarca MD .    Specialty:  Family Medicine  Why:  3-4 weeks  Contact  information:  [address] [address]  [phone]                   TEST  RESULTS PENDING AT DISCHARGE         Charly Diop MD  [facility]  01/24/19  9:47 AM      Time: greater than 32 minutes on discharge  It was a pleasure taking care of this patient while in the hospital.

## 2019-01-24 NOTE — PROGRESS NOTES
"Pharmacy to dose Keppra PO    Patient: Armando Gill (E651/1,  LOS: 1 day )  Relevant clinical data and objective history reviewed:  58 y.o. male 172.7 cm (68\") (!) 150 kg (331 lb 9.6 oz)  Ideal body weight: 68.4 kg (150 lb 12.7 oz)  Adjusted ideal body weight: 101 kg (223 lb 1.9 oz) [unfilled] Body mass index is 50.42 kg/m².  he has a past medical history of Anxiety, Arthritis, Asthma, Asymptomatic hyperuricemia, Atrial fibrillation (CMS/HCC), Callus of foot, Cancer of pituitary gland (CMS/HCC), Depression, Diabetes mellitus (CMS/HCC), Dialysis patient (CMS/HCC), Dyslipidemia, End stage renal disease (CMS/HCC), Gout, History of anemia, History of colon polyps, Hyperparathyroidism (CMS/HCC), Hyperprolactinemia (CMS/HCC), Hypertension, Hypogonadism, male, Hypothyroidism, Male erectile disorder of organic origin, Neuropathy, Obstructive sleep apnea, Presence of arterial-venous shunt (for dialysis) (CMS/HCC), Seizure disorder (CMS/HCC), and Vitamin D deficiency disease.    Consult for Dr Diop  Indication: seizures  Current dose: 500 mg PO qday    Serum creatinine: 5.47 mg/dL (H) 01/24/19 0654  Estimated creatinine clearance: 21.3 mL/min (A)    Assessment/Plan:  Prior to Admission   Medication Sig Dispense Refill Last Dose   • levETIRAcetam (KEPPRA) 1000 MG tablet Take 1,000 mg by mouth 2 (two) times a day.   Taking      ESRD in adults on hemodialysis: Usual dosage, 500 to 1000 mg orally/IV every 24 hours and a 250- to 500-mg supplemental dose after each dialysis session (immediate-release formulations) ; use of extended-release tablet is not recommended    - will start 500 mg qday the add a 500 mg supplemental dose after each dialysis session     Marty Tai, PharmD  01/24/19 8:34 AM      "

## 2019-01-24 NOTE — PROGRESS NOTES
Case Management Discharge Note    Final Note: Pt discharged home.   DENAE Pandey    Destination      No service has been selected for the patient.      Durable Medical Equipment      No service has been selected for the patient.      Dialysis/Infusion      No service has been selected for the patient.      Home Medical Care      No service has been selected for the patient.      Community Resources      No service has been selected for the patient.        Other: Other(Private Auto)    Final Discharge Disposition Code: 01 - home or self-care

## 2019-01-25 ENCOUNTER — READMISSION MANAGEMENT (OUTPATIENT)
Dept: CALL CENTER | Facility: HOSPITAL | Age: 59
End: 2019-01-25

## 2019-01-25 NOTE — OUTREACH NOTE
Prep Survey      Responses   Facility patient discharged from?  [place]   Is patient eligible?  Yes   Discharge diagnosis  Nausea & vomiting, Epigastric abdominal pain    Does the patient have one of the following disease processes/diagnoses(primary or secondary)?  Other   Does the patient have Home health ordered?  No   Is there a DME ordered?  No   Comments regarding appointments  consider EGD or other workup as an outpatient    General alerts for this patient  ESRD on HD MWF   Prep survey completed?  Yes          Allie Moody RN

## 2019-01-26 ENCOUNTER — READMISSION MANAGEMENT (OUTPATIENT)
Dept: CALL CENTER | Facility: HOSPITAL | Age: 59
End: 2019-01-26

## 2019-01-26 NOTE — OUTREACH NOTE
Medical Week 1 Survey      Responses   Facility patient discharged from?  [place]   Does the patient have one of the following disease processes/diagnoses(primary or secondary)?  Other   Is there a successful TCM telephone encounter documented?  No   Week 1 attempt successful?  No   Unsuccessful attempts  Attempt 1          Scarlet Keene RN

## 2019-01-28 ENCOUNTER — READMISSION MANAGEMENT (OUTPATIENT)
Dept: CALL CENTER | Facility: HOSPITAL | Age: 59
End: 2019-01-28

## 2019-01-28 NOTE — OUTREACH NOTE
Medical Week 1 Survey      Responses   Facility patient discharged from?  [place]   Does the patient have one of the following disease processes/diagnoses(primary or secondary)?  Other   Is there a successful TCM telephone encounter documented?  No   Week 1 attempt successful?  No   Unsuccessful attempts  Attempt 2          Kit Arambula RN

## 2019-02-01 ENCOUNTER — READMISSION MANAGEMENT (OUTPATIENT)
Dept: CALL CENTER | Facility: HOSPITAL | Age: 59
End: 2019-02-01

## 2019-02-01 NOTE — OUTREACH NOTE
Medical Week 2 Survey      Responses   Facility patient discharged from?  [place]   Does the patient have one of the following disease processes/diagnoses(primary or secondary)?  Other   Week 2 attempt successful?  Yes   Call start time  1349   General alerts for this patient  ESRD on HD MWF   Discharge diagnosis  Nausea & vomiting, Epigastric abdominal pain    Call end time  1355   Meds reviewed with patient/caregiver?  Yes   Is the patient having any side effects they believe may be caused by any medication additions or changes?  No   Does the patient have all medications ordered at discharge?  Yes   Is the patient taking all medications as directed (includes completed medication regime)?  Yes   Medication comments  Issues with stomach if portion control goes too high, carafate helps.   Does the patient have a primary care provider?   Yes   Does the patient have an appointment with their PCP within 7 days of discharge?  Yes   Comments regarding PCP  Sees MD at dialysis 3 x week.   What is preventing the patient from scheduling follow up appointments within 7 days of discharge?  Earlier appointment not available   Has the patient kept scheduled appointments due by today?  Yes   Has home health visited the patient within 72 hours of discharge?  N/A   Psychosocial issues?  No   Did the patient receive a copy of their discharge instructions?  Yes   Nursing interventions  Reviewed instructions with patient   What is the patient's perception of their health status since discharge?  Same   Is the patient/caregiver able to teach back signs and symptoms related to disease process for when to call PCP?  Yes   Is the patient/caregiver able to teach back signs and symptoms related to disease process for when to call 911?  Yes   Is the patient/caregiver able to teach back the hierarchy of who to call/visit for symptoms/problems? PCP, Specialist, Home health nurse, Urgent Care, ED, 911  Yes   Additional teach back comments   He gets cramps at dialysis when he gets cold.  He is doing ok otherwise.   Week 2 Call Completed?  Yes          Sintia Champagne RN

## 2019-02-08 ENCOUNTER — READMISSION MANAGEMENT (OUTPATIENT)
Dept: CALL CENTER | Facility: HOSPITAL | Age: 59
End: 2019-02-08

## 2019-02-08 NOTE — OUTREACH NOTE
Medical Week 3 Survey      Responses   Facility patient discharged from?  [place]   Does the patient have one of the following disease processes/diagnoses(primary or secondary)?  Other   Week 3 attempt successful?  Yes   Call start time  0817   Call end time  0824   General alerts for this patient  ESRD on HD MWF   Discharge diagnosis  Nausea & vomiting, Epigastric abdominal pain    Meds reviewed with patient/caregiver?  Yes   Is the patient taking all medications as directed (includes completed medication regime)?  Yes   Does the patient have a primary care provider?   Yes   Has the patient kept scheduled appointments due by today?  Yes   Comments  PCP 02/06   Cardiology 02/07   Has home health visited the patient within 72 hours of discharge?  N/A   Did the patient receive a copy of their discharge instructions?  Yes   What is the patient's perception of their health status since discharge?  Improving   Additional teach back comments  Going to dialysis today   Week 3 Call Completed?  Yes   Graduated  Yes   Did the patient feel the follow up calls were helpful during their recovery period?  Yes   Was the number of calls appropriate?  Yes          Maricruz Aguilar RN

## 2019-02-27 ENCOUNTER — APPOINTMENT (OUTPATIENT)
Dept: GENERAL RADIOLOGY | Facility: HOSPITAL | Age: 59
End: 2019-02-27

## 2019-02-27 ENCOUNTER — HOSPITAL ENCOUNTER (OUTPATIENT)
Facility: HOSPITAL | Age: 59
Setting detail: OBSERVATION
Discharge: HOME | End: 2019-03-01
Attending: EMERGENCY MEDICINE | Admitting: INTERNAL MEDICINE

## 2019-02-27 DIAGNOSIS — R09.02 HYPOXIA: Primary | ICD-10-CM

## 2019-02-27 DIAGNOSIS — N18.6 ESRD (END STAGE RENAL DISEASE) (HCC): ICD-10-CM

## 2019-02-27 DIAGNOSIS — E87.70 HYPERVOLEMIA, UNSPECIFIED HYPERVOLEMIA TYPE: ICD-10-CM

## 2019-02-27 DIAGNOSIS — I48.91 ATRIAL FIBRILLATION, UNSPECIFIED TYPE (HCC): ICD-10-CM

## 2019-02-27 LAB
ALBUMIN SERPL-MCNC: 4 G/DL (ref 3.5–5.2)
ALBUMIN/GLOB SERPL: 1.5 G/DL
ALP SERPL-CCNC: 75 U/L (ref 39–117)
ALT SERPL W P-5'-P-CCNC: 30 U/L (ref 1–41)
ANION GAP SERPL CALCULATED.3IONS-SCNC: 16.1 MMOL/L
AST SERPL-CCNC: 22 U/L (ref 1–40)
BASOPHILS # BLD AUTO: 0.07 10*3/MM3 (ref 0–0.2)
BASOPHILS NFR BLD AUTO: 0.7 % (ref 0–1.5)
BILIRUB SERPL-MCNC: 0.4 MG/DL (ref 0.1–1.2)
BUN BLD-MCNC: 67 MG/DL (ref 6–20)
BUN/CREAT SERPL: 10.7 (ref 7–25)
CALCIUM SPEC-SCNC: 9.2 MG/DL (ref 8.6–10.5)
CHLORIDE SERPL-SCNC: 95 MMOL/L (ref 98–107)
CO2 SERPL-SCNC: 24.9 MMOL/L (ref 22–29)
CREAT BLD-MCNC: 6.25 MG/DL (ref 0.76–1.27)
DEPRECATED RDW RBC AUTO: 55.7 FL (ref 37–54)
EOSINOPHIL # BLD AUTO: 0.25 10*3/MM3 (ref 0–0.4)
EOSINOPHIL NFR BLD AUTO: 2.5 % (ref 0.3–6.2)
ERYTHROCYTE [DISTWIDTH] IN BLOOD BY AUTOMATED COUNT: 15.2 % (ref 12.3–15.4)
GFR SERPL CREATININE-BSD FRML MDRD: 9 ML/MIN/1.73
GFR SERPL CREATININE-BSD FRML MDRD: ABNORMAL ML/MIN/1.73
GLOBULIN UR ELPH-MCNC: 2.6 GM/DL
GLUCOSE BLD-MCNC: 138 MG/DL (ref 65–99)
GLUCOSE BLDC GLUCOMTR-MCNC: 127 MG/DL (ref 70–130)
HBV SURFACE AG SERPL QL IA: NORMAL
HCT VFR BLD AUTO: 32 % (ref 37.5–51)
HGB BLD-MCNC: 9.5 G/DL (ref 13–17.7)
IMM GRANULOCYTES # BLD AUTO: 0.08 10*3/MM3 (ref 0–0.05)
IMM GRANULOCYTES NFR BLD AUTO: 0.8 % (ref 0–0.5)
INR PPP: 1.61 (ref 0.9–1.1)
LYMPHOCYTES # BLD AUTO: 0.53 10*3/MM3 (ref 0.7–3.1)
LYMPHOCYTES NFR BLD AUTO: 5.3 % (ref 19.6–45.3)
MCH RBC QN AUTO: 30.2 PG (ref 26.6–33)
MCHC RBC AUTO-ENTMCNC: 29.7 G/DL (ref 31.5–35.7)
MCV RBC AUTO: 101.6 FL (ref 79–97)
MONOCYTES # BLD AUTO: 1.26 10*3/MM3 (ref 0.1–0.9)
MONOCYTES NFR BLD AUTO: 12.6 % (ref 5–12)
NEUTROPHILS # BLD AUTO: 7.84 10*3/MM3 (ref 1.4–7)
NEUTROPHILS NFR BLD AUTO: 78.1 % (ref 42.7–76)
NRBC BLD AUTO-RTO: 0.2 /100 WBC (ref 0–0)
NT-PROBNP SERPL-MCNC: ABNORMAL PG/ML (ref 0–900)
PLATELET # BLD AUTO: 184 10*3/MM3 (ref 140–450)
PMV BLD AUTO: 9.8 FL (ref 6–12)
POTASSIUM BLD-SCNC: 4.9 MMOL/L (ref 3.5–5.2)
PROT SERPL-MCNC: 6.6 G/DL (ref 6–8.5)
PROTHROMBIN TIME: 18.9 SECONDS (ref 11.7–14.2)
RBC # BLD AUTO: 3.15 10*6/MM3 (ref 4.14–5.8)
SODIUM BLD-SCNC: 136 MMOL/L (ref 136–145)
TROPONIN T SERPL-MCNC: 0.13 NG/ML (ref 0–0.03)
WBC NRBC COR # BLD: 10.03 10*3/MM3 (ref 3.4–10.8)

## 2019-02-27 PROCEDURE — 93005 ELECTROCARDIOGRAM TRACING: CPT | Performed by: EMERGENCY MEDICINE

## 2019-02-27 PROCEDURE — 99284 EMERGENCY DEPT VISIT MOD MDM: CPT

## 2019-02-27 PROCEDURE — 82962 GLUCOSE BLOOD TEST: CPT

## 2019-02-27 PROCEDURE — G0378 HOSPITAL OBSERVATION PER HR: HCPCS

## 2019-02-27 PROCEDURE — 85025 COMPLETE CBC W/AUTO DIFF WBC: CPT | Performed by: EMERGENCY MEDICINE

## 2019-02-27 PROCEDURE — 84484 ASSAY OF TROPONIN QUANT: CPT | Performed by: EMERGENCY MEDICINE

## 2019-02-27 PROCEDURE — 87340 HEPATITIS B SURFACE AG IA: CPT | Performed by: INTERNAL MEDICINE

## 2019-02-27 PROCEDURE — 85610 PROTHROMBIN TIME: CPT | Performed by: EMERGENCY MEDICINE

## 2019-02-27 PROCEDURE — 83880 ASSAY OF NATRIURETIC PEPTIDE: CPT | Performed by: EMERGENCY MEDICINE

## 2019-02-27 PROCEDURE — 93010 ELECTROCARDIOGRAM REPORT: CPT | Performed by: INTERNAL MEDICINE

## 2019-02-27 PROCEDURE — 71046 X-RAY EXAM CHEST 2 VIEWS: CPT

## 2019-02-27 PROCEDURE — 80053 COMPREHEN METABOLIC PANEL: CPT | Performed by: EMERGENCY MEDICINE

## 2019-02-27 RX ORDER — HYDRALAZINE HYDROCHLORIDE 25 MG/1
25 TABLET, FILM COATED ORAL 2 TIMES DAILY
Status: DISCONTINUED | OUTPATIENT
Start: 2019-02-27 | End: 2019-03-01 | Stop reason: HOSPADM

## 2019-02-27 RX ORDER — LANTHANUM CARBONATE 1000 MG/1
3000 TABLET, CHEWABLE ORAL
COMMUNITY

## 2019-02-27 RX ORDER — ONDANSETRON 2 MG/ML
4 INJECTION INTRAMUSCULAR; INTRAVENOUS EVERY 6 HOURS PRN
Status: DISCONTINUED | OUTPATIENT
Start: 2019-02-27 | End: 2019-03-01 | Stop reason: HOSPADM

## 2019-02-27 RX ORDER — ARIPIPRAZOLE 2 MG/1
2 TABLET ORAL EVERY EVENING
Status: DISCONTINUED | OUTPATIENT
Start: 2019-02-27 | End: 2019-03-01 | Stop reason: HOSPADM

## 2019-02-27 RX ORDER — BUDESONIDE AND FORMOTEROL FUMARATE DIHYDRATE 160; 4.5 UG/1; UG/1
2 AEROSOL RESPIRATORY (INHALATION)
COMMUNITY

## 2019-02-27 RX ORDER — MELATONIN
1000 DAILY
Status: DISCONTINUED | OUTPATIENT
Start: 2019-02-28 | End: 2019-03-01 | Stop reason: HOSPADM

## 2019-02-27 RX ORDER — ASPIRIN 81 MG/1
81 TABLET ORAL DAILY
Status: DISCONTINUED | OUTPATIENT
Start: 2019-02-28 | End: 2019-03-01 | Stop reason: HOSPADM

## 2019-02-27 RX ORDER — KETOCONAZOLE 20 MG/G
1 CREAM TOPICAL DAILY
COMMUNITY

## 2019-02-27 RX ORDER — SODIUM CHLORIDE 0.9 % (FLUSH) 0.9 %
3 SYRINGE (ML) INJECTION EVERY 12 HOURS SCHEDULED
Status: DISCONTINUED | OUTPATIENT
Start: 2019-02-27 | End: 2019-03-01 | Stop reason: HOSPADM

## 2019-02-27 RX ORDER — WARFARIN SODIUM 4 MG/1
4 TABLET ORAL EVERY EVENING
Status: DISCONTINUED | OUTPATIENT
Start: 2019-02-27 | End: 2019-03-01

## 2019-02-27 RX ORDER — LEVETIRACETAM 500 MG/1
1000 TABLET ORAL EVERY 12 HOURS SCHEDULED
Status: DISCONTINUED | OUTPATIENT
Start: 2019-02-27 | End: 2019-03-01 | Stop reason: HOSPADM

## 2019-02-27 RX ORDER — LEVETIRACETAM 1000 MG/1
1000 TABLET ORAL 2 TIMES DAILY
COMMUNITY

## 2019-02-27 RX ORDER — SODIUM CHLORIDE 0.9 % (FLUSH) 0.9 %
3-10 SYRINGE (ML) INJECTION AS NEEDED
Status: DISCONTINUED | OUTPATIENT
Start: 2019-02-27 | End: 2019-03-01 | Stop reason: HOSPADM

## 2019-02-27 RX ORDER — ONDANSETRON 4 MG/1
4 TABLET, FILM COATED ORAL EVERY 6 HOURS PRN
Status: DISCONTINUED | OUTPATIENT
Start: 2019-02-27 | End: 2019-03-01 | Stop reason: HOSPADM

## 2019-02-27 RX ORDER — SODIUM CHLORIDE 0.9 % (FLUSH) 0.9 %
10 SYRINGE (ML) INJECTION AS NEEDED
Status: DISCONTINUED | OUTPATIENT
Start: 2019-02-27 | End: 2019-03-01 | Stop reason: HOSPADM

## 2019-02-27 RX ORDER — SUCRALFATE ORAL 1 G/10ML
1 SUSPENSION ORAL 2 TIMES DAILY
Status: DISCONTINUED | OUTPATIENT
Start: 2019-02-27 | End: 2019-03-01 | Stop reason: HOSPADM

## 2019-02-27 RX ORDER — MORPHINE SULFATE 100 MG/1
100 TABLET ORAL DAILY
Status: DISCONTINUED | OUTPATIENT
Start: 2019-02-28 | End: 2019-03-01 | Stop reason: HOSPADM

## 2019-02-27 RX ORDER — LEVOTHYROXINE SODIUM 175 UG/1
175 TABLET ORAL
Status: DISCONTINUED | OUTPATIENT
Start: 2019-02-28 | End: 2019-03-01 | Stop reason: HOSPADM

## 2019-02-27 RX ORDER — LORATADINE 10 MG/1
10 TABLET ORAL DAILY
COMMUNITY
End: 2020-03-24 | Stop reason: HOSPADM

## 2019-02-27 RX ORDER — LORAZEPAM 0.5 MG/1
0.5 TABLET ORAL EVERY 8 HOURS PRN
Status: DISCONTINUED | OUTPATIENT
Start: 2019-02-27 | End: 2019-03-01 | Stop reason: HOSPADM

## 2019-02-27 RX ORDER — BUDESONIDE AND FORMOTEROL FUMARATE DIHYDRATE 160; 4.5 UG/1; UG/1
2 AEROSOL RESPIRATORY (INHALATION)
Status: DISCONTINUED | OUTPATIENT
Start: 2019-02-27 | End: 2019-02-28 | Stop reason: SDUPTHER

## 2019-02-27 RX ORDER — PRAVASTATIN SODIUM 10 MG
40 TABLET ORAL DAILY
Status: DISCONTINUED | OUTPATIENT
Start: 2019-02-28 | End: 2019-03-01 | Stop reason: HOSPADM

## 2019-02-27 RX ORDER — LANTHANUM CARBONATE 500 MG/1
750 TABLET, CHEWABLE ORAL
Status: DISCONTINUED | OUTPATIENT
Start: 2019-02-27 | End: 2019-03-01 | Stop reason: HOSPADM

## 2019-02-27 RX ORDER — ARIPIPRAZOLE 2 MG/1
2 TABLET ORAL EVERY EVENING
COMMUNITY

## 2019-02-27 RX ORDER — BUDESONIDE AND FORMOTEROL FUMARATE DIHYDRATE 160; 4.5 UG/1; UG/1
2 AEROSOL RESPIRATORY (INHALATION)
Status: DISCONTINUED | OUTPATIENT
Start: 2019-02-27 | End: 2019-03-01 | Stop reason: HOSPADM

## 2019-02-27 RX ORDER — WARFARIN SODIUM 4 MG/1
4 TABLET ORAL EVERY EVENING
Status: ON HOLD | COMMUNITY
End: 2019-03-01 | Stop reason: SDUPTHER

## 2019-02-27 RX ORDER — SUCROFERRIC OXYHYDROXIDE 500 MG/1
1 TABLET, CHEWABLE ORAL
COMMUNITY

## 2019-02-27 RX ORDER — ALBUTEROL SULFATE 2.5 MG/3ML
2.5 SOLUTION RESPIRATORY (INHALATION) EVERY 6 HOURS PRN
Status: DISCONTINUED | OUTPATIENT
Start: 2019-02-27 | End: 2019-03-01 | Stop reason: HOSPADM

## 2019-02-27 RX ORDER — NITROGLYCERIN 0.4 MG/1
0.4 TABLET SUBLINGUAL
Status: DISCONTINUED | OUTPATIENT
Start: 2019-02-27 | End: 2019-03-01 | Stop reason: HOSPADM

## 2019-02-27 RX ORDER — CETIRIZINE HYDROCHLORIDE 10 MG/1
5 TABLET ORAL DAILY
Status: DISCONTINUED | OUTPATIENT
Start: 2019-02-28 | End: 2019-03-01 | Stop reason: HOSPADM

## 2019-02-27 RX ORDER — ACETAMINOPHEN 325 MG/1
650 TABLET ORAL EVERY 4 HOURS PRN
Status: DISCONTINUED | OUTPATIENT
Start: 2019-02-27 | End: 2019-03-01 | Stop reason: HOSPADM

## 2019-02-27 RX ADMIN — LEVETIRACETAM 1000 MG: 500 TABLET, FILM COATED ORAL at 20:33

## 2019-02-27 RX ADMIN — ARIPIPRAZOLE 2 MG: 2 TABLET ORAL at 20:33

## 2019-02-27 RX ADMIN — LORAZEPAM 0.5 MG: 0.5 TABLET ORAL at 20:56

## 2019-02-27 RX ADMIN — WARFARIN SODIUM 4 MG: 4 TABLET ORAL at 20:33

## 2019-02-27 RX ADMIN — CALCIUM ACETATE 667 MG: 667 CAPSULE ORAL at 20:33

## 2019-02-27 RX ADMIN — SODIUM CHLORIDE, PRESERVATIVE FREE 3 ML: 5 INJECTION INTRAVENOUS at 20:37

## 2019-02-28 LAB
ALBUMIN SERPL-MCNC: 4.2 G/DL (ref 3.5–5.2)
ALBUMIN/GLOB SERPL: 1.6 G/DL
ALP SERPL-CCNC: 72 U/L (ref 39–117)
ALT SERPL W P-5'-P-CCNC: 26 U/L (ref 1–41)
ANION GAP SERPL CALCULATED.3IONS-SCNC: 14.2 MMOL/L
AST SERPL-CCNC: 19 U/L (ref 1–40)
BASOPHILS # BLD AUTO: 0.08 10*3/MM3 (ref 0–0.2)
BASOPHILS NFR BLD AUTO: 0.7 % (ref 0–1.5)
BILIRUB SERPL-MCNC: 0.7 MG/DL (ref 0.1–1.2)
BUN BLD-MCNC: 37 MG/DL (ref 6–20)
BUN/CREAT SERPL: 8.7 (ref 7–25)
CALCIUM SPEC-SCNC: 9.5 MG/DL (ref 8.6–10.5)
CHLORIDE SERPL-SCNC: 99 MMOL/L (ref 98–107)
CO2 SERPL-SCNC: 26.8 MMOL/L (ref 22–29)
CREAT BLD-MCNC: 4.25 MG/DL (ref 0.76–1.27)
DEPRECATED RDW RBC AUTO: 56.3 FL (ref 37–54)
EOSINOPHIL # BLD AUTO: 0.31 10*3/MM3 (ref 0–0.4)
EOSINOPHIL NFR BLD AUTO: 2.8 % (ref 0.3–6.2)
ERYTHROCYTE [DISTWIDTH] IN BLOOD BY AUTOMATED COUNT: 15.1 % (ref 12.3–15.4)
GFR SERPL CREATININE-BSD FRML MDRD: 15 ML/MIN/1.73
GLOBULIN UR ELPH-MCNC: 2.7 GM/DL
GLUCOSE BLD-MCNC: 83 MG/DL (ref 65–99)
HBA1C MFR BLD: 5 % (ref 4.8–5.6)
HCT VFR BLD AUTO: 35.8 % (ref 37.5–51)
HGB BLD-MCNC: 10.8 G/DL (ref 13–17.7)
IMM GRANULOCYTES # BLD AUTO: 0.07 10*3/MM3 (ref 0–0.05)
IMM GRANULOCYTES NFR BLD AUTO: 0.6 % (ref 0–0.5)
LYMPHOCYTES # BLD AUTO: 0.59 10*3/MM3 (ref 0.7–3.1)
LYMPHOCYTES NFR BLD AUTO: 5.3 % (ref 19.6–45.3)
MCH RBC QN AUTO: 31.1 PG (ref 26.6–33)
MCHC RBC AUTO-ENTMCNC: 30.2 G/DL (ref 31.5–35.7)
MCV RBC AUTO: 103.2 FL (ref 79–97)
MONOCYTES # BLD AUTO: 1.72 10*3/MM3 (ref 0.1–0.9)
MONOCYTES NFR BLD AUTO: 15.5 % (ref 5–12)
NEUTROPHILS # BLD AUTO: 8.32 10*3/MM3 (ref 1.4–7)
NEUTROPHILS NFR BLD AUTO: 75.1 % (ref 42.7–76)
NRBC BLD AUTO-RTO: 0 /100 WBC (ref 0–0)
PLATELET # BLD AUTO: 184 10*3/MM3 (ref 140–450)
PMV BLD AUTO: 9.6 FL (ref 6–12)
POTASSIUM BLD-SCNC: 4.5 MMOL/L (ref 3.5–5.2)
PROT SERPL-MCNC: 6.9 G/DL (ref 6–8.5)
RBC # BLD AUTO: 3.47 10*6/MM3 (ref 4.14–5.8)
SODIUM BLD-SCNC: 140 MMOL/L (ref 136–145)
TROPONIN T SERPL-MCNC: 0.14 NG/ML (ref 0–0.03)
WBC NRBC COR # BLD: 11.09 10*3/MM3 (ref 3.4–10.8)

## 2019-02-28 PROCEDURE — 85025 COMPLETE CBC W/AUTO DIFF WBC: CPT | Performed by: INTERNAL MEDICINE

## 2019-02-28 PROCEDURE — 84484 ASSAY OF TROPONIN QUANT: CPT | Performed by: INTERNAL MEDICINE

## 2019-02-28 PROCEDURE — G0378 HOSPITAL OBSERVATION PER HR: HCPCS

## 2019-02-28 PROCEDURE — 94640 AIRWAY INHALATION TREATMENT: CPT

## 2019-02-28 PROCEDURE — G0257 UNSCHED DIALYSIS ESRD PT HOS: HCPCS

## 2019-02-28 PROCEDURE — 83036 HEMOGLOBIN GLYCOSYLATED A1C: CPT | Performed by: INTERNAL MEDICINE

## 2019-02-28 PROCEDURE — 94799 UNLISTED PULMONARY SVC/PX: CPT

## 2019-02-28 PROCEDURE — 80053 COMPREHEN METABOLIC PANEL: CPT | Performed by: INTERNAL MEDICINE

## 2019-02-28 RX ADMIN — VITAMIN D, TAB 1000IU (100/BT) 1000 UNITS: 25 TAB at 08:29

## 2019-02-28 RX ADMIN — LEVETIRACETAM 1000 MG: 500 TABLET, FILM COATED ORAL at 08:29

## 2019-02-28 RX ADMIN — ASPIRIN 81 MG: 81 TABLET, DELAYED RELEASE ORAL at 08:29

## 2019-02-28 RX ADMIN — BUDESONIDE AND FORMOTEROL FUMARATE DIHYDRATE 2 PUFF: 160; 4.5 AEROSOL RESPIRATORY (INHALATION) at 09:09

## 2019-02-28 RX ADMIN — SODIUM CHLORIDE, PRESERVATIVE FREE 3 ML: 5 INJECTION INTRAVENOUS at 21:12

## 2019-02-28 RX ADMIN — LEVOTHYROXINE SODIUM 175 MCG: 175 TABLET ORAL at 06:46

## 2019-02-28 RX ADMIN — HYDRALAZINE HYDROCHLORIDE 25 MG: 25 TABLET, FILM COATED ORAL at 21:12

## 2019-02-28 RX ADMIN — MORPHINE SULFATE 100 MG: 100 TABLET, EXTENDED RELEASE ORAL at 08:29

## 2019-02-28 RX ADMIN — WARFARIN SODIUM 4 MG: 4 TABLET ORAL at 21:12

## 2019-02-28 RX ADMIN — ACETAMINOPHEN 650 MG: 325 TABLET, FILM COATED ORAL at 08:34

## 2019-02-28 RX ADMIN — HYDRALAZINE HYDROCHLORIDE 25 MG: 25 TABLET, FILM COATED ORAL at 08:29

## 2019-02-28 RX ADMIN — BUDESONIDE AND FORMOTEROL FUMARATE DIHYDRATE 2 PUFF: 160; 4.5 AEROSOL RESPIRATORY (INHALATION) at 20:05

## 2019-02-28 RX ADMIN — ARIPIPRAZOLE 2 MG: 2 TABLET ORAL at 21:12

## 2019-02-28 RX ADMIN — CETIRIZINE HYDROCHLORIDE 5 MG: 10 TABLET, FILM COATED ORAL at 08:30

## 2019-02-28 RX ADMIN — PRAVASTATIN SODIUM 40 MG: 10 TABLET ORAL at 08:29

## 2019-02-28 RX ADMIN — LEVETIRACETAM 1000 MG: 500 TABLET, FILM COATED ORAL at 21:12

## 2019-02-28 RX ADMIN — SODIUM CHLORIDE, PRESERVATIVE FREE 3 ML: 5 INJECTION INTRAVENOUS at 08:30

## 2019-03-01 VITALS
WEIGHT: 315 LBS | BODY MASS INDEX: 47.74 KG/M2 | RESPIRATION RATE: 20 BRPM | SYSTOLIC BLOOD PRESSURE: 154 MMHG | DIASTOLIC BLOOD PRESSURE: 87 MMHG | OXYGEN SATURATION: 96 % | HEART RATE: 100 BPM | HEIGHT: 68 IN | TEMPERATURE: 98 F

## 2019-03-01 LAB
ALBUMIN SERPL-MCNC: 3.7 G/DL (ref 3.5–5.2)
ANION GAP SERPL CALCULATED.3IONS-SCNC: 13.2 MMOL/L
BUN BLD-MCNC: 29 MG/DL (ref 6–20)
BUN/CREAT SERPL: 7.3 (ref 7–25)
CALCIUM SPEC-SCNC: 9 MG/DL (ref 8.6–10.5)
CHLORIDE SERPL-SCNC: 101 MMOL/L (ref 98–107)
CO2 SERPL-SCNC: 23.8 MMOL/L (ref 22–29)
CREAT BLD-MCNC: 3.95 MG/DL (ref 0.76–1.27)
DEPRECATED RDW RBC AUTO: 57.5 FL (ref 37–54)
ERYTHROCYTE [DISTWIDTH] IN BLOOD BY AUTOMATED COUNT: 15.1 % (ref 12.3–15.4)
GFR SERPL CREATININE-BSD FRML MDRD: 16 ML/MIN/1.73
GLUCOSE BLD-MCNC: 90 MG/DL (ref 65–99)
HCT VFR BLD AUTO: 32.8 % (ref 37.5–51)
HGB BLD-MCNC: 9.4 G/DL (ref 13–17.7)
INR PPP: 1.5 (ref 0.9–1.1)
MCH RBC QN AUTO: 29.7 PG (ref 26.6–33)
MCHC RBC AUTO-ENTMCNC: 28.7 G/DL (ref 31.5–35.7)
MCV RBC AUTO: 103.8 FL (ref 79–97)
PHOSPHATE SERPL-MCNC: 4.3 MG/DL (ref 2.5–4.5)
PLATELET # BLD AUTO: 151 10*3/MM3 (ref 140–450)
PMV BLD AUTO: 9.9 FL (ref 6–12)
POTASSIUM BLD-SCNC: 4.7 MMOL/L (ref 3.5–5.2)
PROTHROMBIN TIME: 17.8 SECONDS (ref 11.7–14.2)
RBC # BLD AUTO: 3.16 10*6/MM3 (ref 4.14–5.8)
SODIUM BLD-SCNC: 138 MMOL/L (ref 136–145)
WBC NRBC COR # BLD: 7.91 10*3/MM3 (ref 3.4–10.8)

## 2019-03-01 PROCEDURE — 94799 UNLISTED PULMONARY SVC/PX: CPT

## 2019-03-01 PROCEDURE — 85027 COMPLETE CBC AUTOMATED: CPT | Performed by: INTERNAL MEDICINE

## 2019-03-01 PROCEDURE — G0378 HOSPITAL OBSERVATION PER HR: HCPCS

## 2019-03-01 PROCEDURE — 85610 PROTHROMBIN TIME: CPT | Performed by: INTERNAL MEDICINE

## 2019-03-01 PROCEDURE — 80069 RENAL FUNCTION PANEL: CPT | Performed by: INTERNAL MEDICINE

## 2019-03-01 RX ORDER — WARFARIN SODIUM 4 MG/1
4 TABLET ORAL EVERY EVENING
Start: 2019-03-01 | End: 2019-03-01 | Stop reason: SDUPTHER

## 2019-03-01 RX ORDER — WARFARIN SODIUM 4 MG/1
8 TABLET ORAL
Status: DISCONTINUED | OUTPATIENT
Start: 2019-03-01 | End: 2019-03-01 | Stop reason: HOSPADM

## 2019-03-01 RX ORDER — WARFARIN SODIUM 4 MG/1
4 TABLET ORAL EVERY EVENING
Start: 2019-03-01

## 2019-03-01 RX ADMIN — PRAVASTATIN SODIUM 40 MG: 10 TABLET ORAL at 08:25

## 2019-03-01 RX ADMIN — ASPIRIN 81 MG: 81 TABLET, DELAYED RELEASE ORAL at 08:24

## 2019-03-01 RX ADMIN — CETIRIZINE HYDROCHLORIDE 5 MG: 10 TABLET, FILM COATED ORAL at 08:24

## 2019-03-01 RX ADMIN — LEVETIRACETAM 1000 MG: 500 TABLET, FILM COATED ORAL at 08:24

## 2019-03-01 RX ADMIN — HYDRALAZINE HYDROCHLORIDE 25 MG: 25 TABLET, FILM COATED ORAL at 08:24

## 2019-03-01 RX ADMIN — BUDESONIDE AND FORMOTEROL FUMARATE DIHYDRATE 2 PUFF: 160; 4.5 AEROSOL RESPIRATORY (INHALATION) at 08:22

## 2019-03-01 RX ADMIN — LEVOTHYROXINE SODIUM 175 MCG: 175 TABLET ORAL at 05:37

## 2019-03-01 RX ADMIN — VITAMIN D, TAB 1000IU (100/BT) 1000 UNITS: 25 TAB at 08:24

## 2019-03-01 RX ADMIN — LORAZEPAM 0.5 MG: 0.5 TABLET ORAL at 05:37

## 2019-03-01 RX ADMIN — MORPHINE SULFATE 100 MG: 100 TABLET, EXTENDED RELEASE ORAL at 07:12

## 2019-03-01 NOTE — DISCHARGE SUMMARY
[facility]    Date of Discharge:  3/1/2019    PCP: Luis Antonio Abarca MD    Discharge Diagnosis:   Active Hospital Problems    Diagnosis Date Noted   • Hypoxia [R09.02] 02/27/2019   • Volume overload [E87.70] 02/27/2019   • Adult hypothyroidism [E03.9] 10/08/2018   • CAD in native artery [I25.10] 10/08/2018   • DM type 2 (diabetes mellitus, type 2) (CMS/[facility]) [E11.9] 10/08/2018   • Essential hypertension [I10] 10/08/2018   • Neoplasm of pituitary gland [D49.7] 10/08/2018   • On home oxygen therapy [Z99.81] 10/08/2018   • SHABNAM (obstructive sleep apnea) [G47.33] 10/08/2018   • Pulmonary hypertension (CMS/[facility]) [I27.20] 10/08/2018   • Atrial fibrillation (CMS/[facility]) [I48.91] 06/23/2018   • Long term current use of anticoagulant [Z79.01] 06/23/2018   • Secondary hyperparathyroidism of renal origin (CMS/[facility]) [N25.81] 01/06/2017   • End stage renal disease (CMS/[facility]) [N18.6] 05/04/2016   • Diastolic CHF, chronic (CMS/[facility]) [I50.32] 05/04/2015   • Morbid obesity with BMI of 45.0-49.9, adult (CMS/[facility]) [E66.01, Z68.42] 04/16/2015   • Anemia in chronic renal disease [N18.9, D63.1] 04/12/2013      Resolved Hospital Problems   No resolved problems to display.      Consults     Date and Time Order Name Status Description    2/27/2019 8247 [facility] (on-call MD unless specified) Completed     2/27/2019 1253 Nephrology (on -call MD unless specified) Completed         Hospital Course  Please see history and physical for details. Patient is a 58 y.o. male with a history of end-stage renal disease admitted with progressive dyspnea.  He apparently had not been able to finish his dialysis due to leg cramps.  Nephrology managed his dialysis and he had two dialysis in the hospital which she tolerated without cramps.  He is improved and is feeling back to normal requesting to go home today.  He has a history of atrial fibrillation and his INR is subtherapeutic and he will get extra today and  tomorrow and he will have an INR check on Monday.  He states he will get it with dialysis.  He had elevated troponin however they were stable and had no EKG changes.  He has no chest pain or palpitations.  Likely this is due to his renal insufficiency.    Results from last 7 days   Lab Units 03/01/19  0433 02/27/19  1207   INR  1.50* 1.61*     I discussed the patient's findings and my recommendations with patient and nursing staff and Dr. Hall.    Condition on Discharge: Improved.     Temp:  [98 °F (36.7 °C)-98.6 °F (37 °C)] 98 °F (36.7 °C)  Heart Rate:  [] 100  Resp:  [18-20] 20  BP: (121-154)/(80-89) 154/87  Body mass index is 50.98 kg/m².    Physical Exam   Constitutional: He is oriented to person, place, and time. No distress.   Cardiovascular: Normal rate and regular rhythm.   Pulmonary/Chest: Effort normal. No respiratory distress. He has decreased breath sounds.   Abdominal: Soft. Bowel sounds are normal. There is no tenderness. There is no rebound and no guarding.   Neurological: He is alert and oriented to person, place, and time.   Skin: Skin is warm and dry.   Psychiatric: He has a normal mood and affect. His behavior is normal.        Discharge Medications      Changes to Medications      Instructions Start Date   warfarin 4 MG tablet  Commonly known as:  COUMADIN  What changed:  additional instructions   4 mg, Oral, Every Evening, take 8 mg tomorrow and resume previous dose Sunday and check INR Monday 3/4/19 with dialysis         Continue These Medications      Instructions Start Date   ARIPiprazole 2 MG tablet  Commonly known as:  ABILIFY   2 mg, Oral, Every Evening      aspirin 81 MG EC tablet   81 mg, Oral, Daily      BEVESPI AEROSPHERE 9-4.8 MCG/ACT aerosol  Generic drug:  Glycopyrrolate-Formoterol   1 puff, Inhalation, 2 Times Daily      budesonide-formoterol 160-4.5 MCG/ACT inhaler  Commonly known as:  SYMBICORT   2 puffs, Inhalation, 2 Times Daily - RT      hydrALAZINE 25 MG  tablet  Commonly known as:  APRESOLINE   25 mg, Oral, 2 Times Daily      ketoconazole 2 % cream  Commonly known as:  NIZORAL   1 application, Topical, Daily      lanthanum 1000 MG chewable tablet  Commonly known as:  FOSRENOL   3,000 mg, Oral, 3 Times Daily With Meals, 2 TABLETS WITH SNACKS      levETIRAcetam 1000 MG tablet  Commonly known as:  KEPPRA   1,000 mg, Oral, 2 Times Daily      loratadine 10 MG tablet  Commonly known as:  CLARITIN   10 mg, Oral, Daily      LORazepam 0.5 MG tablet  Commonly known as:  ATIVAN   0.5 mg, Oral, Every 8 Hours PRN      Morphine 100 MG 12 hr tablet  Commonly known as:  MS CONTIN   100 mg, Oral, Daily      O2  Commonly known as:  OXYGEN   2 L/min, Inhalation, Nightly      ondansetron 4 MG tablet  Commonly known as:  ZOFRAN   4 mg, Oral, Every 6 Hours PRN      pravastatin 40 MG tablet  Commonly known as:  PRAVACHOL   40 mg, Oral, Daily      PROVENTIL  (90 Base) MCG/ACT inhaler  Generic drug:  albuterol sulfate HFA   2 puffs, Inhalation, Every 4 Hours PRN      sucralfate 1 GM/10ML suspension  Commonly known as:  CARAFATE   1 g, Oral, 2 Times Daily      SYNTHROID 175 MCG tablet  Generic drug:  levothyroxine   175 mcg, Oral, Daily      TRINTELLIX 20 MG tablet  Generic drug:  Vortioxetine HBr   20 mg, Oral, Daily      VELPHORO 500 MG chewable tablet  Generic drug:  Sucroferric Oxyhydroxide   1 tablet, Oral, 3 Times Daily With Meals, 1 TABLET WITH SNACKS      vitamin D 45545 units capsule capsule  Commonly known as:  ERGOCALCIFEROL   50,000 Units, Oral, Every 30 Days              Additional Instructions for the Follow-ups that You Need to Schedule     Protime-INR    Mar 04, 2019 (Approximate)      patient states he can get INR check with dialysis    Order Comments:  patient states he can get INR check with dialysis            Follow-up Information     Luis Antonio Abarca MD Follow up.    Specialty:  Family Medicine  Contact information:  [address]  [address]  [phone]             Andres Hall MD Follow up.    Specialty:  Nephrology  Contact information:  [id number]  [place] [address]  [phone]                  Gabriel Yeager MD  03/01/19  12:30 PM    Discharge time spent greater than 30 minutes.

## 2019-03-01 NOTE — PROGRESS NOTES
Case Management Discharge Note    Final Note: Pt discharged home.   ANSHUL Jacobo RN    Destination      No service has been selected for the patient.      Durable Medical Equipment      No service has been selected for the patient.      Dialysis/Infusion      No service has been selected for the patient.      Home Medical Care      No service has been selected for the patient.      Community Resources      No service has been selected for the patient.        Other: Other(Private Auto)    Final Discharge Disposition Code: 01 - home or self-care

## 2019-03-01 NOTE — PROGRESS NOTES
Pharmacy consult for Warfarin dosing    Armando Gill is a 58 y.o. male (!) 152 kg (335 lb 4.8 oz).  Pharmacy consulted to dose for home warfarin dosing (Atrial fibrillation -requiring full anticoagulation) per Dr. Yeager's request.  Pharmacy dosing since: 3/1/2019  Goal INR: 2 - 3     Estimated Creatinine Clearance: 29.8 mL/min (A) (by C-G formula based on SCr of 3.95 mg/dL (H)).  Creatinine   Date Value Ref Range Status   03/01/2019 3.95 (H) 0.76 - 1.27 mg/dL Final   02/28/2019 4.25 (H) 0.76 - 1.27 mg/dL Final   02/27/2019 6.25 (H) 0.76 - 1.27 mg/dL Final     Platelets   Date Value Ref Range Status   03/01/2019 151 140 - 450 10*3/mm3 Final   02/28/2019 184 140 - 450 10*3/mm3 Final   02/27/2019 184 140 - 450 10*3/mm3 Final     Lab Results   Component Value Date    HGB 9.4 (L) 03/01/2019    HGB 10.8 (L) 02/28/2019    HGB 9.5 (L) 02/27/2019     Lab Results   Component Value Date    INR 1.50 (H) 03/01/2019     Drug Interactions:  Aspirin (increased bleeding risk)  Levothyroxine (low interaction; slight increase in INR)    PLAN:  1. Of note, the patient is an ESRD patient who received 5 hours of dialysis on 2/28/2019. Pt with subtherapeutic INR of 1.50 although on warfarin x2 days. Hence, plan to give Warfarin 8 mg PO x1 dose today. Pt may need another boost but would recommend resuming 4 mg daily afterwards.    2. Will monitor renal function, s/sx of bleeding, and adjust as needed.     Thank you Dr. Yeager for the consult.    Richard Morrell, PharmD, BCPS  03/01/19 11:26 AM

## 2019-03-01 NOTE — NURSING NOTE
Spoke with Dr. Malin.  He asked that I call [facility] and request same dialysis order as yesterday.  Called and order has been placed.  Dr. Malin is on his way to speak with pt and dialysis department.  MAAME Singh RN

## 2019-03-01 NOTE — PLAN OF CARE
Problem: Patient Care Overview  Goal: Plan of Care Review  Outcome: Ongoing (interventions implemented as appropriate)   03/01/19 0540   Coping/Psychosocial   Plan of Care Reviewed With patient   Plan of Care Review   Progress no change   OTHER   Outcome Summary Patient returned from dialysis at 1915. Alert and oriented. Denies any pain. AV fistula with positive thrill and bruit. Complained of anxiety and ativan given. Nursing will continue to monitor     Goal: Individualization and Mutuality  Outcome: Ongoing (interventions implemented as appropriate)    Goal: Discharge Needs Assessment  Outcome: Ongoing (interventions implemented as appropriate)    Goal: Interprofessional Rounds/Family Conf  Outcome: Ongoing (interventions implemented as appropriate)      Problem: Fall Risk (Adult)  Goal: Absence of Fall  Outcome: Ongoing (interventions implemented as appropriate)      Problem: Fluid Volume Excess (Adult)  Goal: Identify Related Risk Factors and Signs and Symptoms  Outcome: Outcome(s) achieved Date Met: 03/01/19    Goal: Optimal Fluid Balance  Outcome: Ongoing (interventions implemented as appropriate)      Problem: Cardiac: Heart Failure (Adult)  Goal: Signs and Symptoms of Listed Potential Problems Will be Absent, Minimized or Managed (Cardiac: Heart Failure)  Outcome: Ongoing (interventions implemented as appropriate)      Problem: Pain, Chronic (Adult)  Goal: Acceptable Pain/Comfort Level and Functional Ability  Outcome: Ongoing (interventions implemented as appropriate)

## 2019-03-01 NOTE — NURSING NOTE
Dr. Malin paged to see if pt can be discharged from renal standpoint.  Awaiting call back.  MAAME Singh RN

## 2019-03-02 ENCOUNTER — READMISSION MANAGEMENT (OUTPATIENT)
Dept: CALL CENTER | Facility: HOSPITAL | Age: 59
End: 2019-03-02

## 2019-03-06 ENCOUNTER — READMISSION MANAGEMENT (OUTPATIENT)
Dept: CALL CENTER | Facility: HOSPITAL | Age: 59
End: 2019-03-06

## 2019-03-06 NOTE — OUTREACH NOTE
Medical Week 1 Survey      Responses   Facility patient discharged from?  [facility]   Does the patient have one of the following disease processes/diagnoses(primary or secondary)?  Other   Is there a successful TCM telephone encounter documented?  No   Week 1 attempt successful?  Yes   Call start time  1409   Revoke  Decline to participate   Call end time  1410          Mercy Cavazos RN

## 2020-03-23 ENCOUNTER — APPOINTMENT (OUTPATIENT)
Dept: GENERAL RADIOLOGY | Facility: HOSPITAL | Age: 60
End: 2020-03-23

## 2020-03-23 ENCOUNTER — APPOINTMENT (OUTPATIENT)
Dept: CT IMAGING | Facility: HOSPITAL | Age: 60
End: 2020-03-23

## 2020-03-23 ENCOUNTER — HOSPITAL ENCOUNTER (OUTPATIENT)
Facility: HOSPITAL | Age: 60
Setting detail: OBSERVATION
Discharge: HOME | End: 2020-03-24
Attending: EMERGENCY MEDICINE | Admitting: INTERNAL MEDICINE

## 2020-03-23 DIAGNOSIS — E87.5 HYPERKALEMIA: Primary | ICD-10-CM

## 2020-03-23 DIAGNOSIS — J96.02 ACUTE RESPIRATORY FAILURE WITH HYPERCAPNIA (HCC): ICD-10-CM

## 2020-03-23 DIAGNOSIS — R56.9 SEIZURE (HCC): ICD-10-CM

## 2020-03-23 LAB
ALBUMIN SERPL-MCNC: 4.7 G/DL (ref 3.5–5.2)
ALBUMIN/GLOB SERPL: 1.8 G/DL
ALP SERPL-CCNC: 171 U/L (ref 39–117)
ALT SERPL W P-5'-P-CCNC: 34 U/L (ref 1–41)
ANION GAP SERPL CALCULATED.3IONS-SCNC: 16.6 MMOL/L (ref 5–15)
ARTERIAL PATENCY WRIST A: POSITIVE
AST SERPL-CCNC: 26 U/L (ref 1–40)
ATMOSPHERIC PRESS: 751.6 MMHG
BASE EXCESS BLDA CALC-SCNC: 0 MMOL/L (ref 0–2)
BASOPHILS # BLD AUTO: 0.06 10*3/MM3 (ref 0–0.2)
BASOPHILS NFR BLD AUTO: 0.6 % (ref 0–1.5)
BDY SITE: ABNORMAL
BILIRUB SERPL-MCNC: 0.3 MG/DL (ref 0.2–1.2)
BUN BLD-MCNC: 76 MG/DL (ref 6–20)
BUN/CREAT SERPL: 10.4 (ref 7–25)
CALCIUM SPEC-SCNC: 7.6 MG/DL (ref 8.6–10.5)
CHLORIDE SERPL-SCNC: 96 MMOL/L (ref 98–107)
CO2 SERPL-SCNC: 24.4 MMOL/L (ref 22–29)
CREAT BLD-MCNC: 7.33 MG/DL (ref 0.76–1.27)
DEPRECATED RDW RBC AUTO: 47.3 FL (ref 37–54)
EOSINOPHIL # BLD AUTO: 0.07 10*3/MM3 (ref 0–0.4)
EOSINOPHIL NFR BLD AUTO: 0.7 % (ref 0.3–6.2)
ERYTHROCYTE [DISTWIDTH] IN BLOOD BY AUTOMATED COUNT: 13.3 % (ref 12.3–15.4)
GAS FLOW AIRWAY: 2 LPM
GFR SERPL CREATININE-BSD FRML MDRD: 8 ML/MIN/1.73
GFR SERPL CREATININE-BSD FRML MDRD: ABNORMAL ML/MIN/{1.73_M2}
GLOBULIN UR ELPH-MCNC: 2.6 GM/DL
GLUCOSE BLD-MCNC: 181 MG/DL (ref 65–99)
GLUCOSE BLDC GLUCOMTR-MCNC: 194 MG/DL (ref 70–130)
HCO3 BLDA-SCNC: 28.5 MMOL/L (ref 22–28)
HCT VFR BLD AUTO: 35.4 % (ref 37.5–51)
HGB BLD-MCNC: 11.8 G/DL (ref 13–17.7)
IMM GRANULOCYTES # BLD AUTO: 0.05 10*3/MM3 (ref 0–0.05)
IMM GRANULOCYTES NFR BLD AUTO: 0.5 % (ref 0–0.5)
INR PPP: 1.77 (ref 0.9–1.1)
LYMPHOCYTES # BLD AUTO: 0.56 10*3/MM3 (ref 0.7–3.1)
LYMPHOCYTES NFR BLD AUTO: 5.4 % (ref 19.6–45.3)
MCH RBC QN AUTO: 32.8 PG (ref 26.6–33)
MCHC RBC AUTO-ENTMCNC: 33.3 G/DL (ref 31.5–35.7)
MCV RBC AUTO: 98.3 FL (ref 79–97)
MODALITY: ABNORMAL
MONOCYTES # BLD AUTO: 0.59 10*3/MM3 (ref 0.1–0.9)
MONOCYTES NFR BLD AUTO: 5.7 % (ref 5–12)
NEUTROPHILS # BLD AUTO: 9.01 10*3/MM3 (ref 1.7–7)
NEUTROPHILS NFR BLD AUTO: 87.1 % (ref 42.7–76)
NRBC BLD AUTO-RTO: 0.1 /100 WBC (ref 0–0.2)
NT-PROBNP SERPL-MCNC: ABNORMAL PG/ML (ref 5–900)
PCO2 BLDA: 65.2 MM HG (ref 35–45)
PH BLDA: 7.25 PH UNITS (ref 7.35–7.45)
PLATELET # BLD AUTO: 196 10*3/MM3 (ref 140–450)
PMV BLD AUTO: 9.5 FL (ref 6–12)
PO2 BLDA: 148.3 MM HG (ref 80–100)
POTASSIUM BLD-SCNC: 7.1 MMOL/L (ref 3.5–5.2)
PROT SERPL-MCNC: 7.3 G/DL (ref 6–8.5)
PROTHROMBIN TIME: 20.3 SECONDS (ref 11.7–14.2)
RBC # BLD AUTO: 3.6 10*6/MM3 (ref 4.14–5.8)
SAO2 % BLDCOA: 98.8 % (ref 92–99)
SODIUM BLD-SCNC: 137 MMOL/L (ref 136–145)
TOTAL RATE: 18 BREATHS/MINUTE
TROPONIN T SERPL-MCNC: 0.06 NG/ML (ref 0–0.03)
WBC NRBC COR # BLD: 10.34 10*3/MM3 (ref 3.4–10.8)

## 2020-03-23 PROCEDURE — 96365 THER/PROPH/DIAG IV INF INIT: CPT

## 2020-03-23 PROCEDURE — 71045 X-RAY EXAM CHEST 1 VIEW: CPT

## 2020-03-23 PROCEDURE — 25010000002 LEVETIRACETAM IN NACL 0.82% 500 MG/100ML SOLUTION: Performed by: EMERGENCY MEDICINE

## 2020-03-23 PROCEDURE — 84484 ASSAY OF TROPONIN QUANT: CPT | Performed by: EMERGENCY MEDICINE

## 2020-03-23 PROCEDURE — 36600 WITHDRAWAL OF ARTERIAL BLOOD: CPT

## 2020-03-23 PROCEDURE — 99285 EMERGENCY DEPT VISIT HI MDM: CPT

## 2020-03-23 PROCEDURE — 82962 GLUCOSE BLOOD TEST: CPT

## 2020-03-23 PROCEDURE — 80053 COMPREHEN METABOLIC PANEL: CPT | Performed by: EMERGENCY MEDICINE

## 2020-03-23 PROCEDURE — 87340 HEPATITIS B SURFACE AG IA: CPT | Performed by: INTERNAL MEDICINE

## 2020-03-23 PROCEDURE — 84132 ASSAY OF SERUM POTASSIUM: CPT | Performed by: INTERNAL MEDICINE

## 2020-03-23 PROCEDURE — 25010000002 CALCIUM GLUCONATE PER 10 ML: Performed by: EMERGENCY MEDICINE

## 2020-03-23 PROCEDURE — G0378 HOSPITAL OBSERVATION PER HR: HCPCS

## 2020-03-23 PROCEDURE — 86706 HEP B SURFACE ANTIBODY: CPT | Performed by: INTERNAL MEDICINE

## 2020-03-23 PROCEDURE — 85025 COMPLETE CBC W/AUTO DIFF WBC: CPT | Performed by: EMERGENCY MEDICINE

## 2020-03-23 PROCEDURE — 86704 HEP B CORE ANTIBODY TOTAL: CPT | Performed by: INTERNAL MEDICINE

## 2020-03-23 PROCEDURE — 82803 BLOOD GASES ANY COMBINATION: CPT

## 2020-03-23 PROCEDURE — 70450 CT HEAD/BRAIN W/O DYE: CPT

## 2020-03-23 PROCEDURE — 83880 ASSAY OF NATRIURETIC PEPTIDE: CPT | Performed by: EMERGENCY MEDICINE

## 2020-03-23 PROCEDURE — 96375 TX/PRO/DX INJ NEW DRUG ADDON: CPT

## 2020-03-23 PROCEDURE — 63710000001 INSULIN REGULAR HUMAN PER 5 UNITS: Performed by: EMERGENCY MEDICINE

## 2020-03-23 PROCEDURE — 85610 PROTHROMBIN TIME: CPT | Performed by: EMERGENCY MEDICINE

## 2020-03-23 RX ORDER — ACETAMINOPHEN 325 MG/1
650 TABLET ORAL EVERY 6 HOURS PRN
Status: DISCONTINUED | OUTPATIENT
Start: 2020-03-23 | End: 2020-03-24 | Stop reason: HOSPADM

## 2020-03-23 RX ORDER — LEVETIRACETAM 5 MG/ML
500 INJECTION INTRAVASCULAR ONCE
Status: COMPLETED | OUTPATIENT
Start: 2020-03-23 | End: 2020-03-23

## 2020-03-23 RX ORDER — DEXTROSE MONOHYDRATE 25 G/50ML
50 INJECTION, SOLUTION INTRAVENOUS ONCE
Status: COMPLETED | OUTPATIENT
Start: 2020-03-23 | End: 2020-03-23

## 2020-03-23 RX ADMIN — LEVETIRACETAM 500 MG: 500 INJECTION, SOLUTION INTRAVENOUS at 19:30

## 2020-03-23 RX ADMIN — DEXTROSE MONOHYDRATE 50 ML: 25 INJECTION, SOLUTION INTRAVENOUS at 21:34

## 2020-03-23 RX ADMIN — INSULIN HUMAN 10 UNITS: 100 INJECTION, SOLUTION PARENTERAL at 21:36

## 2020-03-23 RX ADMIN — ACETAMINOPHEN 650 MG: 325 TABLET, FILM COATED ORAL at 22:22

## 2020-03-23 RX ADMIN — CALCIUM GLUCONATE 1 G: 98 INJECTION, SOLUTION INTRAVENOUS at 21:42

## 2020-03-23 RX ADMIN — SODIUM BICARBONATE 50 MEQ: 84 INJECTION, SOLUTION INTRAVENOUS at 21:32

## 2020-03-23 NOTE — ED PROVIDER NOTES
" EMERGENCY DEPARTMENT ENCOUNTER    Room Number:  44/44  Date of encounter:  3/23/2020  PCP: Luis Antonio Abarca MD  Historian: Patient, wife via phone who provides significant history      HPI:  Chief Complaint: Seizure  A complete HPI/ROS/PMH/PSH/SH/FH are unobtainable due to: None    Context: Armando Gill is a 59 y.o. male who presents to the ED c/o seizure at home earlier today.  Wife reports the patient was acting abnormally with more confusion over the last several hours.  He was last acting normal around 3 PM.  She did note a seizure at home prior to arrival which lasted several minutes and was described as a \"absence seizure\" seizure with his \"eyes bugging out\".  He did not fall to the ground or lose total consciousness.  He was more confused after this seizure.  Patient does have a history of seizure disorder and takes Keppra 1000 mg twice daily he has been compliant with his medications here recently.  Patient is a end-stage renal patient who gets dialysis Tuesday, Thursday, Saturday and did receive a full dialysis on Saturday.  Patient does have a history of some chronic lung disease and uses oxygen at night but rarely in the daytime.      PAST MEDICAL HISTORY  Active Ambulatory Problems     Diagnosis Date Noted   • End stage renal disease (CMS/[facility]) 05/04/2016   • Secondary hyperparathyroidism of renal origin (CMS/[facility]) 01/06/2017   • Fever 06/23/2018   • Sepsis (CMS/[facility]) 06/23/2018   • Nausea and vomiting 06/23/2018   • Atrial fibrillation (CMS/[facility]) 06/23/2018   • Long term current use of anticoagulant 06/23/2018   • Acute pain of right wrist 06/23/2018   • DNR (do not resuscitate) 06/23/2018   • Bacteremia due to Streptococcus 06/25/2018   • Streptococcal arthritis of right wrist (CMS/[facility]) 06/25/2018   • Opioid dependence (CMS/[facility]) 06/28/2018   • Chronic pain syndrome 06/28/2018   • Hyponatremia 07/01/2018   • Seizure (CMS/[facility]) 10/07/2018   • Anemia in chronic renal disease 04/12/2013   • Asthma " 05/14/2018   • Elevated blood uric acid level 10/08/2018   • Benign hypertensive kidney disease with chronic kidney disease stage I through stage IV, or unspecified(403.10) 04/12/2013   • CAD in native artery 10/08/2018   • CKD (chronic kidney disease) 10/08/2018   • Diastolic CHF, chronic (CMS/[facility]) 05/04/2015   • DM type 2 (diabetes mellitus, type 2) (CMS/[facility]) 10/08/2018   • DVT prophylaxis 04/20/2013   • Dyslipidemia 10/08/2018   • Echocardiogram abnormal 05/04/2015   • Essential hypertension 10/08/2018   • Fatigue 10/08/2018   • Hyperlipidemia LDL goal <70 10/08/2018   • Hyperprolactinemia (CMS/[facility]) 10/08/2018   • 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   • Hyposmolality and/or hyponatremia 04/13/2013   • Adult hypothyroidism 10/08/2018   • Morbid obesity with BMI of 45.0-49.9, adult (CMS/[facility]) 04/16/2015   • CEE (nonalcoholic steatohepatitis) 02/26/2013   • Neoplasm of pituitary gland 10/08/2018   • Normal cardiac stress test 10/19/2015   • On home oxygen therapy 10/08/2018   • SHABNAM (obstructive sleep apnea) 10/08/2018   • Other complications due to renal dialysis device, implant, and graft 04/16/2015   • Pancreatitis 04/19/2013   • Pulmonary hypertension (CMS/[facility]) 10/08/2018   • Pyelonephritis 04/20/2013   • Respiratory failure (CMS/[facility]) 02/26/2013   • Uremia 05/01/2015   • Uremic encephalopathy 05/01/2015   • Avitaminosis D 10/08/2018   • Acute hyperkalemia 10/10/2018   • Cerebral atrophy (CMS/[facility]) 10/10/2018   • Nausea & vomiting 01/23/2019   • Epigastric abdominal pain 01/23/2019   • Hypoxia 02/27/2019   • Volume overload 02/27/2019     Resolved Ambulatory Problems     Diagnosis Date Noted   • No Resolved Ambulatory Problems     Past Medical History:   Diagnosis Date   • Anxiety    • Arthritis    • Asymptomatic hyperuricemia    • Callus of foot    • Cancer of pituitary gland (CMS/[facility])    • Depression    • Diabetes mellitus (CMS/[facility])    • Dialysis patient (CMS/[facility])    • Gout    • History of anemia    • History of colon polyps     • Hyperparathyroidism (CMS/[facility])    • Hypertension    • Hypogonadism, male    • Hypothyroidism    • Male erectile disorder of organic origin    • Neuropathy    • Obstructive sleep apnea    • Presence of arterial-venous shunt (for dialysis) (CMS/HCC)    • Seizure disorder (CMS/HCC)    • Vitamin D deficiency disease          PAST SURGICAL HISTORY  Past Surgical History:   Procedure Laterality Date   • CATARACT EXTRACTION Bilateral 2009   • CHOLECYSTECTOMY  2002    Performed in [place]   • COLONOSCOPY W/ POLYPECTOMY N/A 11/2015    Dr. Bolivar   • INCISION AND DRAINAGE ARM Right 6/26/2018    Procedure: RIGHT WRIST INCISION AND DRAINAGE;  Surgeon: Wilian Arredondo MD;  Location: [facility];  Service: Hand   • INGUINAL HERNIA REPAIR Left 2002    Open Inguinal   • THYROIDECTOMY Right 1/6/2017    Procedure: FOUR GLAND PARATHYROIDECTOMY WITH AUTOTRANSPLANT INTO RIGHT FOREARM, INTRA-OPERATIVE PTH MEASUREMENT, PARTIAL THYMECTOMY;  Surgeon: Freida Morfin MD;  Location: [facility];  Service:    • THYROIDECTOMY N/A 8/2/2017    Procedure: left thyroid lobectomy, Left superior parathyroidectomy with autotransplant into right forearm, intra-operative pth monitoring;  Surgeon: Freida Morfin MD;  Location: [facility];  Service:    • TRACHEOSTOMY N/A 10/22/2007    W/ REVISION OF THYROID ISTHMUS, DUE TO SLEEP APNEA; ALLOWED TO GROW CLOSED AFTER ONE YEAR DUE TO LACK OF RELIEF OF SLEEP APNEA SYMPTOMS, DR. MAXIMO BELL   • TRACHEOSTOMY N/A 05/21/2009    REVISION, DR. MAXIMO BELL   • TRACHEOSTOMY N/A 04/25/2009    REVISION, DR. MAXIMO BELL   • VASCULAR SURGERY Left 2014    Acess in Left Arm-Dr. Guzman         FAMILY HISTORY  Family History   Problem Relation Age of Onset   • Heart disease Sister    • Heart disease Father    • Kidney disease Neg Hx    • Malig Hyperthermia Neg Hx          SOCIAL HISTORY  Social History     Socioeconomic History   • Marital status:      Spouse name: Not on file   • Number of children:  Not on file   • Years of education: Not on file   • Highest education level: Not on file   Tobacco Use   • Smoking status: Never Smoker   • Smokeless tobacco: Never Used   Substance and Sexual Activity   • Alcohol use: No   • Drug use: No   • Sexual activity: Defer         ALLERGIES  Adhesive tape; Latex; and Sulfa antibiotics       REVIEW OF SYSTEMS  Review of Systems   Constitutional: Negative.  Negative for fever.   HENT: Negative for sore throat.    Eyes: Negative.    Respiratory: Positive for cough (Occasional dry cough).    Cardiovascular: Negative.  Negative for chest pain.   Gastrointestinal: Negative.    Genitourinary: Negative.  Negative for dysuria.   Musculoskeletal: Negative.  Negative for back pain.   Skin: Negative.  Negative for rash.   Neurological: Positive for seizures and headaches (Occasional mild headache).   Psychiatric/Behavioral: Positive for confusion (Per wife see HPI).   All other systems reviewed and are negative.          PHYSICAL EXAM    I have reviewed the triage vital signs and nursing notes.    ED Triage Vitals [03/23/20 1838]   Temp Heart Rate Resp BP SpO2   98.7 °F (37.1 °C) 110 16 137/80 94 %      Temp src Heart Rate Source Patient Position BP Location FiO2 (%)   Oral Monitor -- -- --       Physical Exam  GENERAL: Well-appearing male who is oriented x3  HENT: nares patent, no oral injuries are noted-head is atraumatic  EYES: no scleral icterus  CV: Irregular rhythm without murmur  RESPIRATORY: normal effort, decreased breath sounds bilaterally-oxygen saturations 82% on room air  ABDOMEN: soft, obese, nontender  MUSCULOSKELETAL: no deformity-no evidence of acute traumatic injury  NEURO: Strength, sensation, and coordination are grossly intact.  Speech and mentation are unremarkable  SKIN: warm, dry      LAB RESULTS  Recent Results (from the past 24 hour(s))   Comprehensive Metabolic Panel    Collection Time: 03/23/20  7:10 PM   Result Value Ref Range    Glucose 181 (H) 65 - 99  mg/dL    BUN 76 (H) 6 - 20 mg/dL    Creatinine 7.33 (H) 0.76 - 1.27 mg/dL    Sodium 137 136 - 145 mmol/L    Potassium 7.1 (C) 3.5 - 5.2 mmol/L    Chloride 96 (L) 98 - 107 mmol/L    CO2 24.4 22.0 - 29.0 mmol/L    Calcium 7.6 (L) 8.6 - 10.5 mg/dL    Total Protein 7.3 6.0 - 8.5 g/dL    Albumin 4.70 3.50 - 5.20 g/dL    ALT (SGPT) 34 1 - 41 U/L    AST (SGOT) 26 1 - 40 U/L    Alkaline Phosphatase 171 (H) 39 - 117 U/L    Total Bilirubin 0.3 0.2 - 1.2 mg/dL    eGFR Non African Amer 8 (L) >60 mL/min/1.73    eGFR  African Amer      Globulin 2.6 gm/dL    A/G Ratio 1.8 g/dL    BUN/Creatinine Ratio 10.4 7.0 - 25.0    Anion Gap 16.6 (H) 5.0 - 15.0 mmol/L   Troponin    Collection Time: 03/23/20  7:10 PM   Result Value Ref Range    Troponin T 0.055 (C) 0.000 - 0.030 ng/mL   Protime-INR    Collection Time: 03/23/20  7:10 PM   Result Value Ref Range    Protime 20.3 (H) 11.7 - 14.2 Seconds    INR 1.77 (H) 0.90 - 1.10   BNP    Collection Time: 03/23/20  7:10 PM   Result Value Ref Range    proBNP 30,670.0 (H) 5.0 - 900.0 pg/mL   CBC Auto Differential    Collection Time: 03/23/20  7:10 PM   Result Value Ref Range    WBC 10.34 3.40 - 10.80 10*3/mm3    RBC 3.60 (L) 4.14 - 5.80 10*6/mm3    Hemoglobin 11.8 (L) 13.0 - 17.7 g/dL    Hematocrit 35.4 (L) 37.5 - 51.0 %    MCV 98.3 (H) 79.0 - 97.0 fL    MCH 32.8 26.6 - 33.0 pg    MCHC 33.3 31.5 - 35.7 g/dL    RDW 13.3 12.3 - 15.4 %    RDW-SD 47.3 37.0 - 54.0 fl    MPV 9.5 6.0 - 12.0 fL    Platelets 196 140 - 450 10*3/mm3    Neutrophil % 87.1 (H) 42.7 - 76.0 %    Lymphocyte % 5.4 (L) 19.6 - 45.3 %    Monocyte % 5.7 5.0 - 12.0 %    Eosinophil % 0.7 0.3 - 6.2 %    Basophil % 0.6 0.0 - 1.5 %    Immature Grans % 0.5 0.0 - 0.5 %    Neutrophils, Absolute 9.01 (H) 1.70 - 7.00 10*3/mm3    Lymphocytes, Absolute 0.56 (L) 0.70 - 3.10 10*3/mm3    Monocytes, Absolute 0.59 0.10 - 0.90 10*3/mm3    Eosinophils, Absolute 0.07 0.00 - 0.40 10*3/mm3    Basophils, Absolute 0.06 0.00 - 0.20 10*3/mm3    Immature Grans,  Absolute 0.05 0.00 - 0.05 10*3/mm3    nRBC 0.1 0.0 - 0.2 /100 WBC   Blood Gas, Arterial    Collection Time: 03/23/20  7:31 PM   Result Value Ref Range    Site Arterial: left radial     Hebert's Test Positive     pH, Arterial 7.249 (C) 7.350 - 7.450 pH units    pCO2, Arterial 65.2 (C) 35.0 - 45.0 mm Hg    pO2, Arterial 148.3 (H) 80.0 - 100.0 mm Hg    HCO3, Arterial 28.5 (H) 22.0 - 28.0 mmol/L    Base Excess, Arterial 0.0 0.0 - 2.0 mmol/L    O2 Saturation Calculated 98.8 92.0 - 99.0 %    Barometric Pressure for Blood Gas 751.6 mmHg    Modality Cannula     Flow Rate 2 lpm    Rate 18 Breaths/minute       Ordered the above labs and independently reviewed the results.      RADIOLOGY  Ct Head Without Contrast    Result Date: 3/23/2020  CT HEAD WO CONTRAST-  INDICATIONS: Seizure, confusion, anticoagulated  TECHNIQUE: Radiation dose reduction techniques were utilized, including automated exposure control and exposure modulation based on body size. Noncontrast head CT  COMPARISON: 10/07/2018  FINDINGS:    No acute intracranial hemorrhage, midline shift or mass effect. Chronic left parafalcine calcification. No acute territorial infarct is identified.  Mild periventricular hypodensities suggest chronic small vessel ischemic change in a patient this age.  Arterial calcifications are seen at the base of the brain.  Ventricles, cisterns, cerebral sulci are unremarkable for patient age.  The visualized paranasal sinuses, orbits, mastoid air cells are unremarkable.           No acute intracranial hemorrhage or hydrocephalus. The cause of patient's seizure was not identified. If there is further clinical concern, MRI could be considered for further evaluation.  This report was finalized on 3/23/2020 8:22 PM by Dr. Rah Cervantes M.D.      Xr Chest 1 View    Result Date: 3/23/2020  EMERGENCY PORTABLE VIEW OF THE CHEST 03/23/2020  CLINICAL HISTORY: Cough, low oxygen saturations. The patient is feeling weak and unwell.  This is  correlated to PA and lateral chest x-ray from [facility] 02/27/2019.  FINDINGS: Cardiomediastinal silhouette is mildly enlarged pulmonary vasculature is within normal limits. The lungs are clear. The costophrenic angles are sharp.      1. No active disease is seen in the chest with no significant change when compared to prior chest x-ray 02/27/2019. There is enlargement of the cardiomediastinal silhouette and there are clips in the right and left paratracheal region lower neck likely from prior thyroidectomy and please correlate with surgical history.  This report was finalized on 3/23/2020 8:15 PM by Dr. Toney Hendricks M.D.        I ordered the above noted radiological studies. Reviewed by me and discussed with radiologist.  See dictation for official radiology interpretation.      PROCEDURES  Critical Care  Performed by: Jacob Hodge MD  Authorized by: Ethan Marin MD     Critical care provider statement:     Critical care time (minutes):  40    Critical care was necessary to treat or prevent imminent or life-threatening deterioration of the following conditions:  Renal failure, metabolic crisis and respiratory failure    Critical care was time spent personally by me on the following activities:  Ordering and review of laboratory studies, ordering and review of radiographic studies, pulse oximetry, re-evaluation of patient's condition, review of old charts, ordering and performing treatments and interventions, discussions with consultants, evaluation of patient's response to treatment and examination of patient          MEDICATIONS GIVEN IN ER    Medications   dextrose (D50W) 25 g/ 50mL Intravenous Solution 50 mL (has no administration in time range)   insulin regular (humuLIN R,novoLIN R) injection 10 Units (has no administration in time range)   sodium bicarbonate injection 8.4% 50 mEq (has no administration in time range)   calcium gluconate 1 g/100 mL (10 mg/mL) NS IVPB (VTB) (has no  administration in time range)   levETIRAcetam in NaCl 0.82% (KEPPRA) IVPB 500 mg (0 mg Intravenous Stopped 3/23/20 2012)         PROGRESS, DATA ANALYSIS, CONSULTS, AND MEDICAL DECISION MAKING    All labs have been independently reviewed by me.  All radiology studies have been reviewed by me and discussed with radiologist dictating the report.   EKG's independently viewed and interpreted by me.  Discussion below represents my analysis of pertinent findings related to patient's condition, differential diagnosis, treatment plan and final disposition.      ED Course as of Mar 23 2120   Mon Mar 23, 2020   1907 MDM this is a complicated patient with an apparent seizure at home per wife.  Patient does have a history of a seizure disorder and is compliant with his Keppra.  Wife does report the patient was confused prior to his seizure.  It is unclear if he might of had an unwitnessed seizure earlier which could have caused postictal state.  Also confusion could have been caused by hypoxia which is noted here in the ED.  His oxygen levels been running in the lower 80s on room air.  We will work-up this patient with CT of the head as he is anticoagulated and may have had an unwitnessed seizure.  We will also get chest x-ray and labs.  We will give IV Keppra 500 mg to try to prevent further seizure.  I discussed patient's symptoms and evaluation with wife over the phone as she is not allowed in the ED during this pandemic of coronavirus.    [DB]   2001 Chest x-ray is been reviewed and shows no obvious acute disease.  Labs are most notable for elevated potassium of 7.1, hemolysis was not reported by lab.  In a dialysis patient I suspect that this is a real reading and I will contact nephrology about treatment.  Troponin elevation of 0.055 is likely due to her chronic renal failure and not acute infarction as patient is not having any chest pain and is actually denied shortness of breath.    [DB]   2002 BNP elevation of 30,000  is noted and is similar to prior values, in fact BNP was 40,001 drawn 1 year ago.    [DB]   2002 ABG as noted to show acute respiratory acidosis with PO2 of 48.  This was drawn on 2 L.  At this point I will try weaning patient off of oxygen orally start him down to 1 L.  Given degree of hyperkalemia and reported seizure I would admit this patient to the intensive care unit and contact nephrology.    [DB]   2013 Patient returned from CAT scan and was noted to be more confused than usual.  Patient was taken off of his oxygen and I plan to repeat an ABG in roughly 20 to 30 minutes off of oxygen.    [DB]   2016 I discussed evaluation with Dr. Ethan Marin from the ICU.  He will admit to the intensive care unit.  He asks I talked to Dr. Morel to try to arrange for dialysis tonight as this should help his acidosis, his oxygenation and his hyperkalemia.    [DB]   2016 Spoke with patient's wife via the telephone and told her that patient was in renal failure with hyperkalemia acidosis and some hypoxia.  I explained that he would be admitted to the ICU with probable dialysis tonight.    [DB]   2120 I spoke with Dr. Hutson from nephrology who asked that I give the patient several medications for hyperkalemia which are listed below.  He will write for urgent orders for urgent dialysis tonight.    [DB]      ED Course User Index  [DB] Jacob Hodge MD       AS OF 21:20 VITALS:    BP - 97/43  HR - 110  TEMP - 98.7 °F (37.1 °C) (Oral)  O2 SATS - 98%      DIAGNOSIS  Final diagnoses:   Hyperkalemia   Seizure (CMS/HCC)   Acute respiratory failure with hypercapnia (CMS/HCC)         DISPOSITION  Admission         Jacob Hodge MD  03/23/20 2121

## 2020-03-23 NOTE — ED NOTES
Side rails padded for patient, due to seizure activity earlier.      Violet Avalos, RN  03/23/20 9845

## 2020-03-23 NOTE — ED TRIAGE NOTES
Pt had a 3 minute seizure at home.  Is on keppra.  Last sz 1.5 months ago.      Pt is a dialysis pt

## 2020-03-24 VITALS
HEART RATE: 75 BPM | SYSTOLIC BLOOD PRESSURE: 83 MMHG | DIASTOLIC BLOOD PRESSURE: 48 MMHG | HEIGHT: 69 IN | BODY MASS INDEX: 40.49 KG/M2 | RESPIRATION RATE: 20 BRPM | WEIGHT: 273.37 LBS | OXYGEN SATURATION: 97 % | TEMPERATURE: 98 F

## 2020-03-24 LAB
ANION GAP SERPL CALCULATED.3IONS-SCNC: 18.3 MMOL/L (ref 5–15)
BUN BLD-MCNC: 36 MG/DL (ref 6–20)
BUN/CREAT SERPL: 8.8 (ref 7–25)
CALCIUM SPEC-SCNC: 8.2 MG/DL (ref 8.6–10.5)
CHLORIDE SERPL-SCNC: 95 MMOL/L (ref 98–107)
CO2 SERPL-SCNC: 22.7 MMOL/L (ref 22–29)
CREAT BLD-MCNC: 4.07 MG/DL (ref 0.76–1.27)
DEPRECATED RDW RBC AUTO: 46.6 FL (ref 37–54)
ERYTHROCYTE [DISTWIDTH] IN BLOOD BY AUTOMATED COUNT: 13.2 % (ref 12.3–15.4)
GFR SERPL CREATININE-BSD FRML MDRD: 15 ML/MIN/1.73
GLUCOSE BLD-MCNC: 143 MG/DL (ref 65–99)
GLUCOSE BLDC GLUCOMTR-MCNC: 127 MG/DL (ref 70–130)
GLUCOSE BLDC GLUCOMTR-MCNC: 137 MG/DL (ref 70–130)
GLUCOSE BLDC GLUCOMTR-MCNC: 95 MG/DL (ref 70–130)
HBV SURFACE AB SER RIA-ACNC: REACTIVE
HBV SURFACE AG SERPL QL IA: NORMAL
HCT VFR BLD AUTO: 34.6 % (ref 37.5–51)
HGB BLD-MCNC: 11.4 G/DL (ref 13–17.7)
HOLD SPECIMEN: NORMAL
INR PPP: 1.65 (ref 0.9–1.1)
MCH RBC QN AUTO: 32.1 PG (ref 26.6–33)
MCHC RBC AUTO-ENTMCNC: 32.9 G/DL (ref 31.5–35.7)
MCV RBC AUTO: 97.5 FL (ref 79–97)
PLATELET # BLD AUTO: 187 10*3/MM3 (ref 140–450)
PMV BLD AUTO: 9.4 FL (ref 6–12)
POTASSIUM BLD-SCNC: 4.2 MMOL/L (ref 3.5–5.2)
POTASSIUM BLD-SCNC: 4.4 MMOL/L (ref 3.5–5.2)
POTASSIUM BLD-SCNC: 7.1 MMOL/L (ref 3.5–5.2)
PROTHROMBIN TIME: 19.2 SECONDS (ref 11.7–14.2)
RBC # BLD AUTO: 3.55 10*6/MM3 (ref 4.14–5.8)
SODIUM BLD-SCNC: 136 MMOL/L (ref 136–145)
WBC NRBC COR # BLD: 10.66 10*3/MM3 (ref 3.4–10.8)

## 2020-03-24 PROCEDURE — 84132 ASSAY OF SERUM POTASSIUM: CPT | Performed by: INTERNAL MEDICINE

## 2020-03-24 PROCEDURE — 82962 GLUCOSE BLOOD TEST: CPT

## 2020-03-24 PROCEDURE — G0378 HOSPITAL OBSERVATION PER HR: HCPCS

## 2020-03-24 PROCEDURE — 80048 BASIC METABOLIC PNL TOTAL CA: CPT | Performed by: INTERNAL MEDICINE

## 2020-03-24 PROCEDURE — P9047 ALBUMIN (HUMAN), 25%, 50ML: HCPCS

## 2020-03-24 PROCEDURE — 25010000002 ALBUMIN HUMAN 25% PER 50 ML

## 2020-03-24 PROCEDURE — 94799 UNLISTED PULMONARY SVC/PX: CPT

## 2020-03-24 PROCEDURE — G0257 UNSCHED DIALYSIS ESRD PT HOS: HCPCS

## 2020-03-24 PROCEDURE — 85027 COMPLETE CBC AUTOMATED: CPT | Performed by: INTERNAL MEDICINE

## 2020-03-24 PROCEDURE — 85610 PROTHROMBIN TIME: CPT | Performed by: INTERNAL MEDICINE

## 2020-03-24 RX ORDER — ASPIRIN 81 MG/1
81 TABLET ORAL DAILY
Status: DISCONTINUED | OUTPATIENT
Start: 2020-03-24 | End: 2020-03-24 | Stop reason: HOSPADM

## 2020-03-24 RX ORDER — HYDRALAZINE HYDROCHLORIDE 25 MG/1
25 TABLET, FILM COATED ORAL 2 TIMES DAILY
Status: DISCONTINUED | OUTPATIENT
Start: 2020-03-24 | End: 2020-03-24 | Stop reason: HOSPADM

## 2020-03-24 RX ORDER — LEVETIRACETAM 500 MG/1
1000 TABLET ORAL EVERY 12 HOURS SCHEDULED
Status: DISCONTINUED | OUTPATIENT
Start: 2020-03-24 | End: 2020-03-24 | Stop reason: HOSPADM

## 2020-03-24 RX ORDER — ALBUMIN (HUMAN) 12.5 G/50ML
SOLUTION INTRAVENOUS
Status: COMPLETED
Start: 2020-03-24 | End: 2020-03-24

## 2020-03-24 RX ORDER — CETIRIZINE HYDROCHLORIDE 10 MG/1
10 TABLET ORAL DAILY
Status: DISCONTINUED | OUTPATIENT
Start: 2020-03-24 | End: 2020-03-24 | Stop reason: HOSPADM

## 2020-03-24 RX ORDER — BISACODYL 10 MG
10 SUPPOSITORY, RECTAL RECTAL DAILY PRN
Status: DISCONTINUED | OUTPATIENT
Start: 2020-03-24 | End: 2020-03-24 | Stop reason: HOSPADM

## 2020-03-24 RX ORDER — DEXTROSE MONOHYDRATE 25 G/50ML
25 INJECTION, SOLUTION INTRAVENOUS
Status: DISCONTINUED | OUTPATIENT
Start: 2020-03-24 | End: 2020-03-24 | Stop reason: HOSPADM

## 2020-03-24 RX ORDER — LORAZEPAM 0.5 MG/1
0.5 TABLET ORAL EVERY 8 HOURS PRN
Status: DISCONTINUED | OUTPATIENT
Start: 2020-03-24 | End: 2020-03-24 | Stop reason: HOSPADM

## 2020-03-24 RX ORDER — WARFARIN SODIUM 4 MG/1
4 TABLET ORAL
Status: DISCONTINUED | OUTPATIENT
Start: 2020-03-25 | End: 2020-03-24 | Stop reason: HOSPADM

## 2020-03-24 RX ORDER — BISACODYL 5 MG/1
5 TABLET, DELAYED RELEASE ORAL DAILY PRN
Status: DISCONTINUED | OUTPATIENT
Start: 2020-03-24 | End: 2020-03-24 | Stop reason: HOSPADM

## 2020-03-24 RX ORDER — ARIPIPRAZOLE 2 MG/1
2 TABLET ORAL EVERY EVENING
Status: DISCONTINUED | OUTPATIENT
Start: 2020-03-24 | End: 2020-03-24 | Stop reason: HOSPADM

## 2020-03-24 RX ORDER — ONDANSETRON 2 MG/ML
4 INJECTION INTRAMUSCULAR; INTRAVENOUS EVERY 6 HOURS PRN
Status: DISCONTINUED | OUTPATIENT
Start: 2020-03-24 | End: 2020-03-24 | Stop reason: HOSPADM

## 2020-03-24 RX ORDER — NICOTINE POLACRILEX 4 MG
15 LOZENGE BUCCAL
Status: DISCONTINUED | OUTPATIENT
Start: 2020-03-24 | End: 2020-03-24 | Stop reason: HOSPADM

## 2020-03-24 RX ORDER — ONDANSETRON 4 MG/1
4 TABLET, FILM COATED ORAL EVERY 6 HOURS PRN
Status: DISCONTINUED | OUTPATIENT
Start: 2020-03-24 | End: 2020-03-24 | Stop reason: HOSPADM

## 2020-03-24 RX ORDER — LEVOTHYROXINE SODIUM 175 UG/1
175 TABLET ORAL
Status: DISCONTINUED | OUTPATIENT
Start: 2020-03-24 | End: 2020-03-24 | Stop reason: HOSPADM

## 2020-03-24 RX ORDER — SODIUM POLYSTYRENE SULFONATE 15 G/60ML
30 SUSPENSION ORAL; RECTAL ONCE
Status: DISCONTINUED | OUTPATIENT
Start: 2020-03-24 | End: 2020-03-24

## 2020-03-24 RX ORDER — WARFARIN SODIUM 5 MG/1
5 TABLET ORAL
Status: DISCONTINUED | OUTPATIENT
Start: 2020-03-24 | End: 2020-03-24 | Stop reason: HOSPADM

## 2020-03-24 RX ORDER — BUDESONIDE AND FORMOTEROL FUMARATE DIHYDRATE 160; 4.5 UG/1; UG/1
2 AEROSOL RESPIRATORY (INHALATION)
Status: DISCONTINUED | OUTPATIENT
Start: 2020-03-24 | End: 2020-03-24 | Stop reason: HOSPADM

## 2020-03-24 RX ORDER — SODIUM CHLORIDE 0.9 % (FLUSH) 0.9 %
10 SYRINGE (ML) INJECTION EVERY 12 HOURS SCHEDULED
Status: DISCONTINUED | OUTPATIENT
Start: 2020-03-24 | End: 2020-03-24 | Stop reason: HOSPADM

## 2020-03-24 RX ORDER — NITROGLYCERIN 0.4 MG/1
0.4 TABLET SUBLINGUAL
Status: DISCONTINUED | OUTPATIENT
Start: 2020-03-24 | End: 2020-03-24 | Stop reason: HOSPADM

## 2020-03-24 RX ORDER — ALBUMIN (HUMAN) 12.5 G/50ML
12.5 SOLUTION INTRAVENOUS AS NEEDED
Status: DISCONTINUED | OUTPATIENT
Start: 2020-03-24 | End: 2020-03-24 | Stop reason: HOSPADM

## 2020-03-24 RX ORDER — SODIUM CHLORIDE 0.9 % (FLUSH) 0.9 %
10 SYRINGE (ML) INJECTION AS NEEDED
Status: DISCONTINUED | OUTPATIENT
Start: 2020-03-24 | End: 2020-03-24 | Stop reason: HOSPADM

## 2020-03-24 RX ORDER — SUCRALFATE ORAL 1 G/10ML
1 SUSPENSION ORAL 2 TIMES DAILY
Status: DISCONTINUED | OUTPATIENT
Start: 2020-03-24 | End: 2020-03-24 | Stop reason: HOSPADM

## 2020-03-24 RX ADMIN — ALBUMIN HUMAN 12.5 G: 0.25 SOLUTION INTRAVENOUS at 06:57

## 2020-03-24 RX ADMIN — SUCRALFATE 1 G: 1 SUSPENSION ORAL at 09:54

## 2020-03-24 RX ADMIN — SODIUM CHLORIDE, PRESERVATIVE FREE 10 ML: 5 INJECTION INTRAVENOUS at 09:57

## 2020-03-24 RX ADMIN — ALBUMIN (HUMAN) 12.5 G: 12.5 SOLUTION INTRAVENOUS at 06:57

## 2020-03-24 RX ADMIN — CETIRIZINE HYDROCHLORIDE 10 MG: 10 TABLET, FILM COATED ORAL at 09:54

## 2020-03-24 RX ADMIN — LORAZEPAM 0.5 MG: 0.5 TABLET ORAL at 03:38

## 2020-03-24 RX ADMIN — SODIUM CHLORIDE, PRESERVATIVE FREE 10 ML: 5 INJECTION INTRAVENOUS at 04:30

## 2020-03-24 RX ADMIN — ACETAMINOPHEN 650 MG: 325 TABLET, FILM COATED ORAL at 04:28

## 2020-03-24 RX ADMIN — BUDESONIDE AND FORMOTEROL FUMARATE DIHYDRATE 2 PUFF: 160; 4.5 AEROSOL RESPIRATORY (INHALATION) at 08:38

## 2020-03-24 RX ADMIN — ASPIRIN 81 MG: 81 TABLET, COATED ORAL at 09:54

## 2020-03-24 RX ADMIN — LEVETIRACETAM 1000 MG: 500 TABLET, FILM COATED ORAL at 06:15

## 2020-03-24 RX ADMIN — LEVOTHYROXINE SODIUM 175 MCG: 175 TABLET ORAL at 06:15

## 2020-03-24 NOTE — PROGRESS NOTES
Pharmacy Consult: Warfarin Dosing/ Monitoring    Armando Gill is a 59 y.o. male, estimated creatinine clearance is 14.3 mL/min (A) (by C-G formula based on SCr of 7.33 mg/dL (H)). weighing 124 kg (273 lb 5.9 oz).     has a past medical history of Anxiety, Arthritis, Asthma, Asymptomatic hyperuricemia, Atrial fibrillation (CMS/HCC), Callus of foot, Cancer of pituitary gland (CMS/HCC), Depression, Diabetes mellitus (CMS/HCC), Dialysis patient (CMS/HCC), Dyslipidemia, End stage renal disease (CMS/HCC), Gout, History of anemia, History of colon polyps, Hyperparathyroidism (CMS/HCC), Hyperprolactinemia (CMS/HCC), Hypertension, Hypogonadism, male, Hypothyroidism, Male erectile disorder of organic origin, Neuropathy, Obstructive sleep apnea, Presence of arterial-venous shunt (for dialysis) (CMS/HCC), Seizure disorder (CMS/HCC), and Vitamin D deficiency disease.    Social History     Tobacco Use   • Smoking status: Never Smoker   • Smokeless tobacco: Never Used   Substance Use Topics   • Alcohol use: No   • Drug use: No       Results from last 7 days   Lab Units 03/23/20 1910   INR  1.77*   HEMOGLOBIN g/dL 11.8*   HEMATOCRIT % 35.4*   PLATELETS 10*3/mm3 196     Results from last 7 days   Lab Units 03/23/20 1910   SODIUM mmol/L 137   POTASSIUM mmol/L 7.1*   CHLORIDE mmol/L 96*   CO2 mmol/L 24.4   BUN mg/dL 76*   CREATININE mg/dL 7.33*   CALCIUM mg/dL 7.6*   BILIRUBIN mg/dL 0.3   ALK PHOS U/L 171*   ALT (SGPT) U/L 34   AST (SGOT) U/L 26   GLUCOSE mg/dL 181*     Anticoagulation history: 4 mg daily    Hospital Anticoagulation:  Consulting provider: Dr Marin  Start date: 3/24/20  Indication: A Fib  Target INR: 2-3  Expected duration:     Bridge Therapy:                     Date 3/23            INR 1.77            Warfarin dose               Potential drug interactions:      Relevant nutrition status:      Other:      Education complete?/ Date:      Assessment/Plan:  Dose: Will change dose to 4 mg on Sunday, Monday,  Wednesday, Thursday and Friday, and 5 mg on Tuesday and Saturday.  Monitor INR daily  Follow up Daily    Pharmacy will continue to follow until discharge or discontinuation of warfarin.   Boo Young RPH  3/24/2020

## 2020-03-24 NOTE — DISCHARGE SUMMARY
Patient Identification:  Name: Armando Gill  Age: 59 y.o.  Sex: male  :  1960  MRN: [id number]  Primary Care Physician: Luis Antonio Abarca MD    Admit date: 3/23/2020  Discharge date and time: 2020  Discharged Condition: good    Discharge Diagnoses:   1. Hyperkalemia  2. Chronic end-stage renal disease on hemodialysis on  and Saturday  3. Seizure, with history of seizure disorder on Keppra  4. Acute hypercapnic respiratory failure likely seizure related, no previous ABGs for comparison  5. Polypharmacy with multiple respiratory suppressant including high-dose narcotics     Other relevant medical problems  6. History of asthma but no apparent exacerbation exam  7. Gout  8. Essential hypertension  9. Hypogonadism  10. Obstructive sleep apnea  11. Diastolic congestive heart failure, compensated  12. Type 2 diabetes mellitus  13. Coronary artery disease  14. Asthma, well controlled  15. Hypothyroidism  16. Nonalcoholic steatohepatitis  17. Neoplasm of the pituitary gland  18. Anemia of chronic kidney disease  19. Patient is DO NOT RESUSCITATE  20. Atrial fibrillation on chronic anticoagulation with subtherapeutic INR      Hospital Course: Armando Gill presented to [facility] with seizure activity and hyperkalemia.  He was admitted to the intensive care unit.  He received proper treatment of hyperkalemia including emergency hemodialysis.  The patient has improved dramatically and is ready for discharge home.    He will follow-up with his primary care physician within 1 week, possibly telehealth visit due to the current coronavirus pandemic.    He needs to call his nephrologist later today.  I instructed him clearly to communicate with his nephrologist today to plan for dialysis tomorrow.  He expressed understanding      Consults:   IP CONSULT TO PULMONOLOGY  IP CONSULT TO NEPHROLOGY    Significant Diagnostic Studies:   Imaging Results (Most Recent)      Procedure Component Value Units Date/Time    CT Head Without Contrast [[id number]] Collected:  03/23/20 2017     Updated:  03/23/20 2025    Narrative:       CT HEAD WO CONTRAST-     INDICATIONS: Seizure, confusion, anticoagulated     TECHNIQUE: Radiation dose reduction techniques were utilized, including  automated exposure control and exposure modulation based on body size.  Noncontrast head CT     COMPARISON: 10/07/2018     FINDINGS:           No acute intracranial hemorrhage, midline shift or mass effect. Chronic  left parafalcine calcification. No acute territorial infarct is  identified.     Mild periventricular hypodensities suggest chronic small vessel ischemic  change in a patient this age.     Arterial calcifications are seen at the base of the brain.     Ventricles, cisterns, cerebral sulci are unremarkable for patient age.     The visualized paranasal sinuses, orbits, mastoid air cells are  unremarkable.                   Impression:          No acute intracranial hemorrhage or hydrocephalus. The cause of  patient's seizure was not identified. If there is further clinical  concern, MRI could be considered for further evaluation.     This report was finalized on 3/23/2020 8:22 PM by Dr. Rah Cervantes M.D.       XR Chest 1 View [[id number]] Collected:  03/23/20 1953     Updated:  03/23/20 2019    Narrative:       EMERGENCY PORTABLE VIEW OF THE CHEST 03/23/2020     CLINICAL HISTORY: Cough, low oxygen saturations. The patient is feeling  weak and unwell.     This is correlated to PA and lateral chest x-ray from [facility] 02/27/2019.     FINDINGS: Cardiomediastinal silhouette is mildly enlarged pulmonary  vasculature is within normal limits. The lungs are clear. The  costophrenic angles are sharp.       Impression:       1. No active disease is seen in the chest with no significant change  when compared to prior chest x-ray 02/27/2019. There is enlargement of  the cardiomediastinal  silhouette and there are clips in the right and  left paratracheal region lower neck likely from prior thyroidectomy and  please correlate with surgical history.     This report was finalized on 3/23/2020 8:15 PM by Dr. Toney Hendricks M.D.                   TEST  RESULTS PENDING AT DISCHARGE   Order Current Status    Protime-INR Collected (03/24/20 0621)    Hepatitis B Core Antibody, Total In process          Discharge Exam:  Alert and oriented x 4, in NAD  Supple neck, midline trach  RRR, no m/r/g, no edema  Clear bilaterally, no wheezing, nonlabored  No clubbing or cyanosis     Disposition:  Home    Patient Instructions:      Discharge Medications      Continue These Medications      Instructions Start Date   ARIPiprazole 2 MG tablet  Commonly known as:  ABILIFY   2 mg, Oral, Every Evening      aspirin 81 MG EC tablet   81 mg, Oral, Daily      Bevespi Aerosphere 9-4.8 MCG/ACT aerosol  Generic drug:  Glycopyrrolate-Formoterol   1 puff, Inhalation, 2 Times Daily      budesonide-formoterol 160-4.5 MCG/ACT inhaler  Commonly known as:  SYMBICORT   2 puffs, Inhalation, 2 Times Daily - RT      hydrALAZINE 25 MG tablet  Commonly known as:  APRESOLINE   25 mg, Oral, 2 Times Daily      ketoconazole 2 % cream  Commonly known as:  NIZORAL   1 application, Topical, Daily      lanthanum 1000 MG chewable tablet  Commonly known as:  FOSRENOL   3,000 mg, Oral, 3 Times Daily With Meals, 2 TABLETS WITH SNACKS      levETIRAcetam 1000 MG tablet  Commonly known as:  KEPPRA   1,000 mg, Oral, 2 Times Daily      LORazepam 0.5 MG tablet  Commonly known as:  ATIVAN   0.5 mg, Oral, Every 8 Hours PRN      Morphine 100 MG 12 hr tablet  Commonly known as:  MS CONTIN   100 mg, Oral, Daily      O2  Commonly known as:  OXYGEN   2 L/min, Inhalation, Nightly      ondansetron 4 MG tablet  Commonly known as:  ZOFRAN   4 mg, Oral, Every 6 Hours PRN      pravastatin 40 MG tablet  Commonly known as:  PRAVACHOL   40 mg, Oral, Daily      Proventil   (90 Base) MCG/ACT inhaler  Generic drug:  albuterol sulfate HFA   2 puffs, Inhalation, Every 4 Hours PRN      sucralfate 1 GM/10ML suspension  Commonly known as:  CARAFATE   1 g, Oral, 2 Times Daily      Synthroid 175 MCG tablet  Generic drug:  levothyroxine   175 mcg, Oral, Daily      Trintellix 20 MG tablet  Generic drug:  Vortioxetine HBr   20 mg, Oral, Daily      Velphoro 500 MG chewable tablet  Generic drug:  Sucroferric Oxyhydroxide   1 tablet, Oral, 3 Times Daily With Meals, 1 TABLET WITH SNACKS      vitamin D 1.25 MG (86230 UT) capsule capsule  Commonly known as:  ERGOCALCIFEROL   50,000 Units, Oral, Every 30 Days      warfarin 4 MG tablet  Commonly known as:  COUMADIN   4 mg, Oral, Every Evening, take 8 mg tomorrow and resume previous dose Sunday and check INR Monday 3/4/19 with dialysis         Stop These Medications    loratadine 10 MG tablet  Commonly known as:  CLARITIN             Your medication list      CONTINUE taking these medications      Instructions Last Dose Given Next Dose Due   ARIPiprazole 2 MG tablet  Commonly known as:  ABILIFY      Take 2 mg by mouth Every Evening.       aspirin 81 MG EC tablet      Take 81 mg by mouth Daily.       Bevespi Aerosphere 9-4.8 MCG/ACT aerosol  Generic drug:  Glycopyrrolate-Formoterol      Inhale 1 puff 2 (Two) Times a Day.       budesonide-formoterol 160-4.5 MCG/ACT inhaler  Commonly known as:  SYMBICORT      Inhale 2 puffs 2 (Two) Times a Day.       hydrALAZINE 25 MG tablet  Commonly known as:  APRESOLINE      Take 25 mg by mouth 2 (Two) Times a Day.       ketoconazole 2 % cream  Commonly known as:  NIZORAL      Apply 1 application topically to the appropriate area as directed Daily.       lanthanum 1000 MG chewable tablet  Commonly known as:  FOSRENOL      Chew 3,000 mg 3 (Three) Times a Day With Meals. 2 TABLETS WITH SNACKS       levETIRAcetam 1000 MG tablet  Commonly known as:  KEPPRA      Take 1,000 mg by mouth 2 (Two) Times a Day.       LORazepam 0.5  MG tablet  Commonly known as:  ATIVAN      Take 1 tablet by mouth Every 8 (Eight) Hours As Needed for Anxiety.       Morphine 100 MG 12 hr tablet  Commonly known as:  MS CONTIN      Take 1 tablet by mouth Daily.       O2  Commonly known as:  OXYGEN      Inhale 2 L/min Every Night.       ondansetron 4 MG tablet  Commonly known as:  ZOFRAN      Take 1 tablet by mouth Every 6 (Six) Hours As Needed for Nausea or Vomiting.       pravastatin 40 MG tablet  Commonly known as:  PRAVACHOL      Take 40 mg by mouth Daily.       Proventil  (90 Base) MCG/ACT inhaler  Generic drug:  albuterol sulfate HFA      Inhale 2 puffs Every 4 (Four) Hours As Needed for Wheezing.       sucralfate 1 GM/10ML suspension  Commonly known as:  CARAFATE      Take 10 mL by mouth 2 (Two) Times a Day.       Synthroid 175 MCG tablet  Generic drug:  levothyroxine      Take 175 mcg by mouth daily.       Trintellix 20 MG tablet  Generic drug:  Vortioxetine HBr      Take 20 mg by mouth Daily.       Velphoro 500 MG chewable tablet  Generic drug:  Sucroferric Oxyhydroxide      Chew 1 tablet 3 (Three) Times a Day With Meals. 1 TABLET WITH SNACKS       vitamin D 1.25 MG (81314 UT) capsule capsule  Commonly known as:  ERGOCALCIFEROL      Take 50,000 Units by mouth Every 30 (Thirty) Days.       warfarin 4 MG tablet  Commonly known as:  COUMADIN      Take 1 tablet by mouth Every Evening. take 8 mg tomorrow and resume previous dose Sunday and check INR Monday 3/4/19 with dialysis          STOP taking these medications    loratadine 10 MG tablet  Commonly known as:  CLARITIN                   Medication Reconciliation: Please see electronically completed Med Rec.    Total time spent discharging patient less than 30 minutes.    Signed:  Minh Malloy MD  3/24/2020  12:52

## 2020-03-24 NOTE — CONSULTS
[facility]                                                                                             Nephrology Initial Consult Note    Patient Identification:  Name: Armando Gill MRN: [id number]  Age: 59 y.o. : 1960  Sex: male  Date:3/24/2020    Requesting Physician: As per consult order.  Reason for Consultation: End-stage renal disease, critical hyperkalemia  Information from:patient/ family/ chart      History of Present Illness: This is a 59 y.o. year old male with a history of end-stage renal disease, on dialysis  and [facility] under the care of my partner Dr. Howard Morel.  He states has been compliant with his outpatient dialysis schedule, diet and fluid restriction has not missed any recent dialysis treatments and reports no problems with his dialysis access.  He was admitted last night through the ER with possible seizure at home, no prior seizure history, increasing confusion, previously described as acting normal per family though there is no family available currently.  He is on Keppra at home and was compliant with this, on oxygen at home but has not required it while he is here.  He did receive a full dialysis treatment early this morning, just finished, to repeat potassium levels have been less than 4.5.  He feels well no additional seizures, no fevers chills shortness of breath chest pain cough or congestion, he is very anxious to go home.      The following medical history and medications personally reviewed by me:    Problem List:   Patient Active Problem List    Diagnosis   • Hyperkalemia [E87.5]   • Hypoxia [R09.02]   • Volume overload [E87.70]   • Nausea & vomiting [R11.2]   • Epigastric abdominal pain [R10.13]   • Acute hyperkalemia [E87.5]   • Cerebral atrophy (CMS/HCC) [G31.9]   • Elevated blood uric acid level [E79.0]   • CAD in native  artery [I25.10]   • CKD (chronic kidney disease) [N18.9]   • DM type 2 (diabetes mellitus, type 2) (CMS/HCC) [E11.9]   • Dyslipidemia [E78.5]   • Essential hypertension [I10]   • Fatigue [R53.83]   • Hyperlipidemia LDL goal <70 [E78.5]   • Hyperprolactinemia (CMS/HCC) [E22.1]   • 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]   • Adult hypothyroidism [E03.9]   • Neoplasm of pituitary gland [D49.7]   • On home oxygen therapy [Z99.81]   • SHABNAM (obstructive sleep apnea) [G47.33]   • Pulmonary hypertension (CMS/HCC) [I27.20]   • Avitaminosis D [E55.9]   • Seizure (CMS/HCC) [R56.9]   • Hyponatremia [E87.1]   • Opioid dependence (CMS/HCC) [F11.20]   • Chronic pain syndrome [G89.4]   • Bacteremia due to Streptococcus [R78.81, B95.5]   • Streptococcal arthritis of right wrist (CMS/HCC) [M00.231]   • Fever [R50.9]   • Sepsis (CMS/HCC) [A41.9]   • Nausea and vomiting [R11.2]   • Atrial fibrillation (CMS/HCC) [I48.91]   • Long term current use of anticoagulant [Z79.01]   • Acute pain of right wrist [M25.531]   • DNR (do not resuscitate) [Z66]   • Asthma [J45.909]   • Secondary hyperparathyroidism of renal origin (CMS/HCC) [N25.81]   • End stage renal disease (CMS/HCC) [N18.6]   • Normal cardiac stress test [[id number]]   • Diastolic CHF, chronic (CMS/HCC) [I50.32]   • Echocardiogram abnormal [R93.1]   • Uremia [N19]   • Uremic encephalopathy [G93.41, N19]   • Morbid obesity with BMI of 45.0-49.9, adult (CMS/HCC) [E66.01, Z68.42]   • Other complications due to renal dialysis device, implant, and graft [T82.898A]   • DVT prophylaxis [Z29.9]   • Pyelonephritis [N12]   • Pancreatitis [K85.90]   • Hyposmolality and/or hyponatremia [E87.1]   • Anemia in chronic renal disease [N18.9, D63.1]   • Benign hypertensive kidney disease with chronic kidney disease stage I through stage IV, or unspecified(403.10) [I12.9]   • CEE (nonalcoholic steatohepatitis) [K75.81]   • Respiratory failure (CMS/HCC) [J96.90]       Past Medical History:  Past Medical History:    Diagnosis Date   • Anxiety    • Arthritis    • Asthma    • Asymptomatic hyperuricemia    • Atrial fibrillation (CMS/HCC)    • Callus of foot     LEFT FOOT , PRESENTLY ATTEND[facility]   • Cancer of pituitary gland (CMS/HCC)    • Depression    • Diabetes mellitus (CMS/HCC)     type 2, A1C of 5 1 month ago by patient report   • Dialysis patient (CMS/HCC)     MON WED FRI   • Dyslipidemia    • End stage renal disease (CMS/HCC)    • Gout    • History of anemia    • History of colon polyps    • Hyperparathyroidism (CMS/HCC)    • Hyperprolactinemia (CMS/HCC)    • Hypertension    • Hypogonadism, male    • Hypothyroidism    • Male erectile disorder of organic origin    • Neuropathy    • Obstructive sleep apnea    • Presence of arterial-venous shunt (for dialysis) (CMS/HCC)    • Seizure disorder (CMS/HCC)      2 YRS AGO   • Vitamin D deficiency disease        Past Surgical History:  Past Surgical History:   Procedure Laterality Date   • CATARACT EXTRACTION Bilateral 2009   • CHOLECYSTECTOMY  2002    Performed in [place]   • COLONOSCOPY W/ POLYPECTOMY N/A 11/2015    Dr. Bolivar   • INCISION AND DRAINAGE ARM Right 6/26/2018    Procedure: RIGHT WRIST INCISION AND DRAINAGE;  Surgeon: Wilian Arredondo MD;  Location: [facility];  Service: Hand   • INGUINAL HERNIA REPAIR Left 2002    Open Inguinal   • THYROIDECTOMY Right 1/6/2017    Procedure: FOUR GLAND PARATHYROIDECTOMY WITH AUTOTRANSPLANT INTO RIGHT FOREARM, INTRA-OPERATIVE PTH MEASUREMENT, PARTIAL THYMECTOMY;  Surgeon: Freida Morfin MD;  Location: [facility];  Service:    • THYROIDECTOMY N/A 8/2/2017    Procedure: left thyroid lobectomy, Left superior parathyroidectomy with autotransplant into right forearm, intra-operative pth monitoring;  Surgeon: Freida Morfin MD;  Location: [facility];  Service:    • TRACHEOSTOMY N/A 10/22/2007    W/ REVISION OF THYROID ISTHMUS, DUE TO SLEEP APNEA; ALLOWED TO GROW CLOSED AFTER ONE YEAR DUE TO LACK OF RELIEF  OF SLEEP APNEA SYMPTOMS, DR. MAXIMO BELL   • TRACHEOSTOMY N/A 05/21/2009    REVISION, DR. MAXIMO BELL   • TRACHEOSTOMY N/A 04/25/2009    REVISION, DR. MAXIMO BELL   • VASCULAR SURGERY Left 2014    Acess in Left Arm-Dr. Guzman        Home Meds:   Medications Prior to Admission   Medication Sig Dispense Refill Last Dose   • albuterol sulfate HFA (PROVENTIL HFA) 108 (90 Base) MCG/ACT inhaler Inhale 2 puffs Every 4 (Four) Hours As Needed for Wheezing.   2/27/2019 at Unknown time   • ARIPiprazole (ABILIFY) 2 MG tablet Take 2 mg by mouth Every Evening.   2/26/2019 at Unknown time   • aspirin 81 MG EC tablet Take 81 mg by mouth Daily.   2/27/2019 at Unknown time   • budesonide-formoterol (SYMBICORT) 160-4.5 MCG/ACT inhaler Inhale 2 puffs 2 (Two) Times a Day.   2/27/2019 at Unknown time   • Glycopyrrolate-Formoterol (BEVESPI AEROSPHERE) 9-4.8 MCG/ACT aerosol Inhale 1 puff 2 (Two) Times a Day.   2/27/2019 at Unknown time   • hydrALAZINE (APRESOLINE) 25 MG tablet Take 25 mg by mouth 2 (Two) Times a Day.   2/27/2019 at Unknown time   • ketoconazole (NIZORAL) 2 % cream Apply 1 application topically to the appropriate area as directed Daily.   2/27/2019 at Unknown time   • lanthanum (FOSRENOL) 1000 MG chewable tablet Chew 3,000 mg 3 (Three) Times a Day With Meals. 2 TABLETS WITH SNACKS   Past Month at Unknown time   • levETIRAcetam (KEPPRA) 1000 MG tablet Take 1,000 mg by mouth 2 (Two) Times a Day.   2/27/2019 at Unknown time   • loratadine (CLARITIN) 10 MG tablet Take 10 mg by mouth Daily.   2/27/2019 at Unknown time   • LORazepam (ATIVAN) 0.5 MG tablet Take 1 tablet by mouth Every 8 (Eight) Hours As Needed for Anxiety. 9 tablet 0 2/26/2019 at Unknown time   • Morphine (MS CONTIN) 100 MG 12 hr tablet Take 1 tablet by mouth Daily. 3 tablet 0 2/27/2019 at Unknown time   • O2 (OXYGEN) Inhale 2 L/min Every Night.      • ondansetron (ZOFRAN) 4 MG tablet Take 1 tablet by mouth Every 6 (Six) Hours As Needed for Nausea or Vomiting. 10  tablet 0 More than a month at Unknown time   • pravastatin (PRAVACHOL) 40 MG tablet Take 40 mg by mouth Daily.   2/27/2019 at Unknown time   • sucralfate (CARAFATE) 1 GM/10ML suspension Take 10 mL by mouth 2 (Two) Times a Day. 420 mL 0 Past Month at Unknown time   • Sucroferric Oxyhydroxide (VELPHORO) 500 MG chewable tablet Chew 1 tablet 3 (Three) Times a Day With Meals. 1 TABLET WITH SNACKS   More than a month at Unknown time   • SYNTHROID 175 MCG tablet Take 175 mcg by mouth daily.   2/27/2019 at Unknown time   • vitamin D (ERGOCALCIFEROL) 32864 units capsule capsule Take 50,000 Units by mouth Every 30 (Thirty) Days.   Taking   • Vortioxetine HBr (TRINTELLIX) 20 MG tablet Take 20 mg by mouth Daily.   2/27/2019 at Unknown time   • warfarin (COUMADIN) 4 MG tablet Take 1 tablet by mouth Every Evening. take 8 mg tomorrow and resume previous dose Sunday and check INR Monday 3/4/19 with dialysis          Current Meds:   Current Facility-Administered Medications   Medication Dose Route Frequency Provider Last Rate Last Dose   • acetaminophen (TYLENOL) tablet 650 mg  650 mg Oral Q6H PRN Ethan Marin MD   650 mg at 03/24/20 0428   • albumin human 25 % IV SOLN 12.5 g  12.5 g Intravenous PRN Jacob Velásquez MD   12.5 g at 03/24/20 0657   • ARIPiprazole (ABILIFY) tablet 2 mg  2 mg Oral Q PM Ethan Marin MD       • aspirin EC tablet 81 mg  81 mg Oral Daily Ethan Marin MD   81 mg at 03/24/20 0954   • bisacodyl (DULCOLAX) EC tablet 5 mg  5 mg Oral Daily PRN Ethan Marin MD       • bisacodyl (DULCOLAX) suppository 10 mg  10 mg Rectal Daily PRN Ethan Marin MD       • budesonide-formoterol (SYMBICORT) 160-4.5 MCG/ACT inhaler 2 puff  2 puff Inhalation BID - RT Ethan Marin MD   2 puff at 03/24/20 0838   • cetirizine (zyrTEC) tablet 10 mg  10 mg Oral Daily Ethan Marin MD   10 mg at 03/24/20 0954   • dextrose (D50W) 25 g/ 50mL Intravenous Solution 25 g  25 g Intravenous Q15 Min PRN Ethan Marin MD        • dextrose (GLUTOSE) oral gel 15 g  15 g Oral Q15 Min PRN Ethan Marin MD       • glucagon (human recombinant) (GLUCAGEN DIAGNOSTIC) injection 1 mg  1 mg Subcutaneous Q15 Min PRN Ethan Marin MD       • hydrALAZINE (APRESOLINE) tablet 25 mg  25 mg Oral BID Ethan Marin MD       • insulin lispro (humaLOG) injection 0-9 Units  0-9 Units Subcutaneous 4x Daily With Meals & Nightly Ethan Marin MD       • levETIRAcetam (KEPPRA) tablet 1,000 mg  1,000 mg Oral Q12H Ethan Marin MD   1,000 mg at 03/24/20 0615   • levothyroxine (SYNTHROID, LEVOTHROID) tablet 175 mcg  175 mcg Oral Q AM Ethan Marin MD   175 mcg at 03/24/20 0615   • LORazepam (ATIVAN) tablet 0.5 mg  0.5 mg Oral Q8H PRN Ethan Marin MD   0.5 mg at 03/24/20 0338   • nitroglycerin (NITROSTAT) SL tablet 0.4 mg  0.4 mg Sublingual Q5 Min PRN Ethan Marin MD       • O2 (OXYGEN)  2 L/min Inhalation Nightly Ethan Marin MD   2 L/min at 03/24/20 0229   • ondansetron (ZOFRAN) tablet 4 mg  4 mg Oral Q6H PRN Ethan Marin MD        Or   • ondansetron (ZOFRAN) injection 4 mg  4 mg Intravenous Q6H PRN Ethan Marin MD       • ondansetron (ZOFRAN) tablet 4 mg  4 mg Oral Q6H PRN Etahn Marin MD       • Pharmacy to dose warfarin   Does not apply Continuous PRN Ethan Marin MD       • sodium chloride 0.9 % flush 10 mL  10 mL Intravenous Q12H Ethan Marin MD   10 mL at 03/24/20 0957   • sodium chloride 0.9 % flush 10 mL  10 mL Intravenous PRN Ethan Marin MD       • sucralfate (CARAFATE) 1 GM/10ML suspension 1 g  1 g Oral BID Ethan Marin MD   1 g at 03/24/20 0954   • [START ON 3/25/2020] warfarin (COUMADIN) tablet 4 mg  4 mg Oral Once per day on Sun Mon Wed Thu Ethan Rodríguez MD       • warfarin (COUMADIN) tablet 5 mg  5 mg Oral Once per day on Tue Sat Ethan Marin MD           Allergies:  Allergies   Allergen Reactions   • Adhesive Tape Itching and Other (See Comments)     Patient reports skin tears    • Latex Rash   •  "Sulfa Antibiotics Other (See Comments)     Causes headaches         Social History:   Social History     Socioeconomic History   • Marital status:      Spouse name: Not on file   • Number of children: Not on file   • Years of education: Not on file   • Highest education level: Not on file   Tobacco Use   • Smoking status: Never Smoker   • Smokeless tobacco: Never Used   Substance and Sexual Activity   • Alcohol use: No   • Drug use: No   • Sexual activity: Defer        Family History:  Family History   Problem Relation Age of Onset   • Heart disease Sister    • Heart disease Father    • Kidney disease Neg Hx    • Malig Hyperthermia Neg Hx         Review of Systems: as per HPI, in addition:    General:      Denies weakness / fatigue,                       No fevers / chills                       no weight loss  HEENT:       no dysphagia / odynophagia  Neck:           normal range of motion, no swelling  Respiratory: no cough / congestion                      No shortness of air                       No wheezing  CV:              No chest pain                       No palpitations  Abdomen/GI: no nausea / vomiting                      No diarrhea / constipation                      No abdominal pain  :             no dysuria / urinary frequency                       No urgency, normal output  Endocrine:   no polyuria / polydipsia,                      No heat or cold intolerance  Skin:           no rashes or skin breakdown   Vascular:   No edema                     No claudication  Psych:        no depression/ anxiety  Neuro:        no focal weakness, no seizures  Musculoskeletal: no joint pain or deformities      Physical Exam:  Vitals:   Temp (24hrs), Av.2 °F (36.8 °C), Min:97.9 °F (36.6 °C), Max:98.7 °F (37.1 °C)    /59   Pulse 90   Temp 98 °F (36.7 °C) (Oral)   Resp 20   Ht 175.3 cm (69\")   Wt 124 kg (273 lb 5.9 oz)   SpO2 (!) 88%   BMI 40.37 kg/m²   Intake/Output:     Intake/Output " Summary (Last 24 hours) at 3/24/2020 1305  Last data filed at 3/24/2020 0510  Gross per 24 hour   Intake 500 ml   Output 2000 ml   Net -1500 ml        Wt Readings from Last 1 Encounters:   03/24/20 0500 124 kg (273 lb 5.9 oz)   03/23/20 2200 124 kg (273 lb 5.9 oz)   03/23/20 1847 136 kg (300 lb)       Exam:    General Appearance:  Awake, alert, oriented x3, no acute distress  Well-appearing   Head and Face:  Normocephalic, atraumatic, mucus membranes moist, oropharynx clear   Eyes:  No icterus, pupils equal round and reactive to light, extraocular movements intact    ENMT: Moist mucosa, tongue symmetric    Neck: Supple  no jugular venous distention  no thyromegaly   Pulmonary:  Respiratory effort: Normal  Auscultation of lungs: Clear bilaterally  No wheezes  No rhonchi  Good air movement, good expansion   Chest wall:  No tenderness or deformity   Cardiovascular:  Auscultation of the heart: Normal rhythm, no murmurs  Trace edema of bilateral lower extremities   Abdomen:  Abdomen: soft, non-tender, normal bowel sounds all four quadrants, no masses   Liver and spleen: no hepatosplenomegaly   Musculoskeletal: Digits and nails: normal  Normal range of motion  No joint swelling or gross deformities    Skin: Skin inspection: color normal, no visible rashes or lesions  Skin palpation: texture, turgor normal, no palpable lesions   Lymphatic:  no cervical lymphadenopathy    Psychiatric: Judgement and insight: normal  Orientation to person place and time: normal  Mood and affect: normal       DATA:  Radiology and Labs:  The following labs independently reviewed by me, additional AM labs ordered  Old records independently reviewed showing ESRD  The following radiologic studies independently viewed by me, findings chest x-ray no active disease, CT head no acute intracranial hemorrhage  Interval notes, chart personally reviewed by me.    I have reviewed and summation of old records with Dr. Malloy  New problems include severe  hyperkalemia    Dialysis PT access: AV fistula    Risk/ complexity of medical care/ medical decision making:  High:  Chronic illness with severe exacerbation or progression of severe hyperkalemia requiring emergent dialysis and ICU admission      Labs:   Recent Results (from the past 24 hour(s))   Comprehensive Metabolic Panel    Collection Time: 03/23/20  7:10 PM   Result Value Ref Range    Glucose 181 (H) 65 - 99 mg/dL    BUN 76 (H) 6 - 20 mg/dL    Creatinine 7.33 (H) 0.76 - 1.27 mg/dL    Sodium 137 136 - 145 mmol/L    Potassium 7.1 (C) 3.5 - 5.2 mmol/L    Chloride 96 (L) 98 - 107 mmol/L    CO2 24.4 22.0 - 29.0 mmol/L    Calcium 7.6 (L) 8.6 - 10.5 mg/dL    Total Protein 7.3 6.0 - 8.5 g/dL    Albumin 4.70 3.50 - 5.20 g/dL    ALT (SGPT) 34 1 - 41 U/L    AST (SGOT) 26 1 - 40 U/L    Alkaline Phosphatase 171 (H) 39 - 117 U/L    Total Bilirubin 0.3 0.2 - 1.2 mg/dL    eGFR Non African Amer 8 (L) >60 mL/min/1.73    eGFR  African Amer      Globulin 2.6 gm/dL    A/G Ratio 1.8 g/dL    BUN/Creatinine Ratio 10.4 7.0 - 25.0    Anion Gap 16.6 (H) 5.0 - 15.0 mmol/L   Troponin    Collection Time: 03/23/20  7:10 PM   Result Value Ref Range    Troponin T 0.055 (C) 0.000 - 0.030 ng/mL   Protime-INR    Collection Time: 03/23/20  7:10 PM   Result Value Ref Range    Protime 20.3 (H) 11.7 - 14.2 Seconds    INR 1.77 (H) 0.90 - 1.10   BNP    Collection Time: 03/23/20  7:10 PM   Result Value Ref Range    proBNP 30,670.0 (H) 5.0 - 900.0 pg/mL   CBC Auto Differential    Collection Time: 03/23/20  7:10 PM   Result Value Ref Range    WBC 10.34 3.40 - 10.80 10*3/mm3    RBC 3.60 (L) 4.14 - 5.80 10*6/mm3    Hemoglobin 11.8 (L) 13.0 - 17.7 g/dL    Hematocrit 35.4 (L) 37.5 - 51.0 %    MCV 98.3 (H) 79.0 - 97.0 fL    MCH 32.8 26.6 - 33.0 pg    MCHC 33.3 31.5 - 35.7 g/dL    RDW 13.3 12.3 - 15.4 %    RDW-SD 47.3 37.0 - 54.0 fl    MPV 9.5 6.0 - 12.0 fL    Platelets 196 140 - 450 10*3/mm3    Neutrophil % 87.1 (H) 42.7 - 76.0 %    Lymphocyte % 5.4 (L) 19.6  - 45.3 %    Monocyte % 5.7 5.0 - 12.0 %    Eosinophil % 0.7 0.3 - 6.2 %    Basophil % 0.6 0.0 - 1.5 %    Immature Grans % 0.5 0.0 - 0.5 %    Neutrophils, Absolute 9.01 (H) 1.70 - 7.00 10*3/mm3    Lymphocytes, Absolute 0.56 (L) 0.70 - 3.10 10*3/mm3    Monocytes, Absolute 0.59 0.10 - 0.90 10*3/mm3    Eosinophils, Absolute 0.07 0.00 - 0.40 10*3/mm3    Basophils, Absolute 0.06 0.00 - 0.20 10*3/mm3    Immature Grans, Absolute 0.05 0.00 - 0.05 10*3/mm3    nRBC 0.1 0.0 - 0.2 /100 WBC   Blood Gas, Arterial    Collection Time: 03/23/20  7:31 PM   Result Value Ref Range    Site Arterial: left radial     Hebert's Test Positive     pH, Arterial 7.249 (C) 7.350 - 7.450 pH units    pCO2, Arterial 65.2 (C) 35.0 - 45.0 mm Hg    pO2, Arterial 148.3 (H) 80.0 - 100.0 mm Hg    HCO3, Arterial 28.5 (H) 22.0 - 28.0 mmol/L    Base Excess, Arterial 0.0 0.0 - 2.0 mmol/L    O2 Saturation Calculated 98.8 92.0 - 99.0 %    Barometric Pressure for Blood Gas 751.6 mmHg    Modality Cannula     Flow Rate 2 lpm    Rate 18 Breaths/minute   POC Glucose Once    Collection Time: 03/23/20 10:02 PM   Result Value Ref Range    Glucose 194 (H) 70 - 130 mg/dL   Hepatitis B Surface Antibody    Collection Time: 03/23/20 11:53 PM   Result Value Ref Range    Hep B S Ab Reactive (A) Non-Reactive   Hepatitis B Surface Antigen    Collection Time: 03/23/20 11:53 PM   Result Value Ref Range    Hepatitis B Surface Ag Non-Reactive Non-Reactive   Hep B Confirmation Tube    Collection Time: 03/23/20 11:53 PM   Result Value Ref Range    Extra Tube Hold for add-ons.    Potassium    Collection Time: 03/23/20 11:53 PM   Result Value Ref Range    Potassium 7.1 (C) 3.5 - 5.2 mmol/L   Protime-INR    Collection Time: 03/24/20  6:21 AM   Result Value Ref Range    Protime 19.2 (H) 11.7 - 14.2 Seconds    INR 1.65 (H) 0.90 - 1.10   Basic Metabolic Panel    Collection Time: 03/24/20  6:21 AM   Result Value Ref Range    Glucose 143 (H) 65 - 99 mg/dL    BUN 36 (H) 6 - 20 mg/dL     Creatinine 4.07 (H) 0.76 - 1.27 mg/dL    Sodium 136 136 - 145 mmol/L    Potassium 4.2 3.5 - 5.2 mmol/L    Chloride 95 (L) 98 - 107 mmol/L    CO2 22.7 22.0 - 29.0 mmol/L    Calcium 8.2 (L) 8.6 - 10.5 mg/dL    eGFR Non African Amer 15 (L) >60 mL/min/1.73    BUN/Creatinine Ratio 8.8 7.0 - 25.0    Anion Gap 18.3 (H) 5.0 - 15.0 mmol/L   CBC (No Diff)    Collection Time: 03/24/20  6:21 AM   Result Value Ref Range    WBC 10.66 3.40 - 10.80 10*3/mm3    RBC 3.55 (L) 4.14 - 5.80 10*6/mm3    Hemoglobin 11.4 (L) 13.0 - 17.7 g/dL    Hematocrit 34.6 (L) 37.5 - 51.0 %    MCV 97.5 (H) 79.0 - 97.0 fL    MCH 32.1 26.6 - 33.0 pg    MCHC 32.9 31.5 - 35.7 g/dL    RDW 13.2 12.3 - 15.4 %    RDW-SD 46.6 37.0 - 54.0 fl    MPV 9.4 6.0 - 12.0 fL    Platelets 187 140 - 450 10*3/mm3   POC Glucose Once    Collection Time: 03/24/20  6:24 AM   Result Value Ref Range    Glucose 137 (H) 70 - 130 mg/dL   POC Glucose Once    Collection Time: 03/24/20  7:31 AM   Result Value Ref Range    Glucose 127 70 - 130 mg/dL   Potassium    Collection Time: 03/24/20 11:44 AM   Result Value Ref Range    Potassium 4.4 3.5 - 5.2 mmol/L   POC Glucose Once    Collection Time: 03/24/20 11:51 AM   Result Value Ref Range    Glucose 95 70 - 130 mg/dL       Radiology:  Imaging Results (Last 24 Hours)     Procedure Component Value Units Date/Time    CT Head Without Contrast [[id number]] Collected:  03/23/20 2017     Updated:  03/23/20 2025    Narrative:       CT HEAD WO CONTRAST-     INDICATIONS: Seizure, confusion, anticoagulated     TECHNIQUE: Radiation dose reduction techniques were utilized, including  automated exposure control and exposure modulation based on body size.  Noncontrast head CT     COMPARISON: 10/07/2018     FINDINGS:           No acute intracranial hemorrhage, midline shift or mass effect. Chronic  left parafalcine calcification. No acute territorial infarct is  identified.     Mild periventricular hypodensities suggest chronic small vessel  ischemic  change in a patient this age.     Arterial calcifications are seen at the base of the brain.     Ventricles, cisterns, cerebral sulci are unremarkable for patient age.     The visualized paranasal sinuses, orbits, mastoid air cells are  unremarkable.                   Impression:          No acute intracranial hemorrhage or hydrocephalus. The cause of  patient's seizure was not identified. If there is further clinical  concern, MRI could be considered for further evaluation.     This report was finalized on 3/23/2020 8:22 PM by Dr. Rah Cervantes M.D.       XR Chest 1 View [[id number]] Collected:  03/23/20 1953     Updated:  03/23/20 2019    Narrative:       EMERGENCY PORTABLE VIEW OF THE CHEST 03/23/2020     CLINICAL HISTORY: Cough, low oxygen saturations. The patient is feeling  weak and unwell.     This is correlated to PA and lateral chest x-ray from [facility] 02/27/2019.     FINDINGS: Cardiomediastinal silhouette is mildly enlarged pulmonary  vasculature is within normal limits. The lungs are clear. The  costophrenic angles are sharp.       Impression:       1. No active disease is seen in the chest with no significant change  when compared to prior chest x-ray 02/27/2019. There is enlargement of  the cardiomediastinal silhouette and there are clips in the right and  left paratracheal region lower neck likely from prior thyroidectomy and  please correlate with surgical history.     This report was finalized on 3/23/2020 8:15 PM by Dr. Toney Hendricks M.D.                  ASSESSMENT:   End-stage renal disease on dialysis Tuesday Thursday Saturday  Severe hyperkalemia, improved after dialysis  Seizure  Diabetes type 2  Hypothyroidism  Obesity  Hypertension  Acute respiratory acidosis  Anemia of chronic kidney disease      PLAN:   He received emergent hemodialysis early this morning, repeat potassium has been 4.2 and 4.4  Stable for discharge from a renal standpoint  Patient advised to  follow-up at his usual dialysis center on Thursday to resume his normal schedule  No need for additional dialysis at this time today  Resume Epogen for anemia at dialysis  Resume home meds      I appreciate the consult request, please call if any questions regarding discharge      Gurpreet Bedoya M.D  [facility]  Office phone number: [phone]  Answering service phone number: [phone]        3/24/2020        Dictation via Dragon dictation software

## 2020-03-24 NOTE — NURSING NOTE
Arrived to do STAT hemodialysis treatment, immediately received a call for another STAT treatment on another unit at this hospital that is more acutely ill and will need to be completed first. Spoke to Dr Velásquez who was understanding of the situation. Will get this patient treatment done as soon as possible.

## 2020-03-24 NOTE — PROGRESS NOTES
Discharge Planning Assessment  [facility]     Patient Name: Armando Gill  MRN: [id number]  Today's Date: 3/24/2020    Admit Date: 3/23/2020    Discharge Needs Assessment     Row Name 03/24/20 1419       Living Environment    Lives With  spouse;child(valdez), adult    Current Living Arrangements  home/apartment/condo    Potentially Unsafe Housing Conditions  unable to assess    Primary Care Provided by  self    Provides Primary Care For  no one    Family Caregiver if Needed  spouse    Able to Return to Prior Arrangements  yes       Resource/Environmental Concerns    Resource/Environmental Concerns  none    Transportation Concerns  car, none       Transition Planning    Patient/Family Anticipates Transition to  home with family    Patient/Family Anticipated Services at Transition  none    Transportation Anticipated  family or friend will provide       Discharge Needs Assessment    Readmission Within the Last 30 Days  unable to assess    Concerns to be Addressed  discharge planning    Equipment Currently Used at Home  none    Anticipated Changes Related to Illness  none    Equipment Needed After Discharge  none        Discharge Plan     Row Name 03/24/20 1419       Plan    Plan  Home no needs    Plan Comments  IMM noted CCP spoke to patient at bedside to discuss d/c planning. Face sheet verified. CCP role explained. Pt emergency contact is his wife Roxie, [phone].  Pt primary care provider is Dr. Latasha Abarca.   Patient lives in a house with his wife.   He is independent with ADL's.  He uses no DME to ambulate.  He has no past history of Home Health.  He has no history of rehab stay. He goes to [facility] in [place] Tuesday, Thursday, Saturday. He obtains his medications from [facility] in [facility].  Plan is home no needs  CCP following        Destination      Coordination has not been started for this encounter.      Durable Medical Equipment      Coordination has not been started for this  encounter.      Dialysis/Infusion      Coordination has not been started for this encounter.      Home Medical Care      Coordination has not been started for this encounter.      Therapy      Coordination has not been started for this encounter.      Community Resources      Coordination has not been started for this encounter.        Expected Discharge Date and Time     Expected Discharge Date Expected Discharge Time    Mar 24, 2020         Demographic Summary    No documentation.       Functional Status    No documentation.       Psychosocial    No documentation.       Abuse/Neglect    No documentation.       Legal    No documentation.       Substance Abuse    No documentation.       Patient Forms    No documentation.           Jaimie Raygoza RN

## 2020-03-24 NOTE — H&P
"  History and Physical    Patient Name: Armando Gill  Age/Sex: 59 y.o. male  : 1960  MRN: [id number]    Date of Admission: 3/23/2020  Date of Encounter Visit: 20  Encounter Provider: Ethan Marin MD  Place of Service: [facility]   Patient Care Team:  Luis Antonio Abarca MD as PCP - General (Family Medicine)      Subjective:     Chief Complaint: Seizure    History of Present Illness:  Armando Gill is a 59 y.o. male who presented to the hospital after a seizure at home with no history of seizure before.  Patient was acting abnormally with worsening confusion over the last several hours according to the family, last time he was acting normal was around 3 PM.  The seizure lasted for only few minutes and it was described as an absence seizure with his eyes \"bugging out\".  Patient did not fall to the ground, no trauma to the head, no tonic-clonic activity.  He had a post ictal depression-like phase.  Patient is on Keppra already at 1000 mg twice a day and according to the family he has been compliant with his medication.  He had end-stage renal disease on hemodialysis that he gets on  and , and he had not missed any dialysis session including this past Saturday.  Patient has history of nocturnal hypoxemia on nocturnal oxygen but no need for any oxygen requirements during the daytime.  Work-up in the ER showed hyperkalemia, patient was also in acute respiratory failure  The chest x-ray showed some low lung volume, and some prominent central vasculature with cardiomegaly.  CT of the head was independently reviewed, patient has no evidence of any intracranial hemorrhage or acute stroke or edema or midline shift with overall no acute disease.  His chemistry was positive for hyperkalemia with a potassium of 7.1, creatinine was 7.33.  He had respiratory acidosis pH 7.25 with a PCO2 of 65, PO2 was elevated at 148 on 2 L nasal cannula oxygen and the oxygen was discontinued " afterwards.  Troponin slightly elevated because of the renal failure BNP however was very elevated at 30,670.  White count was normal with some left shift.  The patient has no recollection of the event prior to presentation, he does report that his seizure is usually up sounds like in nature with no tonic-clonic activity.  His last seizure before this 1 was more than 2 months ago  No fever or chills or nausea or vomiting or abdominal pain  He did have some dry cough  Patient reported that he was eating little more than usual potatoes otherwise no significant changes in his diet.    Pulmonary Functions Testing Results:    No results found for: FEV1, FVC, FML7UTW, TLC, DLCO    Review of Systems:   Review of Systems  Constitutional: Negative.  Negative for fever.   HENT: Negative for sore throat.    Eyes: Negative.    Respiratory: Positive for cough (Occasional dry cough).    Cardiovascular: Negative.  Negative for chest pain.   Gastrointestinal: Negative.    Genitourinary: Negative.  Negative for dysuria.   Musculoskeletal: Negative.  Negative for back pain.   Skin: Negative.  Negative for rash.   Neurological: Positive for seizures and headaches (Occasional mild headache).   Psychiatric/Behavioral: Positive for confusion (Per wife see HPI).   All other systems reviewed and are negative.      Past Medical History:  Past Medical History:   Diagnosis Date   • Anxiety    • Arthritis    • Asthma    • Asymptomatic hyperuricemia    • Atrial fibrillation (CMS/HCC)    • Callus of foot     LEFT FOOT , PRESENTLY ATTENDING WOUND CLINIC ATE E   • Cancer of pituitary gland (CMS/HCC)    • Depression    • Diabetes mellitus (CMS/HCC)     type 2, A1C of 5 1 month ago by patient report   • Dialysis patient (CMS/HCC)     MON WED FRI   • Dyslipidemia    • End stage renal disease (CMS/HCC)    • Gout    • History of anemia    • History of colon polyps    • Hyperparathyroidism (CMS/HCC)    • Hyperprolactinemia (CMS/HCC)    • Hypertension     • Hypogonadism, male    • Hypothyroidism    • Male erectile disorder of organic origin    • Neuropathy    • Obstructive sleep apnea    • Presence of arterial-venous shunt (for dialysis) (CMS/HCC)    • Seizure disorder (CMS/HCC)      2 YRS AGO   • Vitamin D deficiency disease        Past Surgical History:   Procedure Laterality Date   • CATARACT EXTRACTION Bilateral 2009   • CHOLECYSTECTOMY  2002    Performed in [place]   • COLONOSCOPY W/ POLYPECTOMY N/A 11/2015    Dr. Bolivar   • INCISION AND DRAINAGE ARM Right 6/26/2018    Procedure: RIGHT WRIST INCISION AND DRAINAGE;  Surgeon: Wilian Arredondo MD;  Location: [facility];  Service: Hand   • INGUINAL HERNIA REPAIR Left 2002    Open Inguinal   • THYROIDECTOMY Right 1/6/2017    Procedure: FOUR GLAND PARATHYROIDECTOMY WITH AUTOTRANSPLANT INTO RIGHT FOREARM, INTRA-OPERATIVE PTH MEASUREMENT, PARTIAL THYMECTOMY;  Surgeon: Freida Morfin MD;  Location: [facility];  Service:    • THYROIDECTOMY N/A 8/2/2017    Procedure: left thyroid lobectomy, Left superior parathyroidectomy with autotransplant into right forearm, intra-operative pth monitoring;  Surgeon: Freida Morfin MD;  Location: [facility];  Service:    • TRACHEOSTOMY N/A 10/22/2007    W/ REVISION OF THYROID ISTHMUS, DUE TO SLEEP APNEA; ALLOWED TO GROW CLOSED AFTER ONE YEAR DUE TO LACK OF RELIEF OF SLEEP APNEA SYMPTOMS, DR. MAXIMO BELL   • TRACHEOSTOMY N/A 05/21/2009    REVISION, DR. MAXIMO BELL   • TRACHEOSTOMY N/A 04/25/2009    REVISION, DR. MAXIMO BELL   • VASCULAR SURGERY Left 2014    Acess in Left Arm-Dr. Guzman       Home Medications:   Medications Prior to Admission   Medication Sig Dispense Refill Last Dose   • albuterol sulfate HFA (PROVENTIL HFA) 108 (90 Base) MCG/ACT inhaler Inhale 2 puffs Every 4 (Four) Hours As Needed for Wheezing.   2/27/2019 at Unknown time   • ARIPiprazole (ABILIFY) 2 MG tablet Take 2 mg by mouth Every Evening.   2/26/2019 at Unknown time   • aspirin 81 MG EC tablet  Take 81 mg by mouth Daily.   2/27/2019 at Unknown time   • budesonide-formoterol (SYMBICORT) 160-4.5 MCG/ACT inhaler Inhale 2 puffs 2 (Two) Times a Day.   2/27/2019 at Unknown time   • Glycopyrrolate-Formoterol (BEVESPI AEROSPHERE) 9-4.8 MCG/ACT aerosol Inhale 1 puff 2 (Two) Times a Day.   2/27/2019 at Unknown time   • hydrALAZINE (APRESOLINE) 25 MG tablet Take 25 mg by mouth 2 (Two) Times a Day.   2/27/2019 at Unknown time   • ketoconazole (NIZORAL) 2 % cream Apply 1 application topically to the appropriate area as directed Daily.   2/27/2019 at Unknown time   • lanthanum (FOSRENOL) 1000 MG chewable tablet Chew 3,000 mg 3 (Three) Times a Day With Meals. 2 TABLETS WITH SNACKS   Past Month at Unknown time   • levETIRAcetam (KEPPRA) 1000 MG tablet Take 1,000 mg by mouth 2 (Two) Times a Day.   2/27/2019 at Unknown time   • loratadine (CLARITIN) 10 MG tablet Take 10 mg by mouth Daily.   2/27/2019 at Unknown time   • LORazepam (ATIVAN) 0.5 MG tablet Take 1 tablet by mouth Every 8 (Eight) Hours As Needed for Anxiety. 9 tablet 0 2/26/2019 at Unknown time   • Morphine (MS CONTIN) 100 MG 12 hr tablet Take 1 tablet by mouth Daily. 3 tablet 0 2/27/2019 at Unknown time   • O2 (OXYGEN) Inhale 2 L/min Every Night.      • ondansetron (ZOFRAN) 4 MG tablet Take 1 tablet by mouth Every 6 (Six) Hours As Needed for Nausea or Vomiting. 10 tablet 0 More than a month at Unknown time   • pravastatin (PRAVACHOL) 40 MG tablet Take 40 mg by mouth Daily.   2/27/2019 at Unknown time   • sucralfate (CARAFATE) 1 GM/10ML suspension Take 10 mL by mouth 2 (Two) Times a Day. 420 mL 0 Past Month at Unknown time   • Sucroferric Oxyhydroxide (VELPHORO) 500 MG chewable tablet Chew 1 tablet 3 (Three) Times a Day With Meals. 1 TABLET WITH SNACKS   More than a month at Unknown time   • SYNTHROID 175 MCG tablet Take 175 mcg by mouth daily.   2/27/2019 at Unknown time   • vitamin D (ERGOCALCIFEROL) 20195 units capsule capsule Take 50,000 Units by mouth Every  30 (Thirty) Days.   Taking   • Vortioxetine HBr (TRINTELLIX) 20 MG tablet Take 20 mg by mouth Daily.   2/27/2019 at Unknown time   • warfarin (COUMADIN) 4 MG tablet Take 1 tablet by mouth Every Evening. take 8 mg tomorrow and resume previous dose Sunday and check INR Monday 3/4/19 with dialysis          Inpatient Medications:  Scheduled Meds:   Continuous Infusions:   PRN Meds:.•  acetaminophen    Allergies:  Allergies   Allergen Reactions   • Adhesive Tape Itching and Other (See Comments)     Patient reports skin tears    • Latex Rash   • Sulfa Antibiotics Other (See Comments)     Causes headaches         Past Social History:  Social History     Socioeconomic History   • Marital status:      Spouse name: Not on file   • Number of children: Not on file   • Years of education: Not on file   • Highest education level: Not on file   Tobacco Use   • Smoking status: Never Smoker   • Smokeless tobacco: Never Used   Substance and Sexual Activity   • Alcohol use: No   • Drug use: No   • Sexual activity: Defer       Past Family History:  Family History   Problem Relation Age of Onset   • Heart disease Sister    • Heart disease Father    • Kidney disease Neg Hx    • Malig Hyperthermia Neg Hx            Objective:   Temp:  [98.2 °F (36.8 °C)-98.7 °F (37.1 °C)] 98.2 °F (36.8 °C)  Heart Rate:  [] 87  Resp:  [16] 16  BP: ()/(43-80) 130/78   SpO2:  [87 %-98 %] 90 %  on    Device (Oxygen Therapy): room air    Intake/Output Summary (Last 24 hours) at 3/23/2020 2333  Last data filed at 3/23/2020 2012  Gross per 24 hour   Intake 100 ml   Output --   Net 100 ml     Body mass index is 40.37 kg/m².      03/23/20  1847 03/23/20 2200   Weight: 136 kg (300 lb) 124 kg (273 lb 5.9 oz)     Weight change:     Physical Exam:   Physical Exam   General:    No acute distress, alert and oriented x4, pleasant                   Head:    Normocephalic, atraumatic.   Eyes:          Conjunctivae and sclerae normal, no icterus, PERRLA    Throat:   No oral lesions, no thrush, oral mucosa moist.    Neck:   Supple, trachea midline.   Lungs:     Normal chest on inspection, CTAB, no wheezes. No rhonchi. No crackles. Respirations regular, even and unlabored.     Heart:    Regular rhythm and normal rate.  Positive soft flow murmur grade 1/6 systolic, gallops, or rubs noted.   Abdomen:     Soft, non-tender, non-distended, positive bowel sounds.    Extremities:   No clubbing, cyanosis, trace edema.     Pulses:   Pulses palpable and equal bilaterally.  Good thrill in the left upper extremity AV fistula   Skin:   No bleeding or rash.   Neuro:   Non-focal.  Moves all extremities well.    Psychiatric:   Normal mood and affect.     Lab Review:   Results from last 7 days   Lab Units 03/23/20 1910   SODIUM mmol/L 137   POTASSIUM mmol/L 7.1*   CHLORIDE mmol/L 96*   CO2 mmol/L 24.4   BUN mg/dL 76*   CREATININE mg/dL 7.33*   GLUCOSE mg/dL 181*   CALCIUM mg/dL 7.6*   AST (SGOT) U/L 26   ALT (SGPT) U/L 34   ALBUMIN g/dL 4.70     Results from last 7 days   Lab Units 03/23/20 1910   TROPONIN T ng/mL 0.055*     Results from last 7 days   Lab Units 03/23/20 1910   WBC 10*3/mm3 10.34   HEMOGLOBIN g/dL 11.8*   HEMATOCRIT % 35.4*   PLATELETS 10*3/mm3 196   MCV fL 98.3*   MCH pg 32.8   MCHC g/dL 33.3   RDW % 13.3   RDW-SD fl 47.3   MPV fL 9.5   NEUTROPHIL % % 87.1*   LYMPHOCYTE % % 5.4*   MONOCYTES % % 5.7   EOSINOPHIL % % 0.7   BASOPHIL % % 0.6   IMM GRAN % % 0.5   NEUTROS ABS 10*3/mm3 9.01*   LYMPHS ABS 10*3/mm3 0.56*   MONOS ABS 10*3/mm3 0.59   EOS ABS 10*3/mm3 0.07   BASOS ABS 10*3/mm3 0.06   IMMATURE GRANS (ABS) 10*3/mm3 0.05   NRBC /100 WBC 0.1     Results from last 7 days   Lab Units 03/23/20 1910   INR  1.77*               Invalid input(s): LDLCALC  Results from last 7 days   Lab Units 03/23/20  1910   PROBNP pg/mL 30,670.0*         Results from last 7 days   Lab Units 03/23/20  1931   PH, ARTERIAL pH units 7.249*   PCO2, ARTERIAL mm Hg 65.2*   PO2 ART mm Hg  148.3*   HCO3 ART mmol/L 28.5*                                   Imaging:  Imaging Results (Most Recent)     Procedure Component Value Units Date/Time    CT Head Without Contrast [[id number]] Collected:  03/23/20 2017     Updated:  03/23/20 2025    Narrative:       CT HEAD WO CONTRAST-     INDICATIONS: Seizure, confusion, anticoagulated     TECHNIQUE: Radiation dose reduction techniques were utilized, including  automated exposure control and exposure modulation based on body size.  Noncontrast head CT     COMPARISON: 10/07/2018     FINDINGS:           No acute intracranial hemorrhage, midline shift or mass effect. Chronic  left parafalcine calcification. No acute territorial infarct is  identified.     Mild periventricular hypodensities suggest chronic small vessel ischemic  change in a patient this age.     Arterial calcifications are seen at the base of the brain.     Ventricles, cisterns, cerebral sulci are unremarkable for patient age.     The visualized paranasal sinuses, orbits, mastoid air cells are  unremarkable.                   Impression:          No acute intracranial hemorrhage or hydrocephalus. The cause of  patient's seizure was not identified. If there is further clinical  concern, MRI could be considered for further evaluation.     This report was finalized on 3/23/2020 8:22 PM by Dr. Rah Cervantes M.D.       XR Chest 1 View [[id number]] Collected:  03/23/20 1953     Updated:  03/23/20 2019    Narrative:       EMERGENCY PORTABLE VIEW OF THE CHEST 03/23/2020     CLINICAL HISTORY: Cough, low oxygen saturations. The patient is feeling  weak and unwell.     This is correlated to PA and lateral chest x-ray from [facility] 02/27/2019.     FINDINGS: Cardiomediastinal silhouette is mildly enlarged pulmonary  vasculature is within normal limits. The lungs are clear. The  costophrenic angles are sharp.       Impression:       1. No active disease is seen in the chest with no significant  change  when compared to prior chest x-ray 02/27/2019. There is enlargement of  the cardiomediastinal silhouette and there are clips in the right and  left paratracheal region lower neck likely from prior thyroidectomy and  please correlate with surgical history.     This report was finalized on 3/23/2020 8:15 PM by Dr. Toney Hendricks M.D.             I personally viewed and interpreted the patient's imaging studies.    Assessment:     1. Hyperkalemia  2. Chronic end-stage renal disease on hemodialysis on Tuesday Thursday and Saturday  3. Seizure, with history of seizure disorder on Keppra  4. Acute hypercapnic respiratory failure likely seizure related, no previous ABGs for comparison  5. Polypharmacy with multiple respiratory suppressant including high-dose narcotics    Other relevant medical problems  6. History of asthma but no apparent exacerbation exam  7. Gout  8. Essential hypertension  9. Hypogonadism  10. Obstructive sleep apnea  11. Diastolic congestive heart failure, compensated  12. Type 2 diabetes mellitus  13. Coronary artery disease  14. Asthma, well controlled  15. Hypothyroidism  16. Nonalcoholic steatohepatitis  17. Neoplasm of the pituitary gland  18. Anemia of chronic kidney disease  19. Patient is DO NOT RESUSCITATE  20. Atrial fibrillation on chronic anticoagulation with subtherapeutic INR      Plan:     The CT of the head was negative for any obvious source of the seizure, patient will be admitted to the ICU to correct the critical hyperkalemia, dialysis orders per nephrology who were already notified.  Patient was already loaded with IV Keppra in the ER, was treated for the hyperkalemia with calcium gluconate with IV D50 with insulin IV and sodium bicarbonate.  Hemodialysis orders already entered by nephrology  Patient is on high-dose morphine, he is also on PRN Ativan and this might be responsible for his CO2 retention and respiratory acidosis and we will continue with the Ativan as needed to  avoid any withdrawal seizure but we will hold on the MS Contin and may need to consider a lower dose when resumed  We will resume anticoagulation    Dialysis is going to be delayed since there is a more critical patient with severe acidosis who was just started earlier, we will repeat the potassium level and decide any further temporary measures before dialysis can be initiated especially given the tenting of the T wave on his ECG monitor.  No antibiotic indicated since no infection is suspected  Patient will be on the IV Keppra and will transition to p.o.  Need to consult neurology for further modification on his antiepileptic regimen before discharge.  Repeat ABG in the morning    Valente with the patient and with the nursing staff        Ethan Marin MD  [facility]   03/23/20  23:33    Dictated utilizing Dragon dictation

## 2020-03-24 NOTE — PLAN OF CARE
Problem: Patient Care Overview  Goal: Plan of Care Review  Outcome: Ongoing (interventions implemented as appropriate)     Problem: Fall Risk (Adult)  Goal: Identify Related Risk Factors and Signs and Symptoms  Outcome: Ongoing (interventions implemented as appropriate)     Problem: Fall Risk (Adult)  Goal: Absence of Fall  Outcome: Ongoing (interventions implemented as appropriate)       Patient arrived to ICU from ED, admitted for witnessed seizure activity and hyperkalemia- 7.1. Emergent hemodialysis ordered and completed. Patient did not exhibit any seizure activity this shift. Patient tolerated treatment well. Patient became extremely anxious mid-shift about being admitted in the hospital and fany COVID-19. Patient educated several times. This RN as well as the dialysis RN reassured patient multiple times that all precautions are being taken. Spoke with wife briefly and she stated that the patient does become increasingly anxious with hospital admissions. PRN ativan administered. Currently sitting in chair. VSS, currently in AFib- baseline for pt. Will continue to monitor.

## 2020-03-24 NOTE — NURSING NOTE
"Patient extremely anxious about being in hospital and fany Covid 19. Patient heard someone sneeze and asked Dialysis RN if that person had Covid 19.  RN informed patient it was a staff member that sneezed and educated patient that sneezing alone doesn't indicated if a person has Covid 19.  Patient became tearful when told treatment was only removing 2L as opposed to 3-4L.  Patient stated \"oh my God, they won't let me go home today.  I have to go home today, I'm scared to death of getting this stuff\".  Dialysis RN and Primary RN reassured patient that all precautions are being taken to prevent the spread of Covid 19.    Below Hemodialysis orders completed, tolerated treatment.  Max BFR of 450 and UF Goal of 2000mL achieved without difficulty.   Cannulated x2 w/o difficulty.  Needles pulled, applied pressure with 2x2s  hemostasis achieved within 10 minutes to Left AVF.  Applied 2x2s to access and secured with tape.  Patient stable, no distress reported/observed.  Verbal report to primary RN.    Pre Vitals  Post Vitals  BP: 123/62  BP: 128/85  HR: 81  HR: 113  RR:  16   RR:  16  Weight 124.0kg  Weight:  122.0kg    Total Fluid Removed:   2000mL    BVP:  84.9    Sheron Gunn, RN  [facility]  [phone]    Duration of Treatment 4.0 Hours    Access Site AVF    Dialyzer F160     mL/min    Dialysate Temperature (C) 35.5    Use Crit-Line? No    BFR-As tolerated to a maximum of: 450 mL/min    Prime Dialyzer With NS to Priming Volume? Yes    Dialysate Solution Bath: K+ = 2 mEq, Ca = 2.5mEq    Bicarb 35 mEq    Na+ 137 meq    Fluid Removal: 2 kg as tolerated            "

## 2020-03-25 ENCOUNTER — READMISSION MANAGEMENT (OUTPATIENT)
Dept: CALL CENTER | Facility: HOSPITAL | Age: 60
End: 2020-03-25

## 2020-03-25 LAB — HBV CORE AB SER DONR QL IA: NEGATIVE

## 2020-03-25 NOTE — OUTREACH NOTE
Prep Survey      Responses   [facility] facility patient discharged from?  [place]   Is LACE score < 7 ?  No   Eligibility  Readm Mgmt   Discharge diagnosis  Hyperkalemia   Does the patient have one of the following disease processes/diagnoses(primary or secondary)?  Other   Does the patient have Home health ordered?  No   Is there a DME ordered?  No   General alerts for this patient  HD pt    Prep survey completed?  Yes          Isidra Mathews RN

## 2020-03-30 ENCOUNTER — READMISSION MANAGEMENT (OUTPATIENT)
Dept: CALL CENTER | Facility: HOSPITAL | Age: 60
End: 2020-03-30

## 2020-03-30 NOTE — OUTREACH NOTE
Medical Week 1 Survey      Responses   [facility] patient discharged from?  [place]   Does the patient have one of the following disease processes/diagnoses(primary or secondary)?  Other   Is there a successful TCM telephone encounter documented?  No   Week 1 attempt successful?  No   Unsuccessful attempts  Attempt 1          Sintia Champagne RN

## 2020-04-01 ENCOUNTER — READMISSION MANAGEMENT (OUTPATIENT)
Dept: CALL CENTER | Facility: HOSPITAL | Age: 60
End: 2020-04-01

## 2020-04-03 ENCOUNTER — READMISSION MANAGEMENT (OUTPATIENT)
Dept: CALL CENTER | Facility: HOSPITAL | Age: 60
End: 2020-04-03

## 2020-04-03 NOTE — OUTREACH NOTE
Medical Week 1 Survey      Responses   [facility] patient discharged from?  [place]   Does the patient have one of the following disease processes/diagnoses(primary or secondary)?  Other   Is there a successful TCM telephone encounter documented?  No   Week 1 attempt successful?  No   Unsuccessful attempts  Attempt 2          Estefania Do RN

## 2021-03-24 ENCOUNTER — BULK ORDERING (OUTPATIENT)
Dept: CASE MANAGEMENT | Facility: OTHER | Age: 61
End: 2021-03-24

## 2021-03-24 DIAGNOSIS — Z23 IMMUNIZATION DUE: ICD-10-CM

## 2021-06-16 ENCOUNTER — APPOINTMENT (OUTPATIENT)
Dept: WOUND CARE | Facility: HOSPITAL | Age: 61
End: 2021-06-16

## 2023-12-06 NOTE — OUTREACH NOTE
Medical Week 1 Survey      Responses   [facility] patient discharged from?  [place]   Does the patient have one of the following disease processes/diagnoses(primary or secondary)?  Other   Is there a successful TCM telephone encounter documented?  No   Week 1 attempt successful?  Yes   Call start time  0907   Rescheduled  Rescheduled-pt requested   Call end time  0908          Denia Banda RN   [phone]

## (undated) DEVICE — APPL CHLORAPREP W/TINT 26ML ORNG

## (undated) DEVICE — SPNG GZ WOVN 4X4IN 12PLY 10/BX STRL

## (undated) DEVICE — CONTAINER,SPECIMEN,OR STERILE,4OZ: Brand: MEDLINE

## (undated) DEVICE — GLV SURG BIOGEL LTX PF 6 1/2

## (undated) DEVICE — NDL HYPO PRECISIONGLIDE REG 25G 1 1/2

## (undated) DEVICE — IRRIGATOR BULB ASEPTO 60CC STRL

## (undated) DEVICE — SHEET, T, LAPAROTOMY, STERILE: Brand: MEDLINE

## (undated) DEVICE — SUT ETHLN 5/0 P3 18IN 698G

## (undated) DEVICE — SPNG DISECTOR KTNER XRAY COTN 1/4X9/16IN PK/5

## (undated) DEVICE — CULT AER/ANAEROB FASTIDIOUS BACT

## (undated) DEVICE — STPLR SKIN VISISTAT WD 35CT

## (undated) DEVICE — ANTIBACTERIAL UNDYED BRAIDED (POLYGLACTIN 910), SYNTHETIC ABSORBABLE SUTURE: Brand: COATED VICRYL

## (undated) DEVICE — 3M™ STERI-DRAPE™ INSTRUMENT POUCH 1018: Brand: STERI-DRAPE™

## (undated) DEVICE — GAUZE,SPONGE,4"X4",16PLY,XRAY,STRL,LF: Brand: MEDLINE

## (undated) DEVICE — SUT VIC 3/0 TIES 18IN J110T

## (undated) DEVICE — GLV SURG TRIUMPH CLASSIC PF LTX 7.5 STRL

## (undated) DEVICE — SUT VIC 2/0 TIES 18IN J111T

## (undated) DEVICE — SKIN PREP TRAY W/CHG: Brand: MEDLINE INDUSTRIES, INC.

## (undated) DEVICE — BANDAGE,GAUZE,BULKEE II,4.5"X4.1YD,STRL: Brand: MEDLINE

## (undated) DEVICE — SUT VIC 3/0 CT2 27IN J232H

## (undated) DEVICE — DISPOSABLE BIPOLAR FORCEPS 4" (10.2CM) JEWELERS, STRAIGHT 0.4MM TIP AND 12 FT. (3.6M) CABLE: Brand: KIRWAN

## (undated) DEVICE — ELECTRD BLD EDGE/INSUL1P 2.4X5.1MM STRL

## (undated) DEVICE — GOWN ,SIRUS,NONREINFORCED SMALL: Brand: MEDLINE

## (undated) DEVICE — BNDG ESMARK 4IN 12FT LF STRL BLU

## (undated) DEVICE — PK ORTHO MINOR 40

## (undated) DEVICE — TOWEL,OR,DSP,ST,BLUE,STD,4/PK,20PK/CS: Brand: MEDLINE

## (undated) DEVICE — TUBING, SUCTION, 1/4" X 20', STRAIGHT: Brand: MEDLINE INDUSTRIES, INC.

## (undated) DEVICE — DRSNG WND GZ CURAD OIL EMULSION 3X3IN STRL

## (undated) DEVICE — ARM SLING: Brand: DEROYAL

## (undated) DEVICE — PENCL E/S HNDSWCH ROCKR CB

## (undated) DEVICE — BNDG GZ SOF-FORM CONFRM 2X75IN LF STRL

## (undated) DEVICE — BNDG ELAS ELITE V/CLOSE 3IN 5YD LF STRL

## (undated) DEVICE — ADHS SKIN DERMABOND

## (undated) DEVICE — SUT VIC 5/0 PS2 18IN J495H

## (undated) DEVICE — DRSNG TELFA PAD NONADH STR 1S 3X4IN

## (undated) DEVICE — MAGNETIC DRAPE: Brand: DEVON

## (undated) DEVICE — PK ENT MAJ 40